# Patient Record
Sex: FEMALE | Race: WHITE | Employment: OTHER | URBAN - METROPOLITAN AREA
[De-identification: names, ages, dates, MRNs, and addresses within clinical notes are randomized per-mention and may not be internally consistent; named-entity substitution may affect disease eponyms.]

---

## 2017-01-10 PROBLEM — G89.29 CHRONIC BILATERAL LOW BACK PAIN WITH BILATERAL SCIATICA: Status: ACTIVE | Noted: 2017-01-10

## 2017-01-10 PROBLEM — M54.42 CHRONIC BILATERAL LOW BACK PAIN WITH BILATERAL SCIATICA: Status: ACTIVE | Noted: 2017-01-10

## 2017-01-10 PROBLEM — M54.41 CHRONIC BILATERAL LOW BACK PAIN WITH BILATERAL SCIATICA: Status: ACTIVE | Noted: 2017-01-10

## 2017-04-10 PROBLEM — M81.0 AGE-RELATED OSTEOPOROSIS WITHOUT CURRENT PATHOLOGICAL FRACTURE: Status: ACTIVE | Noted: 2017-04-10

## 2017-05-22 PROBLEM — M81.0 OSTEOPOROSIS WITHOUT CURRENT PATHOLOGICAL FRACTURE, UNSPECIFIED OSTEOPOROSIS TYPE: Status: ACTIVE | Noted: 2017-05-22

## 2017-05-22 PROBLEM — M81.0 AGE-RELATED OSTEOPOROSIS WITHOUT CURRENT PATHOLOGICAL FRACTURE: Status: RESOLVED | Noted: 2017-04-10 | Resolved: 2017-05-22

## 2017-05-24 PROBLEM — M43.17 SPONDYLOLISTHESIS AT L5-S1 LEVEL: Status: ACTIVE | Noted: 2017-05-24

## 2017-06-14 PROCEDURE — 81003 URINALYSIS AUTO W/O SCOPE: CPT | Performed by: INTERNAL MEDICINE

## 2017-06-15 PROCEDURE — 82607 VITAMIN B-12: CPT | Performed by: INTERNAL MEDICINE

## 2017-06-15 PROCEDURE — 87081 CULTURE SCREEN ONLY: CPT | Performed by: INTERNAL MEDICINE

## 2017-06-15 PROCEDURE — 82746 ASSAY OF FOLIC ACID SERUM: CPT | Performed by: INTERNAL MEDICINE

## 2017-06-23 ENCOUNTER — HOSPITAL ENCOUNTER (OUTPATIENT)
Facility: HOSPITAL | Age: 77
Setting detail: HOSPITAL OUTPATIENT SURGERY
Discharge: HOME | End: 2017-06-23
Attending: INTERNAL MEDICINE | Admitting: INTERNAL MEDICINE
Payer: MEDICARE

## 2017-06-23 ENCOUNTER — SURGERY (OUTPATIENT)
Age: 77
End: 2017-06-23

## 2017-06-23 VITALS
HEIGHT: 57 IN | SYSTOLIC BLOOD PRESSURE: 117 MMHG | DIASTOLIC BLOOD PRESSURE: 91 MMHG | RESPIRATION RATE: 17 BRPM | WEIGHT: 120 LBS | BODY MASS INDEX: 25.89 KG/M2 | HEART RATE: 82 BPM | OXYGEN SATURATION: 97 %

## 2017-06-23 PROCEDURE — 88305 TISSUE EXAM BY PATHOLOGIST: CPT | Performed by: INTERNAL MEDICINE

## 2017-06-23 PROCEDURE — 88312 SPECIAL STAINS GROUP 1: CPT | Performed by: INTERNAL MEDICINE

## 2017-06-23 PROCEDURE — 0DJD8ZZ INSPECTION OF LOWER INTESTINAL TRACT, VIA NATURAL OR ARTIFICIAL OPENING ENDOSCOPIC: ICD-10-PCS | Performed by: INTERNAL MEDICINE

## 2017-06-23 PROCEDURE — 0DB68ZX EXCISION OF STOMACH, VIA NATURAL OR ARTIFICIAL OPENING ENDOSCOPIC, DIAGNOSTIC: ICD-10-PCS | Performed by: INTERNAL MEDICINE

## 2017-06-23 PROCEDURE — 0DB98ZX EXCISION OF DUODENUM, VIA NATURAL OR ARTIFICIAL OPENING ENDOSCOPIC, DIAGNOSTIC: ICD-10-PCS | Performed by: INTERNAL MEDICINE

## 2017-06-23 RX ORDER — MIDAZOLAM HYDROCHLORIDE 1 MG/ML
INJECTION INTRAMUSCULAR; INTRAVENOUS
Status: DISCONTINUED | OUTPATIENT
Start: 2017-06-23 | End: 2017-06-23

## 2017-06-23 NOTE — OPERATIVE REPORT
SURGICAL CENTER [facility] OPERATIVE REPORT   PATIENT NAME: Branden Cota  MRN: [id number]  DATE OF OPERATION: 6/23/2017    PREOPERATIVE DIAGNOSIS FOR EGD  1. Iron deficiency anemia      PREOPERATIVE DIAGNOSIS FOR COLONOSCOPY:   1.     Iron defic the incisors. The esophagogastric junction was patent. There were no endoscopic features of eosinophilic esophagitis or Su's esophagus. 2. Mild patchy erythema was found in the antrum and body. No ulcers were noted.   Biopsies of the antrum and body evaluation.       Phil Padilla MD  Cc: Gail Bullock MD

## 2017-06-23 NOTE — H&P
GI PRE-OP H&P    CC: iron deficiency anemia    HPI:  Salvatore Current is a 68year old female who is here for EGD, colonoscopy for above indications    Past Medical History   Diagnosis Date   • Malignant neoplasm of breast (female), unspecified site 3/01 hejna    CHOLECYSTECTOMY      APPENDECTOMY         No current outpatient prescriptions on file.   Allergies As of Date: 06/20/2017  Allergen                          Noted       Reaction  CEPHALOSPORINS                    09/03/2008  Rash  PERFUMES

## 2017-08-04 PROBLEM — D50.9 IRON DEFICIENCY ANEMIA, UNSPECIFIED IRON DEFICIENCY ANEMIA TYPE: Status: ACTIVE | Noted: 2017-08-04

## 2017-08-22 PROBLEM — I49.3 PVC (PREMATURE VENTRICULAR CONTRACTION): Status: ACTIVE | Noted: 2017-08-22

## 2017-08-22 PROCEDURE — 81003 URINALYSIS AUTO W/O SCOPE: CPT | Performed by: INTERNAL MEDICINE

## 2017-08-22 PROCEDURE — 87081 CULTURE SCREEN ONLY: CPT | Performed by: INTERNAL MEDICINE

## 2017-09-06 ENCOUNTER — LAB ENCOUNTER (OUTPATIENT)
Dept: LAB | Facility: HOSPITAL | Age: 77
End: 2017-09-06
Attending: ORTHOPAEDIC SURGERY
Payer: MEDICARE

## 2017-09-06 DIAGNOSIS — Z01.818 PREOPERATIVE TESTING: ICD-10-CM

## 2017-09-06 LAB
ANTIBODY SCREEN: NEGATIVE
RH BLOOD TYPE: POSITIVE

## 2017-09-06 PROCEDURE — 86850 RBC ANTIBODY SCREEN: CPT

## 2017-09-06 PROCEDURE — 36415 COLL VENOUS BLD VENIPUNCTURE: CPT

## 2017-09-06 PROCEDURE — 86901 BLOOD TYPING SEROLOGIC RH(D): CPT

## 2017-09-06 PROCEDURE — 86900 BLOOD TYPING SEROLOGIC ABO: CPT

## 2017-09-12 ENCOUNTER — SURGERY (OUTPATIENT)
Age: 77
End: 2017-09-12

## 2017-09-12 ENCOUNTER — HOSPITAL ENCOUNTER (INPATIENT)
Facility: HOSPITAL | Age: 77
LOS: 1 days | Discharge: HOME | DRG: 460 | End: 2017-09-13
Attending: ORTHOPAEDIC SURGERY | Admitting: ORTHOPAEDIC SURGERY
Payer: MEDICARE

## 2017-09-12 ENCOUNTER — APPOINTMENT (OUTPATIENT)
Dept: GENERAL RADIOLOGY | Facility: HOSPITAL | Age: 77
DRG: 460 | End: 2017-09-12
Attending: ORTHOPAEDIC SURGERY
Payer: MEDICARE

## 2017-09-12 ENCOUNTER — ANESTHESIA EVENT (OUTPATIENT)
Dept: SURGERY | Facility: HOSPITAL | Age: 77
DRG: 460 | End: 2017-09-12
Payer: MEDICARE

## 2017-09-12 ENCOUNTER — ANESTHESIA (OUTPATIENT)
Dept: SURGERY | Facility: HOSPITAL | Age: 77
DRG: 460 | End: 2017-09-12
Payer: MEDICARE

## 2017-09-12 DIAGNOSIS — M43.17 SPONDYLOLISTHESIS AT L5-S1 LEVEL: ICD-10-CM

## 2017-09-12 DIAGNOSIS — M54.16 SPINAL STENOSIS OF LUMBAR REGION WITH RADICULOPATHY: Primary | ICD-10-CM

## 2017-09-12 DIAGNOSIS — Z01.818 PREOPERATIVE TESTING: ICD-10-CM

## 2017-09-12 DIAGNOSIS — M48.061 SPINAL STENOSIS OF LUMBAR REGION WITH RADICULOPATHY: Primary | ICD-10-CM

## 2017-09-12 PROBLEM — M43.16 SPONDYLOLISTHESIS, LUMBAR REGION: Status: ACTIVE | Noted: 2017-09-12

## 2017-09-12 LAB
ERYTHROCYTE [DISTWIDTH] IN BLOOD BY AUTOMATED COUNT: 18.7 % (ref 11–15)
HCT VFR BLD AUTO: 33.6 % (ref 35–48)
HGB BLD-MCNC: 10.9 G/DL (ref 12–16)
MCH RBC QN AUTO: 28.2 PG (ref 27–32)
MCHC RBC AUTO-ENTMCNC: 32.5 G/DL (ref 32–37)
MCV RBC AUTO: 86.6 FL (ref 80–100)
PLATELET # BLD AUTO: 263 K/UL (ref 140–400)
PMV BLD AUTO: 7.5 FL (ref 7.4–10.3)
RBC # BLD AUTO: 3.88 M/UL (ref 3.7–5.4)
WBC # BLD AUTO: 9.6 K/UL (ref 4–11)

## 2017-09-12 PROCEDURE — 72100 X-RAY EXAM L-S SPINE 2/3 VWS: CPT | Performed by: ORTHOPAEDIC SURGERY

## 2017-09-12 PROCEDURE — 76001 XR C-ARM FLUORO >1 HOUR  (CPT=76001): CPT | Performed by: ORTHOPAEDIC SURGERY

## 2017-09-12 PROCEDURE — 95938 SOMATOSENSORY TESTING: CPT | Performed by: ORTHOPAEDIC SURGERY

## 2017-09-12 PROCEDURE — 85027 COMPLETE CBC AUTOMATED: CPT | Performed by: PHYSICIAN ASSISTANT

## 2017-09-12 PROCEDURE — 0SB20ZZ EXCISION OF LUMBAR VERTEBRAL DISC, OPEN APPROACH: ICD-10-PCS | Performed by: ORTHOPAEDIC SURGERY

## 2017-09-12 PROCEDURE — 0SG30A1 FUSION OF LUMBOSACRAL JOINT WITH INTERBODY FUSION DEVICE, POSTERIOR APPROACH, POSTERIOR COLUMN, OPEN APPROACH: ICD-10-PCS | Performed by: ORTHOPAEDIC SURGERY

## 2017-09-12 PROCEDURE — 95937 NEUROMUSCULAR JUNCTION TEST: CPT | Performed by: ORTHOPAEDIC SURGERY

## 2017-09-12 RX ORDER — ONDANSETRON 2 MG/ML
INJECTION INTRAMUSCULAR; INTRAVENOUS AS NEEDED
Status: DISCONTINUED | OUTPATIENT
Start: 2017-09-12 | End: 2017-09-12 | Stop reason: SURG

## 2017-09-12 RX ORDER — ROCURONIUM BROMIDE 10 MG/ML
INJECTION, SOLUTION INTRAVENOUS AS NEEDED
Status: DISCONTINUED | OUTPATIENT
Start: 2017-09-12 | End: 2017-09-12 | Stop reason: SURG

## 2017-09-12 RX ORDER — HYDROMORPHONE HYDROCHLORIDE 1 MG/ML
0.6 INJECTION, SOLUTION INTRAMUSCULAR; INTRAVENOUS; SUBCUTANEOUS EVERY 5 MIN PRN
Status: DISCONTINUED | OUTPATIENT
Start: 2017-09-12 | End: 2017-09-12 | Stop reason: HOSPADM

## 2017-09-12 RX ORDER — ONDANSETRON 2 MG/ML
4 INJECTION INTRAMUSCULAR; INTRAVENOUS EVERY 4 HOURS PRN
Status: DISCONTINUED | OUTPATIENT
Start: 2017-09-12 | End: 2017-09-13

## 2017-09-12 RX ORDER — HALOPERIDOL 5 MG/ML
0.25 INJECTION INTRAMUSCULAR ONCE AS NEEDED
Status: DISCONTINUED | OUTPATIENT
Start: 2017-09-12 | End: 2017-09-12 | Stop reason: HOSPADM

## 2017-09-12 RX ORDER — ACETAMINOPHEN 500 MG
1000 TABLET ORAL ONCE
Status: COMPLETED | OUTPATIENT
Start: 2017-09-12 | End: 2017-09-12

## 2017-09-12 RX ORDER — SODIUM CHLORIDE 9 MG/ML
INJECTION, SOLUTION INTRAVENOUS CONTINUOUS PRN
Status: DISCONTINUED | OUTPATIENT
Start: 2017-09-12 | End: 2017-09-12 | Stop reason: SURG

## 2017-09-12 RX ORDER — MELATONIN
325
Status: DISCONTINUED | OUTPATIENT
Start: 2017-09-13 | End: 2017-09-13

## 2017-09-12 RX ORDER — SODIUM CHLORIDE, SODIUM LACTATE, POTASSIUM CHLORIDE, CALCIUM CHLORIDE 600; 310; 30; 20 MG/100ML; MG/100ML; MG/100ML; MG/100ML
INJECTION, SOLUTION INTRAVENOUS CONTINUOUS
Status: DISCONTINUED | OUTPATIENT
Start: 2017-09-12 | End: 2017-09-13

## 2017-09-12 RX ORDER — ONDANSETRON 2 MG/ML
4 INJECTION INTRAMUSCULAR; INTRAVENOUS ONCE AS NEEDED
Status: DISCONTINUED | OUTPATIENT
Start: 2017-09-12 | End: 2017-09-12 | Stop reason: HOSPADM

## 2017-09-12 RX ORDER — HYDROCODONE BITARTRATE AND ACETAMINOPHEN 5; 325 MG/1; MG/1
2 TABLET ORAL AS NEEDED
Status: DISCONTINUED | OUTPATIENT
Start: 2017-09-12 | End: 2017-09-12 | Stop reason: HOSPADM

## 2017-09-12 RX ORDER — POLYETHYLENE GLYCOL 3350 17 G/17G
17 POWDER, FOR SOLUTION ORAL DAILY PRN
Status: DISCONTINUED | OUTPATIENT
Start: 2017-09-12 | End: 2017-09-13

## 2017-09-12 RX ORDER — PHENYLEPHRINE HCL 10 MG/ML
VIAL (ML) INJECTION AS NEEDED
Status: DISCONTINUED | OUTPATIENT
Start: 2017-09-12 | End: 2017-09-12 | Stop reason: SURG

## 2017-09-12 RX ORDER — SENNOSIDES 8.6 MG
17.2 TABLET ORAL NIGHTLY
Status: DISCONTINUED | OUTPATIENT
Start: 2017-09-12 | End: 2017-09-13

## 2017-09-12 RX ORDER — ACETAMINOPHEN 325 MG/1
650 TABLET ORAL EVERY 4 HOURS PRN
Status: DISCONTINUED | OUTPATIENT
Start: 2017-09-12 | End: 2017-09-13

## 2017-09-12 RX ORDER — MAGNESIUM HYDROXIDE 1200 MG/15ML
LIQUID ORAL CONTINUOUS PRN
Status: DISCONTINUED | OUTPATIENT
Start: 2017-09-12 | End: 2017-09-12

## 2017-09-12 RX ORDER — SODIUM PHOSPHATE, DIBASIC AND SODIUM PHOSPHATE, MONOBASIC 7; 19 G/133ML; G/133ML
1 ENEMA RECTAL ONCE AS NEEDED
Status: DISCONTINUED | OUTPATIENT
Start: 2017-09-12 | End: 2017-09-13

## 2017-09-12 RX ORDER — SCOLOPAMINE TRANSDERMAL SYSTEM 1 MG/1
1 PATCH, EXTENDED RELEASE TRANSDERMAL ONCE
Status: DISCONTINUED | OUTPATIENT
Start: 2017-09-12 | End: 2017-09-12

## 2017-09-12 RX ORDER — MORPHINE SULFATE 4 MG/ML
4 INJECTION, SOLUTION INTRAMUSCULAR; INTRAVENOUS EVERY 10 MIN PRN
Status: DISCONTINUED | OUTPATIENT
Start: 2017-09-12 | End: 2017-09-12 | Stop reason: HOSPADM

## 2017-09-12 RX ORDER — EPHEDRINE SULFATE 50 MG/ML
INJECTION, SOLUTION INTRAVENOUS AS NEEDED
Status: DISCONTINUED | OUTPATIENT
Start: 2017-09-12 | End: 2017-09-12 | Stop reason: SURG

## 2017-09-12 RX ORDER — TIZANIDINE 2 MG/1
2 TABLET ORAL ONCE
Status: COMPLETED | OUTPATIENT
Start: 2017-09-12 | End: 2017-09-12

## 2017-09-12 RX ORDER — DEXAMETHASONE SODIUM PHOSPHATE 4 MG/ML
VIAL (ML) INJECTION AS NEEDED
Status: DISCONTINUED | OUTPATIENT
Start: 2017-09-12 | End: 2017-09-12 | Stop reason: SURG

## 2017-09-12 RX ORDER — MORPHINE SULFATE 10 MG/ML
6 INJECTION, SOLUTION INTRAMUSCULAR; INTRAVENOUS EVERY 10 MIN PRN
Status: DISCONTINUED | OUTPATIENT
Start: 2017-09-12 | End: 2017-09-12 | Stop reason: HOSPADM

## 2017-09-12 RX ORDER — DIPHENHYDRAMINE HCL 25 MG
25 CAPSULE ORAL EVERY 4 HOURS PRN
Status: DISCONTINUED | OUTPATIENT
Start: 2017-09-12 | End: 2017-09-13

## 2017-09-12 RX ORDER — NALOXONE HYDROCHLORIDE 0.4 MG/ML
80 INJECTION, SOLUTION INTRAMUSCULAR; INTRAVENOUS; SUBCUTANEOUS AS NEEDED
Status: DISCONTINUED | OUTPATIENT
Start: 2017-09-12 | End: 2017-09-12 | Stop reason: HOSPADM

## 2017-09-12 RX ORDER — HYDROMORPHONE HYDROCHLORIDE 1 MG/ML
0.4 INJECTION, SOLUTION INTRAMUSCULAR; INTRAVENOUS; SUBCUTANEOUS EVERY 5 MIN PRN
Status: DISCONTINUED | OUTPATIENT
Start: 2017-09-12 | End: 2017-09-12 | Stop reason: HOSPADM

## 2017-09-12 RX ORDER — MIDAZOLAM HYDROCHLORIDE 1 MG/ML
INJECTION INTRAMUSCULAR; INTRAVENOUS AS NEEDED
Status: DISCONTINUED | OUTPATIENT
Start: 2017-09-12 | End: 2017-09-12 | Stop reason: SURG

## 2017-09-12 RX ORDER — DOCUSATE SODIUM 100 MG/1
100 CAPSULE, LIQUID FILLED ORAL 2 TIMES DAILY
Status: DISCONTINUED | OUTPATIENT
Start: 2017-09-12 | End: 2017-09-13

## 2017-09-12 RX ORDER — DIPHENHYDRAMINE HYDROCHLORIDE 50 MG/ML
25 INJECTION INTRAMUSCULAR; INTRAVENOUS EVERY 4 HOURS PRN
Status: DISCONTINUED | OUTPATIENT
Start: 2017-09-12 | End: 2017-09-13

## 2017-09-12 RX ORDER — FAMOTIDINE 20 MG/1
20 TABLET ORAL ONCE
Status: DISCONTINUED | OUTPATIENT
Start: 2017-09-12 | End: 2017-09-12 | Stop reason: HOSPADM

## 2017-09-12 RX ORDER — HALOBETASOL PROPIONATE 0.05 %
OINTMENT (GRAM) TOPICAL 2 TIMES DAILY
Status: DISCONTINUED | OUTPATIENT
Start: 2017-09-13 | End: 2017-09-13

## 2017-09-12 RX ORDER — SUCCINYLCHOLINE CHLORIDE 20 MG/ML
INJECTION INTRAMUSCULAR; INTRAVENOUS AS NEEDED
Status: DISCONTINUED | OUTPATIENT
Start: 2017-09-12 | End: 2017-09-12 | Stop reason: SURG

## 2017-09-12 RX ORDER — HYDROCODONE BITARTRATE AND ACETAMINOPHEN 10; 325 MG/1; MG/1
1 TABLET ORAL EVERY 4 HOURS PRN
Status: DISCONTINUED | OUTPATIENT
Start: 2017-09-12 | End: 2017-09-13

## 2017-09-12 RX ORDER — ZOLPIDEM TARTRATE 5 MG/1
5 TABLET ORAL NIGHTLY PRN
Status: DISCONTINUED | OUTPATIENT
Start: 2017-09-12 | End: 2017-09-13

## 2017-09-12 RX ORDER — DIAZEPAM 5 MG/1
5 TABLET ORAL EVERY 6 HOURS PRN
Status: DISCONTINUED | OUTPATIENT
Start: 2017-09-12 | End: 2017-09-13

## 2017-09-12 RX ORDER — CELECOXIB 100 MG/1
100 CAPSULE ORAL ONCE
Status: COMPLETED | OUTPATIENT
Start: 2017-09-12 | End: 2017-09-12

## 2017-09-12 RX ORDER — HYDROMORPHONE HYDROCHLORIDE 1 MG/ML
0.2 INJECTION, SOLUTION INTRAMUSCULAR; INTRAVENOUS; SUBCUTANEOUS EVERY 5 MIN PRN
Status: DISCONTINUED | OUTPATIENT
Start: 2017-09-12 | End: 2017-09-12 | Stop reason: HOSPADM

## 2017-09-12 RX ORDER — FAMOTIDINE 40 MG/1
40 TABLET, FILM COATED ORAL 2 TIMES DAILY
Status: DISCONTINUED | OUTPATIENT
Start: 2017-09-12 | End: 2017-09-13

## 2017-09-12 RX ORDER — METOCLOPRAMIDE HYDROCHLORIDE 5 MG/ML
10 INJECTION INTRAMUSCULAR; INTRAVENOUS EVERY 6 HOURS PRN
Status: DISCONTINUED | OUTPATIENT
Start: 2017-09-12 | End: 2017-09-13

## 2017-09-12 RX ORDER — BISACODYL 10 MG
10 SUPPOSITORY, RECTAL RECTAL
Status: DISCONTINUED | OUTPATIENT
Start: 2017-09-12 | End: 2017-09-13

## 2017-09-12 RX ORDER — HYDROMORPHONE HYDROCHLORIDE 1 MG/ML
0.2 INJECTION, SOLUTION INTRAMUSCULAR; INTRAVENOUS; SUBCUTANEOUS EVERY 2 HOUR PRN
Status: DISCONTINUED | OUTPATIENT
Start: 2017-09-12 | End: 2017-09-13

## 2017-09-12 RX ORDER — GLYCOPYRROLATE 0.2 MG/ML
INJECTION INTRAMUSCULAR; INTRAVENOUS AS NEEDED
Status: DISCONTINUED | OUTPATIENT
Start: 2017-09-12 | End: 2017-09-12 | Stop reason: SURG

## 2017-09-12 RX ORDER — GABAPENTIN 300 MG/1
600 CAPSULE ORAL ONCE
Status: COMPLETED | OUTPATIENT
Start: 2017-09-12 | End: 2017-09-12

## 2017-09-12 RX ORDER — HYDROCODONE BITARTRATE AND ACETAMINOPHEN 5; 325 MG/1; MG/1
1 TABLET ORAL AS NEEDED
Status: DISCONTINUED | OUTPATIENT
Start: 2017-09-12 | End: 2017-09-12 | Stop reason: HOSPADM

## 2017-09-12 RX ORDER — HYDROCODONE BITARTRATE AND ACETAMINOPHEN 10; 325 MG/1; MG/1
2 TABLET ORAL EVERY 6 HOURS PRN
Status: DISCONTINUED | OUTPATIENT
Start: 2017-09-12 | End: 2017-09-13

## 2017-09-12 RX ORDER — METOCLOPRAMIDE 10 MG/1
10 TABLET ORAL ONCE
Status: DISCONTINUED | OUTPATIENT
Start: 2017-09-12 | End: 2017-09-12 | Stop reason: HOSPADM

## 2017-09-12 RX ORDER — ACETAMINOPHEN 325 MG/1
650 TABLET ORAL ONCE
Status: DISCONTINUED | OUTPATIENT
Start: 2017-09-12 | End: 2017-09-12 | Stop reason: HOSPADM

## 2017-09-12 RX ORDER — MORPHINE SULFATE 2 MG/ML
2 INJECTION, SOLUTION INTRAMUSCULAR; INTRAVENOUS EVERY 10 MIN PRN
Status: DISCONTINUED | OUTPATIENT
Start: 2017-09-12 | End: 2017-09-12 | Stop reason: HOSPADM

## 2017-09-12 RX ORDER — BUPIVACAINE HYDROCHLORIDE 2.5 MG/ML
INJECTION, SOLUTION EPIDURAL; INFILTRATION; INTRACAUDAL AS NEEDED
Status: DISCONTINUED | OUTPATIENT
Start: 2017-09-12 | End: 2017-09-12 | Stop reason: HOSPADM

## 2017-09-12 RX ORDER — HYDROMORPHONE HYDROCHLORIDE 1 MG/ML
0.4 INJECTION, SOLUTION INTRAMUSCULAR; INTRAVENOUS; SUBCUTANEOUS EVERY 2 HOUR PRN
Status: DISCONTINUED | OUTPATIENT
Start: 2017-09-12 | End: 2017-09-13

## 2017-09-12 RX ORDER — LIDOCAINE HYDROCHLORIDE 10 MG/ML
INJECTION, SOLUTION EPIDURAL; INFILTRATION; INTRACAUDAL; PERINEURAL AS NEEDED
Status: DISCONTINUED | OUTPATIENT
Start: 2017-09-12 | End: 2017-09-12 | Stop reason: SURG

## 2017-09-12 RX ORDER — LISINOPRIL AND HYDROCHLOROTHIAZIDE 12.5; 1 MG/1; MG/1
1 TABLET ORAL
Status: DISCONTINUED | OUTPATIENT
Start: 2017-09-13 | End: 2017-09-12

## 2017-09-12 RX ORDER — OXYCODONE HYDROCHLORIDE 5 MG/1
10 TABLET ORAL ONCE
Status: COMPLETED | OUTPATIENT
Start: 2017-09-12 | End: 2017-09-12

## 2017-09-12 RX ORDER — HYDROMORPHONE HYDROCHLORIDE 1 MG/ML
0.8 INJECTION, SOLUTION INTRAMUSCULAR; INTRAVENOUS; SUBCUTANEOUS EVERY 2 HOUR PRN
Status: DISCONTINUED | OUTPATIENT
Start: 2017-09-12 | End: 2017-09-13

## 2017-09-12 RX ADMIN — SODIUM CHLORIDE, SODIUM LACTATE, POTASSIUM CHLORIDE, CALCIUM CHLORIDE: 600; 310; 30; 20 INJECTION, SOLUTION INTRAVENOUS at 14:28:00

## 2017-09-12 RX ADMIN — SODIUM CHLORIDE, SODIUM LACTATE, POTASSIUM CHLORIDE, CALCIUM CHLORIDE: 600; 310; 30; 20 INJECTION, SOLUTION INTRAVENOUS at 17:50:00

## 2017-09-12 RX ADMIN — SODIUM CHLORIDE: 9 INJECTION, SOLUTION INTRAVENOUS at 17:45:00

## 2017-09-12 RX ADMIN — SUCCINYLCHOLINE CHLORIDE 100 MG: 20 INJECTION INTRAMUSCULAR; INTRAVENOUS at 14:29:00

## 2017-09-12 RX ADMIN — EPHEDRINE SULFATE 10 MG: 50 INJECTION, SOLUTION INTRAVENOUS at 15:03:00

## 2017-09-12 RX ADMIN — SODIUM CHLORIDE: 9 INJECTION, SOLUTION INTRAVENOUS at 14:39:00

## 2017-09-12 RX ADMIN — ONDANSETRON 4 MG: 2 INJECTION INTRAMUSCULAR; INTRAVENOUS at 15:16:00

## 2017-09-12 RX ADMIN — ROCURONIUM BROMIDE 5 MG: 10 INJECTION, SOLUTION INTRAVENOUS at 14:29:00

## 2017-09-12 RX ADMIN — DEXAMETHASONE SODIUM PHOSPHATE 4 MG: 4 MG/ML VIAL (ML) INJECTION at 15:16:00

## 2017-09-12 RX ADMIN — LIDOCAINE HYDROCHLORIDE 50 MG: 10 INJECTION, SOLUTION EPIDURAL; INFILTRATION; INTRACAUDAL; PERINEURAL at 14:30:00

## 2017-09-12 RX ADMIN — PHENYLEPHRINE HCL 100 MCG: 10 MG/ML VIAL (ML) INJECTION at 15:45:00

## 2017-09-12 RX ADMIN — GLYCOPYRROLATE 0.2 MG: 0.2 INJECTION INTRAMUSCULAR; INTRAVENOUS at 14:29:00

## 2017-09-12 RX ADMIN — MIDAZOLAM HYDROCHLORIDE 1 MG: 1 INJECTION INTRAMUSCULAR; INTRAVENOUS at 14:29:00

## 2017-09-12 NOTE — INTERVAL H&P NOTE
Pre-op Diagnosis: Spondylolisthesis, spinal stenosis without neurogenic claudication, lumbar radiculopathy    The above referenced H&P was reviewed by Reina Anderson PA-C on 9/12/2017, the patient was examined and no significant changes have occurred in th

## 2017-09-12 NOTE — BRIEF OP NOTE
Pre-Operative Diagnosis: Spondylolisthesis, spinal stenosis without neurogenic claudication, lumbar radiculopathy     Post-Operative Diagnosis: Spondylolisthesis, spinal stenosis without neurogenic claudication, lumbar radiculopathy     Procedure Perfor

## 2017-09-12 NOTE — OPERATIVE REPORT
OPERATIVE REPORT   Torres Moreno  [id number]    9/12/2017    YOB: 1940 [id number]      PATIENT'S NAME:  Pao Pérez   ATTENDING PHYSICIAN:  Jesús Baker M.D.     OPERATING PHYSICIAN:  Jesús Baker M.D.      [facility] PROCEDURE: Patient's back was marked in the holding area. Patient was then brought back to the operating room where once under general anesthesia, Bernardo catheter and SCD boots were placed along with neuromonitoring leads.  Patient was then placed in the pro proceeded to use downgoing curettes and side going curettes to clear up the disc off the endplates and placed Actifuse bone graft into the disc space, and placed an 9 x 28 x 8mm cage, which was filled with allograft and local autograft into the disc space. My physician assistant was essential in performing the procedure for retraction and assistance throughout the procedure, from positioning through wound closure.      Enrique Hsu MD

## 2017-09-12 NOTE — H&P (VIEW-ONLY)
The patient is 68year old female  CC: Patient presents with:  Pre-Op Exam: Back surgery on 9/12/17 at [facility] by Dr. Susan Alexander. Check on the anemia and blood work.      HPI:    Patient is referred by Dr Susan Alexander for scheduled surgery named spinal fusion • Esophageal reflux    • Exposure to unspecified radiation     completed in 2005   • FHx: colon cancer     maternal grandfather   • High blood pressure    • High cholesterol    • History of psoriasis    • Insomnia 11/20/2014   • Iron deficiency anemia, u REPLACEMENT Left  6/16: TOTAL KNEE REPLACEMENT Right      Comment: martin [employer]    Current Outpatient Prescriptions on File Prior to Visit:  omeprazole 20 MG Oral Capsule Delayed Release Take 1 capsule (20 mg total) by mouth every morning.  Disp: 90 ca 11/15/2011  12/04/2012      HIGH DOSE FLU 65 YRS AND OLDER PRSV FREE SINGLE D (93598) FLU CLINIC                          11/25/2016      Influenza             09/01/2010 01/11/2014      Influenza Vaccine, High Dose, Preserv Free Position: Sitting, Cuff Size: adult)   Pulse 80   Wt 123 lb (55.8 kg)   Breastfeeding?  No   BMI 26.62 kg/m²    125/60    GENERAL: Well developed, well nourished white female, in no apparent distress   SKIN: no rashes, no suspicious lesions;normal temperatu PROTEIN      6.1 - 8.3 g/dL 7.2    Albumin      3.5 - 4.8 g/dL 3.5    Total Bilirubin      0.10 - 2.00 mg/dL 0.25    ALKALINE PHOSPHATASE      55 - 142 U/L 43 (L)    AST (SGOT)      15 - 41 U/L 16    ALT (SGPT)      14 - 54 U/L 14    GFR CKD-EPI      >=60. around the surgery. Stop mvi, vitamin supplements, otc herbals, nsaids, aspirin one week before surgery.   The patient has been instructed to hold the following medications one week before the surgery: vit d/nsaids  The patient has been instructed to h creams  Creams were prescribed  - Urea (X-VIATE) 40 % External Cream; APPLY DAILY  - Halobetasol Propionate (ULTRAVATE) 0.05 % External Ointment; APPLY EXTERNALLY DAILY AS DIRECTED    Osteoporosis  Check bmd  followup with endocrinology    Hypothyroidism, hours as needed for Pain. Celecoxib CeleBREX 200 MG 9/5 Take 1 capsule (200 mg total) by mouth every 12 (twelve) hours. Cholecalciferol VITAMIN D3 1000 UNITS  9/5 Take 1,000 Units by mouth daily.         DiphenhydrAMINE HCl BENADRYL 25 MG 9/11

## 2017-09-12 NOTE — ANESTHESIA PREPROCEDURE EVALUATION
Anesthesia PreOp Note    HPI:     Anali Harris is a 68year old female who presents for preoperative consultation requested by: Karlene Mccarty MD    Date of Surgery: 9/12/2017    Procedure(s):  POSTERIOR LUMBAR LAMINECT SPINAL FUS W/INSTR 1 RUSH Armendariz rhinitis, cause unspecified    • Back problem with left sided sciatica   • Depression 03/1992   • Esophageal reflux    • Exposure to unspecified radiation     completed in 2005   • High blood pressure    • High cholesterol    • History of blood transfusion REPLACEMENT Left  6/16: TOTAL KNEE REPLACEMENT Right      Comment: martin Fay      Prescriptions Prior to Admission:  docusate sodium 100 MG Oral Cap Take 1 capsule (100 mg total) by mouth 2 (two) times daily.  Disp: 60 capsule Rfl: 0 9/11/2017 at 21 months. Disp: 1 mL Rfl: prn Unknown at Unknown time   acetaminophen 500 MG Oral Tab Take 1-2 tablets (500-1,000 mg total) by mouth every 6 (six) hours as needed for Pain.  Disp: 50 tablet Rfl: 11 Unknown at Unknown time   Urea (X-VIATE) 40 % External Cream Available pre-op labs reviewed.     Lab Results  Component Value Date   WBC 4.37 08/18/2017   RBC 4.11 08/18/2017   HGB 10.9 (L) 08/18/2017   HCT 36.3 08/18/2017   MCV 88.3 08/18/2017   MCH 26.5 (L) 08/18/2017   MCHC 30.0 (L) 08/18/2017   RDW 18.4 (H) position included not limited blindness      I have informed Olegario Harman  of the nature of the anesthetic plan, benefits, risks, major complications, and any alternative forms of anesthetic management.    All of the patient's questions were answered to

## 2017-09-13 ENCOUNTER — APPOINTMENT (OUTPATIENT)
Dept: GENERAL RADIOLOGY | Facility: HOSPITAL | Age: 77
DRG: 460 | End: 2017-09-13
Attending: PHYSICIAN ASSISTANT
Payer: MEDICARE

## 2017-09-13 VITALS
BODY MASS INDEX: 25.67 KG/M2 | TEMPERATURE: 99 F | DIASTOLIC BLOOD PRESSURE: 51 MMHG | OXYGEN SATURATION: 97 % | WEIGHT: 119 LBS | HEART RATE: 88 BPM | HEIGHT: 57 IN | SYSTOLIC BLOOD PRESSURE: 106 MMHG | RESPIRATION RATE: 16 BRPM

## 2017-09-13 LAB
ANION GAP SERPL CALC-SCNC: 12 MMOL/L (ref 0–18)
BUN SERPL-MCNC: 15 MG/DL (ref 8–20)
BUN/CREAT SERPL: 28.8 (ref 10–20)
CALCIUM SERPL-MCNC: 8.4 MG/DL (ref 8.5–10.5)
CHLORIDE SERPL-SCNC: 106 MMOL/L (ref 95–110)
CO2 SERPL-SCNC: 20 MMOL/L (ref 22–32)
CREAT SERPL-MCNC: 0.52 MG/DL (ref 0.5–1.5)
ERYTHROCYTE [DISTWIDTH] IN BLOOD BY AUTOMATED COUNT: 18.6 % (ref 11–15)
GLUCOSE SERPL-MCNC: 89 MG/DL (ref 70–99)
HCT VFR BLD AUTO: 29.5 % (ref 35–48)
HGB BLD-MCNC: 9.5 G/DL (ref 12–16)
MCH RBC QN AUTO: 28 PG (ref 27–32)
MCHC RBC AUTO-ENTMCNC: 32.3 G/DL (ref 32–37)
MCV RBC AUTO: 86.8 FL (ref 80–100)
OSMOLALITY UR CALC.SUM OF ELEC: 286 MOSM/KG (ref 275–295)
PLATELET # BLD AUTO: 272 K/UL (ref 140–400)
PMV BLD AUTO: 7.4 FL (ref 7.4–10.3)
POTASSIUM SERPL-SCNC: 4.1 MMOL/L (ref 3.3–5.1)
RBC # BLD AUTO: 3.4 M/UL (ref 3.7–5.4)
SODIUM SERPL-SCNC: 138 MMOL/L (ref 136–144)
WBC # BLD AUTO: 6.8 K/UL (ref 4–11)

## 2017-09-13 PROCEDURE — 97161 PT EVAL LOW COMPLEX 20 MIN: CPT

## 2017-09-13 PROCEDURE — 97116 GAIT TRAINING THERAPY: CPT

## 2017-09-13 PROCEDURE — 85027 COMPLETE CBC AUTOMATED: CPT | Performed by: PHYSICIAN ASSISTANT

## 2017-09-13 PROCEDURE — 72100 X-RAY EXAM L-S SPINE 2/3 VWS: CPT | Performed by: PHYSICIAN ASSISTANT

## 2017-09-13 PROCEDURE — 80048 BASIC METABOLIC PNL TOTAL CA: CPT | Performed by: PHYSICIAN ASSISTANT

## 2017-09-13 PROCEDURE — 97535 SELF CARE MNGMENT TRAINING: CPT

## 2017-09-13 PROCEDURE — 97530 THERAPEUTIC ACTIVITIES: CPT

## 2017-09-13 PROCEDURE — 97165 OT EVAL LOW COMPLEX 30 MIN: CPT

## 2017-09-13 RX ORDER — LEVOTHYROXINE SODIUM 0.05 MG/1
50 TABLET ORAL
Status: DISCONTINUED | OUTPATIENT
Start: 2017-09-13 | End: 2017-09-13

## 2017-09-13 NOTE — OCCUPATIONAL THERAPY NOTE
OCCUPATIONAL THERAPY QUICK EVALUATION - INPATIENT    Room Number: 426/426-A  Evaluation Date: 9/13/2017     Type of Evaluation: Initial       Physician Order: IP Consult to Occupational Therapy  Reason for Therapy:  ADL/IADL Dysfunction and Discharge Plann EGD      Comment: gastritis, gastric polyps, hiatal hernia,                biopseis neg for HP or celiac  1964 both bone fx arm: FRACTURE SURGERY Right  1964 femur with metal: FRACTURE SURGERY Left  1964: SKIN TISSUE PROCEDURE UNLISTED      Comment: after meals?: None    AM-PAC Score:  Score: 24  Approx Degree of Impairment: 0%  Standardized Score (AM-PAC Scale): 57.54  CMS Modifier (G-Code): CH    FUNCTIONAL TRANSFER ASSESSMENT  Supine to Sit : Modified independent (w/ log roll)  Sit to Stand: Modified ind her . Pt has no add'l OT needs at this time, d/c OT.            OT Discharge Recommendations: Home  OT Device Recommendations: Shower chair;Sock aid;Long-handled shoehorn;Long-handled sponge    PLAN   Patient has been evaluated and presents with no

## 2017-09-13 NOTE — PHYSICAL THERAPY NOTE
PHYSICAL THERAPY TREATMENT NOTE - INPATIENT    Room Number: 426/426-A       Presenting Problem: Spondylolisthesis s/p L5-S1 laminectomy and TLIF    Problem List  Active Problems:    Spondylolisthesis, lumbar region      ASSESSMENT   WIL Cruz approved partic shortness of breath    AM-PAC '6-Clicks' INPATIENT SHORT FORM - BASIC MOBILITY  How much difficulty does the patient currently have. ..  -   Turning over in bed (including adjusting bedclothes, sheets and blankets)?: None   -   Sitting down on and standing home activity/exercise instructions provided to patient in preparation for discharge.    Goal #5   Current Status In progress   Goal #6    Goal #6  Current Status

## 2017-09-13 NOTE — H&P
[facility] Hospitalist H&P       CC: Back pain     PCP: Terence Cartagena MD    History of Present Illness: Patient is a 68year old female with PMH sig for HTN, Hypothyroidism, spinal stenosis, arthritis, who presented for lumbar fusion.  Patient is currently [facility] [employer]  06/2017: EGD      Comment: gastritis, gastric polyps, hiatal hernia,                biopseis neg for HP or celiac  1964 both bone fx arm: FRACTURE SURGERY Right  1964 femur with metal: FRACTURE SURGERY Left  1964: SKIN TISSUE PROCEDURE UNLISTED      C DiphenhydrAMINE HCl (BENADRYL) 25 MG Oral Cap Take 25 mg by mouth nightly as needed for Itching.  Disp:  Rfl:          Soc Hx     Smoking status: Former Smoker  1.00 Packs/day  For 15.00 Years     Types: Cigarettes    Quit date: 1/2/1970    Smokeless toba TPROT    No results for input(s): TROP in the last 72 hours. Radiology: Xr Lumbar Spine (min 2 Views) (cpt=72100)    Result Date: 9/13/2017  CONCLUSION:  1. Status post posterior L5-S1 spinal fusion.          Xr Lumbar Spine (min 2 Views) (cpt=72100

## 2017-09-13 NOTE — DISCHARGE PLANNING
CALIN met with the pt. At bedside. The pt. Lives with her  in a 2 story home with the bedrooms on the 2nd level. The pt. Reports being independent prior to admission with adls, ambulation and driving. The pt's  also drives. The pt.  Has 4 chi

## 2017-09-13 NOTE — DISCHARGE SUMMARY
General Medicine Discharge Summary     Patient ID:  Gilles Noel  68year old  10/26/1940    Admit date: 9/12/2017    Discharge date and time: 9/13/17    Attending Physician: Pamela Mooney MD     Consults: IP CONSULT TO HOSPITALIST  IP CONSULT TO SOCIAL fluoroscopy time only. Images were not obtained. Please see operative report and prior imaging studies for further details.              Disposition: home    Activity: activity as tolerated  Diet: regular diet  Wound Care: as directed  Code Status: Full Cod Sleep.        STOP taking these medications    celecoxib 200 MG Caps  Commonly known as:  CeleBREX            FU  Follow-up Information     Humberto Valerio PA-C In 10 days.     Specialty:  Physician Assistant  Why:  For wound re-check  Contact information: should be walking 1-2 miles per day by 4 weeks.   NOTE: If you need to lift or  an object (less than 10 lbs) from the floor, squat with your knees bent, do not bend at your waist.    Limitations  -No driving until cleared by MD, however you may be a plastic bag and then wrap pack or bag in a towel before you use it. You may also need to use pain medicine. If you do need pain medicine, take the medicine you were prescribed as directed.  Do not increase the pain medicine dosage without first contacting y protect your health. 3. Call your pharmacy early in the day. This gives your doctor time to review your records. To ensure you get the right medications, we do not rush refills without making sure the order and your record are reviewed.  Often, physicians outlined    Thank Taya Thompson M.D.  [facility] Hospitalist  [place]

## 2017-09-13 NOTE — PAYOR COMM NOTE
[facility] #[id number]   Admission Info: Inpatient (Adm: 09/12/17)   Hospital Account: [de-identified]   Description: 68year old F Primary Service: Surgical Unit Info: Uzair [id number] [facility]   Admission Orders     ADMIT TO INPATIENT [[id number]]     Electronically s 9/12/2017 1503 Given 10 mg Intravenous Lylia Edilia BENJAMIN CRNA      famotidine (PEPCID) tab     Date Action Dose Route User    9/13/2017 0757 Given 40 mg Oral Dennise Juárez, RN      fentaNYL citrate (SUBLIMAZE) 0.05 MG/ML injection 50 mcg     Date Acti HYDROmorphone HCl PF (DILAUDID) 1 MG/ML injection 0.4 mg     Date Action Dose Route User    9/13/2017 0044 Given 0.4 mg Intravenous Trevor Art RN      lactated ringers infusion     Date Action Dose Route User    9/12/2017 1426 New Bag (none) Kassandra Date Action Dose Route User    9/12/2017 1429 Given 5 mg Intravenous Jorge BENJAMIN CRNA      scopolamine (TRANSDERM-SCOP) 1.5 mg patch     Date Action Dose Route User    9/12/2017 1226 Patch applied 1 patch Transdermal (Behind Right Ear) [place]asha Diaz : Elle Jaramillo PA-C (Physician Assistant)   Pre-op Diagnosis: Spondylolisthesis, spinal stenosis without neurogenic claudication, lumbar radiculopathy     The above referenced H&P was reviewed by Da Dsouza PA-C on 9/12/2017, the patient was e which is the equivalent of climbing a flight of stairs, walking up a hill, do heavy housework, dance, or walk on level ground at 4 miles per hour  Does aerobics at [facility]  No cp  No sob     The patient has had surgery before and tolerated anesthesia.   With • Vitamin D deficiency 2015      Past Surgical History:  No date: APPENDECTOMY  No date:       Comment: x4  2017: CAPSULE      Comment: gastritis, small erosion in duodenum, likely                from previous biopsy, normal small bowel Lisinopril-Hydrochlorothiazide 10-12.5 MG Oral Tab Take 1 tablet by mouth once daily. Disp: 90 tablet Rfl: 3   denosumab 60 MG/ML Subcutaneous Solution Inject 60 mg sub-q every 6 months.  Disp: 1 mL Rfl: prn   LEVOTHYROXINE SODIUM 50 MCG Oral Tab TAKE 1 TAB Family history:                  Relation Status Comments               Fa  at age 68 CHF, MI's   Mo  at age 80 old age//CHF/heart block had      pacer          Orin Alive obese   Son Alive     Sis Alive breast cancer, asthma   Bro Alive kid ABDOMEN: normal active BS+, soft, nondistended; no HSM; no masses;nontender   PSYCHIATRIC: alert and oriented x 3; affect appropriate;intact judgement   Knees from   Pulses 2+ and equal PT     Pertinent Data:  Lab:   Component      Latest Ref Rng & Units 8 10.0 - 291.0 ng/mL   31.0   APTT      24.0 - 33.7 sec 22.4 (L)           Cxr: CHEST AP AND LATERAL VIEWS     COMPARISON: None     HISTORY: Encounter for other preprocedural examination     FINDINGS: Cardiac silhouette is within normal limits.  Pulmonary We discussed the role of anticoagulation to prevent DVT and the risk of bleeding or bruising-NOT AFTER SPINAL SURGERY-SCD'S AND WALKING  We discussed the use of incentive spirometry to prevent pneumonia.   We discussed the use of narcotics with patient-cont - Vitamin D3 (VITAMIN D3) 1000 UNITS Oral Tab;  Take 1,000 Units by mouth daily.     S/P subtotal parathyroidectomy  followup with endocrinology regarding calcium and bone density  Lytes stable         Insomnia, unspecified insomnia  - Zolpidem Tartrate (AM   Halobetasol Propionate ULTRAVATE 0.05 % 9/11 APPLY EXTERNALLY DAILY AS DIRECTED           Hydrocodone-Acetaminophen NORCO 5-325 MG 9/11 Take 1-2 tablets by mouth every 4 (four) hours as needed.           Ibuprofen (Discontinued) Ibuprofen 200 MG 9/5 Take posterior segmental spinal instrumentation L5 and S1 level,   morselized local autograft,   cancellous allograft,  intraoperative microscope use.      ASSISTANT: Jerry Baez PA-C     ANESTHESIA: General.   ESTIMATED BLOOD LOSS: 200 mL.    COMPLICATIONS: N PROCEDURE: Patient's back was marked in the holding area. Patient was then brought back to the operating room where once under general anesthesia, Bernardo catheter and SCD boots were placed along with neuromonitoring leads.  Patient was then placed in the pro proceeded to use downgoing curettes and side going curettes to clear up the disc off the endplates and placed Actifuse bone graft into the disc space, and placed an 9 x 28 x 8mm cage, which was filled with allograft and local autograft into the disc space. My physician assistant was essential in performing the procedure for retraction and assistance throughout the procedure, from positioning through wound closure.      Frandy Villareal MD

## 2017-09-13 NOTE — PROGRESS NOTES
S: Ms. Terrell Sparrow is doing well today. She notes low back discomfort, managed at this time with pain medication. She denies leg pain, but notes numbness/tingling in her right upper thigh, which she denies having previously.  She as not worked with PT/OT yet toda

## 2017-09-13 NOTE — PHYSICAL THERAPY NOTE
PHYSICAL THERAPY EVALUATION - INPATIENT     Room Number: 426/426-A  Evaluation Date: 9/13/2017  Type of Evaluation: Initial  Physician Order: PT Eval and Treat    Presenting Problem: Spondylolisthesis s/p L5-S1 laminectomy and TLIF  Reason for Therapy: Medical History  Past Medical History:   Diagnosis Date   • Acute cystitis with hematuria 8/22/2016   • Allergic rhinitis, cause unspecified    • Back problem with left sided sciatica   • Depression 03/1992   • Esophageal reflux    • Exposure to unspecifie lumpectomy  age 21: SPECIAL SERVICE OR REPORT      Comment: intussecption  5/2015 subtotal parathyroidectomy: THYROIDECTOMY  2011: TOTAL KNEE REPLACEMENT Left  6/16: TOTAL KNEE REPLACEMENT Right      Comment: martin Castro of  of the bed?: A Little   How much help from another person does the patient currently need. ..   -   Moving to and from a bed to a chair (including a wheelchair)?: A Little   -   Need to walk in hospital room?: A Little   -   Climbing 3-5 steps with a railin

## 2017-09-20 NOTE — PAYOR COMM NOTE
PLEASE FAX APPROVED DAYS BACK--------------  DISCHARGE REVIEW    Payor: [facility] [address] #:  [id number]  Authorization Number: [id number]    Admit date: 9/12/17  Admit time:  1914  Discharge Date: 9/13/2017  3:11 PM     Admitting Physi 9.9 oral iron continued, fu with PCP  - fu with surgery     HTN   - controlled  - continue home meds     Hypothyroidism  - continue home meds       Operative Procedures: Procedure(s) (LRB):  POSTERIOR LUMBAR LAMINECT SPINAL FUS W/INSTR 1 LEV (N/A)     Imag MG Tabs     Halobetasol Propionate 0.05 % Oint  Commonly known as:  ULTRAVATE  APPLY EXTERNALLY DAILY AS DIRECTED     Levothyroxine Sodium 50 MCG Tabs  Commonly known as:  SYNTHROID  TAKE 1 TABLET BY MOUTH DAILY     Lisinopril-Hydrochlorothiazide 10-12.5 M after surgery and hot tubs and swimming pools for at least 6 weeks. 4. Sleep either on your back, stomach or side. You may use pillows for support placed behind your back or between your legs.   5. It is important to begin a walking program as soon as you incision.  -Clear Drainage from your incision.  -Increase in drainage from the incision. Pain Management  It is not unusual after surgery, during the healing process to experience occasional pain, numbness, tingling or weakness in you back or legs.  If y delay in getting your prescription refilled  1. Contact your pharmacy at least 5 days before your prescription requires refilling. To protect your health, pharmacies will accept refill orders only from your doctor.  So, when you call [facility] with your request a of days. If you run out early, it may mean you are experiencing some difficulty with your dosage or medication. Call [facility] immediately and explain your difficulty. If your prescription is lost, misplaced or stolen:     To protect your health, lost or stolen p

## 2017-09-26 PROBLEM — Z98.890 S/P LUMBAR SPINE OPERATION: Status: ACTIVE | Noted: 2017-09-26

## 2018-01-31 PROBLEM — M43.16 SPONDYLOLISTHESIS, LUMBAR REGION: Status: RESOLVED | Noted: 2017-09-12 | Resolved: 2018-01-31

## 2018-01-31 PROBLEM — G89.29 CHRONIC BILATERAL LOW BACK PAIN WITH BILATERAL SCIATICA: Status: RESOLVED | Noted: 2017-01-10 | Resolved: 2018-01-31

## 2018-01-31 PROBLEM — M54.41 CHRONIC BILATERAL LOW BACK PAIN WITH BILATERAL SCIATICA: Status: RESOLVED | Noted: 2017-01-10 | Resolved: 2018-01-31

## 2018-01-31 PROBLEM — M54.42 CHRONIC BILATERAL LOW BACK PAIN WITH BILATERAL SCIATICA: Status: RESOLVED | Noted: 2017-01-10 | Resolved: 2018-01-31

## 2018-05-16 PROBLEM — M81.0 OSTEOPOROSIS WITHOUT CURRENT PATHOLOGICAL FRACTURE: Status: ACTIVE | Noted: 2017-05-22

## 2018-07-31 PROBLEM — M81.8 OTHER OSTEOPOROSIS WITHOUT CURRENT PATHOLOGICAL FRACTURE: Status: ACTIVE | Noted: 2017-05-22

## 2019-07-20 ENCOUNTER — HOSPITAL ENCOUNTER (EMERGENCY)
Facility: HOSPITAL | Age: 79
Discharge: HOME | End: 2019-07-20
Attending: EMERGENCY MEDICINE
Payer: MEDICARE

## 2019-07-20 ENCOUNTER — APPOINTMENT (OUTPATIENT)
Dept: GENERAL RADIOLOGY | Facility: HOSPITAL | Age: 79
End: 2019-07-20
Attending: EMERGENCY MEDICINE
Payer: MEDICARE

## 2019-07-20 ENCOUNTER — APPOINTMENT (OUTPATIENT)
Dept: CT IMAGING | Facility: HOSPITAL | Age: 79
End: 2019-07-20
Attending: EMERGENCY MEDICINE
Payer: MEDICARE

## 2019-07-20 VITALS
RESPIRATION RATE: 19 BRPM | WEIGHT: 115 LBS | BODY MASS INDEX: 24.81 KG/M2 | HEART RATE: 89 BPM | SYSTOLIC BLOOD PRESSURE: 125 MMHG | TEMPERATURE: 98 F | DIASTOLIC BLOOD PRESSURE: 71 MMHG | OXYGEN SATURATION: 97 % | HEIGHT: 57 IN

## 2019-07-20 DIAGNOSIS — R07.9 CHEST PAIN OF UNCERTAIN ETIOLOGY: Primary | ICD-10-CM

## 2019-07-20 LAB
ANION GAP SERPL CALC-SCNC: 8 MMOL/L (ref 0–18)
BASOPHILS # BLD AUTO: 0.02 X10(3) UL (ref 0–0.2)
BASOPHILS NFR BLD AUTO: 0.2 %
BUN BLD-MCNC: 15 MG/DL (ref 7–18)
BUN/CREAT SERPL: 18.1 (ref 10–20)
CALCIUM BLD-MCNC: 9.1 MG/DL (ref 8.5–10.1)
CHLORIDE SERPL-SCNC: 106 MMOL/L (ref 98–112)
CO2 SERPL-SCNC: 29 MMOL/L (ref 21–32)
CREAT BLD-MCNC: 0.83 MG/DL (ref 0.55–1.02)
D DIMER PPP FEU-MCNC: 0.8 UG/ML FEU (ref ?–0.78)
DEPRECATED RDW RBC AUTO: 49 FL (ref 35.1–46.3)
EOSINOPHIL # BLD AUTO: 0.08 X10(3) UL (ref 0–0.7)
EOSINOPHIL NFR BLD AUTO: 0.9 %
ERYTHROCYTE [DISTWIDTH] IN BLOOD BY AUTOMATED COUNT: 14.6 % (ref 11–15)
GLUCOSE BLD-MCNC: 217 MG/DL (ref 70–99)
HCT VFR BLD AUTO: 33.6 % (ref 35–48)
HGB BLD-MCNC: 10.7 G/DL (ref 12–16)
IMM GRANULOCYTES # BLD AUTO: 0.03 X10(3) UL (ref 0–1)
IMM GRANULOCYTES NFR BLD: 0.3 %
LYMPHOCYTES # BLD AUTO: 0.63 X10(3) UL (ref 1–4)
LYMPHOCYTES NFR BLD AUTO: 6.9 %
MCH RBC QN AUTO: 29.2 PG (ref 26–34)
MCHC RBC AUTO-ENTMCNC: 31.8 G/DL (ref 31–37)
MCV RBC AUTO: 91.6 FL (ref 80–100)
MONOCYTES # BLD AUTO: 0.71 X10(3) UL (ref 0.1–1)
MONOCYTES NFR BLD AUTO: 7.8 %
NEUTROPHILS # BLD AUTO: 7.61 X10 (3) UL (ref 1.5–7.7)
NEUTROPHILS # BLD AUTO: 7.61 X10(3) UL (ref 1.5–7.7)
NEUTROPHILS NFR BLD AUTO: 83.9 %
OSMOLALITY SERPL CALC.SUM OF ELEC: 303 MOSM/KG (ref 275–295)
PLATELET # BLD AUTO: 276 10(3)UL (ref 150–450)
POTASSIUM SERPL-SCNC: 3.9 MMOL/L (ref 3.5–5.1)
RBC # BLD AUTO: 3.67 X10(6)UL (ref 3.8–5.3)
SODIUM SERPL-SCNC: 143 MMOL/L (ref 136–145)
TROPONIN I SERPL-MCNC: <0.045 NG/ML (ref ?–0.04)
WBC # BLD AUTO: 9.1 X10(3) UL (ref 4–11)

## 2019-07-20 PROCEDURE — 93010 ELECTROCARDIOGRAM REPORT: CPT | Performed by: EMERGENCY MEDICINE

## 2019-07-20 PROCEDURE — 80048 BASIC METABOLIC PNL TOTAL CA: CPT | Performed by: EMERGENCY MEDICINE

## 2019-07-20 PROCEDURE — 99284 EMERGENCY DEPT VISIT MOD MDM: CPT

## 2019-07-20 PROCEDURE — 84484 ASSAY OF TROPONIN QUANT: CPT | Performed by: EMERGENCY MEDICINE

## 2019-07-20 PROCEDURE — 71045 X-RAY EXAM CHEST 1 VIEW: CPT | Performed by: EMERGENCY MEDICINE

## 2019-07-20 PROCEDURE — 93005 ELECTROCARDIOGRAM TRACING: CPT

## 2019-07-20 PROCEDURE — 71260 CT THORAX DX C+: CPT | Performed by: EMERGENCY MEDICINE

## 2019-07-20 PROCEDURE — 36415 COLL VENOUS BLD VENIPUNCTURE: CPT

## 2019-07-20 PROCEDURE — 85025 COMPLETE CBC W/AUTO DIFF WBC: CPT | Performed by: EMERGENCY MEDICINE

## 2019-07-20 PROCEDURE — 85379 FIBRIN DEGRADATION QUANT: CPT | Performed by: EMERGENCY MEDICINE

## 2019-07-20 NOTE — ED NOTES
DISCHARGE INSTRUCTIONS GIVEN TO PATIENT. VERBALIZED UNDERSTANDING. NO DISTRESS.   PATIENT LEFT ED AMBULATORY STEADY GAIT WITH SPOUSE

## 2019-07-20 NOTE — ED PROVIDER NOTES
Patient Seen in: [facility] AND [facility] Emergency Department    History   Patient presents with:  Chest Pain Angina (cardiovascular)    Stated Complaint:     HPI    75-year-old female with history of hyperlipidemia, hypertension, previous breast cancer, here polyps, hiatal hernia, biopseis neg for HP or celiac   • EGD  06/23/2017   • ESOPHAGOGASTRODUODENOSCOPY (EGD) N/A 6/23/2017    Performed by Matt Scott MD at [address] [place] [address] Right 1964 both bone fx arm   • FRACTURE SURGERY Left 1 dysuria, flank pain and frequency. Musculoskeletal: Negative for back pain. Skin: Negative for rash. Neurological: Negative for weakness, light-headedness and headaches. All other systems reviewed and are negative.       Positive for stated complain Report. Rate: 113  Rhythm: Sinus Rhythm  Reading: abnormal for rate, normal for rhythm, no acute ST changes      Cardiac Monitor:    Patient placed on the cardiac monitor and a rhythm strip obtained with the indication of chest pian.   Monitor shows regula Absolute 0.02 0.00 - 0.20 x10(3) uL    Immature Granulocyte Absolute 0.03 0.00 - 1.00 x10(3) uL    Neutrophil % 83.9 %    Lymphocyte % 6.9 %    Monocyte % 7.8 %    Eosinophil % 0.9 %    Basophil % 0.2 %    Immature Granulocyte % 0.3 %       Imaging Results 7 hours and troponin is negative - she does not wish to stay for 2hr troponin - she understands risks/benefits and would like to go home - return precautions discussed    The patient was informed of their elevated blood pressure reading in the Emergency [place] List

## 2019-07-20 NOTE — ED INITIAL ASSESSMENT (HPI)
C/o midsternal chest pressure xtoday, +recent flight from [place].  States pain radiates to left arm and jaw

## 2020-06-17 PROBLEM — E11.9 NEW ONSET TYPE 2 DIABETES MELLITUS (HCC): Status: ACTIVE | Noted: 2020-06-17

## 2020-06-17 PROBLEM — E53.8 B12 DEFICIENCY: Status: ACTIVE | Noted: 2020-06-17

## 2020-09-18 PROBLEM — R73.03 PREDIABETES: Status: ACTIVE | Noted: 2020-09-18

## 2021-05-02 PROBLEM — D64.9 ANEMIA, UNSPECIFIED TYPE: Status: ACTIVE | Noted: 2021-05-02

## 2021-05-06 PROBLEM — E11.9 NEW ONSET TYPE 2 DIABETES MELLITUS (HCC): Status: RESOLVED | Noted: 2020-06-17 | Resolved: 2021-05-06

## 2021-05-06 PROBLEM — E11.65 CONTROLLED TYPE 2 DIABETES MELLITUS WITH HYPERGLYCEMIA, WITHOUT LONG-TERM CURRENT USE OF INSULIN (HCC): Status: ACTIVE | Noted: 2021-05-06

## 2021-05-08 PROBLEM — R73.03 PREDIABETES: Status: RESOLVED | Noted: 2020-09-18 | Resolved: 2021-05-08

## 2021-07-07 PROBLEM — M54.32 BACK PAIN WITH LEFT-SIDED SCIATICA: Status: ACTIVE | Noted: 2021-07-07

## 2021-08-18 PROBLEM — M54.16 LUMBAR RADICULOPATHY: Status: ACTIVE | Noted: 2021-08-18

## 2021-08-18 PROBLEM — Z98.1 HISTORY OF LUMBAR FUSION: Status: ACTIVE | Noted: 2017-09-26

## 2021-09-06 PROBLEM — E11.22 CKD STAGE 3 SECONDARY TO DIABETES (HCC): Status: ACTIVE | Noted: 2021-09-06

## 2021-09-06 PROBLEM — N18.30 CKD STAGE 3 SECONDARY TO DIABETES (HCC): Status: ACTIVE | Noted: 2021-09-06

## 2021-09-15 PROBLEM — M43.16 SPONDYLOLISTHESIS OF LUMBAR REGION: Status: ACTIVE | Noted: 2017-09-12

## 2021-10-15 PROBLEM — M51.26 HNP (HERNIATED NUCLEUS PULPOSUS), LUMBAR: Status: ACTIVE | Noted: 2021-10-15

## 2021-11-05 ENCOUNTER — HOSPITAL ENCOUNTER (EMERGENCY)
Facility: HOSPITAL | Age: 81
Discharge: HOME | End: 2021-11-05
Attending: EMERGENCY MEDICINE
Payer: MEDICARE

## 2021-11-05 ENCOUNTER — APPOINTMENT (OUTPATIENT)
Dept: GENERAL RADIOLOGY | Facility: HOSPITAL | Age: 81
End: 2021-11-05
Attending: EMERGENCY MEDICINE
Payer: MEDICARE

## 2021-11-05 ENCOUNTER — APPOINTMENT (OUTPATIENT)
Dept: CT IMAGING | Facility: HOSPITAL | Age: 81
End: 2021-11-05
Attending: EMERGENCY MEDICINE
Payer: MEDICARE

## 2021-11-05 VITALS
HEIGHT: 57 IN | RESPIRATION RATE: 18 BRPM | HEART RATE: 93 BPM | SYSTOLIC BLOOD PRESSURE: 166 MMHG | OXYGEN SATURATION: 96 % | WEIGHT: 106 LBS | BODY MASS INDEX: 22.87 KG/M2 | TEMPERATURE: 97 F | DIASTOLIC BLOOD PRESSURE: 76 MMHG

## 2021-11-05 DIAGNOSIS — M54.89 BACK PAIN WITHOUT SCIATICA: Primary | ICD-10-CM

## 2021-11-05 PROCEDURE — 72100 X-RAY EXAM L-S SPINE 2/3 VWS: CPT | Performed by: EMERGENCY MEDICINE

## 2021-11-05 PROCEDURE — 99284 EMERGENCY DEPT VISIT MOD MDM: CPT

## 2021-11-05 PROCEDURE — 72131 CT LUMBAR SPINE W/O DYE: CPT | Performed by: EMERGENCY MEDICINE

## 2021-11-05 RX ORDER — CYCLOBENZAPRINE HCL 10 MG
10 TABLET ORAL 3 TIMES DAILY PRN
Qty: 20 TABLET | Refills: 0 | Status: SHIPPED | OUTPATIENT
Start: 2021-11-05 | End: 2021-11-12

## 2021-11-05 RX ORDER — METHYLPREDNISOLONE 4 MG/1
TABLET ORAL
Qty: 1 EACH | Refills: 0 | Status: SHIPPED | OUTPATIENT
Start: 2021-11-05 | End: 2021-11-05

## 2021-11-05 RX ORDER — CYCLOBENZAPRINE HCL 10 MG
10 TABLET ORAL 3 TIMES DAILY PRN
Qty: 20 TABLET | Refills: 0 | Status: SHIPPED | OUTPATIENT
Start: 2021-11-05 | End: 2021-11-05

## 2021-11-05 RX ORDER — METHYLPREDNISOLONE 4 MG/1
TABLET ORAL
Qty: 1 EACH | Refills: 0 | Status: SHIPPED | OUTPATIENT
Start: 2021-11-05 | End: 2021-11-15 | Stop reason: ALTCHOICE

## 2021-11-05 RX ORDER — HYDROCODONE BITARTRATE AND ACETAMINOPHEN 5; 325 MG/1; MG/1
1 TABLET ORAL EVERY 6 HOURS PRN
Qty: 10 TABLET | Refills: 0 | Status: SHIPPED | OUTPATIENT
Start: 2021-11-05 | End: 2021-11-05

## 2021-11-05 RX ORDER — HYDROCODONE BITARTRATE AND ACETAMINOPHEN 5; 325 MG/1; MG/1
1 TABLET ORAL EVERY 6 HOURS PRN
Qty: 10 TABLET | Refills: 0 | Status: SHIPPED | OUTPATIENT
Start: 2021-11-05 | End: 2021-11-15

## 2021-11-05 RX ORDER — CYCLOBENZAPRINE HCL 10 MG
10 TABLET ORAL ONCE
Status: COMPLETED | OUTPATIENT
Start: 2021-11-05 | End: 2021-11-05

## 2021-11-05 NOTE — ED PROVIDER NOTES
Patient Seen in: [facility] AND [facility] Emergency Department      History   Patient presents with:  Back Pain    Stated Complaint: Severe back pain    Subjective:   HPI    26-year-old female with previous back surgery, here with complaints of worsening back p polyp, no repeat needed   • COLONOSCOPY N/A 6/23/2017    diverticulosis, int hemorrhoids, no further screening needed   • COLONOSCOPY  06/23/2017    done for anemia   • COLONOSCOPY,BIOPSY N/A 7/30/2015    Procedure: COLONOSCOPY, POSSIBLE BIOPSY, POSSIBLE P systems reviewed and are negative. Positive for stated complaint: Severe back pain  Other systems are as noted in HPI. Constitutional and vital signs reviewed. All other systems reviewed and negative except as noted above.     Physical Exam     E pedicle screw, and left L4 inferior corner extending into the defect. Abnormal lucency around the left L4 pedicle screw compatible with motion. 2. Multilevel advanced lumbar degenerative disc disease with levoscoliosis. 3.  L4-5 posterior and interbody fus obtaining the patient's history, performing the physical exam and reviewing the diagnostics, multiple initial diagnoses were considered based on the presenting problem including fracture, contusion, hardware failure.                              Disposition

## 2021-11-18 PROBLEM — J43.8 OTHER EMPHYSEMA (HCC): Status: ACTIVE | Noted: 2021-11-18

## 2021-12-12 NOTE — PLAN OF CARE
DISCHARGE PLANNING    • Discharge to home or other facility with appropriate resources Adequate for Discharge        GENITOURINARY - ADULT    • Absence of urinary retention Adequate for Discharge        MUSCULOSKELETAL - ADULT    • Return mobility to safes
(1) Outpatient Area

## 2022-01-19 PROBLEM — R60.9 PERIPHERAL EDEMA: Status: ACTIVE | Noted: 2022-01-19

## 2022-01-19 PROBLEM — R60.0 PERIPHERAL EDEMA: Status: ACTIVE | Noted: 2022-01-19

## 2022-02-23 NOTE — ANESTHESIA POSTPROCEDURE EVALUATION
Patient:  [address]    Procedure Summary     Date:  09/12/17 Room / Location:  [address] [place] [address] MAIN OR 10 / 300 [facility] MAIN OR    Anesthesia Start:  6127 Anesthesia Stop:  2538    Procedure:  POSTERIOR LUMBAR LAMINECT SPINAL FUS W/INSTR 1 LEV (N/A ) Diagnosis:  (Spondyl - Abilify 10 mg PO bedtime - Abilify 10 mg PO bedtime

## 2022-03-02 PROBLEM — E11.65 CONTROLLED TYPE 2 DIABETES MELLITUS WITH HYPERGLYCEMIA, WITHOUT LONG-TERM CURRENT USE OF INSULIN (HCC): Status: RESOLVED | Noted: 2021-05-06 | Resolved: 2022-03-02

## 2022-08-18 ENCOUNTER — HOSPITAL ENCOUNTER (INPATIENT)
Facility: HOSPITAL | Age: 82
LOS: 1 days | Discharge: HOME | End: 2022-08-20
Attending: STUDENT IN AN ORGANIZED HEALTH CARE EDUCATION/TRAINING PROGRAM | Admitting: STUDENT IN AN ORGANIZED HEALTH CARE EDUCATION/TRAINING PROGRAM
Payer: MEDICARE

## 2022-08-18 ENCOUNTER — APPOINTMENT (OUTPATIENT)
Dept: GENERAL RADIOLOGY | Facility: HOSPITAL | Age: 82
End: 2022-08-18
Attending: STUDENT IN AN ORGANIZED HEALTH CARE EDUCATION/TRAINING PROGRAM
Payer: MEDICARE

## 2022-08-18 DIAGNOSIS — J18.9 COMMUNITY ACQUIRED PNEUMONIA OF LEFT LUNG, UNSPECIFIED PART OF LUNG: Primary | ICD-10-CM

## 2022-08-18 LAB
ALBUMIN SERPL-MCNC: 3 G/DL (ref 3.4–5)
ALP LIVER SERPL-CCNC: 32 U/L
ALT SERPL-CCNC: 18 U/L
ANION GAP SERPL CALC-SCNC: 10 MMOL/L (ref 0–18)
APTT PPP: 25 SECONDS (ref 23.3–35.6)
AST SERPL-CCNC: 11 U/L (ref 15–37)
BASOPHILS # BLD AUTO: 0.02 X10(3) UL (ref 0–0.2)
BASOPHILS NFR BLD AUTO: 0.2 %
BILIRUB DIRECT SERPL-MCNC: <0.1 MG/DL (ref 0–0.2)
BILIRUB SERPL-MCNC: 0.2 MG/DL (ref 0.1–2)
BUN BLD-MCNC: 33 MG/DL (ref 7–18)
BUN/CREAT SERPL: 39.3 (ref 10–20)
CALCIUM BLD-MCNC: 9.7 MG/DL (ref 8.5–10.1)
CHLORIDE SERPL-SCNC: 107 MMOL/L (ref 98–112)
CO2 SERPL-SCNC: 23 MMOL/L (ref 21–32)
CREAT BLD-MCNC: 0.84 MG/DL
D DIMER PPP FEU-MCNC: 0.51 UG/ML FEU (ref ?–0.81)
DEPRECATED RDW RBC AUTO: 49.9 FL (ref 35.1–46.3)
EOSINOPHIL # BLD AUTO: 0.15 X10(3) UL (ref 0–0.7)
EOSINOPHIL NFR BLD AUTO: 1.7 %
ERYTHROCYTE [DISTWIDTH] IN BLOOD BY AUTOMATED COUNT: 14 % (ref 11–15)
GFR SERPLBLD BASED ON 1.73 SQ M-ARVRAT: 70 ML/MIN/1.73M2 (ref 60–?)
GLUCOSE BLD-MCNC: 131 MG/DL (ref 70–99)
HCT VFR BLD AUTO: 32.4 %
HGB BLD-MCNC: 10.2 G/DL
IMM GRANULOCYTES # BLD AUTO: 0.08 X10(3) UL (ref 0–1)
IMM GRANULOCYTES NFR BLD: 0.9 %
INR BLD: 1.02 (ref 0.85–1.16)
LYMPHOCYTES # BLD AUTO: 0.94 X10(3) UL (ref 1–4)
LYMPHOCYTES NFR BLD AUTO: 10.4 %
MCH RBC QN AUTO: 30.8 PG (ref 26–34)
MCHC RBC AUTO-ENTMCNC: 31.5 G/DL (ref 31–37)
MCV RBC AUTO: 97.9 FL
MONOCYTES # BLD AUTO: 0.41 X10(3) UL (ref 0.1–1)
MONOCYTES NFR BLD AUTO: 4.6 %
NEUTROPHILS # BLD AUTO: 7.4 X10 (3) UL (ref 1.5–7.7)
NEUTROPHILS # BLD AUTO: 7.4 X10(3) UL (ref 1.5–7.7)
NEUTROPHILS NFR BLD AUTO: 82.2 %
NT-PROBNP SERPL-MCNC: 134 PG/ML (ref ?–450)
OSMOLALITY SERPL CALC.SUM OF ELEC: 299 MOSM/KG (ref 275–295)
PLATELET # BLD AUTO: 273 10(3)UL (ref 150–450)
POTASSIUM SERPL-SCNC: 4 MMOL/L (ref 3.5–5.1)
PROCALCITONIN SERPL-MCNC: 3.76 NG/ML (ref ?–0.16)
PROT SERPL-MCNC: 6.4 G/DL (ref 6.4–8.2)
PROTHROMBIN TIME: 13.3 SECONDS (ref 11.6–14.8)
RBC # BLD AUTO: 3.31 X10(6)UL
SARS-COV-2 RNA RESP QL NAA+PROBE: NOT DETECTED
SODIUM SERPL-SCNC: 140 MMOL/L (ref 136–145)
TROPONIN I HIGH SENSITIVITY: 10 NG/L
WBC # BLD AUTO: 9 X10(3) UL (ref 4–11)

## 2022-08-18 PROCEDURE — 93010 ELECTROCARDIOGRAM REPORT: CPT | Performed by: STUDENT IN AN ORGANIZED HEALTH CARE EDUCATION/TRAINING PROGRAM

## 2022-08-18 PROCEDURE — 85610 PROTHROMBIN TIME: CPT | Performed by: STUDENT IN AN ORGANIZED HEALTH CARE EDUCATION/TRAINING PROGRAM

## 2022-08-18 PROCEDURE — 85025 COMPLETE CBC W/AUTO DIFF WBC: CPT | Performed by: STUDENT IN AN ORGANIZED HEALTH CARE EDUCATION/TRAINING PROGRAM

## 2022-08-18 PROCEDURE — 84145 PROCALCITONIN (PCT): CPT | Performed by: STUDENT IN AN ORGANIZED HEALTH CARE EDUCATION/TRAINING PROGRAM

## 2022-08-18 PROCEDURE — 83880 ASSAY OF NATRIURETIC PEPTIDE: CPT | Performed by: STUDENT IN AN ORGANIZED HEALTH CARE EDUCATION/TRAINING PROGRAM

## 2022-08-18 PROCEDURE — 93005 ELECTROCARDIOGRAM TRACING: CPT

## 2022-08-18 PROCEDURE — 99285 EMERGENCY DEPT VISIT HI MDM: CPT

## 2022-08-18 PROCEDURE — 85379 FIBRIN DEGRADATION QUANT: CPT | Performed by: STUDENT IN AN ORGANIZED HEALTH CARE EDUCATION/TRAINING PROGRAM

## 2022-08-18 PROCEDURE — 71045 X-RAY EXAM CHEST 1 VIEW: CPT | Performed by: STUDENT IN AN ORGANIZED HEALTH CARE EDUCATION/TRAINING PROGRAM

## 2022-08-18 PROCEDURE — 85730 THROMBOPLASTIN TIME PARTIAL: CPT | Performed by: STUDENT IN AN ORGANIZED HEALTH CARE EDUCATION/TRAINING PROGRAM

## 2022-08-18 PROCEDURE — 80076 HEPATIC FUNCTION PANEL: CPT | Performed by: STUDENT IN AN ORGANIZED HEALTH CARE EDUCATION/TRAINING PROGRAM

## 2022-08-18 PROCEDURE — 80048 BASIC METABOLIC PNL TOTAL CA: CPT | Performed by: STUDENT IN AN ORGANIZED HEALTH CARE EDUCATION/TRAINING PROGRAM

## 2022-08-18 PROCEDURE — 96365 THER/PROPH/DIAG IV INF INIT: CPT

## 2022-08-18 PROCEDURE — 36415 COLL VENOUS BLD VENIPUNCTURE: CPT

## 2022-08-18 PROCEDURE — 84484 ASSAY OF TROPONIN QUANT: CPT | Performed by: STUDENT IN AN ORGANIZED HEALTH CARE EDUCATION/TRAINING PROGRAM

## 2022-08-19 PROBLEM — J18.9 COMMUNITY ACQUIRED PNEUMONIA: Status: ACTIVE | Noted: 2022-08-19

## 2022-08-19 PROBLEM — J18.9 COMMUNITY ACQUIRED PNEUMONIA OF LEFT LUNG, UNSPECIFIED PART OF LUNG: Status: ACTIVE | Noted: 2022-08-19

## 2022-08-19 LAB
L PNEUMO AG UR QL: NEGATIVE
LACTATE SERPL-SCNC: 0.8 MMOL/L (ref 0.4–2)
STREP PNEUMO ANTIGEN, URINE: NEGATIVE

## 2022-08-19 PROCEDURE — 87040 BLOOD CULTURE FOR BACTERIA: CPT | Performed by: STUDENT IN AN ORGANIZED HEALTH CARE EDUCATION/TRAINING PROGRAM

## 2022-08-19 PROCEDURE — 83605 ASSAY OF LACTIC ACID: CPT | Performed by: STUDENT IN AN ORGANIZED HEALTH CARE EDUCATION/TRAINING PROGRAM

## 2022-08-19 PROCEDURE — 87449 NOS EACH ORGANISM AG IA: CPT | Performed by: STUDENT IN AN ORGANIZED HEALTH CARE EDUCATION/TRAINING PROGRAM

## 2022-08-19 PROCEDURE — 87081 CULTURE SCREEN ONLY: CPT | Performed by: STUDENT IN AN ORGANIZED HEALTH CARE EDUCATION/TRAINING PROGRAM

## 2022-08-19 RX ORDER — LISINOPRIL AND HYDROCHLOROTHIAZIDE 12.5; 1 MG/1; MG/1
1 TABLET ORAL DAILY
Status: DISCONTINUED | OUTPATIENT
Start: 2022-08-19 | End: 2022-08-19

## 2022-08-19 RX ORDER — LEVOFLOXACIN 5 MG/ML
750 INJECTION, SOLUTION INTRAVENOUS ONCE
Status: COMPLETED | OUTPATIENT
Start: 2022-08-19 | End: 2022-08-19

## 2022-08-19 RX ORDER — LEVOFLOXACIN 5 MG/ML
500 INJECTION, SOLUTION INTRAVENOUS
Status: DISCONTINUED | OUTPATIENT
Start: 2022-08-21 | End: 2022-08-19

## 2022-08-19 RX ORDER — GUAIFENESIN 600 MG
600 TABLET, EXTENDED RELEASE 12 HR ORAL 2 TIMES DAILY
Status: DISCONTINUED | OUTPATIENT
Start: 2022-08-19 | End: 2022-08-20

## 2022-08-19 RX ORDER — HYDROCHLOROTHIAZIDE 12.5 MG/1
12.5 TABLET ORAL DAILY
Status: DISCONTINUED | OUTPATIENT
Start: 2022-08-19 | End: 2022-08-20

## 2022-08-19 RX ORDER — ATORVASTATIN CALCIUM 10 MG/1
10 TABLET, FILM COATED ORAL NIGHTLY
Status: DISCONTINUED | OUTPATIENT
Start: 2022-08-19 | End: 2022-08-20

## 2022-08-19 RX ORDER — LEVOTHYROXINE SODIUM 0.05 MG/1
50 TABLET ORAL
Status: DISCONTINUED | OUTPATIENT
Start: 2022-08-19 | End: 2022-08-20

## 2022-08-19 RX ORDER — GUAIFENESIN 600 MG
600 TABLET, EXTENDED RELEASE 12 HR ORAL ONCE
Status: COMPLETED | OUTPATIENT
Start: 2022-08-19 | End: 2022-08-19

## 2022-08-19 RX ORDER — ACETAMINOPHEN 500 MG
500 TABLET ORAL EVERY 4 HOURS PRN
Status: DISCONTINUED | OUTPATIENT
Start: 2022-08-19 | End: 2022-08-20

## 2022-08-19 RX ORDER — CLONAZEPAM 0.5 MG/1
0.5 TABLET ORAL 2 TIMES DAILY PRN
Status: DISCONTINUED | OUTPATIENT
Start: 2022-08-19 | End: 2022-08-20

## 2022-08-19 RX ORDER — LEVOFLOXACIN 5 MG/ML
750 INJECTION, SOLUTION INTRAVENOUS
Status: DISCONTINUED | OUTPATIENT
Start: 2022-08-20 | End: 2022-08-20

## 2022-08-19 RX ORDER — BENZONATATE 100 MG/1
200 CAPSULE ORAL 3 TIMES DAILY PRN
Status: DISCONTINUED | OUTPATIENT
Start: 2022-08-19 | End: 2022-08-20

## 2022-08-19 RX ORDER — GABAPENTIN 300 MG/1
300 CAPSULE ORAL 2 TIMES DAILY
Status: DISCONTINUED | OUTPATIENT
Start: 2022-08-19 | End: 2022-08-20

## 2022-08-19 RX ORDER — PANTOPRAZOLE SODIUM 40 MG/1
40 TABLET, DELAYED RELEASE ORAL
Status: DISCONTINUED | OUTPATIENT
Start: 2022-08-19 | End: 2022-08-20

## 2022-08-19 RX ORDER — ENOXAPARIN SODIUM 100 MG/ML
40 INJECTION SUBCUTANEOUS DAILY
Status: DISCONTINUED | OUTPATIENT
Start: 2022-08-19 | End: 2022-08-20

## 2022-08-19 RX ORDER — LISINOPRIL 10 MG/1
10 TABLET ORAL DAILY
Status: DISCONTINUED | OUTPATIENT
Start: 2022-08-19 | End: 2022-08-20

## 2022-08-19 RX ORDER — LEVOFLOXACIN 5 MG/ML
500 INJECTION, SOLUTION INTRAVENOUS EVERY 24 HOURS
Status: DISCONTINUED | OUTPATIENT
Start: 2022-08-20 | End: 2022-08-19

## 2022-08-19 RX ORDER — SODIUM CHLORIDE 9 MG/ML
INJECTION, SOLUTION INTRAVENOUS CONTINUOUS
Status: DISCONTINUED | OUTPATIENT
Start: 2022-08-19 | End: 2022-08-19

## 2022-08-19 NOTE — PROGRESS NOTES
[facility] Pharmacy Note:  Renal Adjustment for levofloxacin (Anton Loza)    Gio Anderson is a 80year old patient who has been prescribed levofloxacin (LEVAQUIN) 750 mg every 24 hrs. The estimated creatinine clearance is 42.1 mL/min (based on SCr of 0.84 mg/dL). The dose has been adjusted to levofloxacin (LEVAQUIN) 750 mg every 48 hrs per hospital renal dose adjustment protocol for treatment of pneumonia. Pharmacy will follow and adjust dose as warranted for additional renal function changes.     Thank you,    Randy Charles, PharmD  8/19/2022  3:27 AM

## 2022-08-19 NOTE — ED QUICK NOTES
Pt updated on pox verbalizes understanding and is agreeable. Waiting for admission, call light within reach.

## 2022-08-19 NOTE — ED QUICK NOTES
Pt reports she feels congested asking for something to help with her nasal congestion denies sob/cp. Ed md aware.

## 2022-08-19 NOTE — ED QUICK NOTES
Orders for admission, patient is aware of plan and ready to go upstairs. Any questions, please call ED RN Madeline at extension [phone].      Patient Covid vaccination status: Fully vaccinated     COVID Test Ordered in ED: Rapid SARS-CoV-2 by PCR    COVID Suspicion at Admission: Low clinical suspicion for COVID    Running Infusions:  None    Mental Status/LOC at time of transport: A&Ox3    Other pertinent information:   CIWA score: N/A   NIH score:  N/A

## 2022-08-19 NOTE — PROGRESS NOTES
[facility] Pharmacy Note:  Renal Adjustment for levofloxacin (Priya ShermanGeorge Lieberman is a 80year old patient who has been prescribed levofloxacin (LEVAQUIN) 500 mg every 48 hrs. The estimated creatinine clearance is 42.1 mL/min (based on SCr of 0.84 mg/dL). The dose has been adjusted to levofloxacin (LEVAQUIN) 750 mg every 48 hrs per hospital renal dose adjustment protocol for treatment of pneumonia. Pharmacy will follow and adjust dose as warranted for additional renal function changes.     Thank you,    Linda Phelan, [facility]  8/19/2022  8:53 AM

## 2022-08-19 NOTE — ED QUICK NOTES
Pt up to and from restroom, son at bedside, pt has no complaints at this time, waiting for admission, call light within reach.

## 2022-08-19 NOTE — ED QUICK NOTES
Pt up ambulatory to and from restroom tolerated well, pt aware of poc verbalizes understanding and is agreeable. Call light within reach.

## 2022-08-19 NOTE — ED QUICK NOTES
Pt ambulatory to room steady gait with cane with reports of feeling short of breath \"labored\" that lasted about an hour and resolved pt spoke with pcp and was send to er to get checked out, pt at this time has no complaints denies cp/sob/n/v/dizziness/cough. Pt changed into gown, placed on monitor, oriented to room, call light within reach.

## 2022-08-19 NOTE — ED INITIAL ASSESSMENT (HPI)
Patient arrives ambulatory through triage with complaints of SOB, high HR since about 6pm.   Denies any CP. At home COVID negative today. Advised to come to ER by PCP.

## 2022-08-20 VITALS
WEIGHT: 112 LBS | DIASTOLIC BLOOD PRESSURE: 53 MMHG | TEMPERATURE: 98 F | OXYGEN SATURATION: 94 % | SYSTOLIC BLOOD PRESSURE: 122 MMHG | RESPIRATION RATE: 16 BRPM | BODY MASS INDEX: 24.16 KG/M2 | HEIGHT: 57 IN | HEART RATE: 93 BPM

## 2022-08-20 LAB
ANION GAP SERPL CALC-SCNC: 3 MMOL/L (ref 0–18)
BASOPHILS # BLD AUTO: 0.03 X10(3) UL (ref 0–0.2)
BASOPHILS NFR BLD AUTO: 0.6 %
BUN BLD-MCNC: 23 MG/DL (ref 7–18)
BUN/CREAT SERPL: 33.8 (ref 10–20)
CALCIUM BLD-MCNC: 9.7 MG/DL (ref 8.5–10.1)
CHLORIDE SERPL-SCNC: 109 MMOL/L (ref 98–112)
CO2 SERPL-SCNC: 28 MMOL/L (ref 21–32)
CREAT BLD-MCNC: 0.68 MG/DL
DEPRECATED RDW RBC AUTO: 51.2 FL (ref 35.1–46.3)
EOSINOPHIL # BLD AUTO: 0.28 X10(3) UL (ref 0–0.7)
EOSINOPHIL NFR BLD AUTO: 5.1 %
ERYTHROCYTE [DISTWIDTH] IN BLOOD BY AUTOMATED COUNT: 13.9 % (ref 11–15)
GFR SERPLBLD BASED ON 1.73 SQ M-ARVRAT: 87 ML/MIN/1.73M2 (ref 60–?)
GLUCOSE BLD-MCNC: 111 MG/DL (ref 70–99)
HCT VFR BLD AUTO: 32.1 %
HGB BLD-MCNC: 10.1 G/DL
IMM GRANULOCYTES # BLD AUTO: 0.05 X10(3) UL (ref 0–1)
IMM GRANULOCYTES NFR BLD: 0.9 %
LYMPHOCYTES # BLD AUTO: 1.38 X10(3) UL (ref 1–4)
LYMPHOCYTES NFR BLD AUTO: 25.3 %
MAGNESIUM SERPL-MCNC: 1.9 MG/DL (ref 1.6–2.6)
MCH RBC QN AUTO: 31.1 PG (ref 26–34)
MCHC RBC AUTO-ENTMCNC: 31.5 G/DL (ref 31–37)
MCV RBC AUTO: 98.8 FL
MONOCYTES # BLD AUTO: 0.26 X10(3) UL (ref 0.1–1)
MONOCYTES NFR BLD AUTO: 4.8 %
NEUTROPHILS # BLD AUTO: 3.45 X10 (3) UL (ref 1.5–7.7)
NEUTROPHILS # BLD AUTO: 3.45 X10(3) UL (ref 1.5–7.7)
NEUTROPHILS NFR BLD AUTO: 63.3 %
OSMOLALITY SERPL CALC.SUM OF ELEC: 294 MOSM/KG (ref 275–295)
PLATELET # BLD AUTO: 273 10(3)UL (ref 150–450)
POTASSIUM SERPL-SCNC: 4.5 MMOL/L (ref 3.5–5.1)
RBC # BLD AUTO: 3.25 X10(6)UL
SODIUM SERPL-SCNC: 140 MMOL/L (ref 136–145)
WBC # BLD AUTO: 5.5 X10(3) UL (ref 4–11)

## 2022-08-20 PROCEDURE — 83735 ASSAY OF MAGNESIUM: CPT | Performed by: STUDENT IN AN ORGANIZED HEALTH CARE EDUCATION/TRAINING PROGRAM

## 2022-08-20 PROCEDURE — 85025 COMPLETE CBC W/AUTO DIFF WBC: CPT | Performed by: STUDENT IN AN ORGANIZED HEALTH CARE EDUCATION/TRAINING PROGRAM

## 2022-08-20 PROCEDURE — 80048 BASIC METABOLIC PNL TOTAL CA: CPT | Performed by: STUDENT IN AN ORGANIZED HEALTH CARE EDUCATION/TRAINING PROGRAM

## 2022-08-20 RX ORDER — ACETAMINOPHEN 500 MG
500 TABLET ORAL EVERY 4 HOURS PRN
Status: DISCONTINUED | OUTPATIENT
Start: 2022-08-20 | End: 2022-08-20

## 2022-08-20 RX ORDER — LEVOFLOXACIN 750 MG/1
750 TABLET ORAL
Qty: 2 TABLET | Refills: 0 | Status: SHIPPED | OUTPATIENT
Start: 2022-08-20 | End: 2022-08-23

## 2023-08-31 ENCOUNTER — APPOINTMENT (OUTPATIENT)
Dept: URGENT CARE | Age: 83
End: 2023-08-31

## 2023-10-06 RX ORDER — CHOLECALCIFEROL (VITAMIN D3) 50 MCG
TABLET ORAL
COMMUNITY

## 2023-10-06 RX ORDER — LISINOPRIL 10 MG/1
10 TABLET ORAL DAILY
COMMUNITY

## 2023-10-06 RX ORDER — DULOXETIN HYDROCHLORIDE 60 MG/1
60 CAPSULE, DELAYED RELEASE ORAL NIGHTLY
COMMUNITY

## 2023-10-06 RX ORDER — MELATONIN
325
COMMUNITY

## 2023-10-06 RX ORDER — AMLODIPINE BESYLATE 2.5 MG/1
2.5 TABLET ORAL NIGHTLY
COMMUNITY

## 2023-10-07 ENCOUNTER — EKG ENCOUNTER (OUTPATIENT)
Dept: LAB | Facility: HOSPITAL | Age: 83
End: 2023-10-07
Attending: UROLOGY
Payer: MEDICARE

## 2023-10-07 DIAGNOSIS — Z01.818 PRE-OP TESTING: ICD-10-CM

## 2023-10-07 PROCEDURE — 93005 ELECTROCARDIOGRAM TRACING: CPT

## 2023-10-07 PROCEDURE — 93010 ELECTROCARDIOGRAM REPORT: CPT | Performed by: UROLOGY

## 2023-10-08 LAB
ATRIAL RATE: 83 BPM
P AXIS: 80 DEGREES
P-R INTERVAL: 172 MS
Q-T INTERVAL: 336 MS
QRS DURATION: 92 MS
QTC CALCULATION (BEZET): 394 MS
R AXIS: -11 DEGREES
T AXIS: 40 DEGREES
VENTRICULAR RATE: 83 BPM

## 2023-10-10 ENCOUNTER — HOSPITAL ENCOUNTER (OUTPATIENT)
Facility: HOSPITAL | Age: 83
Setting detail: HOSPITAL OUTPATIENT SURGERY
Discharge: HOME | End: 2023-10-10
Attending: UROLOGY | Admitting: UROLOGY
Payer: MEDICARE

## 2023-10-10 ENCOUNTER — ANESTHESIA EVENT (OUTPATIENT)
Dept: SURGERY | Facility: HOSPITAL | Age: 83
End: 2023-10-10
Payer: MEDICARE

## 2023-10-10 ENCOUNTER — ANESTHESIA (OUTPATIENT)
Dept: SURGERY | Facility: HOSPITAL | Age: 83
End: 2023-10-10
Payer: MEDICARE

## 2023-10-10 VITALS
HEIGHT: 57 IN | RESPIRATION RATE: 18 BRPM | TEMPERATURE: 98 F | BODY MASS INDEX: 22.87 KG/M2 | WEIGHT: 106 LBS | OXYGEN SATURATION: 95 % | SYSTOLIC BLOOD PRESSURE: 112 MMHG | HEART RATE: 88 BPM | DIASTOLIC BLOOD PRESSURE: 52 MMHG

## 2023-10-10 DIAGNOSIS — Z01.818 PRE-OP TESTING: Primary | ICD-10-CM

## 2023-10-10 PROCEDURE — 88305 TISSUE EXAM BY PATHOLOGIST: CPT | Performed by: UROLOGY

## 2023-10-10 PROCEDURE — 0TBB8ZZ EXCISION OF BLADDER, VIA NATURAL OR ARTIFICIAL OPENING ENDOSCOPIC: ICD-10-PCS | Performed by: UROLOGY

## 2023-10-10 PROCEDURE — 88307 TISSUE EXAM BY PATHOLOGIST: CPT | Performed by: UROLOGY

## 2023-10-10 RX ORDER — NEOSTIGMINE METHYLSULFATE 1 MG/ML
INJECTION, SOLUTION INTRAVENOUS AS NEEDED
Status: DISCONTINUED | OUTPATIENT
Start: 2023-10-10 | End: 2023-10-10 | Stop reason: SURG

## 2023-10-10 RX ORDER — HYDROCODONE BITARTRATE AND ACETAMINOPHEN 5; 325 MG/1; MG/1
2 TABLET ORAL ONCE AS NEEDED
Status: DISCONTINUED | OUTPATIENT
Start: 2023-10-10 | End: 2023-10-10

## 2023-10-10 RX ORDER — EPHEDRINE SULFATE 50 MG/ML
INJECTION INTRAVENOUS AS NEEDED
Status: DISCONTINUED | OUTPATIENT
Start: 2023-10-10 | End: 2023-10-10 | Stop reason: SURG

## 2023-10-10 RX ORDER — HYDROMORPHONE HYDROCHLORIDE 1 MG/ML
0.6 INJECTION, SOLUTION INTRAMUSCULAR; INTRAVENOUS; SUBCUTANEOUS EVERY 5 MIN PRN
Status: DISCONTINUED | OUTPATIENT
Start: 2023-10-10 | End: 2023-10-10

## 2023-10-10 RX ORDER — ACETAMINOPHEN 500 MG
1000 TABLET ORAL ONCE
Status: DISCONTINUED | OUTPATIENT
Start: 2023-10-10 | End: 2023-10-10 | Stop reason: HOSPADM

## 2023-10-10 RX ORDER — HYDROMORPHONE HYDROCHLORIDE 1 MG/ML
0.2 INJECTION, SOLUTION INTRAMUSCULAR; INTRAVENOUS; SUBCUTANEOUS EVERY 5 MIN PRN
Status: DISCONTINUED | OUTPATIENT
Start: 2023-10-10 | End: 2023-10-10

## 2023-10-10 RX ORDER — ONDANSETRON 2 MG/ML
INJECTION INTRAMUSCULAR; INTRAVENOUS AS NEEDED
Status: DISCONTINUED | OUTPATIENT
Start: 2023-10-10 | End: 2023-10-10 | Stop reason: SURG

## 2023-10-10 RX ORDER — SODIUM CHLORIDE, SODIUM LACTATE, POTASSIUM CHLORIDE, CALCIUM CHLORIDE 600; 310; 30; 20 MG/100ML; MG/100ML; MG/100ML; MG/100ML
INJECTION, SOLUTION INTRAVENOUS CONTINUOUS
Status: DISCONTINUED | OUTPATIENT
Start: 2023-10-10 | End: 2023-10-10

## 2023-10-10 RX ORDER — ROCURONIUM BROMIDE 10 MG/ML
INJECTION, SOLUTION INTRAVENOUS AS NEEDED
Status: DISCONTINUED | OUTPATIENT
Start: 2023-10-10 | End: 2023-10-10 | Stop reason: SURG

## 2023-10-10 RX ORDER — PHENYLEPHRINE HCL 10 MG/ML
VIAL (ML) INJECTION AS NEEDED
Status: DISCONTINUED | OUTPATIENT
Start: 2023-10-10 | End: 2023-10-10 | Stop reason: SURG

## 2023-10-10 RX ORDER — GLYCOPYRROLATE 0.2 MG/ML
INJECTION, SOLUTION INTRAMUSCULAR; INTRAVENOUS AS NEEDED
Status: DISCONTINUED | OUTPATIENT
Start: 2023-10-10 | End: 2023-10-10 | Stop reason: SURG

## 2023-10-10 RX ORDER — ACETAMINOPHEN 500 MG
1000 TABLET ORAL ONCE AS NEEDED
Status: DISCONTINUED | OUTPATIENT
Start: 2023-10-10 | End: 2023-10-10

## 2023-10-10 RX ORDER — LIDOCAINE HYDROCHLORIDE 10 MG/ML
INJECTION, SOLUTION EPIDURAL; INFILTRATION; INTRACAUDAL; PERINEURAL AS NEEDED
Status: DISCONTINUED | OUTPATIENT
Start: 2023-10-10 | End: 2023-10-10 | Stop reason: SURG

## 2023-10-10 RX ORDER — NALOXONE HYDROCHLORIDE 0.4 MG/ML
0.08 INJECTION, SOLUTION INTRAMUSCULAR; INTRAVENOUS; SUBCUTANEOUS AS NEEDED
Status: DISCONTINUED | OUTPATIENT
Start: 2023-10-10 | End: 2023-10-10

## 2023-10-10 RX ORDER — HYDROMORPHONE HYDROCHLORIDE 1 MG/ML
0.4 INJECTION, SOLUTION INTRAMUSCULAR; INTRAVENOUS; SUBCUTANEOUS EVERY 5 MIN PRN
Status: DISCONTINUED | OUTPATIENT
Start: 2023-10-10 | End: 2023-10-10

## 2023-10-10 RX ORDER — ONDANSETRON 2 MG/ML
4 INJECTION INTRAMUSCULAR; INTRAVENOUS EVERY 6 HOURS PRN
Status: DISCONTINUED | OUTPATIENT
Start: 2023-10-10 | End: 2023-10-10

## 2023-10-10 RX ORDER — HYDROCODONE BITARTRATE AND ACETAMINOPHEN 5; 325 MG/1; MG/1
1 TABLET ORAL ONCE AS NEEDED
Status: DISCONTINUED | OUTPATIENT
Start: 2023-10-10 | End: 2023-10-10

## 2023-10-10 RX ORDER — PHENAZOPYRIDINE HYDROCHLORIDE 200 MG/1
200 TABLET, FILM COATED ORAL 3 TIMES DAILY PRN
Qty: 21 TABLET | Refills: 0 | Status: SHIPPED | OUTPATIENT
Start: 2023-10-10

## 2023-10-10 RX ADMIN — SODIUM CHLORIDE, SODIUM LACTATE, POTASSIUM CHLORIDE, CALCIUM CHLORIDE: 600; 310; 30; 20 INJECTION, SOLUTION INTRAVENOUS at 14:54:00

## 2023-10-10 RX ADMIN — EPHEDRINE SULFATE 10 MG: 50 INJECTION INTRAVENOUS at 14:15:00

## 2023-10-10 RX ADMIN — ROCURONIUM BROMIDE 30 MG: 10 INJECTION, SOLUTION INTRAVENOUS at 13:52:00

## 2023-10-10 RX ADMIN — PHENYLEPHRINE HCL 100 MCG: 10 MG/ML VIAL (ML) INJECTION at 14:02:00

## 2023-10-10 RX ADMIN — GLYCOPYRROLATE 0.6 MG: 0.2 INJECTION, SOLUTION INTRAMUSCULAR; INTRAVENOUS at 14:45:00

## 2023-10-10 RX ADMIN — NEOSTIGMINE METHYLSULFATE 3 MG: 1 INJECTION, SOLUTION INTRAVENOUS at 14:45:00

## 2023-10-10 RX ADMIN — LIDOCAINE HYDROCHLORIDE 50 MG: 10 INJECTION, SOLUTION EPIDURAL; INFILTRATION; INTRACAUDAL; PERINEURAL at 13:52:00

## 2023-10-10 RX ADMIN — ONDANSETRON 4 MG: 2 INJECTION INTRAMUSCULAR; INTRAVENOUS at 14:36:00

## 2023-10-10 RX ADMIN — EPHEDRINE SULFATE 10 MG: 50 INJECTION INTRAVENOUS at 14:19:00

## 2023-10-10 RX ADMIN — PHENYLEPHRINE HCL 100 MCG: 10 MG/ML VIAL (ML) INJECTION at 14:25:00

## 2023-10-10 RX ADMIN — PHENYLEPHRINE HCL 100 MCG: 10 MG/ML VIAL (ML) INJECTION at 14:05:00

## 2023-10-10 RX ADMIN — SODIUM CHLORIDE, SODIUM LACTATE, POTASSIUM CHLORIDE, CALCIUM CHLORIDE: 600; 310; 30; 20 INJECTION, SOLUTION INTRAVENOUS at 13:51:00

## 2023-10-10 NOTE — ANESTHESIA PROCEDURE NOTES
Airway  Date/Time: 10/10/2023 1:54 PM  Urgency: elective    Airway not difficult    General Information and Staff    Patient location during procedure: OR  Anesthesiologist: Israel Geronimo MD  Resident/CRNA: Cathryn Gandara CRNA  Performed: CRNA   Performed by: Cathryn Gandara CRNA  Authorized by: Israel Geronimo MD      Indications and Patient Condition  Indications for airway management: anesthesia  Sedation level: deep  Preoxygenated: yes  Patient position: sniffing  Mask difficulty assessment: 2 - vent by mask + OA or adjuvant +/- NMBA    Final Airway Details  Final airway type: endotracheal airway      Successful airway: ETT  Cuffed: yes   Successful intubation technique: direct laryngoscopy  Endotracheal tube insertion site: oral  Blade: Marita  Blade size: #3  ETT size (mm): 7.0    Cormack-Lehane Classification: grade IIA - partial view of glottis  Placement verified by: capnometry   Measured from: lips  ETT to lips (cm): 20  Number of attempts at approach: 1

## 2023-10-10 NOTE — H&P
H&P dated 10/9/23 by Dr. Troy Jimenez was reviewed. No changes to be made. Patient seen in preoperative area. We discussed the surgical plan for today and again discussed risks, benefits, alternatives, and post operative expectations. Patient verbalized understanding and all questions answered. She would like to proceed with surgery as planned.     Yumiko Lutz, [address] [facility] Urology

## 2023-10-10 NOTE — DISCHARGE INSTRUCTIONS
Expect a variable degree of blood in urine. Stay well hydrated and avoid constipation. Rest today and resume normal activity tomorrow. Call if fever, passing large blood clots, severe pain, questions or concerns.

## 2023-10-10 NOTE — OPERATIVE REPORT
[address] [facility]  10/26/1940  [id number]      Operative Report    Preoperative diagnosis: Bladder tumor  Postoperative diagnosis: Same  Procedure: Cystoscopy with trans urethral resection of 3 cm bladder tumor  Date of procedure: 10/10/2023        Surgeon: Dr. Conchita Soni  Assistant: None  Anesthesia: General  EBL: 5mL  Findings: 3 cm tumor on the left trigone which came down to a 5 mm stalk 1 cm anteromedial to the left ureteral orifice. The left ureteral orifice was not affected. Specimen: 1. Bladder tumor 2. Deep resection of bladder tumor site  Drains: None    Procedure Summary: After informed consent was obtained and in the chart, preoperative antibiotics were given, and the patient was taken to the operating room suite and placed on the operating room table in supine position. After bilateral sequential devices had been applied and satisfactory general anesthesia had been achieved, the patients' legs were raised to a dorsal lithotomy position. The patients' groin was prepped and draped in the usual sterile fashion. A 21Fr cystoscope was then introduced through a normal female urethra with a large tumor on the left trigone as above. The bladder was drained and refilled with sterile irrigant. Systematic examination of the bladder showed no signs of foreign body, diverticulum, extrinsic pressure defect, and minimal trabeculation. A well lubricated 26Fr resectoscope sheath was placed into the bladder using a bibiana obturator. The owens working element was then placed with a 24Fr bipolar loop. The loop was then used to resect the tumor which was irrigated from the bladder and sent as specimen #1. Additional resection was the taken of the bladder tumor site which was sent as specimen #2. Hemostasis was achieved in the resected area using electrocautery. The bladder was drained and the area was re-inspected under a low flow, low pressure state and was seen to be completely hemostatic.  The left ureteral orifice was near the area of resection but not involved and there was clear efflux of urine seen. The bladder was refilled with sterile irrigant and drained several times to ensure no retained tumor fragments. The resectoscope was then removed. The procedure was then terminated. Disposition: The patient tolerated the procedure well, was extubated in the OR and transferred to the PACU in stable condition with no immediately apparent complications.      Juan Daniel Wilhelm, [address] [place] [address] Urology

## 2024-04-18 RX ORDER — ACETAMINOPHEN 500 MG
500 TABLET ORAL EVERY 6 HOURS PRN
COMMUNITY

## 2024-04-18 RX ORDER — MULTIVITAMIN WITH IRON
250 TABLET ORAL 2 TIMES DAILY
COMMUNITY

## 2024-04-19 ENCOUNTER — LAB ENCOUNTER (OUTPATIENT)
Dept: LAB | Facility: HOSPITAL | Age: 84
End: 2024-04-19
Attending: ANESTHESIOLOGY
Payer: MEDICARE

## 2024-04-19 DIAGNOSIS — D64.9 ANEMIA: Primary | ICD-10-CM

## 2024-04-19 DIAGNOSIS — Z01.818 PRE-OP TESTING: ICD-10-CM

## 2024-04-19 LAB
ANTIBODY SCREEN: NEGATIVE
RH BLOOD TYPE: POSITIVE

## 2024-04-19 PROCEDURE — 85060 BLOOD SMEAR INTERPRETATION: CPT

## 2024-04-19 PROCEDURE — 86920 COMPATIBILITY TEST SPIN: CPT

## 2024-04-19 PROCEDURE — 86901 BLOOD TYPING SEROLOGIC RH(D): CPT

## 2024-04-19 PROCEDURE — 86900 BLOOD TYPING SEROLOGIC ABO: CPT

## 2024-04-19 PROCEDURE — 86850 RBC ANTIBODY SCREEN: CPT

## 2024-04-19 PROCEDURE — 36415 COLL VENOUS BLD VENIPUNCTURE: CPT

## 2024-04-19 PROCEDURE — 85027 COMPLETE CBC AUTOMATED: CPT

## 2024-04-20 LAB
DEPRECATED RDW RBC AUTO: 67.1 FL (ref 35.1–46.3)
ERYTHROCYTE [DISTWIDTH] IN BLOOD BY AUTOMATED COUNT: 15.5 % (ref 11–15)
HCT VFR BLD AUTO: 28.5 %
HGB BLD-MCNC: 8.8 G/DL
MCH RBC QN AUTO: 36.4 PG (ref 26–34)
MCHC RBC AUTO-ENTMCNC: 30.9 G/DL (ref 31–37)
MCV RBC AUTO: 117.8 FL
PLATELET # BLD AUTO: 164 10(3)UL (ref 150–450)
RBC # BLD AUTO: 2.42 X10(6)UL
WBC # BLD AUTO: 8 X10(3) UL (ref 4–11)

## 2024-04-22 ENCOUNTER — ANESTHESIA EVENT (OUTPATIENT)
Dept: SURGERY | Facility: HOSPITAL | Age: 84
End: 2024-04-22
Payer: MEDICARE

## 2024-04-23 ENCOUNTER — HOSPITAL ENCOUNTER (INPATIENT)
Facility: HOSPITAL | Age: 84
LOS: 10 days | Discharge: NURSING, REHAB OR LONG-TERM CARE | End: 2024-05-03
Attending: OBSTETRICS & GYNECOLOGY | Admitting: OBSTETRICS & GYNECOLOGY
Payer: MEDICARE

## 2024-04-23 ENCOUNTER — ANESTHESIA (OUTPATIENT)
Dept: SURGERY | Facility: HOSPITAL | Age: 84
End: 2024-04-23
Payer: MEDICARE

## 2024-04-23 DIAGNOSIS — Z01.818 PRE-OP TESTING: Primary | ICD-10-CM

## 2024-04-23 DIAGNOSIS — C56.9 MALIGNANT NEOPLASM OF OVARY, UNSPECIFIED LATERALITY (HCC): ICD-10-CM

## 2024-04-23 LAB — GLUCOSE BLDC GLUCOMTR-MCNC: 143 MG/DL (ref 70–99)

## 2024-04-23 PROCEDURE — 0DNW4ZZ RELEASE PERITONEUM, PERCUTANEOUS ENDOSCOPIC APPROACH: ICD-10-PCS | Performed by: OBSTETRICS & GYNECOLOGY

## 2024-04-23 PROCEDURE — 0UT24ZZ RESECTION OF BILATERAL OVARIES, PERCUTANEOUS ENDOSCOPIC APPROACH: ICD-10-PCS | Performed by: OBSTETRICS & GYNECOLOGY

## 2024-04-23 PROCEDURE — 88309 TISSUE EXAM BY PATHOLOGIST: CPT | Performed by: OBSTETRICS & GYNECOLOGY

## 2024-04-23 PROCEDURE — 82962 GLUCOSE BLOOD TEST: CPT

## 2024-04-23 PROCEDURE — 88342 IMHCHEM/IMCYTCHM 1ST ANTB: CPT | Performed by: OBSTETRICS & GYNECOLOGY

## 2024-04-23 PROCEDURE — 88305 TISSUE EXAM BY PATHOLOGIST: CPT | Performed by: OBSTETRICS & GYNECOLOGY

## 2024-04-23 PROCEDURE — 07BC4ZZ EXCISION OF PELVIS LYMPHATIC, PERCUTANEOUS ENDOSCOPIC APPROACH: ICD-10-PCS | Performed by: OBSTETRICS & GYNECOLOGY

## 2024-04-23 PROCEDURE — 88112 CYTOPATH CELL ENHANCE TECH: CPT | Performed by: OBSTETRICS & GYNECOLOGY

## 2024-04-23 PROCEDURE — 0UT74ZZ RESECTION OF BILATERAL FALLOPIAN TUBES, PERCUTANEOUS ENDOSCOPIC APPROACH: ICD-10-PCS | Performed by: OBSTETRICS & GYNECOLOGY

## 2024-04-23 PROCEDURE — 0UT94ZZ RESECTION OF UTERUS, PERCUTANEOUS ENDOSCOPIC APPROACH: ICD-10-PCS | Performed by: OBSTETRICS & GYNECOLOGY

## 2024-04-23 PROCEDURE — 0DBU4ZZ EXCISION OF OMENTUM, PERCUTANEOUS ENDOSCOPIC APPROACH: ICD-10-PCS | Performed by: OBSTETRICS & GYNECOLOGY

## 2024-04-23 PROCEDURE — 8E0W4CZ ROBOTIC ASSISTED PROCEDURE OF TRUNK REGION, PERCUTANEOUS ENDOSCOPIC APPROACH: ICD-10-PCS | Performed by: OBSTETRICS & GYNECOLOGY

## 2024-04-23 PROCEDURE — 88307 TISSUE EXAM BY PATHOLOGIST: CPT | Performed by: OBSTETRICS & GYNECOLOGY

## 2024-04-23 PROCEDURE — 88341 IMHCHEM/IMCYTCHM EA ADD ANTB: CPT | Performed by: OBSTETRICS & GYNECOLOGY

## 2024-04-23 RX ORDER — MORPHINE SULFATE 10 MG/ML
6 INJECTION, SOLUTION INTRAMUSCULAR; INTRAVENOUS EVERY 10 MIN PRN
Status: DISCONTINUED | OUTPATIENT
Start: 2024-04-23 | End: 2024-04-23 | Stop reason: HOSPADM

## 2024-04-23 RX ORDER — HYDROCODONE BITARTRATE AND ACETAMINOPHEN 5; 325 MG/1; MG/1
1 TABLET ORAL EVERY 6 HOURS PRN
Status: DISCONTINUED | OUTPATIENT
Start: 2024-04-23 | End: 2024-05-03

## 2024-04-23 RX ORDER — ACETAMINOPHEN 325 MG/1
650 TABLET ORAL EVERY 4 HOURS PRN
Status: DISCONTINUED | OUTPATIENT
Start: 2024-04-23 | End: 2024-05-03

## 2024-04-23 RX ORDER — NEOSTIGMINE METHYLSULFATE 1 MG/ML
INJECTION, SOLUTION INTRAVENOUS AS NEEDED
Status: DISCONTINUED | OUTPATIENT
Start: 2024-04-23 | End: 2024-04-23 | Stop reason: SURG

## 2024-04-23 RX ORDER — DOCUSATE SODIUM 100 MG/1
100 CAPSULE, LIQUID FILLED ORAL 2 TIMES DAILY
Status: DISCONTINUED | OUTPATIENT
Start: 2024-04-24 | End: 2024-05-03

## 2024-04-23 RX ORDER — DEXAMETHASONE SODIUM PHOSPHATE 4 MG/ML
VIAL (ML) INJECTION AS NEEDED
Status: DISCONTINUED | OUTPATIENT
Start: 2024-04-23 | End: 2024-04-23 | Stop reason: SURG

## 2024-04-23 RX ORDER — ATORVASTATIN CALCIUM 10 MG/1
10 TABLET, FILM COATED ORAL NIGHTLY
Status: DISCONTINUED | OUTPATIENT
Start: 2024-04-23 | End: 2024-05-03

## 2024-04-23 RX ORDER — NICOTINE POLACRILEX 4 MG
15 LOZENGE BUCCAL
Status: DISCONTINUED | OUTPATIENT
Start: 2024-04-23 | End: 2024-04-23 | Stop reason: HOSPADM

## 2024-04-23 RX ORDER — CLONAZEPAM 0.5 MG/1
0.5 TABLET ORAL NIGHTLY
Status: DISCONTINUED | OUTPATIENT
Start: 2024-04-23 | End: 2024-05-03

## 2024-04-23 RX ORDER — MORPHINE SULFATE 4 MG/ML
4 INJECTION, SOLUTION INTRAMUSCULAR; INTRAVENOUS EVERY 10 MIN PRN
Status: DISCONTINUED | OUTPATIENT
Start: 2024-04-23 | End: 2024-04-23 | Stop reason: HOSPADM

## 2024-04-23 RX ORDER — ONDANSETRON 2 MG/ML
4 INJECTION INTRAMUSCULAR; INTRAVENOUS EVERY 8 HOURS PRN
Status: DISCONTINUED | OUTPATIENT
Start: 2024-04-23 | End: 2024-05-03

## 2024-04-23 RX ORDER — ENOXAPARIN SODIUM 100 MG/ML
40 INJECTION SUBCUTANEOUS DAILY
Status: DISCONTINUED | OUTPATIENT
Start: 2024-04-24 | End: 2024-05-03

## 2024-04-23 RX ORDER — HYDROMORPHONE HYDROCHLORIDE 1 MG/ML
0.6 INJECTION, SOLUTION INTRAMUSCULAR; INTRAVENOUS; SUBCUTANEOUS EVERY 5 MIN PRN
Status: DISCONTINUED | OUTPATIENT
Start: 2024-04-23 | End: 2024-04-23 | Stop reason: HOSPADM

## 2024-04-23 RX ORDER — NICOTINE POLACRILEX 4 MG
30 LOZENGE BUCCAL
Status: DISCONTINUED | OUTPATIENT
Start: 2024-04-23 | End: 2024-04-23 | Stop reason: HOSPADM

## 2024-04-23 RX ORDER — BUPIVACAINE HYDROCHLORIDE 2.5 MG/ML
INJECTION, SOLUTION EPIDURAL; INFILTRATION; INTRACAUDAL AS NEEDED
Status: DISCONTINUED | OUTPATIENT
Start: 2024-04-23 | End: 2024-04-23 | Stop reason: HOSPADM

## 2024-04-23 RX ORDER — LISINOPRIL 10 MG/1
10 TABLET ORAL EVERY MORNING
Status: DISCONTINUED | OUTPATIENT
Start: 2024-04-23 | End: 2024-05-03

## 2024-04-23 RX ORDER — GLYCOPYRROLATE 0.2 MG/ML
INJECTION, SOLUTION INTRAMUSCULAR; INTRAVENOUS AS NEEDED
Status: DISCONTINUED | OUTPATIENT
Start: 2024-04-23 | End: 2024-04-23 | Stop reason: SURG

## 2024-04-23 RX ORDER — SODIUM CHLORIDE, SODIUM LACTATE, POTASSIUM CHLORIDE, CALCIUM CHLORIDE 600; 310; 30; 20 MG/100ML; MG/100ML; MG/100ML; MG/100ML
INJECTION, SOLUTION INTRAVENOUS CONTINUOUS
Status: DISCONTINUED | OUTPATIENT
Start: 2024-04-23 | End: 2024-04-24

## 2024-04-23 RX ORDER — SODIUM CHLORIDE, SODIUM LACTATE, POTASSIUM CHLORIDE, CALCIUM CHLORIDE 600; 310; 30; 20 MG/100ML; MG/100ML; MG/100ML; MG/100ML
INJECTION, SOLUTION INTRAVENOUS CONTINUOUS
Status: DISCONTINUED | OUTPATIENT
Start: 2024-04-23 | End: 2024-04-27

## 2024-04-23 RX ORDER — LIDOCAINE HYDROCHLORIDE 10 MG/ML
INJECTION, SOLUTION EPIDURAL; INFILTRATION; INTRACAUDAL; PERINEURAL AS NEEDED
Status: DISCONTINUED | OUTPATIENT
Start: 2024-04-23 | End: 2024-04-23 | Stop reason: SURG

## 2024-04-23 RX ORDER — NALOXONE HYDROCHLORIDE 0.4 MG/ML
0.08 INJECTION, SOLUTION INTRAMUSCULAR; INTRAVENOUS; SUBCUTANEOUS AS NEEDED
Status: DISCONTINUED | OUTPATIENT
Start: 2024-04-23 | End: 2024-04-23 | Stop reason: HOSPADM

## 2024-04-23 RX ORDER — HYDROMORPHONE HYDROCHLORIDE 1 MG/ML
0.8 INJECTION, SOLUTION INTRAMUSCULAR; INTRAVENOUS; SUBCUTANEOUS EVERY 2 HOUR PRN
Status: DISCONTINUED | OUTPATIENT
Start: 2024-04-23 | End: 2024-04-28

## 2024-04-23 RX ORDER — SODIUM CHLORIDE, SODIUM LACTATE, POTASSIUM CHLORIDE, CALCIUM CHLORIDE 600; 310; 30; 20 MG/100ML; MG/100ML; MG/100ML; MG/100ML
INJECTION, SOLUTION INTRAVENOUS CONTINUOUS
Status: DISCONTINUED | OUTPATIENT
Start: 2024-04-23 | End: 2024-04-23 | Stop reason: HOSPADM

## 2024-04-23 RX ORDER — DIPHENHYDRAMINE HYDROCHLORIDE 50 MG/ML
12.5 INJECTION INTRAMUSCULAR; INTRAVENOUS EVERY 4 HOURS PRN
Status: DISCONTINUED | OUTPATIENT
Start: 2024-04-23 | End: 2024-04-28

## 2024-04-23 RX ORDER — ONDANSETRON 2 MG/ML
INJECTION INTRAMUSCULAR; INTRAVENOUS AS NEEDED
Status: DISCONTINUED | OUTPATIENT
Start: 2024-04-23 | End: 2024-04-23 | Stop reason: SURG

## 2024-04-23 RX ORDER — HYDROMORPHONE HYDROCHLORIDE 1 MG/ML
0.2 INJECTION, SOLUTION INTRAMUSCULAR; INTRAVENOUS; SUBCUTANEOUS EVERY 2 HOUR PRN
Status: DISCONTINUED | OUTPATIENT
Start: 2024-04-23 | End: 2024-04-28

## 2024-04-23 RX ORDER — SODIUM CHLORIDE 9 MG/ML
INJECTION, SOLUTION INTRAVENOUS CONTINUOUS PRN
Status: DISCONTINUED | OUTPATIENT
Start: 2024-04-23 | End: 2024-04-23 | Stop reason: SURG

## 2024-04-23 RX ORDER — CLINDAMYCIN PHOSPHATE 900 MG/50ML
900 INJECTION, SOLUTION INTRAVENOUS EVERY 8 HOURS
Status: COMPLETED | OUTPATIENT
Start: 2024-04-23 | End: 2024-04-23

## 2024-04-23 RX ORDER — ACETAMINOPHEN 10 MG/ML
INJECTION, SOLUTION INTRAVENOUS AS NEEDED
Status: DISCONTINUED | OUTPATIENT
Start: 2024-04-23 | End: 2024-04-23 | Stop reason: SURG

## 2024-04-23 RX ORDER — PANTOPRAZOLE SODIUM 20 MG/1
20 TABLET, DELAYED RELEASE ORAL
Status: DISCONTINUED | OUTPATIENT
Start: 2024-04-24 | End: 2024-05-03

## 2024-04-23 RX ORDER — HYDROMORPHONE HYDROCHLORIDE 1 MG/ML
0.4 INJECTION, SOLUTION INTRAMUSCULAR; INTRAVENOUS; SUBCUTANEOUS EVERY 5 MIN PRN
Status: DISCONTINUED | OUTPATIENT
Start: 2024-04-23 | End: 2024-04-23 | Stop reason: HOSPADM

## 2024-04-23 RX ORDER — ONDANSETRON 4 MG/1
4 TABLET, FILM COATED ORAL EVERY 8 HOURS PRN
Status: DISCONTINUED | OUTPATIENT
Start: 2024-04-23 | End: 2024-05-03

## 2024-04-23 RX ORDER — LEVOTHYROXINE SODIUM 0.05 MG/1
50 TABLET ORAL
Status: DISCONTINUED | OUTPATIENT
Start: 2024-04-24 | End: 2024-05-03

## 2024-04-23 RX ORDER — DEXTROSE MONOHYDRATE 25 G/50ML
50 INJECTION, SOLUTION INTRAVENOUS
Status: DISCONTINUED | OUTPATIENT
Start: 2024-04-23 | End: 2024-04-23 | Stop reason: HOSPADM

## 2024-04-23 RX ORDER — MORPHINE SULFATE 4 MG/ML
2 INJECTION, SOLUTION INTRAMUSCULAR; INTRAVENOUS EVERY 10 MIN PRN
Status: DISCONTINUED | OUTPATIENT
Start: 2024-04-23 | End: 2024-04-23 | Stop reason: HOSPADM

## 2024-04-23 RX ORDER — SIMETHICONE 80 MG
80 TABLET,CHEWABLE ORAL 3 TIMES DAILY PRN
Status: DISCONTINUED | OUTPATIENT
Start: 2024-04-23 | End: 2024-05-03

## 2024-04-23 RX ORDER — ACETAMINOPHEN 500 MG
1000 TABLET ORAL ONCE
Status: DISCONTINUED | OUTPATIENT
Start: 2024-04-23 | End: 2024-04-23 | Stop reason: HOSPADM

## 2024-04-23 RX ORDER — HYDROMORPHONE HYDROCHLORIDE 1 MG/ML
0.4 INJECTION, SOLUTION INTRAMUSCULAR; INTRAVENOUS; SUBCUTANEOUS EVERY 2 HOUR PRN
Status: DISCONTINUED | OUTPATIENT
Start: 2024-04-23 | End: 2024-04-28

## 2024-04-23 RX ORDER — PHENYLEPHRINE HCL 10 MG/ML
VIAL (ML) INJECTION AS NEEDED
Status: DISCONTINUED | OUTPATIENT
Start: 2024-04-23 | End: 2024-04-23 | Stop reason: SURG

## 2024-04-23 RX ORDER — HYDROMORPHONE HYDROCHLORIDE 1 MG/ML
0.2 INJECTION, SOLUTION INTRAMUSCULAR; INTRAVENOUS; SUBCUTANEOUS EVERY 5 MIN PRN
Status: DISCONTINUED | OUTPATIENT
Start: 2024-04-23 | End: 2024-04-23 | Stop reason: HOSPADM

## 2024-04-23 RX ORDER — ROCURONIUM BROMIDE 10 MG/ML
INJECTION, SOLUTION INTRAVENOUS AS NEEDED
Status: DISCONTINUED | OUTPATIENT
Start: 2024-04-23 | End: 2024-04-23 | Stop reason: SURG

## 2024-04-23 RX ORDER — IBUPROFEN 600 MG/1
600 TABLET ORAL EVERY 6 HOURS PRN
Status: DISCONTINUED | OUTPATIENT
Start: 2024-04-23 | End: 2024-04-24

## 2024-04-23 RX ORDER — HYDROMORPHONE HYDROCHLORIDE 1 MG/ML
INJECTION, SOLUTION INTRAMUSCULAR; INTRAVENOUS; SUBCUTANEOUS AS NEEDED
Status: DISCONTINUED | OUTPATIENT
Start: 2024-04-23 | End: 2024-04-23 | Stop reason: SURG

## 2024-04-23 RX ORDER — GABAPENTIN 300 MG/1
300 CAPSULE ORAL 2 TIMES DAILY
Status: DISCONTINUED | OUTPATIENT
Start: 2024-04-23 | End: 2024-05-03

## 2024-04-23 RX ORDER — AMLODIPINE BESYLATE 2.5 MG/1
2.5 TABLET ORAL NIGHTLY
Status: DISCONTINUED | OUTPATIENT
Start: 2024-04-23 | End: 2024-05-03

## 2024-04-23 RX ORDER — DULOXETIN HYDROCHLORIDE 30 MG/1
60 CAPSULE, DELAYED RELEASE ORAL NIGHTLY
Status: DISCONTINUED | OUTPATIENT
Start: 2024-04-23 | End: 2024-05-03

## 2024-04-23 RX ADMIN — PHENYLEPHRINE HCL 100 MCG: 10 MG/ML VIAL (ML) INJECTION at 10:37:00

## 2024-04-23 RX ADMIN — PHENYLEPHRINE HCL 200 MCG: 10 MG/ML VIAL (ML) INJECTION at 08:21:00

## 2024-04-23 RX ADMIN — PHENYLEPHRINE HCL 200 MCG: 10 MG/ML VIAL (ML) INJECTION at 07:42:00

## 2024-04-23 RX ADMIN — ROCURONIUM BROMIDE 10 MG: 10 INJECTION, SOLUTION INTRAVENOUS at 09:34:00

## 2024-04-23 RX ADMIN — LIDOCAINE HYDROCHLORIDE 50 MG: 10 INJECTION, SOLUTION EPIDURAL; INFILTRATION; INTRACAUDAL; PERINEURAL at 07:37:00

## 2024-04-23 RX ADMIN — PHENYLEPHRINE HCL 200 MCG: 10 MG/ML VIAL (ML) INJECTION at 07:41:00

## 2024-04-23 RX ADMIN — PHENYLEPHRINE HCL 100 MCG: 10 MG/ML VIAL (ML) INJECTION at 10:35:00

## 2024-04-23 RX ADMIN — SODIUM CHLORIDE, SODIUM LACTATE, POTASSIUM CHLORIDE, CALCIUM CHLORIDE: 600; 310; 30; 20 INJECTION, SOLUTION INTRAVENOUS at 09:42:00

## 2024-04-23 RX ADMIN — PHENYLEPHRINE HCL 100 MCG: 10 MG/ML VIAL (ML) INJECTION at 10:32:00

## 2024-04-23 RX ADMIN — ROCURONIUM BROMIDE 30 MG: 10 INJECTION, SOLUTION INTRAVENOUS at 07:37:00

## 2024-04-23 RX ADMIN — PHENYLEPHRINE HCL 200 MCG: 10 MG/ML VIAL (ML) INJECTION at 07:45:00

## 2024-04-23 RX ADMIN — DEXAMETHASONE SODIUM PHOSPHATE 8 MG: 4 MG/ML VIAL (ML) INJECTION at 07:37:00

## 2024-04-23 RX ADMIN — PHENYLEPHRINE HCL 100 MCG: 10 MG/ML VIAL (ML) INJECTION at 09:20:00

## 2024-04-23 RX ADMIN — SODIUM CHLORIDE: 9 INJECTION, SOLUTION INTRAVENOUS at 07:56:00

## 2024-04-23 RX ADMIN — ONDANSETRON 4 MG: 2 INJECTION INTRAMUSCULAR; INTRAVENOUS at 07:37:00

## 2024-04-23 RX ADMIN — PHENYLEPHRINE HCL 200 MCG: 10 MG/ML VIAL (ML) INJECTION at 08:06:00

## 2024-04-23 RX ADMIN — GLYCOPYRROLATE 0.8 MG: 0.2 INJECTION, SOLUTION INTRAMUSCULAR; INTRAVENOUS at 11:03:00

## 2024-04-23 RX ADMIN — ACETAMINOPHEN 650 MG: 10 INJECTION, SOLUTION INTRAVENOUS at 10:56:00

## 2024-04-23 RX ADMIN — PHENYLEPHRINE HCL 200 MCG: 10 MG/ML VIAL (ML) INJECTION at 08:22:00

## 2024-04-23 RX ADMIN — NEOSTIGMINE METHYLSULFATE 4 MG: 1 INJECTION, SOLUTION INTRAVENOUS at 11:03:00

## 2024-04-23 RX ADMIN — HYDROMORPHONE HYDROCHLORIDE 0.5 MG: 1 INJECTION, SOLUTION INTRAMUSCULAR; INTRAVENOUS; SUBCUTANEOUS at 09:30:00

## 2024-04-23 RX ADMIN — SODIUM CHLORIDE, SODIUM LACTATE, POTASSIUM CHLORIDE, CALCIUM CHLORIDE: 600; 310; 30; 20 INJECTION, SOLUTION INTRAVENOUS at 07:37:00

## 2024-04-23 NOTE — CM/SW NOTE
Department  notified of request for tierra [facility] referrals started. Assigned CM/SW to follow up with pt/family on further discharge planning.     Pattie Espinoza, DSC

## 2024-04-23 NOTE — ANESTHESIA POSTPROCEDURE EVALUATION
Patient: Lizeth Mcgill    Procedure Summary       Date: 04/23/24 Room / Location: [facility] MAIN OR  / [facility] MAIN OR    Anesthesia Start: 0730 Anesthesia Stop: 1125    Procedures:       Robotic assisted total laparoscopic hysterectomy, bilateral salpingo-oophorectomy, staging, debulking; Omentectomy; lysis of adhesions      XI ROBOT-ASSISTED LAPAROSCOPIC OVARIAN CYSTECTOMY/ SALPINGO-OOPHORECTOMY (Bilateral) Diagnosis: (Carcinoma, malignant neoplasm of right ovary)    Surgeons: Rigoberto Couch MD Anesthesiologist: Aiden Garrett MD    Anesthesia Type: general ASA Status: 4            Anesthesia Type: general    Vitals Value Taken Time   /61 04/23/24 1206   Temp 97.3 °F (36.3 °C) 04/23/24 1126   Pulse 66 04/23/24 1213   Resp 13 04/23/24 1213   SpO2 93 % 04/23/24 1213   Vitals shown include unfiled device data.    [facility] AN Post Evaluation:   Patient Evaluated in PACU  Patient Participation: complete - patient participated  Level of Consciousness: awake and alert  Pain Score: 0  Pain Management: adequate  Airway Patency:patent  Yes    Nausea/Vomiting: none  Cardiovascular Status: acceptable  Respiratory Status: acceptable  Postoperative Hydration acceptable      Aiden Garrett MD  4/23/2024 12:13 PM

## 2024-04-23 NOTE — CM/SW NOTE
APN order- pt will need to recover at [facility]    [facility] to send ref, [facility]    4/[facility] 0830  CM lvm for PP liaison re ref, await acceptance.    0910  Reserved at [facility], await PT/OT notes to start auth.    1145  CM met with pt and son at bedside to discuss rehab for recovery. Per pt she had this planned pta and facility is aware.    CM informed pt that bed is res'd however rehab notes will be needed to send to ins for prior auth. Pt reports she was seen by therapy already today.    1400  PT/OT notes pending    1455  PT OT notes sent to ref, CM req ins auth be started asap      Plan  [facility] pending  auth     / to remain available for support and/or discharge planning.     Mayuri Almendarez, RN    Ext [phone]

## 2024-04-23 NOTE — ANESTHESIA PROCEDURE NOTES
Airway  Date/Time: 4/23/2024 7:39 AM  Urgency: elective    Airway not difficult    General Information and Staff    Patient location during procedure: OR  Anesthesiologist: Aiden Garrett MD  Performed: anesthesiologist   Performed by: Aiden Garrett MD  Authorized by: Aiden Garrett MD      Indications and Patient Condition  Indications for airway management: anesthesia  Spontaneous Ventilation: absent  Sedation level: deep  Preoxygenated: yes  Patient position: sniffing  MILS not maintained throughout  Mask difficulty assessment: 0 - not attempted    Final Airway Details  Final airway type: endotracheal airway      Successful airway: ETT  Cuffed: yes   Successful intubation technique: direct laryngoscopy  Facilitating devices/methods: rapid sequence intubation  Endotracheal tube insertion site: oral  Blade: Marita  Blade size: #4  ETT size (mm): 7.0    Cormack-Lehane Classification: grade I - full view of glottis  Placement verified by: capnometry   Cuff volume (mL): 5  Measured from: lips  ETT to lips (cm): 22  Number of attempts at approach: 1  Number of other approaches attempted: 0
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
## 2024-04-23 NOTE — PLAN OF CARE
Pt aox4, drowsy, easily arousable. Tolerating clears. Pain managed with dilaudid. Voiding via arellano, low output, MD notified. Call light within reach, calls appropriately. Safety plan in place. Family at bedside.     Problem: Patient Centered Care  Goal: Patient preferences are identified and integrated in the patient's plan of care  Description: Interventions:  - What would you like us to know as we care for you? My son is my POA.   - Provide timely, complete, and accurate information to patient/family  - Incorporate patient and family knowledge, values, beliefs, and cultural backgrounds into the planning and delivery of care  - Encourage patient/family to participate in care and decision-making at the level they choose  - Honor patient and family perspectives and choices  Outcome: Progressing     Problem: Patient/Family Goals  Goal: Patient/Family Short Term Goal  Description: Patient's Short Term Goal:     Interventions:   -   - See additional Care Plan goals for specific interventions  Outcome: Progressing     Problem: PAIN - ADULT  Goal: Verbalizes/displays adequate comfort level or patient's stated pain goal  Description: INTERVENTIONS:  - Encourage pt to monitor pain and request assistance  - Assess pain using appropriate pain scale  - Administer analgesics based on type and severity of pain and evaluate response  - Implement non-pharmacological measures as appropriate and evaluate response  - Consider cultural and social influences on pain and pain management  - Manage/alleviate anxiety  - Utilize distraction and/or relaxation techniques  - Monitor for opioid side effects  - Notify MD/LIP if interventions unsuccessful or patient reports new pain  - Anticipate increased pain with activity and pre-medicate as appropriate  Outcome: Progressing     Problem: SAFETY ADULT - FALL  Goal: Free from fall injury  Description: INTERVENTIONS:  - Assess pt frequently for physical needs  - Identify cognitive and physical  deficits and behaviors that affect risk of falls.  - Atkinson fall precautions as indicated by assessment.  - Educate pt/family on patient safety including physical limitations  - Instruct pt to call for assistance with activity based on assessment  - Modify environment to reduce risk of injury  - Provide assistive devices as appropriate  - Consider OT/PT consult to assist with strengthening/mobility  - Encourage toileting schedule  Outcome: Progressing

## 2024-04-23 NOTE — BRIEF OP NOTE
Pre-Operative Diagnosis: Carcinoma, malignant neoplasm of right ovary     Post-Operative Diagnosis: Carcinoma, malignant neoplasm of right ovary      Procedure Performed:   Robotic assisted total laparoscopic hysterectomy, bilateral salpingo-oophorectomy, staging, debulking; Omentectomy; lysis of adhesions    Surgeons and Role:  Panel 1:     * Rigoberto Couch MD - Primary  Panel 2:     * Rigoberto Couch MD - Primary    Assistant(s):  Surgical Assistant.: Nando Mosqueda CSA  PA: Amanda Garza PA     Surgical Findings: right ovarian mass, extensive adhesions, see full op report     Specimen: sent to pathology     Estimated Blood Loss: No data recorded    Dictation Number:  TBD    BARRINGTON Ireland  4/23/2024  11:27 AM

## 2024-04-24 LAB
ANION GAP SERPL CALC-SCNC: 6 MMOL/L (ref 0–18)
BASOPHILS # BLD AUTO: 0.01 X10(3) UL (ref 0–0.2)
BASOPHILS NFR BLD AUTO: 0.1 %
BUN BLD-MCNC: 14 MG/DL (ref 9–23)
BUN/CREAT SERPL: 21.9 (ref 10–20)
CALCIUM BLD-MCNC: 9.4 MG/DL (ref 8.7–10.4)
CHLORIDE SERPL-SCNC: 109 MMOL/L (ref 98–112)
CO2 SERPL-SCNC: 26 MMOL/L (ref 21–32)
CREAT BLD-MCNC: 0.64 MG/DL
DEPRECATED RDW RBC AUTO: 64.8 FL (ref 35.1–46.3)
EGFRCR SERPLBLD CKD-EPI 2021: 88 ML/MIN/1.73M2 (ref 60–?)
EOSINOPHIL # BLD AUTO: 0 X10(3) UL (ref 0–0.7)
EOSINOPHIL NFR BLD AUTO: 0 %
ERYTHROCYTE [DISTWIDTH] IN BLOOD BY AUTOMATED COUNT: 15 % (ref 11–15)
GLUCOSE BLD-MCNC: 154 MG/DL (ref 70–99)
HCT VFR BLD AUTO: 25 %
HGB BLD-MCNC: 7.9 G/DL
IMM GRANULOCYTES # BLD AUTO: 0.04 X10(3) UL (ref 0–1)
IMM GRANULOCYTES NFR BLD: 0.3 %
LYMPHOCYTES # BLD AUTO: 0.56 X10(3) UL (ref 1–4)
LYMPHOCYTES NFR BLD AUTO: 4.6 %
MCH RBC QN AUTO: 36.6 PG (ref 26–34)
MCHC RBC AUTO-ENTMCNC: 31.6 G/DL (ref 31–37)
MCV RBC AUTO: 115.7 FL
MONOCYTES # BLD AUTO: 0.56 X10(3) UL (ref 0.1–1)
MONOCYTES NFR BLD AUTO: 4.6 %
NEUTROPHILS # BLD AUTO: 11.07 X10 (3) UL (ref 1.5–7.7)
NEUTROPHILS # BLD AUTO: 11.07 X10(3) UL (ref 1.5–7.7)
NEUTROPHILS NFR BLD AUTO: 90.4 %
OSMOLALITY SERPL CALC.SUM OF ELEC: 296 MOSM/KG (ref 275–295)
PLATELET # BLD AUTO: 234 10(3)UL (ref 150–450)
POTASSIUM SERPL-SCNC: 3.9 MMOL/L (ref 3.5–5.1)
RBC # BLD AUTO: 2.16 X10(6)UL
SODIUM SERPL-SCNC: 141 MMOL/L (ref 136–145)
WBC # BLD AUTO: 12.2 X10(3) UL (ref 4–11)

## 2024-04-24 PROCEDURE — 97166 OT EVAL MOD COMPLEX 45 MIN: CPT

## 2024-04-24 PROCEDURE — 97162 PT EVAL MOD COMPLEX 30 MIN: CPT

## 2024-04-24 PROCEDURE — 97530 THERAPEUTIC ACTIVITIES: CPT

## 2024-04-24 PROCEDURE — 85025 COMPLETE CBC W/AUTO DIFF WBC: CPT | Performed by: PHYSICIAN ASSISTANT

## 2024-04-24 PROCEDURE — 97535 SELF CARE MNGMENT TRAINING: CPT

## 2024-04-24 PROCEDURE — 80048 BASIC METABOLIC PNL TOTAL CA: CPT | Performed by: PHYSICIAN ASSISTANT

## 2024-04-24 RX ORDER — HYDROCODONE BITARTRATE AND ACETAMINOPHEN 5; 325 MG/1; MG/1
1 TABLET ORAL EVERY 6 HOURS PRN
Qty: 20 TABLET | Refills: 0 | Status: SHIPPED | OUTPATIENT
Start: 2024-04-24 | End: 2024-04-26

## 2024-04-24 RX ORDER — SIMETHICONE 80 MG
80 TABLET,CHEWABLE ORAL 3 TIMES DAILY PRN
Qty: 60 TABLET | Refills: 0 | Status: SHIPPED | OUTPATIENT
Start: 2024-04-24 | End: 2024-04-26

## 2024-04-24 RX ORDER — SODIUM CHLORIDE, SODIUM LACTATE, POTASSIUM CHLORIDE, CALCIUM CHLORIDE 600; 310; 30; 20 MG/100ML; MG/100ML; MG/100ML; MG/100ML
INJECTION, SOLUTION INTRAVENOUS CONTINUOUS
Status: DISCONTINUED | OUTPATIENT
Start: 2024-04-24 | End: 2024-04-26

## 2024-04-24 RX ORDER — PSEUDOEPHEDRINE HCL 30 MG
100 TABLET ORAL 2 TIMES DAILY
Qty: 60 CAPSULE | Refills: 0 | Status: SHIPPED | OUTPATIENT
Start: 2024-04-24 | End: 2024-04-26

## 2024-04-24 RX ORDER — ENOXAPARIN SODIUM 100 MG/ML
40 INJECTION SUBCUTANEOUS DAILY
Qty: 8 ML | Refills: 0 | Status: SHIPPED | OUTPATIENT
Start: 2024-04-25 | End: 2024-04-26

## 2024-04-24 NOTE — PROGRESS NOTES
[facility]   INTERNAL MEDICINE PROGRESS NOTE    CHIEF COMPLAINT   Post-op TLH-BSO, resp failure    SUBJECTIVE  24 HR EVENTS   Hypoxia overnight  Needed some IV dilaudid  Pain better this AM   Some nausea - zofran helped    INPATIENT MEDICATIONS  Scheduled Medications:  enoxaparin, 40 mg, Daily  docusate sodium, 100 mg, BID  clonazePAM, 0.5 mg, Nightly  DULoxetine, 60 mg, Nightly  gabapentin, 300 mg, BID  levothyroxine, 50 mcg, Daily @ 0700  pantoprazole, 20 mg, QAM AC  atorvastatin, 10 mg, Nightly  [Held by provider] lisinopril, 10 mg, QAM  [Held by provider] amLODIPine, 2.5 mg, Nightly     Drips:   lactated ringers, Last Rate: 150 mL/hr at 04/23/24 0942  lactated ringers, Last Rate: 100 mL/hr at 04/24/24 0850       PRN Medications:   ondansetron, 4 mg, Q8H PRN   Or  ondansetron, 4 mg, Q8H PRN  acetaminophen, 650 mg, Q4H PRN  ibuprofen, 600 mg, Q6H PRN  HYDROcodone-acetaminophen, 1 tablet, Q6H PRN  HYDROmorphone, 0.2 mg, Q2H PRN   Or  HYDROmorphone, 0.4 mg, Q2H PRN   Or  HYDROmorphone, 0.8 mg, Q2H PRN  simethicone, 80 mg, TID PRN  diphenhydrAMINE, 12.5 mg, Q4H PRN       PHYSICAL EXAMINATION  Vitals: /47 (BP Location: Right arm)   Pulse 113   Temp 97.6 °F (36.4 °C) (Oral)   Resp 20   Ht 4' 9\" (1.448 m)   Wt 102 lb (46.3 kg)   SpO2 93%   BMI 22.07 kg/m²   Gen: NAD, well nourished  Eyes: PERRLA, normal conjunctivae  ENMT: Moist mucous membranes, trachea midline  CV: RRR, no peripheral edema  Resp: CTAB, non-labored respirations, symmetric expansion  GI: Soft, NT, ND, no rebound, no guarding  MSK:  No C/C/E, normal active/passive ROM in C-spine  Skin: No rashes  Neuro: CN 2-12 grossly intact  Psych: A&Ox3, conversant w/ linear thought process     LABORATORY VALUES   No results for input(s): \"NA\", \"K\", \"CL\", \"CO2\", \"BUN\", \"CREATSERUM\", \"ANIONGAP\", \"GLU\", \"CA\", \"MAGNESIUM\", \"PHOS\", \"TP\", \"ALB\", \"AST\", \"ALT\", \"ALKPHO\", \"BILT\", \"EGFRCR\" in the last 168 hours.    Invalid input(s): \"AGR\"  Recent Labs    Lab 04/19/24  1354 04/24/24  1034   WBC 8.0 12.2*   HGB 8.8* 7.9*   HCT 28.5* 25.0*   .0 234.0   .8* 115.7*   MOPERCENT  --  4.6   EOPERCENT  --  0.0   BAPERCENT  --  0.1     ASSESSMENT & PLAN  Lizeth Mcgill - 83 year old female with ovarian CA, HTN, breaset CA, DM2, hypothyroidism who presented for hysterectomy-BSO.     Ovarian CA  S/p robotic assisted total lap hysterectomy-BSO, debulking, omentectomy  - PRN pain meds, Transition to PO when able  - monitor for acute blood loss anemia   - Bowel reg, antiemetics  - DVT Prophy- lovenox  - Bernardo removed  - Still needing some IV dilaudid    Acute resp failure w hypoxia  - 70% w ambulating per pt. On 3L NC 4/24 AM  - Suspect atelectasis. Minimal basilar air movement on exam  - Can hold off on imaging for now  - OOB, encourage IS, ambulate TID  - Supplemental O2 for goal SpO2 90-93%. On 3L NC 4/24 AM     Acute on chronic anemia   - Presume 2/2 expected post-op ABLA w dilutional component from IVF  - No active bleeding  - Trend CBC    HTN  - BP stable  - hold home lisinopril for now    Anxiety  - clonazepam nightly    Hypothyroidism  - home sythroid    GERD  - PPI    ACP: Full Code  Ppx: DVT: Enox  Dispo  EDoD:  ~ 4/25  F/u:   - PCP:  Chong Cornejo MD    Available via bubble chat or perfect serve 7A - 7P.    Hai Landrum MD   Internal Medicine - Hospitalist  [facility]

## 2024-04-24 NOTE — OPERATIVE REPORT
[facility]    PATIENT'S NAME: GÉNESIS RIVERA   ATTENDING PHYSICIAN: Rigoberto Couch MD   OPERATING PHYSICIAN: Rigoberto Couch MD   PATIENT ACCOUNT#:   [id number]    LOCATION:  [address] [place] [address]  MEDICAL RECORD #:   [id number]       YOB: 1940  ADMISSION DATE:       04/23/2024      OPERATION DATE:  04/23/2024    OPERATIVE REPORT    #####EDITING MAY BE REQUIRED#####    PREOPERATIVE DIAGNOSIS:    1.   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  2.   Status post 6 cycles of neoadjuvant chemotherapy.  3.   History of extensive abdominal surgeries.   4.   History of diverticulosis.   POSTOPERATIVE DIAGNOSIS:    1.   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  2.   Status post 6 cycles of neoadjuvant chemotherapy.  3.   History of extensive abdominal surgeries.   4.   History of diverticulosis.   PROCEDURE:  Laparoscopic lysis of adhesions (encompassing over 20 minutes), robotic-assisted total laparoscopic hysterectomy and bilateral salpingo-oophorectomy with tumor debulking, bilateral pelvic and paraaortic lymph node dissection, infracolic and portion of supracolic omentectomy.      ASSISTANTS:  Amanda Garza PA-C and Nando Mosqueda CSA.    INTRAVENOUS FLUIDS:  2 L.    URINE OUTPUT:  200 mL.    ESTIMATED BLOOD LOSS:  50 mL.     DRAINS:  Bernardo catheter.    COMPLICATIONS:  None.    CONDITION:  Stable, to recovery room.     INDICATIONS:  This is an 83-year-old female who had an incidental finding of bladder mass, pelvic mass, and peritoneal nodules.  She was found to have a low-grade papillary urothelial bladder cancer, underwent transurethral resection.  PET scan showed peritoneal implants and the lesion in the right ovary.  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  Immunohistochemistry was not typical for gynecologic malignancies, although this was thought to be the leading differential diagnosis.  Her tumor markers were all negative.  The  patient was started on neoadjuvant chemotherapy, has undergone 6 cycles.  Tumor markers are unchanged; however, recent PET scan shows resolution of nodularity along the ascending colon, the right adnexal lesion now has no activity and there was no obvious omental disease that was seen on PET scan either.  I discussed with the patient that overall our suspicion is that this is a gynecologic primary given ovarian mass and pattern of peritoneal spread.  The patient is 83 with a decent performance status; however, has a significant surgical history and diverticular disease.  I counseled her on the role of surgical intervention and given the smaller burden of disease, her age and history of previous surgeries, an MIS approach would be best to minimize complications and enhance recovery.  She was counseled on risks of complications given her extensive surgical history and diverticular disease, and informed consent was obtained.  She underwent preoperative medical clearance and was cleared for surgery.      FINDINGS:  Laparoscopic evaluation showed extensive adhesions of the omentum in all 4 quadrants, especially in the upper aspects of the abdomen.  The omentum was adhered in numerous areas with some thin and filmy adhesions and then also more dense adhesions, especially near the periumbilical area.  There were tissue changes in the area above where it was attached to the umbilicus and some other areas suggestive of previous and possibly residual malignancy.  This was part of her debulking procedure.  There was scarring in the right upper quadrant in the area of her previous cholecystectomy.  The diaphragms looked normal.  The stomach had no disease.  The small-bowel loops had no obvious disease.  I inspected the ascending colon and the cecal area.  The patient is status post appendectomy with no obvious disease that was seen in this area.  In the pelvis, the uterus had some scarring in the anterior cul-de-sac from her  previous 4 C-sections.  The right ovary had a cystic mass on it and some papillary changes near the fallopian tubes suggestive of residual cancer.  The left tube and ovary was adhered to the sigmoid colon with some thin filmy adhesions outside the uterus.  The patient had extensive colonic diverticulum with no evidence of acute infection or significant adhesions.  In the lymph node areas, there were some mildly prominent lymph nodes, especially on the right side and some in the paraaortic region.  These were excised as part of a debulking procedure.  At the end of the procedure there was no visible disease that was seen, leaving her with an R0 resection.    OPERATIVE TECHNIQUE:  The patient was taken to the operating room where she was given general endotracheal tube intubation anesthesia.  She was prepped and draped in the usual sterile fashion.  A Bernardo catheter was inserted.  Pneumoperitoneum was created in the left upper quadrant at Levin point with a Veress needle.  Initial pressure was 3 mmHg.  The abdomen was then insufflated and AirSeal port was placed.  Significant areas of adhesions were seen; therefore, at this time I was able to strategically place a left-sided port approximately 15 cm from the midline and approximately 3 mL above the umbilicus.  Using these ports, the adhesions of the omentum to the anterior abdominal wall and the left upper quadrant were taken down.  We were then able to systematically work on the adhesions in the right upper abdomen and midabdomen of the omentum.  Cold scissors and monopolar tori with energy were used.  Ultimately, all the adhesions were taken down safely, including dense adhesions in the omentum to the anterior abdominal wall near the umbilicus, which likely represented residual disease in the omentum.  At this time, the AirSeal port was converted to a robotic port given the patient's very thin configuration and narrow body.  I placed a midline port just to the  right of the midline.  At the same level, approximately 2 cm off to the right side, an additional right-sided port was placed for the robot.  AirSeal was placed in the left upper quadrant above our initial port and a 5 mm assistant port was placed in the right lateral quadrant.  The robot was set for upper abdominal surgery and was brought in and docked.  I used the vessel sealer device along with monopolar tori and bipolar cautery and went on to further take adhesions down of the infracolic omentum to the left upper quadrant.  We started with dividing the posterior membrane along the transverse colon,  the omentum off both edges, and systematically we were able to perform an infracolic and partial supracolic omentectomy by coming across the lesser sac.  A significant amount of time was required for this, but a complete infracolic omentectomy and a representative supracolic omentectomy was performed.  There were areas concerning for residual disease that were all removed as part of this resection.  This omentum was then sutured to the anterior abdominal wall.  We undocked the robot and placed the patient in Trendelenburg position.  Prior to this a medium VCare was placed under direct visualization with good uterine manipulation.  Pelvic washings were obtained.  The robot was docked.  Standard instrumentation applied.  Adhesions of the left ovary and uterus to the sigmoid colon with thin filmy adhesions were taken down.  The round ligaments on both sides were cauterized and cut.  The peritoneum between the round ligament and infundibulopelvic ligament was divided.  Retroperitoneal space was opened, ureters were identified bilaterally.  Infundibulopelvic ligaments were then skeletonized, cauterized, and cut anteriorly for the round ligaments then ________  due to her significant adhesions from .  The bladder was backfilled with approximately 100 mL of saline and this helped direct the planes of the  bladder and we were able to dissect the bladder off the pubocervical fascia and it came down quite nicely.  The posterior peritoneum was then skeletonized.  The uterine vessels were skeletonized, cauterized, and cut.  Descending branches were similarly cauterized and cut.  The parametrium was taken down up to the level of the VCare cup.  Colpotomy was made anteriorly, carried out circumferentially.  Specimen was delivered through the vagina.  The omentum was then removed from ________ of the anterior abdominal wall and delivered through the vagina.  A 35 mm Manuel Kaylen tube was brought in transvaginally.  The pelvic sidewall was opened.  On the right side, there were noted to be some enlarged lymph nodes and these were excised along the external and internal iliac vessels.  On the left side, similar dissection was carried out.  We then opened the peritoneum over the right common iliac up to the level of duodenum.  The patient had a fairly low duodenum.  Aortocaval lymph nodes in this area were sampled and delivered through the Kaylen tube.  Surgicel was placed in the areas of lymph node dissection.  The vagina was then closed with 0 180 V-Loc sutures starting from the right lateral vaginal angle to the left side and back to the right side.  Transvaginal examination showed excellent closure of the vagina.  The pelvis was irrigated.  Small bleeders were cauterized and no ongoing bleeding was seen.  Surgicel powder was placed in the pelvis.  I then took the patient out of Trendelenburg position and reinspected the area of dissection in the upper abdomen for the omentectomy and no ongoing bleeding was seen.  There was no evidence of any other injury to other organs in the area.  Surgicel powder was placed in this area as well.  Robotic instruments were removed.  Robot was undocked.  Cannulas were removed under direct visualization.  AirSeal was used to deflate the abdomen.  The incisions were closed with  subcuticular 4-0 Vicryl suture along with Dermabond.  Approximately 30 mL of Marcaine was injected over the 6 incision sites.  Transvaginal examination showed good closure of the vagina.  There was noted to be a small vaginal introitus laceration in the posterior aspect.  This was corrected with a figure-of-eight 2-0 Vicryl suture.  No ongoing bleeding was seen.  Bernardo catheter showed clear urine.     The procedure at this point was ended.  Sponge, instrument, and needle counts were reported as correct x2.  I was present and scrubbed for the entire procedure.  The procedure took additional time due to laparoscopic lysis of adhesions and adhesions over the bladder, but otherwise was uncomplicated, went fairly smoothly, and the patient tolerated the procedure quite well.  Amanda Garza served as assistant and was invaluable in providing traction, countertraction, exposure, and specimen retrieval in this advanced oncologic case.  Sponge, instrument, and needle counts were reported as correct x2.      Dictated By Rigoberto Couch MD  d: 04/23/2024 11:27:05  t: 04/23/2024 13:06:49  Job [id number]  /    cc: Rigoberto Couch MD

## 2024-04-24 NOTE — PHYSICAL THERAPY NOTE
PHYSICAL THERAPY EVALUATION - INPATIENT     Room Number: 456/456-A  Evaluation Date: 4/24/2024  Type of Evaluation: Initial   Physician Order: PT Eval and Treat    Presenting Problem: s/p Robotic assisted total laparoscopic hysterectomy, bilateral salpingo-oophorectomy, staging, debulking; Omentectomy; lysis of adhesions on 4/23  Co-Morbidities : ovarian cancer  Reason for Therapy: Mobility Dysfunction and Discharge Planning    PHYSICAL THERAPY ASSESSMENT   Patient is a 83 year old female admitted 4/23/2024 for Robotic assisted total laparoscopic hysterectomy, bilateral salpingo-oophorectomy, staging, debulking; Omentectomy; lysis of adhesions.  Prior to admission, patient's baseline is independent.  Patient is currently functioning below baseline with bed mobility, transfers, gait, standing prolonged periods, and performing household tasks.  Patient is requiring contact guard assist as a result of the following impairments: decreased functional strength, decreased endurance/aerobic capacity, pain, impaired dynamic standing balance, medical status, and increased O2 needs from baseline.  Physical Therapy will continue to follow for duration of hospitalization.    Patient will benefit from continued skilled PT Services to promote return to prior level of function and safety with continuous assistance and gradual rehabilitative therapy .    PLAN  PT Treatment Plan: Body mechanics;Bed mobility;Endurance;Energy conservation;Family education;Patient education;Gait training;Strengthening;Balance training;Transfer training  Rehab Potential : Good  Frequency (Obs): 3-5x/week    PHYSICAL THERAPY MEDICAL/SOCIAL HISTORY     Problem List  Active Problems:    Ovarian cancer (HCC)    HOME SITUATION  Home Situation  Type of Home: Independent living facility  Home Layout: One level  Lives With: Alone (staff assist PRN, family assist PRN)  Drives: No  Patient Owned Equipment: Rollator  Patient Regularly Uses: None     Prior Level of  [place]: independent with ADLs and functional mobility with a rollator    SUBJECTIVE  Agreeable to activity.     PHYSICAL THERAPY EXAMINATION   OBJECTIVE  Precautions: Abdominal protective strategies;Bed/chair alarm;Limb alert - left  Fall Risk: Standard fall risk    WEIGHT BEARING RESTRICTION  Weight Bearing Restriction: None    PAIN ASSESSMENT  Rating: Unable to rate (\"a little\")  Location: abdomen  Management Techniques: Activity promotion;Body mechanics;Repositioning    COGNITION  Overall Cognitive Status:  WFL - within functional limits    RANGE OF MOTION AND STRENGTH ASSESSMENT  Upper extremity ROM and strength are within functional limits.  Lower extremity ROM is within functional limits.  Lower extremity strength is within functional limits.    BALANCE  Static Sitting: Good  Dynamic Sitting: Fair +  Static Standing: Fair  Dynamic Standing: Fair -    O2 WALK  Oxygen Therapy  SPO2% on Oxygen at Rest: 94  At rest oxygen flow (liters per minute): 2  SPO2% Ambulation on Oxygen: 78 (increased O2 to 6 L gradually for recovery; remained on 4 L for rest of session with SPO2 stable at 93% at end of session)  Ambulation oxygen flow (liters per minute): 2    AM-PAC '6-Clicks' INPATIENT SHORT FORM - BASIC MOBILITY  How much difficulty does the patient currently have...  Patient Difficulty: Turning over in bed (including adjusting bedclothes, sheets and blankets)?: A Little   Patient Difficulty: Sitting down on and standing up from a chair with arms (e.g., wheelchair, bedside commode, etc.): A Little   Patient Difficulty: Moving from lying on back to sitting on the side of the bed?: A Little   How much help from another person does the patient currently need...   Help from Another: Moving to and from a bed to a chair (including a wheelchair)?: A Little   Help from Another: Need to walk in hospital room?: A Little   Help from Another: Climbing 3-5 steps with a railing?: A Lot     AM-PAC Score:  Raw Score: 17   Approx  Degree of Impairment: 50.57%   Standardized Score (AM-PAC Scale): 42.13   CMS Modifier (G-Code): CK    FUNCTIONAL ABILITY STATUS  Functional Mobility/Gait Assessment  Gait Assistance: Contact guard assist  Distance (ft): 40 x 2  Assistive Device: Rolling walker  Pattern: Shuffle (flexed posture, slow pace; 1 seated rest break)  Sit to Stand: contact guard assist    Exercise/Education Provided:  Body mechanics  Energy conservation  Functional activity tolerated  Gait training  Posture  Transfer training  Abdominal protective strategies    Additional Information: RN approved PT eval. Pt received sitting in chair with son at bedside. Introduced self and role. Educated on PT plan of care and goals for today. Pt verbalized understanding and agreeable to activity. Demos STS transfer from chair with CGA. Ambulated in hallway with RW and CGA. Maintained on 3 L O2 while walking initially, increased to 4 L on return to room (see flowsheet). Required 1 seated rest break for O2 recovery. Pt overall slightly unsteady, slow, shuffled gait but no LOB noted. Educated regarding energy conservation, pursed lip breathing, taking rest breaks as needed and pacing techniques. Pt was left sitting in chair with chair alarm on, needs within reach, handoff to RN complete.     The patient's Approx Degree of Impairment: 50.57% has been calculated based on documentation in the [facility] '6 clicks' Inpatient Basic Mobility Short Form.  Research supports that patients with this level of impairment may benefit from a rehab facility.  Final disposition will be made by interdisciplinary medical team.    Patient End of Session: Up in chair;Needs met;Call light within reach;RN aware of session/findings;All patient questions and concerns addressed;Alarm set;Family present;Discussed recommendations with /    CURRENT GOALS  Goals to be met by: 5/1/24  Patient Goal Patient's self-stated goal is: go to rehab   Goal #1 Patient is able  to demonstrate supine - sit EOB @ level: supervision     Goal #1   Current Status    Goal #2 Patient is able to demonstrate transfers Sit to/from Stand at assistance level: supervision with walker - rolling     Goal #2  Current Status    Goal #3 Patient is able to ambulate 150 feet with assist device: walker - rolling at assistance level: supervision   Goal #3   Current Status    Goal #4    Goal #4   Current Status    Goal #5 Patient to demonstrate independence with home activity/exercise instructions provided to patient in preparation for discharge.   Goal #5   Current Status    Goal #6    Goal #6  Current Status      Patient Evaluation Complexity Level:  History Moderate - 1 or 2 personal factors and/or co-morbidities   Examination of body systems Moderate - addressing a total of 3 or more elements   Clinical Presentation  Moderate - Evolving   Clinical Decision Making  Moderate Complexity     Therapeutic Activity:  24 minutes

## 2024-04-24 NOTE — PROGRESS NOTES
[facility] GYNECOLOGIC ONCOLOGY POST-OPERATIVE VISIT     MEDICAL RECORD #: [id number] DATE OF SERVICE: 4/24/2024             Reason for visit:  Post-operative day one    HISTORY OF PRESENT ILLNESS:  83 year old female with a history of incidental finding of bladder mass, pelvic mass, and peritoneal nodules. She was found to have a low-grade papillary urothelial bladder cancer, underwent transurethral resection. PET scan showed peritoneal implants and the lesion in the right ovary. 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 Immunohistochemistry was not typical for gynecologic malignancies, although this was thought to be the leading differential diagnosis. Her tumor markers were all negative. The patient was started on neoadjuvant chemotherapy, has undergone 6 cycles.  She underwent Laparoscopic lysis of adhesions (encompassing over 20 minutes), robotic-assisted total laparoscopic hysterectomy and bilateral salpingo-oophorectomy with tumor debulking, bilateral pelvic and paraaortic lymph node dissection, infracolic and portion of supracolic omentectomy on 4/23/24.    GI//GYNE Complaint:   Other complaints: Tolerating diet, no N/V. Urinating freely, +belching, no flatus. Pain controlled. Desaturated working with PT this AM, on NC.     Interim History:  Underwent surgery    Patient's medications, allergies, past medical, surgical, social and family histories were reviewed and updated as appropriate.    Pathology/Lab Results:  Recent Results (from the past 720 hour(s))   CBC, Platelet; No Differential    Collection Time: 04/19/24  1:54 PM   Result Value Ref Range    WBC 8.0 4.0 - 11.0 x10(3) uL    RBC 2.42 (L) 3.80 - 5.30 x10(6)uL    HGB 8.8 (L) 12.0 - 16.0 g/dL    HCT 28.5 (L) 35.0 - 48.0 %    .8 (H) 80.0 - 100.0 fL    MCH 36.4 (H) 26.0 - 34.0 pg    MCHC 30.9 (L) 31.0 - 37.0 g/dL    RDW 15.5 (H) 11.0 - 15.0 %    RDW-SD 67.1 (H) 35.1 - 46.3 fL    .0 150.0 - 450.0 10(3)uL   ABORH  (Blood Type)    Collection Time: 04/19/24  1:54 PM   Result Value Ref Range    ABO BLOOD TYPE A     RH BLOOD TYPE Positive    Antibody Screen    Collection Time: 04/19/24  1:54 PM   Result Value Ref Range    Antibody Screen Negative    MD BLOOD SMEAR CONSULT    Collection Time: 04/19/24  1:54 PM   Result Value Ref Range    MD Blood Smear Consult       Evaluation of the peripheral blood smear demonstrates moderate macrocytic anemia characterized by moderate anisopoikilocytosis with macrocytes, ovalocytes and polychromasia. Neutrophils display distinct cytoplasmic granulation. No circulating blasts seen.    Differential considerations for macrocytic anemia may include vitamin B12 or folate deficiency, autoimmune hemolytic anemia, cold agglutinin disease, liver disease, some myelodysplasias, thyroid disease, excessive alcohol consumption and drug effects.     Clinical correlation is recommended.     Reviewed by IRAIDA Vázquez M.D.   Prepare RBC STAT    Collection Time: 04/23/24  6:38 AM   Result Value Ref Range    Blood Product [id number]     Unit Number [id number]     UNIT ABO A     UNIT RH POS     Crossmatch Compatible     Product Status Cross Matched     Expiration Date [id number]     Blood Type Barcode 6200     Unit Volume 350 ml   Cytology fluids    Collection Time: 04/23/24  8:27 AM   Result Value Ref Range    Case Report       Non-Gynecologic Cytology                          Case: [id number]                                   Authorizing Provider:  Rigoberto Couch MD         Collected:           04/23/2024 08:27 AM          Ordering Location:     [facility]          Received:            04/23/2024 10:44 AM                                 Operating Room                                                               Pathologist:           Cooper Dahl MD                                                         Specimen:    Pelvic washings, 1. PELVIC WASHINGS-FOR CYTOLOGY                                            General Categorization         Benign, inflammatory, reactive or reparative changes    Final Diagnosis:         Pelvic washings:   Adequacy: Satisfactory for evaluation  General Category: Benign, inflammatory, reactive, or reparative changes  Diagnosis:  Benign mesothelial cells present  Histiocytes present  Peripheral blood elements present  No malignant cells identified        Embedded Images      Final Diagnosis Comment         Recommend correlation with histologic findings ([id number]).            Procedure       Monolayers:  1  Cytospins:     2      Clinical Information       Carcinoma, Malignant Neoplasm Of Right Ovary.        Non Gyne Interpretation  Benign      Gross Description       Volume (ml): 25    Color: Pink     POCT Glucose    Collection Time: 04/23/24 11:27 AM   Result Value Ref Range    POC Glucose  143 (H) 70 - 99 mg/dL   Basic Metabolic Panel (8)    Collection Time: 04/24/24 10:16 AM   Result Value Ref Range    Glucose 154 (H) 70 - 99 mg/dL    Sodium 141 136 - 145 mmol/L    Potassium 3.9 3.5 - 5.1 mmol/L    Chloride 109 98 - 112 mmol/L    CO2 26.0 21.0 - 32.0 mmol/L    Anion Gap 6 0 - 18 mmol/L    BUN 14 9 - 23 mg/dL    Creatinine 0.64 0.55 - 1.02 mg/dL    BUN/CREA Ratio 21.9 (H) 10.0 - 20.0    Calcium, Total 9.4 8.7 - 10.4 mg/dL    Calculated Osmolality 296 (H) 275 - 295 mOsm/kg    eGFR-Cr 88 >=60 mL/min/1.73m2   CBC W/ DIFFERENTIAL    Collection Time: 04/24/24 10:34 AM   Result Value Ref Range    WBC 12.2 (H) 4.0 - 11.0 x10(3) uL    RBC 2.16 (L) 3.80 - 5.30 x10(6)uL    HGB 7.9 (L) 12.0 - 16.0 g/dL    HCT 25.0 (L) 35.0 - 48.0 %    .7 (H) 80.0 - 100.0 fL    MCH 36.6 (H) 26.0 - 34.0 pg    MCHC 31.6 31.0 - 37.0 g/dL    RDW-SD 64.8 (H) 35.1 - 46.3 fL    RDW 15.0 11.0 - 15.0 %    .0 150.0 - 450.0 10(3)uL    Neutrophil Absolute Prelim 11.07 (H) 1.50 - 7.70 x10 (3) uL    Neutrophil Absolute 11.07 (H) 1.50 - 7.70 x10(3) uL    Lymphocyte Absolute 0.56 (L) 1.00 - 4.00 x10(3) uL     Monocyte Absolute 0.56 0.10 - 1.00 x10(3) uL    Eosinophil Absolute 0.00 0.00 - 0.70 x10(3) uL    Basophil Absolute 0.01 0.00 - 0.20 x10(3) uL    Immature Granulocyte Absolute 0.04 0.00 - 1.00 x10(3) uL    Neutrophil % 90.4 %    Lymphocyte % 4.6 %    Monocyte % 4.6 %    Eosinophil % 0.0 %    Basophil % 0.1 %    Immature Granulocyte % 0.3 %       Labs reviewed    Physical examination:    /48 (BP Location: Right arm)   Pulse 101   Temp 98.4 °F (36.9 °C) (Oral)   Resp 19   Ht 4' 9\" (1.448 m)   Wt 102 lb (46.3 kg)   SpO2 95%   BMI 22.07 kg/m²     GENERAL: alert and oriented, cooperative, in no acute distress  ABDOMEN: soft, non-tender. Bowel sounds normal. No masses,  no organomegaly  INCISION/SURGICAL WOUNDS: clean, dry and intact trocar incisions  PELVIC: Pelvic exam: examination not indicated.    ASSESSMENT:  Dx: suspected ovarian vs primary peritoneal cancer    Hgb with anticipated drop,was 8.8 pre-op, today 7.9    Cr and WBC WNL    Vitals stable on NC    Good UOP with IVF bolus    PLAN:    Monitor hgb, transfuse <7.0    LMWH - will go home on lmwh 40mg daily x 2 weeks    Soft diet, IVF, encouraged PO fluids    Encouraged IS    PT/OT/SW    Planning to discharge to [facility] likely tomorrow    Recheck labs in AM    The patient verbalized good understanding of this.  I will keep you informed of her progress.  Thank you for allowing me to participate in the care of this patient.    BARRINGTON Ireland  04/24/24

## 2024-04-24 NOTE — CDS QUERY
How to answer this Query:     1.) DON'T CLICK COSIGN BUTTON FIRST  2.) Click \"3 dots...\" to the right of cosign button and click EDIT on the toolbar.  2.) Type an \"X\" in the bracket for the diagnosis that applies. (You may also add additional clinical details as you feel necessary to substantiate your response).   3.) Finally click \"Sign\" to complete response.  Thank You     CLINICAL DOCUMENTATION CLARIFICATION FORM  Dear Doctor: Diallo    Please clarify if the Atelectasis is a postop complication   SELECTION BY PROVIDER ONLY    ( )  Yes, the atelectasis is a postoperative complication     ( x )  No, the atelectasis is not a postoperative complication. Expected post-op condition     ( )  Other (please specify):       S/P 4/23/24--Laparoscopic lysis of adhesions (encompassing over 20 minutes),robotic-assisted total laparoscopic hysterectomy and bilateral salpingo-oophorectomy with tumor debulking, bilateral pelvic and paraaortic lymph node dissection, infracolic and portion of supracolic omentectomy.  O2 SAT DROPPED TO 70% with ambulation- placed on 15L NRB. Eventually switched over to 4L oxygen via nasal cannula  4/24 PN states - Suspect atelectasis.Minimal basilar air movement on exam- Can hold off on imaging for now. OOB, encourage IS, ambulate TID  If you have any questions, please contact Clinical :  Salma ANGEL RN at [phone]     Thank You!    THIS FORM IS A PERMANENT PART OF THE MEDICAL RECORD

## 2024-04-24 NOTE — OCCUPATIONAL THERAPY NOTE
OCCUPATIONAL THERAPY EVALUATION - INPATIENT     Room Number: 456/456-A  Evaluation Date: 4/24/2024  Type of Evaluation: Initial  Presenting Problem: S/p robotic assisted total lap hysterectomy-BSO, debulking, omentectomy    Physician Order: IP Consult to Occupational Therapy  Reason for Therapy: ADL/IADL Dysfunction and Discharge Planning    OCCUPATIONAL THERAPY ASSESSMENT   Patient is a 83 year old female admitted 4/23/2024 for S/p robotic assisted total lap hysterectomy-BSO, debulking, omentectomy.  Prior to admission, patient's baseline is independent with ADL and functional mobility and receives assist with IADL.  Patient is currently functioning below baseline with toileting, bathing, lower body dressing, bed mobility, transfers, dynamic standing balance, functional standing tolerance, and energy conservation strategies.  Patient is requiring contact guard assist and moderate assist as a result of the following impairments: decreased functional strength, decreased endurance, pain, impaired dynamic standing balance, impaired motor planning, and decreased muscular endurance. Occupational Therapy will continue to follow for duration of hospitalization.    Patient will benefit from continued skilled OT Services to promote return to prior level of function and safety with continuous assistance and gradual rehabilitative therapy     PLAN  OT Treatment Plan: Balance activities;Energy conservation/work simplification techniques;ADL training;IADL training;Functional transfer training;UE strengthening/ROM;Endurance training;Patient/Family education;Patient/Family training;Equipment eval/education;Compensatory technique education  OT Device Recommendations: TBD    OCCUPATIONAL THERAPY MEDICAL/SOCIAL HISTORY   Problem List  Active Problems:    Ovarian cancer (HCC)    HOME SITUATION  Type of Home: Independent living facility  Home Layout: One level  Lives With: Alone (staff assist PRN, family assist PRN)  Toilet and  Equipment: Comfort height toilet; Grab bar  Shower/Tub and Equipment: Walk-in shower; Grab bar; Shower chair  Other Equipment: None  Occupation/Status: retired  Hand Dominance: Right  Drives: No  Patient Regularly Uses: None    Stairs in Home: none  Use of Assistive Device(s): rollator    Prior Level of [place]: Prior to admission, patient was independent with all ADLs and receives assist for IADLs. Patient lives in a ILF with 24 hour staff. Patient was not driving and currently does not work. Patient used rollator for mobility. At home patient currently owns all recommended equipment.     SUBJECTIVE  \"I went for an MRI for my spine and they found this cancer.\"    OCCUPATIONAL THERAPY EXAMINATION    OBJECTIVE  Precautions: Abdominal protective strategies; Bed/chair alarm; Limb alert - left  Fall Risk: Standard fall risk    PAIN ASSESSMENT  Rating: 3  Location: surgical site  Management Techniques: Activity promotion; Body mechanics; Relaxation; Repositioning; Breathing techniques    COGNITION  Overall Cognitive Status:  WFL - within functional limits    VISION  Current Vision: no visual deficits    PERCEPTION  Overall Perception Status:   WFL - within functional limits    SENSATION  Light touch:  intact    Communication: Able to communicate wants and needs     Behavioral/Emotional/Social: Calm and cooperative     RANGE OF MOTION   Upper extremity ROM is within functional limits     STRENGTH ASSESSMENT  Upper extremity strength is within functional limits     COORDINATION  Gross Motor: WFL   Fine Motor: WFL     ACTIVITIES OF DAILY LIVING ASSESSMENT  AM-PAC ‘6-Clicks’ Inpatient Daily Activity Short Form  How much help from another person does the patient currently need…  -   Putting on and taking off regular lower body clothing?: A Lot  -   Bathing (including washing, rinsing, drying)?: A Lot  -   Toileting, which includes using toilet, bedpan or urinal? : A Lot  -   Putting on and taking off regular upper body  clothing?: None  -   Taking care of personal grooming such as brushing teeth?: A Little  -   Eating meals?: None    AM-PAC Score:  Score: 17  Approx Degree of Impairment: 50.11%  Standardized Score (AM-PAC Scale): 37.26  CMS Modifier (G-Code): CK    FUNCTIONAL TRANSFER ASSESSMENT  Sit to Stand: Chair  Chair: Contact Guard Assist    FUNCTIONAL ADL ASSESSMENT  Eating: Independent  Grooming Standing: Contact Guard Assist  Bathing Seated: Moderate Assist  UB Dressing Seated: Independent  LB Dressing Seated: Moderate Assist  Toileting Seated: Moderate Assist    THERAPEUTIC EXERCISE     Skilled Therapy Provided: Patient is able to complete all functional mobility this session with CGA. Patient requires mod A with ADLs due to dynamic standing balance and endurance. Education provided on importance of OOB activity and energy conservation upon returning home with good return demonstration from patient. Patient educated on diaphragmatic breathing techniques as patient drops to 78% on 2L and required increasing support to 6L to recover. On 4L with activity, patient is able to maintain saturations >90%. Patient reports that 7-8/10 on level of exertion scale.    EDUCATION PROVIDED  Patient: Role of Occupational Therapy; Plan of Care; Adaptive Equipment Recommendations; DME Recommendations; Functional Transfer Techniques; Fall Prevention; Surgical Precautions; Posture/Positioning; Energy Conservation; Compensatory ADL Techniques; Proper Body Mechanics; Abdominal Protective Strategies  Patient's Response to Education: Verbalized Understanding; Returned Demonstration    The patient's Approx Degree of Impairment: 50.11% has been calculated based on documentation in the [facility] '6 clicks' Inpatient Daily Activity Short Form.  Research supports that patients with this level of impairment may benefit from subacute rehab.  Final disposition will be made by interdisciplinary medical team.     Patient End of Session: Up in chair;Needs  met;RN aware of session/findings;Call light within reach;All patient questions and concerns addressed;Family present    OT Goals  Patients self stated goal is: unstated     Patient will complete functional transfer with mod I  Comment:     Patient will complete toileting with mod I  Comment:     Patient will tolerate standing for 3 minutes in prep for adls with mod I   Comment:    Patient will complete item retrieval with mod I  Comment:          Goals  on: 24  Frequency: 3-5x/week    Patient Evaluation Complexity Level:   Occupational Profile/Medical History LOW - Brief history including review of medical or therapy records    Specific performance deficits impacting engagement in ADL/IADL LOW  1 - 3 performance deficits    Client Assessment/Performance Deficits MODERATE - Comorbidities and min to mod modifications of tasks    Clinical Decision Making LOW - Analysis of occupational profile, problem-focused assessments, limited treatment options    Overall Complexity LOW     Self-Care Home Management: 15 minutes  Therapeutic Activity: 10 minutes    Cheryl Cisse OTR/L  [facility]  [id number]

## 2024-04-24 NOTE — DIETARY NOTE
BRIEF DIETITIAN NOTE     Pt re-screened for MS of 2. Found to be at no nutrition risk at this time. Pt with PMHx of pelvic mass. Low po intakes for now, POD#1 lysis of adhesions, progressing from CLD to FLD. Weight relatively stable, per EHR wt hx review, pt wt as of 10/10/23 was 106#, #, pt with 4 lbs eight loss, this represents a 3.7% weight loss in 6 months, not significant. Will monitor diet progression. Will follow per protocol. Please consult RD if earlier nutrition intervention is needed.     Percent Meals Eaten (last 6 days)       Date/Time Percent Meals Eaten (%)    04/24/24 0936 50 %             Patient Weight(s) for the past 336 hrs:   Weight   04/23/24 0624 46.3 kg (102 lb)   04/18/24 1032 46.7 kg (103 lb)        Wt Readings from Last 6 Encounters:   04/23/24 46.3 kg (102 lb)   10/10/23 48.1 kg (106 lb)   08/19/22 50.8 kg (112 lb)   02/03/22 49.9 kg (110 lb)   01/18/22 49.9 kg (110 lb)   11/15/21 49.9 kg (110 lb)        F/u per protocol or as appropriate.       Trudy Agrawal RD, LDN  Clinical Dietitian  Office: [phone]

## 2024-04-24 NOTE — PLAN OF CARE
Patient alert and oriented. Pain managed with norco. Denies nausea, tolerating LFS. 2L oxygen, weaning as able, encouraging use of IS and ambulation. Voiding s/p arellano removal, IVF, vaginal discharge. Up with assist and walker. Call light within reach, using appropriately.     Problem: PAIN - ADULT  Goal: Verbalizes/displays adequate comfort level or patient's stated pain goal  Description: INTERVENTIONS:  - Encourage pt to monitor pain and request assistance  - Assess pain using appropriate pain scale  - Administer analgesics based on type and severity of pain and evaluate response  - Implement non-pharmacological measures as appropriate and evaluate response  - Consider cultural and social influences on pain and pain management  - Manage/alleviate anxiety  - Utilize distraction and/or relaxation techniques  - Monitor for opioid side effects  - Notify MD/LIP if interventions unsuccessful or patient reports new pain  - Anticipate increased pain with activity and pre-medicate as appropriate  Outcome: Progressing     Problem: SAFETY ADULT - FALL  Goal: Free from fall injury  Description: INTERVENTIONS:  - Assess pt frequently for physical needs  - Identify cognitive and physical deficits and behaviors that affect risk of falls.  - [place] fall precautions as indicated by assessment.  - Educate pt/family on patient safety including physical limitations  - Instruct pt to call for assistance with activity based on assessment  - Modify environment to reduce risk of injury  - Provide assistive devices as appropriate  - Consider OT/PT consult to assist with strengthening/mobility  - Encourage toileting schedule  Outcome: Progressing     Problem: RISK FOR INFECTION - ADULT  Goal: Absence of fever/infection during anticipated neutropenic period  Description: INTERVENTIONS  - Monitor WBC  - Administer growth factors as ordered  - Implement neutropenic guidelines  Outcome: Progressing     Problem: DISCHARGE PLANNING  Goal:  Discharge to home or other facility with appropriate resources  Description: INTERVENTIONS:  - Identify barriers to discharge w/pt and caregiver  - Include patient/family/discharge partner in discharge planning  - Arrange for needed discharge resources and transportation as appropriate  - Identify discharge learning needs (meds, wound care, etc)  - Arrange for interpreters to assist at discharge as needed  - Consider post-discharge preferences of patient/family/discharge partner  - Complete POLST form as appropriate  - Assess patient's ability to be responsible for managing their own health  - Refer to Case Management Department for coordinating discharge planning if the patient needs post-hospital services based on physician/LIP order or complex needs related to functional status, cognitive ability or social support system  Outcome: Progressing     Problem: RESPIRATORY - ADULT  Goal: Achieves optimal ventilation and oxygenation  Description: INTERVENTIONS:  - Assess for changes in respiratory status  - Assess for changes in mentation and behavior  - Position to facilitate oxygenation and minimize respiratory effort  - Oxygen supplementation based on oxygen saturation or ABGs  - Provide Smoking Cessation handout, if applicable  - Encourage broncho-pulmonary hygiene including cough, deep breathe, Incentive Spirometry  - Assess the need for suctioning and perform as needed  - Assess and instruct to report SOB or any respiratory difficulty  - Respiratory Therapy support as indicated  - Manage/alleviate anxiety  - Monitor for signs/symptoms of CO2 retention  Outcome: Progressing     Problem: GASTROINTESTINAL - ADULT  Goal: Minimal or absence of nausea and vomiting  Description: INTERVENTIONS:  - Maintain adequate hydration with IV or PO as ordered and tolerated  - Nasogastric tube to low intermittent suction as ordered  - Evaluate effectiveness of ordered antiemetic medications  - Provide nonpharmacologic comfort  measures as appropriate  - Advance diet as tolerated, if ordered  - Obtain nutritional consult as needed  - Evaluate fluid balance  Outcome: Progressing  Goal: Maintains or returns to baseline bowel function  Description: INTERVENTIONS:  - Assess bowel function  - Maintain adequate hydration with IV or PO as ordered and tolerated  - Evaluate effectiveness of GI medications  - Encourage mobilization and activity  - Obtain nutritional consult as needed  - Establish a toileting routine/schedule  - Consider collaborating with pharmacy to review patient's medication profile  Outcome: Progressing     Problem: GENITOURINARY - ADULT  Goal: Absence of urinary retention  Description: INTERVENTIONS:  - Assess patient’s ability to void and empty bladder  - Monitor intake/output and perform bladder scan as needed  - Follow urinary retention protocol/standard of care  - Consider collaborating with pharmacy to review patient's medication profile  - Implement strategies to promote bladder emptying  Outcome: Progressing     Problem: METABOLIC/FLUID AND ELECTROLYTES - ADULT  Goal: Electrolytes maintained within normal limits  Description: INTERVENTIONS:  - Monitor labs and rhythm and assess patient for signs and symptoms of electrolyte imbalances  - Administer electrolyte replacement as ordered  - Monitor response to electrolyte replacements, including rhythm and repeat lab results as appropriate  - Fluid restriction as ordered  - Instruct patient on fluid and nutrition restrictions as appropriate  Outcome: Progressing     Problem: SKIN/TISSUE INTEGRITY - ADULT  Goal: Skin integrity remains intact  Description: INTERVENTIONS  - Assess and document risk factors for pressure ulcer development  - Assess and document skin integrity  - Monitor for areas of redness and/or skin breakdown  - Initiate interventions, skin care algorithm/standards of care as needed  Outcome: Progressing     Problem: SKIN/TISSUE INTEGRITY - ADULT  Goal:  Incision(s), wounds(s) or drain site(s) healing without S/S of infection  Description: INTERVENTIONS:  - Assess and document risk factors for pressure ulcer development  - Assess and document skin integrity  - Assess and document dressing/incision, wound bed, drain sites and surrounding tissue  - Implement wound care per orders  - Initiate isolation precautions as appropriate  - Initiate Pressure Ulcer prevention bundle as indicated  Outcome: Progressing

## 2024-04-24 NOTE — PLAN OF CARE
Lizeth is alert and oriented x4, doing Q4 VS per order, on 2-3L O2 via NC d/t desatting a bit, wean as evangelista, will start on Lovenox. SCDs on bilaterally. Using IS at bedside, educated on use. CLD, will start on LFS diet in the morning. Bernardo in place, monitoring output, if output is > 300 ml Q8 hours then will DC in the morning and removed packing vaginal per order. Repos as evangelista in bed. Incisions in place, CDI, open to air. Pain managed with dilaudid prn and norco prn. Zofran given once for nausea, improved afterwards. Left arm precaution. LR at 100 ml/hr. Plan pending course, safety measures in place, call light within reach.     Problem: Patient Centered Care  Goal: Patient preferences are identified and integrated in the patient's plan of care  Description: Interventions:  - What would you like us to know as we care for you? From home   - Provide timely, complete, and accurate information to patient/family  - Incorporate patient and family knowledge, values, beliefs, and cultural backgrounds into the planning and delivery of care  - Encourage patient/family to participate in care and decision-making at the level they choose  - Honor patient and family perspectives and choices  Outcome: Progressing     Problem: Patient/Family Goals  Goal: Patient/Family Long Term Goal  Description: Patient's Long Term Goal:     Interventions:  -   - See additional Care Plan goals for specific interventions  Outcome: Progressing  Goal: Patient/Family Short Term Goal  Description: Patient's Short Term Goal:     Interventions:   -   - See additional Care Plan goals for specific interventions  Outcome: Progressing     Problem: PAIN - ADULT  Goal: Verbalizes/displays adequate comfort level or patient's stated pain goal  Description: INTERVENTIONS:  - Encourage pt to monitor pain and request assistance  - Assess pain using appropriate pain scale  - Administer analgesics based on type and severity of pain and evaluate response  - Implement  non-pharmacological measures as appropriate and evaluate response  - Consider cultural and social influences on pain and pain management  - Manage/alleviate anxiety  - Utilize distraction and/or relaxation techniques  - Monitor for opioid side effects  - Notify MD/LIP if interventions unsuccessful or patient reports new pain  - Anticipate increased pain with activity and pre-medicate as appropriate  Outcome: Progressing     Problem: SAFETY ADULT - FALL  Goal: Free from fall injury  Description: INTERVENTIONS:  - Assess pt frequently for physical needs  - Identify cognitive and physical deficits and behaviors that affect risk of falls.  - [place] fall precautions as indicated by assessment.  - Educate pt/family on patient safety including physical limitations  - Instruct pt to call for assistance with activity based on assessment  - Modify environment to reduce risk of injury  - Provide assistive devices as appropriate  - Consider OT/PT consult to assist with strengthening/mobility  - Encourage toileting schedule  Outcome: Progressing     Problem: RISK FOR INFECTION - ADULT  Goal: Absence of fever/infection during anticipated neutropenic period  Description: INTERVENTIONS  - Monitor WBC  - Administer growth factors as ordered  - Implement neutropenic guidelines  Outcome: Progressing     Problem: DISCHARGE PLANNING  Goal: Discharge to home or other facility with appropriate resources  Description: INTERVENTIONS:  - Identify barriers to discharge w/pt and caregiver  - Include patient/family/discharge partner in discharge planning  - Arrange for needed discharge resources and transportation as appropriate  - Identify discharge learning needs (meds, wound care, etc)  - Arrange for interpreters to assist at discharge as needed  - Consider post-discharge preferences of patient/family/discharge partner  - Complete POLST form as appropriate  - Assess patient's ability to be responsible for managing their own health  -  Refer to Case Management Department for coordinating discharge planning if the patient needs post-hospital services based on physician/LIP order or complex needs related to functional status, cognitive ability or social support system  Outcome: Progressing     Problem: RESPIRATORY - ADULT  Goal: Achieves optimal ventilation and oxygenation  Description: INTERVENTIONS:  - Assess for changes in respiratory status  - Assess for changes in mentation and behavior  - Position to facilitate oxygenation and minimize respiratory effort  - Oxygen supplementation based on oxygen saturation or ABGs  - Provide Smoking Cessation handout, if applicable  - Encourage broncho-pulmonary hygiene including cough, deep breathe, Incentive Spirometry  - Assess the need for suctioning and perform as needed  - Assess and instruct to report SOB or any respiratory difficulty  - Respiratory Therapy support as indicated  - Manage/alleviate anxiety  - Monitor for signs/symptoms of CO2 retention  Outcome: Progressing     Problem: GASTROINTESTINAL - ADULT  Goal: Minimal or absence of nausea and vomiting  Description: INTERVENTIONS:  - Maintain adequate hydration with IV or PO as ordered and tolerated  - Nasogastric tube to low intermittent suction as ordered  - Evaluate effectiveness of ordered antiemetic medications  - Provide nonpharmacologic comfort measures as appropriate  - Advance diet as tolerated, if ordered  - Obtain nutritional consult as needed  - Evaluate fluid balance  Outcome: Progressing  Goal: Maintains or returns to baseline bowel function  Description: INTERVENTIONS:  - Assess bowel function  - Maintain adequate hydration with IV or PO as ordered and tolerated  - Evaluate effectiveness of GI medications  - Encourage mobilization and activity  - Obtain nutritional consult as needed  - Establish a toileting routine/schedule  - Consider collaborating with pharmacy to review patient's medication profile  Outcome: Progressing      Problem: GENITOURINARY - ADULT  Goal: Absence of urinary retention  Description: INTERVENTIONS:  - Assess patient’s ability to void and empty bladder  - Monitor intake/output and perform bladder scan as needed  - Follow urinary retention protocol/standard of care  - Consider collaborating with pharmacy to review patient's medication profile  - Implement strategies to promote bladder emptying  Outcome: Progressing     Problem: METABOLIC/FLUID AND ELECTROLYTES - ADULT  Goal: Electrolytes maintained within normal limits  Description: INTERVENTIONS:  - Monitor labs and rhythm and assess patient for signs and symptoms of electrolyte imbalances  - Administer electrolyte replacement as ordered  - Monitor response to electrolyte replacements, including rhythm and repeat lab results as appropriate  - Fluid restriction as ordered  - Instruct patient on fluid and nutrition restrictions as appropriate  Outcome: Progressing     Problem: SKIN/TISSUE INTEGRITY - ADULT  Goal: Skin integrity remains intact  Description: INTERVENTIONS  - Assess and document risk factors for pressure ulcer development  - Assess and document skin integrity  - Monitor for areas of redness and/or skin breakdown  - Initiate interventions, skin care algorithm/standards of care as needed  Outcome: Progressing  Goal: Incision(s), wounds(s) or drain site(s) healing without S/S of infection  Description: INTERVENTIONS:  - Assess and document risk factors for pressure ulcer development  - Assess and document skin integrity  - Assess and document dressing/incision, wound bed, drain sites and surrounding tissue  - Implement wound care per orders  - Initiate isolation precautions as appropriate  - Initiate Pressure Ulcer prevention bundle as indicated  Outcome: Progressing     Problem: HEMATOLOGIC - ADULT  Goal: Maintains hematologic stability  Description: INTERVENTIONS  - Assess for signs and symptoms of bleeding or hemorrhage  - Monitor labs and vital signs  for trends  - Administer supportive blood products/factors, fluids and medications as ordered and appropriate  - Administer supportive blood products/factors as ordered and appropriate  Outcome: Progressing  Goal: Free from bleeding injury  Description: (Example usage: patient with low platelets)  INTERVENTIONS:  - Avoid intramuscular injections, enemas and rectal medication administration  - Ensure safe mobilization of patient  - Hold pressure on venipuncture sites to achieve adequate hemostasis  - Assess for signs and symptoms of internal bleeding  - Monitor lab trends  - Patient is to report abnormal signs of bleeding to staff  - Avoid use of toothpicks and dental floss  - Use electric shaver for shaving  - Use soft bristle tooth brush  - Limit straining and forceful nose blowing  Outcome: Progressing     Problem: MUSCULOSKELETAL - ADULT  Goal: Return mobility to safest level of function  Description: INTERVENTIONS:  - Assess patient stability and activity tolerance for standing, transferring and ambulating w/ or w/o assistive devices  - Assist with transfers and ambulation using safe patient handling equipment as needed  - Ensure adequate protection for wounds/incisions during mobilization  - Obtain PT/OT consults as needed  - Advance activity as appropriate  - Communicate ordered activity level and limitations with patient/family  Outcome: Progressing  Goal: Maintain proper alignment of affected body part  Description: INTERVENTIONS:  - Support and protect limb and body alignment per provider's orders  - Instruct and reinforce with patient and family use of appropriate assistive device and precautions (e.g. spinal or hip dislocation precautions)  Outcome: Progressing

## 2024-04-25 LAB
ANION GAP SERPL CALC-SCNC: 4 MMOL/L (ref 0–18)
ANTIBODY SCREEN: NEGATIVE
BASOPHILS # BLD AUTO: 0.02 X10(3) UL (ref 0–0.2)
BASOPHILS NFR BLD AUTO: 0.3 %
BLOOD TYPE BARCODE: 6200
BUN BLD-MCNC: 11 MG/DL (ref 9–23)
BUN/CREAT SERPL: 20 (ref 10–20)
CALCIUM BLD-MCNC: 9.5 MG/DL (ref 8.7–10.4)
CHLORIDE SERPL-SCNC: 109 MMOL/L (ref 98–112)
CO2 SERPL-SCNC: 29 MMOL/L (ref 21–32)
CREAT BLD-MCNC: 0.55 MG/DL
DEPRECATED RDW RBC AUTO: 63.8 FL (ref 35.1–46.3)
EGFRCR SERPLBLD CKD-EPI 2021: 91 ML/MIN/1.73M2 (ref 60–?)
EOSINOPHIL # BLD AUTO: 0.05 X10(3) UL (ref 0–0.7)
EOSINOPHIL NFR BLD AUTO: 0.8 %
ERYTHROCYTE [DISTWIDTH] IN BLOOD BY AUTOMATED COUNT: 15 % (ref 11–15)
GLUCOSE BLD-MCNC: 113 MG/DL (ref 70–99)
HCT VFR BLD AUTO: 21.3 %
HCT VFR BLD AUTO: 23.9 %
HGB BLD-MCNC: 6.6 G/DL
HGB BLD-MCNC: 8.1 G/DL
IMM GRANULOCYTES # BLD AUTO: 0.03 X10(3) UL (ref 0–1)
IMM GRANULOCYTES NFR BLD: 0.5 %
LYMPHOCYTES # BLD AUTO: 0.49 X10(3) UL (ref 1–4)
LYMPHOCYTES NFR BLD AUTO: 7.7 %
MCH RBC QN AUTO: 36.3 PG (ref 26–34)
MCHC RBC AUTO-ENTMCNC: 31 G/DL (ref 31–37)
MCV RBC AUTO: 117 FL
MONOCYTES # BLD AUTO: 0.34 X10(3) UL (ref 0.1–1)
MONOCYTES NFR BLD AUTO: 5.3 %
NEUTROPHILS # BLD AUTO: 5.47 X10 (3) UL (ref 1.5–7.7)
NEUTROPHILS # BLD AUTO: 5.47 X10(3) UL (ref 1.5–7.7)
NEUTROPHILS NFR BLD AUTO: 85.4 %
OSMOLALITY SERPL CALC.SUM OF ELEC: 294 MOSM/KG (ref 275–295)
PLATELET # BLD AUTO: 201 10(3)UL (ref 150–450)
POTASSIUM SERPL-SCNC: 3.7 MMOL/L (ref 3.5–5.1)
RBC # BLD AUTO: 1.82 X10(6)UL
RH BLOOD TYPE: POSITIVE
SODIUM SERPL-SCNC: 142 MMOL/L (ref 136–145)
UNIT VOLUME: 350 ML
WBC # BLD AUTO: 6.4 X10(3) UL (ref 4–11)

## 2024-04-25 PROCEDURE — 36430 TRANSFUSION BLD/BLD COMPNT: CPT

## 2024-04-25 PROCEDURE — 85060 BLOOD SMEAR INTERPRETATION: CPT | Performed by: PHYSICIAN ASSISTANT

## 2024-04-25 PROCEDURE — 85025 COMPLETE CBC W/AUTO DIFF WBC: CPT | Performed by: PHYSICIAN ASSISTANT

## 2024-04-25 PROCEDURE — 85014 HEMATOCRIT: CPT | Performed by: INTERNAL MEDICINE

## 2024-04-25 PROCEDURE — 85018 HEMOGLOBIN: CPT | Performed by: INTERNAL MEDICINE

## 2024-04-25 PROCEDURE — 86900 BLOOD TYPING SEROLOGIC ABO: CPT | Performed by: INTERNAL MEDICINE

## 2024-04-25 PROCEDURE — 86850 RBC ANTIBODY SCREEN: CPT | Performed by: INTERNAL MEDICINE

## 2024-04-25 PROCEDURE — 86920 COMPATIBILITY TEST SPIN: CPT

## 2024-04-25 PROCEDURE — 30233N1 TRANSFUSION OF NONAUTOLOGOUS RED BLOOD CELLS INTO PERIPHERAL VEIN, PERCUTANEOUS APPROACH: ICD-10-PCS | Performed by: INTERNAL MEDICINE

## 2024-04-25 PROCEDURE — 80048 BASIC METABOLIC PNL TOTAL CA: CPT | Performed by: PHYSICIAN ASSISTANT

## 2024-04-25 PROCEDURE — 86901 BLOOD TYPING SEROLOGIC RH(D): CPT | Performed by: INTERNAL MEDICINE

## 2024-04-25 RX ORDER — SODIUM CHLORIDE 9 MG/ML
INJECTION, SOLUTION INTRAVENOUS ONCE
Status: COMPLETED | OUTPATIENT
Start: 2024-04-25 | End: 2024-04-25

## 2024-04-25 NOTE — PROGRESS NOTES
[facility]   INTERNAL MEDICINE PROGRESS NOTE    CHIEF COMPLAINT   Post-op TLH-BSO, resp failure    SUBJECTIVE  24 HR EVENTS   Anemia - hgb 6.6. 1u RBC this AM    Hypoxia overnight - still 2L. On O2 via NC  Needed some norco  Pain better this AM   Some nausea - zofran helped    INPATIENT MEDICATIONS  Scheduled Medications:  sodium chloride, , Once  enoxaparin, 40 mg, Daily  docusate sodium, 100 mg, BID  clonazePAM, 0.5 mg, Nightly  DULoxetine, 60 mg, Nightly  gabapentin, 300 mg, BID  levothyroxine, 50 mcg, Daily @ 0700  pantoprazole, 20 mg, QAM AC  atorvastatin, 10 mg, Nightly  [Held by provider] lisinopril, 10 mg, QAM  [Held by provider] amLODIPine, 2.5 mg, Nightly     Drips:   lactated ringers, Last Rate: 60 mL/hr at 04/24/24 1925  lactated ringers, Last Rate: 150 mL/hr at 04/23/24 0942       PRN Medications:   ondansetron, 4 mg, Q8H PRN   Or  ondansetron, 4 mg, Q8H PRN  acetaminophen, 650 mg, Q4H PRN  HYDROcodone-acetaminophen, 1 tablet, Q6H PRN  HYDROmorphone, 0.2 mg, Q2H PRN   Or  HYDROmorphone, 0.4 mg, Q2H PRN   Or  HYDROmorphone, 0.8 mg, Q2H PRN  simethicone, 80 mg, TID PRN  diphenhydrAMINE, 12.5 mg, Q4H PRN       PHYSICAL EXAMINATION  Vitals: /52 (BP Location: Left arm)   Pulse 95   Temp 98.2 °F (36.8 °C) (Oral)   Resp 19   Ht 4' 9\" (1.448 m)   Wt 102 lb (46.3 kg)   SpO2 93%   BMI 22.07 kg/m²   Gen: NAD, well nourished  Eyes: PERRLA, normal conjunctivae  ENMT: Moist mucous membranes, trachea midline  CV: RRR, no peripheral edema  Resp: CTAB, non-labored respirations, symmetric expansion  GI: Soft, NT, ND, no rebound, no guarding  MSK:  No C/C/E, normal active/passive ROM in C-spine  Skin: No rashes  Neuro: CN 2-12 grossly intact  Psych: A&Ox3, conversant w/ linear thought process     LABORATORY VALUES   Recent Labs   Lab 04/24/24  1016 04/25/24  0547    142   K 3.9 3.7    109   CO2 26.0 29.0   BUN 14 11   CREATSERUM 0.64 0.55   ANIONGAP 6 4   * 113*   CA 9.4 9.5    EGFRCR 88 91     Recent Labs   Lab 04/19/24  1354 04/24/24  1034 04/25/24  0620   WBC 8.0 12.2* 6.4   HGB 8.8* 7.9* 6.6*   HCT 28.5* 25.0* 21.3*   .0 234.0 201.0   .8* 115.7* 117.0*   MOPERCENT  --  4.6  --    EOPERCENT  --  0.0  --    BAPERCENT  --  0.1  --      ASSESSMENT & PLAN  Lizeth Mcgill - 83 year old female with ovarian CA, HTN, breaset CA, DM2, hypothyroidism who presented for hysterectomy-BSO.     Ovarian CA  S/p robotic assisted total lap hysterectomy-BSO, debulking, omentectomy  - PRN pain meds, Transition to PO when able  - monitor for acute blood loss anemia   - Bowel reg, antiemetics  - DVT Prophy- lovenox  - Bernardo removed  - Still needing some IV dilaudid    Acute resp failure w hypoxia  - 70% w ambulating per pt. On 3L NC 4/24 AM  - Suspect atelectasis. Minimal basilar air movement on exam  - Can hold off on imaging for now  - OOB, encourage IS, ambulate TID  - Supplemental O2 for goal SpO2 90-93%. On 2L NC 4/24 AM   - Lungs are clear - can hold off on imaging. Encourage IS    Acute on chronic anemia   - Presume 2/2 expected post-op ABLA w dilutional component from IVF  - No active bleeding  - Trend CBC  - 4/25 hgb < 7 - 1u RBC    HTN  - BP stable  - hold home lisinopril for now    Anxiety  - clonazepam nightly    Hypothyroidism  - home sythroid    GERD  - PPI    ACP: Full Code  Ppx: DVT: Enox  Dispo  EDoD:  ~ 4/26 if hgb stable & off O2  F/u:   - PCP:  Chong Cornejo MD    Available via epic secure chat or perfect serve 7A - 7P.    Hai Landrum MD   Internal Medicine - Hospitalist  [facility]

## 2024-04-25 NOTE — PLAN OF CARE
Patient alert and oriented x4, vitals stable. Transfused 1 unit of pRBCs, tolerated well. Pain managed with norco. Denies nausea, tolerating LFS diet. 2L oxygen, weaning as able, encouraging use of IS and ambulation. Voiding freely, loose BM this am, continues IVF, scant vaginal discharge. Up with assist and walker. Call light within reach, has been calling appropriately for assistance.  Problem: Patient Centered Care  Goal: Patient preferences are identified and integrated in the patient's plan of care  Description: Interventions:  - What would you like us to know as we care for you? From home   - Provide timely, complete, and accurate information to patient/family  - Incorporate patient and family knowledge, values, beliefs, and cultural backgrounds into the planning and delivery of care  - Encourage patient/family to participate in care and decision-making at the level they choose  - Honor patient and family perspectives and choices  Outcome: Progressing     Problem: PAIN - ADULT  Goal: Verbalizes/displays adequate comfort level or patient's stated pain goal  Description: INTERVENTIONS:  - Encourage pt to monitor pain and request assistance  - Assess pain using appropriate pain scale  - Administer analgesics based on type and severity of pain and evaluate response  - Implement non-pharmacological measures as appropriate and evaluate response  - Consider cultural and social influences on pain and pain management  - Manage/alleviate anxiety  - Utilize distraction and/or relaxation techniques  - Monitor for opioid side effects  - Notify MD/LIP if interventions unsuccessful or patient reports new pain  - Anticipate increased pain with activity and pre-medicate as appropriate  Outcome: Progressing     Problem: SAFETY ADULT - FALL  Goal: Free from fall injury  Description: INTERVENTIONS:  - Assess pt frequently for physical needs  - Identify cognitive and physical deficits and behaviors that affect risk of falls.  -  [place] fall precautions as indicated by assessment.  - Educate pt/family on patient safety including physical limitations  - Instruct pt to call for assistance with activity based on assessment  - Modify environment to reduce risk of injury  - Provide assistive devices as appropriate  - Consider OT/PT consult to assist with strengthening/mobility  - Encourage toileting schedule  Outcome: Progressing     Problem: RISK FOR INFECTION - ADULT  Goal: Absence of fever/infection during anticipated neutropenic period  Description: INTERVENTIONS  - Monitor WBC  - Administer growth factors as ordered  - Implement neutropenic guidelines  Outcome: Progressing     Problem: DISCHARGE PLANNING  Goal: Discharge to home or other facility with appropriate resources  Description: INTERVENTIONS:  - Identify barriers to discharge w/pt and caregiver  - Include patient/family/discharge partner in discharge planning  - Arrange for needed discharge resources and transportation as appropriate  - Identify discharge learning needs (meds, wound care, etc)  - Arrange for interpreters to assist at discharge as needed  - Consider post-discharge preferences of patient/family/discharge partner  - Complete POLST form as appropriate  - Assess patient's ability to be responsible for managing their own health  - Refer to Case Management Department for coordinating discharge planning if the patient needs post-hospital services based on physician/LIP order or complex needs related to functional status, cognitive ability or social support system  Outcome: Progressing     Problem: RESPIRATORY - ADULT  Goal: Achieves optimal ventilation and oxygenation  Description: INTERVENTIONS:  - Assess for changes in respiratory status  - Assess for changes in mentation and behavior  - Position to facilitate oxygenation and minimize respiratory effort  - Oxygen supplementation based on oxygen saturation or ABGs  - Provide Smoking Cessation handout, if applicable  -  Encourage broncho-pulmonary hygiene including cough, deep breathe, Incentive Spirometry  - Assess the need for suctioning and perform as needed  - Assess and instruct to report SOB or any respiratory difficulty  - Respiratory Therapy support as indicated  - Manage/alleviate anxiety  - Monitor for signs/symptoms of CO2 retention  Outcome: Progressing     Problem: GASTROINTESTINAL - ADULT  Goal: Minimal or absence of nausea and vomiting  Description: INTERVENTIONS:  - Maintain adequate hydration with IV or PO as ordered and tolerated  - Nasogastric tube to low intermittent suction as ordered  - Evaluate effectiveness of ordered antiemetic medications  - Provide nonpharmacologic comfort measures as appropriate  - Advance diet as tolerated, if ordered  - Obtain nutritional consult as needed  - Evaluate fluid balance  Outcome: Progressing  Goal: Maintains or returns to baseline bowel function  Description: INTERVENTIONS:  - Assess bowel function  - Maintain adequate hydration with IV or PO as ordered and tolerated  - Evaluate effectiveness of GI medications  - Encourage mobilization and activity  - Obtain nutritional consult as needed  - Establish a toileting routine/schedule  - Consider collaborating with pharmacy to review patient's medication profile  Outcome: Progressing     Problem: GENITOURINARY - ADULT  Goal: Absence of urinary retention  Description: INTERVENTIONS:  - Assess patient’s ability to void and empty bladder  - Monitor intake/output and perform bladder scan as needed  - Follow urinary retention protocol/standard of care  - Consider collaborating with pharmacy to review patient's medication profile  - Implement strategies to promote bladder emptying  Outcome: Progressing     Problem: METABOLIC/FLUID AND ELECTROLYTES - ADULT  Goal: Electrolytes maintained within normal limits  Description: INTERVENTIONS:  - Monitor labs and rhythm and assess patient for signs and symptoms of electrolyte imbalances  -  Administer electrolyte replacement as ordered  - Monitor response to electrolyte replacements, including rhythm and repeat lab results as appropriate  - Fluid restriction as ordered  - Instruct patient on fluid and nutrition restrictions as appropriate  Outcome: Progressing     Problem: SKIN/TISSUE INTEGRITY - ADULT  Goal: Skin integrity remains intact  Description: INTERVENTIONS  - Assess and document risk factors for pressure ulcer development  - Assess and document skin integrity  - Monitor for areas of redness and/or skin breakdown  - Initiate interventions, skin care algorithm/standards of care as needed  Outcome: Progressing  Goal: Incision(s), wounds(s) or drain site(s) healing without S/S of infection  Description: INTERVENTIONS:  - Assess and document risk factors for pressure ulcer development  - Assess and document skin integrity  - Assess and document dressing/incision, wound bed, drain sites and surrounding tissue  - Implement wound care per orders  - Initiate isolation precautions as appropriate  - Initiate Pressure Ulcer prevention bundle as indicated  Outcome: Progressing     Problem: HEMATOLOGIC - ADULT  Goal: Maintains hematologic stability  Description: INTERVENTIONS  - Assess for signs and symptoms of bleeding or hemorrhage  - Monitor labs and vital signs for trends  - Administer supportive blood products/factors, fluids and medications as ordered and appropriate  - Administer supportive blood products/factors as ordered and appropriate  Outcome: Progressing  Goal: Free from bleeding injury  Description: (Example usage: patient with low platelets)  INTERVENTIONS:  - Avoid intramuscular injections, enemas and rectal medication administration  - Ensure safe mobilization of patient  - Hold pressure on venipuncture sites to achieve adequate hemostasis  - Assess for signs and symptoms of internal bleeding  - Monitor lab trends  - Patient is to report abnormal signs of bleeding to staff  - Avoid use of  toothpicks and dental floss  - Use electric shaver for shaving  - Use soft bristle tooth brush  - Limit straining and forceful nose blowing  Outcome: Progressing     Problem: MUSCULOSKELETAL - ADULT  Goal: Return mobility to safest level of function  Description: INTERVENTIONS:  - Assess patient stability and activity tolerance for standing, transferring and ambulating w/ or w/o assistive devices  - Assist with transfers and ambulation using safe patient handling equipment as needed  - Ensure adequate protection for wounds/incisions during mobilization  - Obtain PT/OT consults as needed  - Advance activity as appropriate  - Communicate ordered activity level and limitations with patient/family  Outcome: Progressing  Goal: Maintain proper alignment of affected body part  Description: INTERVENTIONS:  - Support and protect limb and body alignment per provider's orders  - Instruct and reinforce with patient and family use of appropriate assistive device and precautions (e.g. spinal or hip dislocation precautions)  Outcome: Progressing

## 2024-04-25 NOTE — PLAN OF CARE
Lizeth is alert and oriented x4, on 2L O2 via NC d/t desatting a bit, wean as evangelista, on Lovenox. Using IS at bedside, educated on use. Voiding up to bathroom, 1x-assist/stand-by with walker, hat in bathroom to monitor output. Repos as evangelista in bed. Incisions in place, CDI, open to air. Pain managed, norco prn if need, pt will call when she needs it. Zofran given once for nausea, improved afterwards. Left arm precaution. LR at 60 ml/hr. Had BM during the shift. Plan pending course, plan for [facility] pending auth, safety measures in place, call light within reach.     Problem: Patient Centered Care  Goal: Patient preferences are identified and integrated in the patient's plan of care  Description: Interventions:  - What would you like us to know as we care for you? From home   - Provide timely, complete, and accurate information to patient/family  - Incorporate patient and family knowledge, values, beliefs, and cultural backgrounds into the planning and delivery of care  - Encourage patient/family to participate in care and decision-making at the level they choose  - Honor patient and family perspectives and choices  Outcome: Progressing     Problem: Patient/Family Goals  Goal: Patient/Family Long Term Goal  Description: Patient's Long Term Goal:     Interventions:  -   - See additional Care Plan goals for specific interventions  Outcome: Progressing  Goal: Patient/Family Short Term Goal  Description: Patient's Short Term Goal:     Interventions:   -   - See additional Care Plan goals for specific interventions  Outcome: Progressing     Problem: PAIN - ADULT  Goal: Verbalizes/displays adequate comfort level or patient's stated pain goal  Description: INTERVENTIONS:  - Encourage pt to monitor pain and request assistance  - Assess pain using appropriate pain scale  - Administer analgesics based on type and severity of pain and evaluate response  - Implement non-pharmacological measures as appropriate and evaluate  response  - Consider cultural and social influences on pain and pain management  - Manage/alleviate anxiety  - Utilize distraction and/or relaxation techniques  - Monitor for opioid side effects  - Notify MD/LIP if interventions unsuccessful or patient reports new pain  - Anticipate increased pain with activity and pre-medicate as appropriate  Outcome: Progressing     Problem: SAFETY ADULT - FALL  Goal: Free from fall injury  Description: INTERVENTIONS:  - Assess pt frequently for physical needs  - Identify cognitive and physical deficits and behaviors that affect risk of falls.  - [place] fall precautions as indicated by assessment.  - Educate pt/family on patient safety including physical limitations  - Instruct pt to call for assistance with activity based on assessment  - Modify environment to reduce risk of injury  - Provide assistive devices as appropriate  - Consider OT/PT consult to assist with strengthening/mobility  - Encourage toileting schedule  Outcome: Progressing     Problem: RISK FOR INFECTION - ADULT  Goal: Absence of fever/infection during anticipated neutropenic period  Description: INTERVENTIONS  - Monitor WBC  - Administer growth factors as ordered  - Implement neutropenic guidelines  Outcome: Progressing     Problem: DISCHARGE PLANNING  Goal: Discharge to home or other facility with appropriate resources  Description: INTERVENTIONS:  - Identify barriers to discharge w/pt and caregiver  - Include patient/family/discharge partner in discharge planning  - Arrange for needed discharge resources and transportation as appropriate  - Identify discharge learning needs (meds, wound care, etc)  - Arrange for interpreters to assist at discharge as needed  - Consider post-discharge preferences of patient/family/discharge partner  - Complete POLST form as appropriate  - Assess patient's ability to be responsible for managing their own health  - Refer to Case Management Department for coordinating  discharge planning if the patient needs post-hospital services based on physician/LIP order or complex needs related to functional status, cognitive ability or social support system  Outcome: Progressing     Problem: RESPIRATORY - ADULT  Goal: Achieves optimal ventilation and oxygenation  Description: INTERVENTIONS:  - Assess for changes in respiratory status  - Assess for changes in mentation and behavior  - Position to facilitate oxygenation and minimize respiratory effort  - Oxygen supplementation based on oxygen saturation or ABGs  - Provide Smoking Cessation handout, if applicable  - Encourage broncho-pulmonary hygiene including cough, deep breathe, Incentive Spirometry  - Assess the need for suctioning and perform as needed  - Assess and instruct to report SOB or any respiratory difficulty  - Respiratory Therapy support as indicated  - Manage/alleviate anxiety  - Monitor for signs/symptoms of CO2 retention  Outcome: Progressing     Problem: GASTROINTESTINAL - ADULT  Goal: Minimal or absence of nausea and vomiting  Description: INTERVENTIONS:  - Maintain adequate hydration with IV or PO as ordered and tolerated  - Nasogastric tube to low intermittent suction as ordered  - Evaluate effectiveness of ordered antiemetic medications  - Provide nonpharmacologic comfort measures as appropriate  - Advance diet as tolerated, if ordered  - Obtain nutritional consult as needed  - Evaluate fluid balance  Outcome: Progressing  Goal: Maintains or returns to baseline bowel function  Description: INTERVENTIONS:  - Assess bowel function  - Maintain adequate hydration with IV or PO as ordered and tolerated  - Evaluate effectiveness of GI medications  - Encourage mobilization and activity  - Obtain nutritional consult as needed  - Establish a toileting routine/schedule  - Consider collaborating with pharmacy to review patient's medication profile  Outcome: Progressing     Problem: GENITOURINARY - ADULT  Goal: Absence of urinary  retention  Description: INTERVENTIONS:  - Assess patient’s ability to void and empty bladder  - Monitor intake/output and perform bladder scan as needed  - Follow urinary retention protocol/standard of care  - Consider collaborating with pharmacy to review patient's medication profile  - Implement strategies to promote bladder emptying  Outcome: Progressing     Problem: METABOLIC/FLUID AND ELECTROLYTES - ADULT  Goal: Electrolytes maintained within normal limits  Description: INTERVENTIONS:  - Monitor labs and rhythm and assess patient for signs and symptoms of electrolyte imbalances  - Administer electrolyte replacement as ordered  - Monitor response to electrolyte replacements, including rhythm and repeat lab results as appropriate  - Fluid restriction as ordered  - Instruct patient on fluid and nutrition restrictions as appropriate  Outcome: Progressing     Problem: SKIN/TISSUE INTEGRITY - ADULT  Goal: Skin integrity remains intact  Description: INTERVENTIONS  - Assess and document risk factors for pressure ulcer development  - Assess and document skin integrity  - Monitor for areas of redness and/or skin breakdown  - Initiate interventions, skin care algorithm/standards of care as needed  Outcome: Progressing  Goal: Incision(s), wounds(s) or drain site(s) healing without S/S of infection  Description: INTERVENTIONS:  - Assess and document risk factors for pressure ulcer development  - Assess and document skin integrity  - Assess and document dressing/incision, wound bed, drain sites and surrounding tissue  - Implement wound care per orders  - Initiate isolation precautions as appropriate  - Initiate Pressure Ulcer prevention bundle as indicated  Outcome: Progressing     Problem: HEMATOLOGIC - ADULT  Goal: Maintains hematologic stability  Description: INTERVENTIONS  - Assess for signs and symptoms of bleeding or hemorrhage  - Monitor labs and vital signs for trends  - Administer supportive blood products/factors,  fluids and medications as ordered and appropriate  - Administer supportive blood products/factors as ordered and appropriate  Outcome: Progressing  Goal: Free from bleeding injury  Description: (Example usage: patient with low platelets)  INTERVENTIONS:  - Avoid intramuscular injections, enemas and rectal medication administration  - Ensure safe mobilization of patient  - Hold pressure on venipuncture sites to achieve adequate hemostasis  - Assess for signs and symptoms of internal bleeding  - Monitor lab trends  - Patient is to report abnormal signs of bleeding to staff  - Avoid use of toothpicks and dental floss  - Use electric shaver for shaving  - Use soft bristle tooth brush  - Limit straining and forceful nose blowing  Outcome: Progressing     Problem: MUSCULOSKELETAL - ADULT  Goal: Return mobility to safest level of function  Description: INTERVENTIONS:  - Assess patient stability and activity tolerance for standing, transferring and ambulating w/ or w/o assistive devices  - Assist with transfers and ambulation using safe patient handling equipment as needed  - Ensure adequate protection for wounds/incisions during mobilization  - Obtain PT/OT consults as needed  - Advance activity as appropriate  - Communicate ordered activity level and limitations with patient/family  Outcome: Progressing  Goal: Maintain proper alignment of affected body part  Description: INTERVENTIONS:  - Support and protect limb and body alignment per provider's orders  - Instruct and reinforce with patient and family use of appropriate assistive device and precautions (e.g. spinal or hip dislocation precautions)  Outcome: Progressing

## 2024-04-25 NOTE — PROGRESS NOTES
[facility] GYNECOLOGIC ONCOLOGY POST-OPERATIVE VISIT     MEDICAL RECORD #: [id number] DATE OF SERVICE: 4/25/2024             Reason for visit:  Post-operative day two    HISTORY OF PRESENT ILLNESS:  83 year old female with a history of incidental finding of bladder mass, pelvic mass, and peritoneal nodules. She was found to have a low-grade papillary urothelial bladder cancer, underwent transurethral resection. PET scan showed peritoneal implants and the lesion in the right ovary. 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 Immunohistochemistry was not typical for gynecologic malignancies, although this was thought to be the leading differential diagnosis. Her tumor markers were all negative. The patient was started on neoadjuvant chemotherapy, has undergone 6 cycles.  She underwent Laparoscopic lysis of adhesions (encompassing over 20 minutes), robotic-assisted total laparoscopic hysterectomy and bilateral salpingo-oophorectomy with tumor debulking, bilateral pelvic and paraaortic lymph node dissection, infracolic and portion of supracolic omentectomy on 4/23/24.    GI//GYNE Complaint:   Other complaints: Tolerating diet, no N/V. Urinating freely, +belching, +BM. Pain controlled. Desaturated again this AM, on NC. Hgb 6.6, pending transfusion.    Interim History:  Underwent surgery    Patient's medications, allergies, past medical, surgical, social and family histories were reviewed and updated as appropriate.    Pathology/Lab Results:  Recent Results (from the past 720 hour(s))   CBC, Platelet; No Differential    Collection Time: 04/19/24  1:54 PM   Result Value Ref Range    WBC 8.0 4.0 - 11.0 x10(3) uL    RBC 2.42 (L) 3.80 - 5.30 x10(6)uL    HGB 8.8 (L) 12.0 - 16.0 g/dL    HCT 28.5 (L) 35.0 - 48.0 %    .8 (H) 80.0 - 100.0 fL    MCH 36.4 (H) 26.0 - 34.0 pg    MCHC 30.9 (L) 31.0 - 37.0 g/dL    RDW 15.5 (H) 11.0 - 15.0 %    RDW-SD 67.1 (H) 35.1 - 46.3 fL    .0 150.0 - 450.0  10(3)uL   ABORH (Blood Type)    Collection Time: 04/19/24  1:54 PM   Result Value Ref Range    ABO BLOOD TYPE A     RH BLOOD TYPE Positive    Antibody Screen    Collection Time: 04/19/24  1:54 PM   Result Value Ref Range    Antibody Screen Negative    MD BLOOD SMEAR CONSULT    Collection Time: 04/19/24  1:54 PM   Result Value Ref Range    MD Blood Smear Consult       Evaluation of the peripheral blood smear demonstrates moderate macrocytic anemia characterized by moderate anisopoikilocytosis with macrocytes, ovalocytes and polychromasia. Neutrophils display distinct cytoplasmic granulation. No circulating blasts seen.    Differential considerations for macrocytic anemia may include vitamin B12 or folate deficiency, autoimmune hemolytic anemia, cold agglutinin disease, liver disease, some myelodysplasias, thyroid disease, excessive alcohol consumption and drug effects.     Clinical correlation is recommended.     Reviewed by IRAIDA Vázquez M.D.   Prepare RBC STAT    Collection Time: 04/23/24  6:38 AM   Result Value Ref Range    Blood Product [id number]     Unit Number [id number]     UNIT ABO A     UNIT RH POS     Crossmatch Compatible     Product Status Cross Matched     Expiration Date 734711407650     Blood Type Barcode 6200     Unit Volume 350 ml   Cytology fluids    Collection Time: 04/23/24  8:27 AM   Result Value Ref Range    Case Report       Non-Gynecologic Cytology                          Case: [id number]                                   Authorizing Provider:  Rigoberto Couch MD         Collected:           04/23/2024 08:27 AM          Ordering Location:     [facility]          Received:            04/23/2024 10:44 AM                                 Operating Room                                                               Pathologist:           Cooper Dahl MD                                                         Specimen:    Pelvic washings, 1. PELVIC WASHINGS-FOR CYTOLOGY                                            General Categorization         Benign, inflammatory, reactive or reparative changes    Final Diagnosis:         Pelvic washings:   Adequacy: Satisfactory for evaluation  General Category: Benign, inflammatory, reactive, or reparative changes  Diagnosis:  Benign mesothelial cells present  Histiocytes present  Peripheral blood elements present  No malignant cells identified        Embedded Images      Final Diagnosis Comment         Recommend correlation with histologic findings ([id number]).            Procedure       Monolayers:  1  Cytospins:     2      Clinical Information       Carcinoma, Malignant Neoplasm Of Right Ovary.        Non Gyne Interpretation  Benign      Gross Description       Volume (ml): 25    Color: Pink     POCT Glucose    Collection Time: 04/23/24 11:27 AM   Result Value Ref Range    POC Glucose  143 (H) 70 - 99 mg/dL   Basic Metabolic Panel (8)    Collection Time: 04/24/24 10:16 AM   Result Value Ref Range    Glucose 154 (H) 70 - 99 mg/dL    Sodium 141 136 - 145 mmol/L    Potassium 3.9 3.5 - 5.1 mmol/L    Chloride 109 98 - 112 mmol/L    CO2 26.0 21.0 - 32.0 mmol/L    Anion Gap 6 0 - 18 mmol/L    BUN 14 9 - 23 mg/dL    Creatinine 0.64 0.55 - 1.02 mg/dL    BUN/CREA Ratio 21.9 (H) 10.0 - 20.0    Calcium, Total 9.4 8.7 - 10.4 mg/dL    Calculated Osmolality 296 (H) 275 - 295 mOsm/kg    eGFR-Cr 88 >=60 mL/min/1.73m2   CBC W/ DIFFERENTIAL    Collection Time: 04/24/24 10:34 AM   Result Value Ref Range    WBC 12.2 (H) 4.0 - 11.0 x10(3) uL    RBC 2.16 (L) 3.80 - 5.30 x10(6)uL    HGB 7.9 (L) 12.0 - 16.0 g/dL    HCT 25.0 (L) 35.0 - 48.0 %    .7 (H) 80.0 - 100.0 fL    MCH 36.6 (H) 26.0 - 34.0 pg    MCHC 31.6 31.0 - 37.0 g/dL    RDW-SD 64.8 (H) 35.1 - 46.3 fL    RDW 15.0 11.0 - 15.0 %    .0 150.0 - 450.0 10(3)uL    Neutrophil Absolute Prelim 11.07 (H) 1.50 - 7.70 x10 (3) uL    Neutrophil Absolute 11.07 (H) 1.50 - 7.70 x10(3) uL    Lymphocyte Absolute 0.56 (L) 1.00 - 4.00  x10(3) uL    Monocyte Absolute 0.56 0.10 - 1.00 x10(3) uL    Eosinophil Absolute 0.00 0.00 - 0.70 x10(3) uL    Basophil Absolute 0.01 0.00 - 0.20 x10(3) uL    Immature Granulocyte Absolute 0.04 0.00 - 1.00 x10(3) uL    Neutrophil % 90.4 %    Lymphocyte % 4.6 %    Monocyte % 4.6 %    Eosinophil % 0.0 %    Basophil % 0.1 %    Immature Granulocyte % 0.3 %   Basic Metabolic Panel (8)    Collection Time: 04/25/24  5:47 AM   Result Value Ref Range    Glucose 113 (H) 70 - 99 mg/dL    Sodium 142 136 - 145 mmol/L    Potassium 3.7 3.5 - 5.1 mmol/L    Chloride 109 98 - 112 mmol/L    CO2 29.0 21.0 - 32.0 mmol/L    Anion Gap 4 0 - 18 mmol/L    BUN 11 9 - 23 mg/dL    Creatinine 0.55 0.55 - 1.02 mg/dL    BUN/CREA Ratio 20.0 10.0 - 20.0    Calcium, Total 9.5 8.7 - 10.4 mg/dL    Calculated Osmolality 294 275 - 295 mOsm/kg    eGFR-Cr 91 >=60 mL/min/1.73m2   CBC W/ DIFFERENTIAL    Collection Time: 04/25/24  6:20 AM   Result Value Ref Range    WBC 6.4 4.0 - 11.0 x10(3) uL    RBC 1.82 (L) 3.80 - 5.30 x10(6)uL    HGB 6.6 (LL) 12.0 - 16.0 g/dL    HCT 21.3 (L) 35.0 - 48.0 %    .0 (H) 80.0 - 100.0 fL    MCH 36.3 (H) 26.0 - 34.0 pg    MCHC 31.0 31.0 - 37.0 g/dL    RDW-SD 63.8 (H) 35.1 - 46.3 fL    RDW 15.0 11.0 - 15.0 %    .0 150.0 - 450.0 10(3)uL    Neutrophil Absolute Prelim 5.47 1.50 - 7.70 x10 (3) uL   ABORH (Blood Type)    Collection Time: 04/25/24  7:57 AM   Result Value Ref Range    ABO BLOOD TYPE A     RH BLOOD TYPE Positive    Antibody Screen    Collection Time: 04/25/24  7:57 AM   Result Value Ref Range    Antibody Screen Negative    Prepare RBC Once    Collection Time: 04/25/24  9:06 AM   Result Value Ref Range    Blood Product [id number]     Unit Number [id number]     UNIT ABO A     UNIT RH NEG     Crossmatch Compatible     Product Status Cross Matched     Expiration Date 756114595202     Blood Type Barcode 0600     Unit Volume 350 ml       Labs reviewed    Physical examination:    /60 (BP Location: Right  arm)   Pulse 109   Temp 98.8 °F (37.1 °C) (Oral)   Resp 18   Ht 4' 9\" (1.448 m)   Wt 102 lb (46.3 kg)   SpO2 94%   BMI 22.07 kg/m²     GENERAL: alert and oriented, cooperative, in no acute distress  ABDOMEN: soft, non-tender. Bowel sounds normal. No masses,  no organomegaly  INCISION/SURGICAL WOUNDS: clean, dry and intact trocar incisions  PELVIC: Pelvic exam: examination not indicated.    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  I will keep you informed of her progress.  Thank you for allowing me to participate in the care of this patient.    BARRINGTON Ireland  04/25/24

## 2024-04-25 NOTE — CONGREGATE LIVING REVIEW
[facility] Authorization    The [facility] Review Committee has reviewed this case and the patient IS APPROVED for discharge to a facility for Short Term Skilled once the following procedure is followed:     - The physician discharge instructions (contained within the [facility] note for SNF) must inlcude the below appropriate and approved COVID instructions to the facility    For questions regarding CLRC approval process, please contact the CM assigned to the case.  For questions regarding RN discharge workflow, please contact the unit Clinical Leader.

## 2024-04-25 NOTE — CM/SW NOTE
Per Gabi at [facility] ins auth has been received.   Bed available when pt is stable for discharge.    Reina Govea RN, BSN  Nurse   [phone]

## 2024-04-26 ENCOUNTER — APPOINTMENT (OUTPATIENT)
Dept: GENERAL RADIOLOGY | Facility: HOSPITAL | Age: 84
End: 2024-04-26
Attending: INTERNAL MEDICINE
Payer: MEDICARE

## 2024-04-26 LAB
ANION GAP SERPL CALC-SCNC: 2 MMOL/L (ref 0–18)
BASOPHILS # BLD AUTO: 0.01 X10(3) UL (ref 0–0.2)
BASOPHILS NFR BLD AUTO: 0.1 %
BLOOD TYPE BARCODE: 600
BUN BLD-MCNC: 10 MG/DL (ref 9–23)
BUN/CREAT SERPL: 15.2 (ref 10–20)
CALCIUM BLD-MCNC: 9.7 MG/DL (ref 8.7–10.4)
CHLORIDE SERPL-SCNC: 108 MMOL/L (ref 98–112)
CO2 SERPL-SCNC: 32 MMOL/L (ref 21–32)
CREAT BLD-MCNC: 0.66 MG/DL
DEPRECATED RDW RBC AUTO: 86.3 FL (ref 35.1–46.3)
EGFRCR SERPLBLD CKD-EPI 2021: 87 ML/MIN/1.73M2 (ref 60–?)
EOSINOPHIL # BLD AUTO: 0.09 X10(3) UL (ref 0–0.7)
EOSINOPHIL NFR BLD AUTO: 1.1 %
ERYTHROCYTE [DISTWIDTH] IN BLOOD BY AUTOMATED COUNT: 23.9 % (ref 11–15)
GLUCOSE BLD-MCNC: 116 MG/DL (ref 70–99)
HCT VFR BLD AUTO: 22 %
HCT VFR BLD AUTO: 22.2 %
HGB BLD-MCNC: 7.3 G/DL
HGB BLD-MCNC: 7.6 G/DL
IMM GRANULOCYTES # BLD AUTO: 0.03 X10(3) UL (ref 0–1)
IMM GRANULOCYTES NFR BLD: 0.4 %
LYMPHOCYTES # BLD AUTO: 0.49 X10(3) UL (ref 1–4)
LYMPHOCYTES NFR BLD AUTO: 6.1 %
MCH RBC QN AUTO: 34.4 PG (ref 26–34)
MCHC RBC AUTO-ENTMCNC: 33.2 G/DL (ref 31–37)
MCV RBC AUTO: 103.8 FL
MONOCYTES # BLD AUTO: 0.32 X10(3) UL (ref 0.1–1)
MONOCYTES NFR BLD AUTO: 4 %
NEUTROPHILS # BLD AUTO: 7.03 X10 (3) UL (ref 1.5–7.7)
NEUTROPHILS # BLD AUTO: 7.03 X10(3) UL (ref 1.5–7.7)
NEUTROPHILS NFR BLD AUTO: 88.3 %
OSMOLALITY SERPL CALC.SUM OF ELEC: 294 MOSM/KG (ref 275–295)
PLATELET # BLD AUTO: 171 10(3)UL (ref 150–450)
PLATELET MORPHOLOGY: NORMAL
POTASSIUM SERPL-SCNC: 3.5 MMOL/L (ref 3.5–5.1)
RBC # BLD AUTO: 2.12 X10(6)UL
SODIUM SERPL-SCNC: 142 MMOL/L (ref 136–145)
UNIT VOLUME: 350 ML
WBC # BLD AUTO: 8 X10(3) UL (ref 4–11)

## 2024-04-26 PROCEDURE — 80048 BASIC METABOLIC PNL TOTAL CA: CPT | Performed by: PHYSICIAN ASSISTANT

## 2024-04-26 PROCEDURE — 97530 THERAPEUTIC ACTIVITIES: CPT

## 2024-04-26 PROCEDURE — 85025 COMPLETE CBC W/AUTO DIFF WBC: CPT | Performed by: PHYSICIAN ASSISTANT

## 2024-04-26 PROCEDURE — 71045 X-RAY EXAM CHEST 1 VIEW: CPT | Performed by: INTERNAL MEDICINE

## 2024-04-26 PROCEDURE — 85018 HEMOGLOBIN: CPT | Performed by: PHYSICIAN ASSISTANT

## 2024-04-26 PROCEDURE — 85014 HEMATOCRIT: CPT | Performed by: PHYSICIAN ASSISTANT

## 2024-04-26 RX ORDER — SIMETHICONE 80 MG
80 TABLET,CHEWABLE ORAL 3 TIMES DAILY PRN
Qty: 60 TABLET | Refills: 0 | Status: SHIPPED | OUTPATIENT
Start: 2024-04-26

## 2024-04-26 RX ORDER — ENOXAPARIN SODIUM 100 MG/ML
40 INJECTION SUBCUTANEOUS DAILY
Qty: 8 ML | Refills: 0 | Status: SHIPPED | OUTPATIENT
Start: 2024-04-26 | End: 2024-05-10

## 2024-04-26 RX ORDER — PSEUDOEPHEDRINE HCL 30 MG
100 TABLET ORAL 2 TIMES DAILY
Qty: 60 CAPSULE | Refills: 0 | Status: SHIPPED | OUTPATIENT
Start: 2024-04-26

## 2024-04-26 RX ORDER — HYDROCODONE BITARTRATE AND ACETAMINOPHEN 5; 325 MG/1; MG/1
1 TABLET ORAL EVERY 6 HOURS PRN
Qty: 20 TABLET | Refills: 0 | Status: SHIPPED | OUTPATIENT
Start: 2024-04-26

## 2024-04-26 NOTE — PLAN OF CARE
Patient is alert and orientated by 4.  Patient has abdominal TEO drains in place, patient has abdominal abscess.  Patient on Lovenox Patient continuing IV antibiotics and Tylenol for pain, Norco if needed.  Patient ambulates with walker to restroom.  Patient had BM today.  Patient to have PICC line placed. Patient has personal belongings and call light within reach.  Safety measures in place.  Problem: Patient Centered Care  Goal: Patient preferences are identified and integrated in the patient's plan of care  Description: Interventions:  - What would you like us to know as we care for you? From home   - Provide timely, complete, and accurate information to patient/family  - Incorporate patient and family knowledge, values, beliefs, and cultural backgrounds into the planning and delivery of care  - Encourage patient/family to participate in care and decision-making at the level they choose  - Honor patient and family perspectives and choices  4/26/2024 0423 by Milagro Black RN  Outcome: Progressing  4/25/2024 2259 by Milagro Black RN  Outcome: Progressing     Problem: Patient/Family Goals  Goal: Patient/Family Long Term Goal  Description: Patient's Long Term Goal: To go home    Interventions:  - Continue plan of care  - See additional Care Plan goals for specific interventions  4/26/2024 0423 by Milagro Black RN  Outcome: Progressing  4/25/2024 2259 by Milagro Black RN  Outcome: Progressing  Goal: Patient/Family Short Term Goal  Description: Patient's Short Term Goal: To wake up feeling good.    Interventions:   - Continue to follow plan of care  - See additional Care Plan goals for specific interventions  4/26/2024 0423 by Milagro Black RN  Outcome: Progressing  4/25/2024 2259 by Milagro Black RN  Outcome: Progressing     Problem: PAIN - ADULT  Goal: Verbalizes/displays adequate comfort level or patient's stated pain goal  Description: INTERVENTIONS:  - Encourage pt to monitor pain and  request assistance  - Assess pain using appropriate pain scale  - Administer analgesics based on type and severity of pain and evaluate response  - Implement non-pharmacological measures as appropriate and evaluate response  - Consider cultural and social influences on pain and pain management  - Manage/alleviate anxiety  - Utilize distraction and/or relaxation techniques  - Monitor for opioid side effects  - Notify MD/LIP if interventions unsuccessful or patient reports new pain  - Anticipate increased pain with activity and pre-medicate as appropriate  4/26/2024 0423 by Milagro Black RN  Outcome: Progressing  4/25/2024 2259 by Milagro Black RN  Outcome: Progressing     Problem: SAFETY ADULT - FALL  Goal: Free from fall injury  Description: INTERVENTIONS:  - Assess pt frequently for physical needs  - Identify cognitive and physical deficits and behaviors that affect risk of falls.  - [place] fall precautions as indicated by assessment.  - Educate pt/family on patient safety including physical limitations  - Instruct pt to call for assistance with activity based on assessment  - Modify environment to reduce risk of injury  - Provide assistive devices as appropriate  - Consider OT/PT consult to assist with strengthening/mobility  - Encourage toileting schedule  4/26/2024 0423 by Milagro Black RN  Outcome: Progressing  4/25/2024 2259 by Milagro Black RN  Outcome: Progressing     Problem: RISK FOR INFECTION - ADULT  Goal: Absence of fever/infection during anticipated neutropenic period  Description: INTERVENTIONS  - Monitor WBC  - Administer growth factors as ordered  - Implement neutropenic guidelines  4/26/2024 0423 by Milagro Black RN  Outcome: Progressing  4/25/2024 2259 by Milagro Black RN  Outcome: Progressing     Problem: DISCHARGE PLANNING  Goal: Discharge to home or other facility with appropriate resources  Description: INTERVENTIONS:  - Identify barriers to discharge w/pt and  caregiver  - Include patient/family/discharge partner in discharge planning  - Arrange for needed discharge resources and transportation as appropriate  - Identify discharge learning needs (meds, wound care, etc)  - Arrange for interpreters to assist at discharge as needed  - Consider post-discharge preferences of patient/family/discharge partner  - Complete POLST form as appropriate  - Assess patient's ability to be responsible for managing their own health  - Refer to Case Management Department for coordinating discharge planning if the patient needs post-hospital services based on physician/LIP order or complex needs related to functional status, cognitive ability or social support system  4/26/2024 0423 by Milagro Black RN  Outcome: Progressing  4/25/2024 2259 by Milagro Black RN  Outcome: Progressing     Problem: RESPIRATORY - ADULT  Goal: Achieves optimal ventilation and oxygenation  Description: INTERVENTIONS:  - Assess for changes in respiratory status  - Assess for changes in mentation and behavior  - Position to facilitate oxygenation and minimize respiratory effort  - Oxygen supplementation based on oxygen saturation or ABGs  - Provide Smoking Cessation handout, if applicable  - Encourage broncho-pulmonary hygiene including cough, deep breathe, Incentive Spirometry  - Assess the need for suctioning and perform as needed  - Assess and instruct to report SOB or any respiratory difficulty  - Respiratory Therapy support as indicated  - Manage/alleviate anxiety  - Monitor for signs/symptoms of CO2 retention  4/26/2024 0423 by Milagro Black RN  Outcome: Progressing  4/25/2024 2259 by Milagro Black RN  Outcome: Progressing     Problem: GASTROINTESTINAL - ADULT  Goal: Minimal or absence of nausea and vomiting  Description: INTERVENTIONS:  - Maintain adequate hydration with IV or PO as ordered and tolerated  - Nasogastric tube to low intermittent suction as ordered  - Evaluate effectiveness of  ordered antiemetic medications  - Provide nonpharmacologic comfort measures as appropriate  - Advance diet as tolerated, if ordered  - Obtain nutritional consult as needed  - Evaluate fluid balance  4/26/2024 0423 by Milagro Black RN  Outcome: Progressing  4/25/2024 2259 by Milagro Black RN  Outcome: Progressing  Goal: Maintains or returns to baseline bowel function  Description: INTERVENTIONS:  - Assess bowel function  - Maintain adequate hydration with IV or PO as ordered and tolerated  - Evaluate effectiveness of GI medications  - Encourage mobilization and activity  - Obtain nutritional consult as needed  - Establish a toileting routine/schedule  - Consider collaborating with pharmacy to review patient's medication profile  4/26/2024 0423 by Milagro Black RN  Outcome: Progressing  4/25/2024 2259 by Milagro Black RN  Outcome: Progressing     Problem: GENITOURINARY - ADULT  Goal: Absence of urinary retention  Description: INTERVENTIONS:  - Assess patient’s ability to void and empty bladder  - Monitor intake/output and perform bladder scan as needed  - Follow urinary retention protocol/standard of care  - Consider collaborating with pharmacy to review patient's medication profile  - Implement strategies to promote bladder emptying  4/26/2024 0423 by Milagro Black RN  Outcome: Progressing  4/25/2024 2259 by Milagro Black RN  Outcome: Progressing     Problem: METABOLIC/FLUID AND ELECTROLYTES - ADULT  Goal: Electrolytes maintained within normal limits  Description: INTERVENTIONS:  - Monitor labs and rhythm and assess patient for signs and symptoms of electrolyte imbalances  - Administer electrolyte replacement as ordered  - Monitor response to electrolyte replacements, including rhythm and repeat lab results as appropriate  - Fluid restriction as ordered  - Instruct patient on fluid and nutrition restrictions as appropriate  4/26/2024 0423 by Milagro Black RN  Outcome:  Progressing  4/25/2024 2259 by Milagro Black RN  Outcome: Progressing     Problem: SKIN/TISSUE INTEGRITY - ADULT  Goal: Skin integrity remains intact  Description: INTERVENTIONS  - Assess and document risk factors for pressure ulcer development  - Assess and document skin integrity  - Monitor for areas of redness and/or skin breakdown  - Initiate interventions, skin care algorithm/standards of care as needed  4/26/2024 0423 by Milagro Black RN  Outcome: Progressing  4/25/2024 2259 by Milagro Black RN  Outcome: Progressing  Goal: Incision(s), wounds(s) or drain site(s) healing without S/S of infection  Description: INTERVENTIONS:  - Assess and document risk factors for pressure ulcer development  - Assess and document skin integrity  - Assess and document dressing/incision, wound bed, drain sites and surrounding tissue  - Implement wound care per orders  - Initiate isolation precautions as appropriate  - Initiate Pressure Ulcer prevention bundle as indicated  4/26/2024 0423 by Milagro Black RN  Outcome: Progressing  4/25/2024 2259 by Milagro Black RN  Outcome: Progressing     Problem: HEMATOLOGIC - ADULT  Goal: Maintains hematologic stability  Description: INTERVENTIONS  - Assess for signs and symptoms of bleeding or hemorrhage  - Monitor labs and vital signs for trends  - Administer supportive blood products/factors, fluids and medications as ordered and appropriate  - Administer supportive blood products/factors as ordered and appropriate  4/26/2024 0423 by Milagro Black RN  Outcome: Progressing  4/25/2024 2259 by Milagro Black RN  Outcome: Progressing  Goal: Free from bleeding injury  Description: (Example usage: patient with low platelets)  INTERVENTIONS:  - Avoid intramuscular injections, enemas and rectal medication administration  - Ensure safe mobilization of patient  - Hold pressure on venipuncture sites to achieve adequate hemostasis  - Assess for signs and symptoms of internal  bleeding  - Monitor lab trends  - Patient is to report abnormal signs of bleeding to staff  - Avoid use of toothpicks and dental floss  - Use electric shaver for shaving  - Use soft bristle tooth brush  - Limit straining and forceful nose blowing  4/26/2024 0423 by Milagro Black RN  Outcome: Progressing  4/25/2024 2259 by Milagro Black RN  Outcome: Progressing     Problem: MUSCULOSKELETAL - ADULT  Goal: Return mobility to safest level of function  Description: INTERVENTIONS:  - Assess patient stability and activity tolerance for standing, transferring and ambulating w/ or w/o assistive devices  - Assist with transfers and ambulation using safe patient handling equipment as needed  - Ensure adequate protection for wounds/incisions during mobilization  - Obtain PT/OT consults as needed  - Advance activity as appropriate  - Communicate ordered activity level and limitations with patient/family  4/26/2024 0423 by Milagro Black RN  Outcome: Progressing  4/25/2024 2259 by Milagro Black RN  Outcome: Progressing  Goal: Maintain proper alignment of affected body part  Description: INTERVENTIONS:  - Support and protect limb and body alignment per provider's orders  - Instruct and reinforce with patient and family use of appropriate assistive device and precautions (e.g. spinal or hip dislocation precautions)  4/26/2024 0423 by Milagro Black RN  Outcome: Progressing  4/25/2024 2259 by Milagro Black RN  Outcome: Progressing

## 2024-04-26 NOTE — PLAN OF CARE
Patient alert and oriented x4, vitals stable. Saline locked. Denies nausea, tolerating LFS diet. 2L oxygen, weaning as able, encouraging use of IS and ambulation, sat up in chair today. Worked with PT. Voiding freely, loose BM this am refused colace. Up with assist and walker. Call light within reach, has been calling appropriately for assistance. Plans for transfer to [facility] tomorrow pending medically clearance, prescriptions on chart.  Problem: Patient Centered Care  Goal: Patient preferences are identified and integrated in the patient's plan of care  Description: Interventions:  - What would you like us to know as we care for you? From home   - Provide timely, complete, and accurate information to patient/family  - Incorporate patient and family knowledge, values, beliefs, and cultural backgrounds into the planning and delivery of care  - Encourage patient/family to participate in care and decision-making at the level they choose  - Honor patient and family perspectives and choices  Outcome: Progressing  Problem: PAIN - ADULT  Goal: Verbalizes/displays adequate comfort level or patient's stated pain goal  Description: INTERVENTIONS:  - Encourage pt to monitor pain and request assistance  - Assess pain using appropriate pain scale  - Administer analgesics based on type and severity of pain and evaluate response  - Implement non-pharmacological measures as appropriate and evaluate response  - Consider cultural and social influences on pain and pain management  - Manage/alleviate anxiety  - Utilize distraction and/or relaxation techniques  - Monitor for opioid side effects  - Notify MD/LIP if interventions unsuccessful or patient reports new pain  - Anticipate increased pain with activity and pre-medicate as appropriate  Outcome: Progressing     Problem: SAFETY ADULT - FALL  Goal: Free from fall injury  Description: INTERVENTIONS:  - Assess pt frequently for physical needs  - Identify cognitive and physical  deficits and behaviors that affect risk of falls.  - [place] fall precautions as indicated by assessment.  - Educate pt/family on patient safety including physical limitations  - Instruct pt to call for assistance with activity based on assessment  - Modify environment to reduce risk of injury  - Provide assistive devices as appropriate  - Consider OT/PT consult to assist with strengthening/mobility  - Encourage toileting schedule  Outcome: Progressing     Problem: RISK FOR INFECTION - ADULT  Goal: Absence of fever/infection during anticipated neutropenic period  Description: INTERVENTIONS  - Monitor WBC  - Administer growth factors as ordered  - Implement neutropenic guidelines  Outcome: Progressing     Problem: DISCHARGE PLANNING  Goal: Discharge to home or other facility with appropriate resources  Description: INTERVENTIONS:  - Identify barriers to discharge w/pt and caregiver  - Include patient/family/discharge partner in discharge planning  - Arrange for needed discharge resources and transportation as appropriate  - Identify discharge learning needs (meds, wound care, etc)  - Arrange for interpreters to assist at discharge as needed  - Consider post-discharge preferences of patient/family/discharge partner  - Complete POLST form as appropriate  - Assess patient's ability to be responsible for managing their own health  - Refer to Case Management Department for coordinating discharge planning if the patient needs post-hospital services based on physician/LIP order or complex needs related to functional status, cognitive ability or social support system  Outcome: Progressing     Problem: RESPIRATORY - ADULT  Goal: Achieves optimal ventilation and oxygenation  Description: INTERVENTIONS:  - Assess for changes in respiratory status  - Assess for changes in mentation and behavior  - Position to facilitate oxygenation and minimize respiratory effort  - Oxygen supplementation based on oxygen saturation or ABGs  -  Provide Smoking Cessation handout, if applicable  - Encourage broncho-pulmonary hygiene including cough, deep breathe, Incentive Spirometry  - Assess the need for suctioning and perform as needed  - Assess and instruct to report SOB or any respiratory difficulty  - Respiratory Therapy support as indicated  - Manage/alleviate anxiety  - Monitor for signs/symptoms of CO2 retention  Outcome: Progressing     Problem: GASTROINTESTINAL - ADULT  Goal: Minimal or absence of nausea and vomiting  Description: INTERVENTIONS:  - Maintain adequate hydration with IV or PO as ordered and tolerated  - Nasogastric tube to low intermittent suction as ordered  - Evaluate effectiveness of ordered antiemetic medications  - Provide nonpharmacologic comfort measures as appropriate  - Advance diet as tolerated, if ordered  - Obtain nutritional consult as needed  - Evaluate fluid balance  Outcome: Progressing  Goal: Maintains or returns to baseline bowel function  Description: INTERVENTIONS:  - Assess bowel function  - Maintain adequate hydration with IV or PO as ordered and tolerated  - Evaluate effectiveness of GI medications  - Encourage mobilization and activity  - Obtain nutritional consult as needed  - Establish a toileting routine/schedule  - Consider collaborating with pharmacy to review patient's medication profile  Outcome: Progressing     Problem: GENITOURINARY - ADULT  Goal: Absence of urinary retention  Description: INTERVENTIONS:  - Assess patient’s ability to void and empty bladder  - Monitor intake/output and perform bladder scan as needed  - Follow urinary retention protocol/standard of care  - Consider collaborating with pharmacy to review patient's medication profile  - Implement strategies to promote bladder emptying  Outcome: Progressing     Problem: METABOLIC/FLUID AND ELECTROLYTES - ADULT  Goal: Electrolytes maintained within normal limits  Description: INTERVENTIONS:  - Monitor labs and rhythm and assess patient for  signs and symptoms of electrolyte imbalances  - Administer electrolyte replacement as ordered  - Monitor response to electrolyte replacements, including rhythm and repeat lab results as appropriate  - Fluid restriction as ordered  - Instruct patient on fluid and nutrition restrictions as appropriate  Outcome: Progressing     Problem: SKIN/TISSUE INTEGRITY - ADULT  Goal: Skin integrity remains intact  Description: INTERVENTIONS  - Assess and document risk factors for pressure ulcer development  - Assess and document skin integrity  - Monitor for areas of redness and/or skin breakdown  - Initiate interventions, skin care algorithm/standards of care as needed  Outcome: Progressing  Goal: Incision(s), wounds(s) or drain site(s) healing without S/S of infection  Description: INTERVENTIONS:  - Assess and document risk factors for pressure ulcer development  - Assess and document skin integrity  - Assess and document dressing/incision, wound bed, drain sites and surrounding tissue  - Implement wound care per orders  - Initiate isolation precautions as appropriate  - Initiate Pressure Ulcer prevention bundle as indicated  Outcome: Progressing     Problem: HEMATOLOGIC - ADULT  Goal: Maintains hematologic stability  Description: INTERVENTIONS  - Assess for signs and symptoms of bleeding or hemorrhage  - Monitor labs and vital signs for trends  - Administer supportive blood products/factors, fluids and medications as ordered and appropriate  - Administer supportive blood products/factors as ordered and appropriate  Outcome: Progressing  Goal: Free from bleeding injury  Description: (Example usage: patient with low platelets)  INTERVENTIONS:  - Avoid intramuscular injections, enemas and rectal medication administration  - Ensure safe mobilization of patient  - Hold pressure on venipuncture sites to achieve adequate hemostasis  - Assess for signs and symptoms of internal bleeding  - Monitor lab trends  - Patient is to report  abnormal signs of bleeding to staff  - Avoid use of toothpicks and dental floss  - Use electric shaver for shaving  - Use soft bristle tooth brush  - Limit straining and forceful nose blowing  Outcome: Progressing     Problem: MUSCULOSKELETAL - ADULT  Goal: Return mobility to safest level of function  Description: INTERVENTIONS:  - Assess patient stability and activity tolerance for standing, transferring and ambulating w/ or w/o assistive devices  - Assist with transfers and ambulation using safe patient handling equipment as needed  - Ensure adequate protection for wounds/incisions during mobilization  - Obtain PT/OT consults as needed  - Advance activity as appropriate  - Communicate ordered activity level and limitations with patient/family  Outcome: Progressing  Goal: Maintain proper alignment of affected body part  Description: INTERVENTIONS:  - Support and protect limb and body alignment per provider's orders  - Instruct and reinforce with patient and family use of appropriate assistive device and precautions (e.g. spinal or hip dislocation precautions)  Outcome: Progressing

## 2024-04-26 NOTE — PHYSICAL THERAPY NOTE
PHYSICAL THERAPY TREATMENT NOTE - INPATIENT     Room Number: 456/456-A       Presenting Problem: s/p Robotic assisted total laparoscopic hysterectomy, bilateral salpingo-oophorectomy, staging, debulking; Omentectomy; lysis of adhesions on 4/23  Co-Morbidities : ovarian cancer    Problem List  Active Problems:    Ovarian cancer (HCC)      PHYSICAL THERAPY ASSESSMENT   Patient demonstrates good  progress this session, goals  remain in progress.    Patient continues to function below baseline with bed mobility, transfers, gait, stair negotiation, and standing prolonged periods.  Contributing factors to remaining limitations include decreased functional strength, decreased endurance/aerobic capacity, and decreased muscular endurance.  Next session anticipate patient to progress bed mobility, transfers, gait, and standing prolonged periods.  Physical Therapy will continue to follow patient for duration of hospitalization.    Patient continues to benefit from continued skilled PT services: to promote return to prior level of function and safety with continuous assistance and gradual rehabilitative therapy .    PLAN  PT Treatment Plan: Bed mobility;Body mechanics;Coordination;Endurance;Energy conservation;Patient education;Gait training;Strengthening;Transfer training;Balance training  Frequency (Obs): 3-5x/week    SUBJECTIVE  Pt was agreeable to therapy session.     OBJECTIVE  Precautions: Abdominal protective strategies;Bed/chair alarm;Limb alert - left    WEIGHT BEARING RESTRICTION                PAIN ASSESSMENT   Rating: Unable to rate (\"a little\")  Location: abdomen  Management Techniques: Activity promotion;Body mechanics;Repositioning    BALANCE  Static Sitting: Good  Dynamic Sitting: Fair +  Static Standing: Fair -  Dynamic Standing: Fair -    ACTIVITY TOLERANCE                          O2 WALK       AM-PAC '6-Clicks' INPATIENT SHORT FORM - BASIC MOBILITY  How much difficulty does the patient currently  have...  Patient Difficulty: Turning over in bed (including adjusting bedclothes, sheets and blankets)?: A Little   Patient Difficulty: Sitting down on and standing up from a chair with arms (e.g., wheelchair, bedside commode, etc.): A Little   Patient Difficulty: Moving from lying on back to sitting on the side of the bed?: A Lot   How much help from another person does the patient currently need...   Help from Another: Moving to and from a bed to a chair (including a wheelchair)?: A Little   Help from Another: Need to walk in hospital room?: A Little   Help from Another: Climbing 3-5 steps with a railing?: A Lot     AM-PAC Score:  Raw Score: 16   Approx Degree of Impairment: 54.16%   Standardized Score (AM-PAC Scale): 40.78   CMS Modifier (G-Code): CK    FUNCTIONAL ABILITY STATUS  Functional Mobility/Gait Assessment  Gait Assistance: Contact guard assist  Distance (ft): 50'x2  Assistive Device: Rolling walker  Pattern:  (narrow KATRINA)  Rolling: minimal assist  Supine to Sit: minimal assist  Sit to Supine: moderate assist  Sit to Stand: minimal assist    Additional information: Pt is received in the bed and was cleared for therapy session. Pt is min A with bed mobility and to transfer to the EOB. Pt sat EOB for a few minutes and denied any dizziness and light headedness. Pt is in min A with sit<>stand transfers with the RW. Pt was able to AMb about 50'x2 with the RW min/CGA for balance and safety. Pt with decreased steve and step length with narrow KATRINA. Pt reported that she was feeling a little stronger today. Returned pt back to the room and back to the bed per pt request. Pt is mod A back to supine in  the bed. Pt is repositioned and left in the bed with all needs within reach and alarm system activated. Reported to the RN on the status of the pt.     The patient's Approx Degree of Impairment: 54.16% has been calculated based on documentation in the [facility] '6 clicks' Inpatient Daily Activity Short Form.  Research  supports that patients with this level of impairment may benefit from Rehab.  Final disposition will be made by interdisciplinary medical team.        Patient End of Session: In bed;Needs met;Call light within reach;RN aware of session/findings;All patient questions and concerns addressed;Alarm set    CURRENT GOALS   Goals to be met by: 5/1/24  Patient Goal Patient's self-stated goal is: go to rehab   Goal #1 Patient is able to demonstrate supine - sit EOB @ level: supervision     Goal #1   Current Status Min/mod A    Goal #2 Patient is able to demonstrate transfers Sit to/from Stand at assistance level: supervision with walker - rolling     Goal #2  Current Status Min A with the RW   Goal #3 Patient is able to ambulate 150 feet with assist device: walker - rolling at assistance level: supervision   Goal #3   Current Status 50'x2 with the RW CGA   Goal #4    Goal #4   Current Status    Goal #5 Patient to demonstrate independence with home activity/exercise instructions provided to patient in preparation for discharge.   Goal #5   Current Status IN PROGRESS   Goal #6    Goal #6  Current Status      Therapeutic Activity: 25 minutes

## 2024-04-26 NOTE — DIETARY NOTE
BRIEF DIETITIAN NOTE (4/24)    Pt re-screened for MS of 2. Found to be at no nutrition risk at this time. Pt with PMHx of pelvic mass. Low po intakes for now, POD#1 lysis of adhesions, progressing from CLD to FLD. Weight relatively stable, per EHR wt hx review, pt wt as of 10/10/23 was 106#, #, pt with 4 lbs eight loss, this represents a 3.7% weight loss in 6 months, not significant. Will monitor diet progression. Will follow per protocol. Please consult RD if earlier nutrition intervention is needed.     Percent Meals Eaten (last 6 days)       Date/Time Percent Meals Eaten (%)    04/24/24 0936 50 %             Patient Weight(s) for the past 336 hrs:   Weight   04/23/24 0624 46.3 kg (102 lb)   04/18/24 1032 46.7 kg (103 lb)        Wt Readings from Last 6 Encounters:   04/23/24 46.3 kg (102 lb)   10/10/23 48.1 kg (106 lb)   08/19/22 50.8 kg (112 lb)   02/03/22 49.9 kg (110 lb)   01/18/22 49.9 kg (110 lb)   11/15/21 49.9 kg (110 lb)        F/u per protocol or as appropriate.       Trudy Agrawal RD, LDN  Clinical Dietitian  Office: [phone]      Brief Nutrition Note: (4/26/24)    F/u po intake. Chart review completed. Pt is POD #3. Visited pt, but sleeping soundly. Lunch tray on tray table and pt ate 1/2 of a whole milk, all of a pudding, all of chicken noodle soup and 1/4 of a tuna sandwich--decent intake for an 83 y o tiny woman.  Looked well nourished per visual exam. Will add an ONS daily just to maximize po intake for post op healing, but full nutritional assessment not warranted.      Ольга Bernal RD, LDN   Clinical Dietitian [id number]

## 2024-04-26 NOTE — PROGRESS NOTES
[facility]   INTERNAL MEDICINE PROGRESS NOTE    CHIEF COMPLAINT   Post-op TLH-BSO, resp failure    SUBJECTIVE  24 HR EVENTS   Anemia - hgb 6.6. 1u RBC 4/25. 8 then 7 today. No bleeding    Hypoxia overnight - still 3L. On O2 via NC  Needed some norco  Pain better this AM   Some nausea - zofran helped    INPATIENT MEDICATIONS  Scheduled Medications:  enoxaparin, 40 mg, Daily  docusate sodium, 100 mg, BID  clonazePAM, 0.5 mg, Nightly  DULoxetine, 60 mg, Nightly  gabapentin, 300 mg, BID  levothyroxine, 50 mcg, Daily @ 0700  pantoprazole, 20 mg, QAM AC  atorvastatin, 10 mg, Nightly  [Held by provider] lisinopril, 10 mg, QAM  [Held by provider] amLODIPine, 2.5 mg, Nightly     Drips:   lactated ringers, Last Rate: 60 mL/hr at 04/25/24 1534  lactated ringers, Last Rate: 150 mL/hr at 04/23/24 0942       PRN Medications:   ondansetron, 4 mg, Q8H PRN   Or  ondansetron, 4 mg, Q8H PRN  acetaminophen, 650 mg, Q4H PRN  HYDROcodone-acetaminophen, 1 tablet, Q6H PRN  HYDROmorphone, 0.2 mg, Q2H PRN   Or  HYDROmorphone, 0.4 mg, Q2H PRN   Or  HYDROmorphone, 0.8 mg, Q2H PRN  simethicone, 80 mg, TID PRN  diphenhydrAMINE, 12.5 mg, Q4H PRN       PHYSICAL EXAMINATION  Vitals: /59 (BP Location: Right arm)   Pulse 101   Temp 98.6 °F (37 °C) (Oral)   Resp 18   Ht 4' 9\" (1.448 m)   Wt 102 lb (46.3 kg)   SpO2 96%   BMI 22.07 kg/m²   Gen: NAD, well nourished  Eyes: PERRLA, normal conjunctivae  ENMT: Moist mucous membranes, trachea midline  CV: RRR, no peripheral edema  Resp: CTAB, non-labored respirations, symmetric expansion  GI: Soft, NT, ND, no rebound, no guarding  MSK:  No C/C/E, normal active/passive ROM in C-spine  Skin: No rashes  Neuro: CN 2-12 grossly intact  Psych: A&Ox3, conversant w/ linear thought process     LABORATORY VALUES   Recent Labs   Lab 04/24/24  1016 04/25/24  0547 04/26/24  0526    142 142   K 3.9 3.7 3.5    109 108   CO2 26.0 29.0 32.0   BUN 14 11 10   CREATSERUM 0.64 0.55 0.66    ANIONGAP 6 4 2   * 113* 116*   CA 9.4 9.5 9.7   EGFRCR 88 91 87     Recent Labs   Lab 04/19/24  1354 04/24/24  1034 04/25/24  0620 04/25/24  1407 04/26/24  0526   WBC 8.0 12.2* 6.4  --  8.0   HGB 8.8* 7.9* 6.6* 8.1* 7.3*   HCT 28.5* 25.0* 21.3* 23.9* 22.0*   .0 234.0 201.0  --  171.0   .8* 115.7* 117.0*  --  103.8*   MOPERCENT  --  4.6 5.3  --  4.0   EOPERCENT  --  0.0 0.8  --  1.1   BAPERCENT  --  0.1 0.3  --  0.1     ASSESSMENT & PLAN  Lizeth Mcgill - 83 year old female with ovarian CA, HTN, breaset CA, DM2, hypothyroidism who presented for hysterectomy-BSO.     Ovarian CA  S/p robotic assisted total lap hysterectomy-BSO, debulking, omentectomy  - PRN pain meds, Transition to PO when able  - monitor for acute blood loss anemia   - Bowel reg, antiemetics  - DVT Prophy- lovenox  - Bernardo removed    Acute resp failure w hypoxia  - 70% w ambulating per pt. On 3L NC 4/24 AM  - Suspect atelectasis. Minimal basilar air movement on exam  - Can hold off on imaging for now  - OOB, encourage IS, ambulate TID  - Supplemental O2 for goal SpO2 90-93%. On 2L NC 4/24 AM   - Encourage IS  [  ] Still on 3L, check CXR    Acute on chronic anemia   - Presume 2/2 expected post-op ABLA w dilutional component from IVF  - No active bleeding  - Trend CBC  - 4/25 hgb < 7 - 1u RBC    HTN  - BP stable  - hold home lisinopril for now    Anxiety  - clonazepam nightly    Hypothyroidism  - home sythroid    GERD  - PPI    ACP: Full Code  Ppx: DVT: Enox  Dispo  EDoD:  ~ 4/27 if hgb stable & off O2  F/u:   - PCP:  Chong Cornejo MD    Available via epic secure chat or perfect serve 7A - 7P.    Hai Landrum, MD   Internal Medicine - Hospitalist  [facility]    ADDENDUM  CXR reviewed - LLL opacity. No s/s of pneumonia. Do suspect atelectasis. Pt not using IS. Encourage IS use!!!    - Prior to any increases in supplemental O2, please ask patient to use IS!   - Supplemental O2 to maintain SpO2 > 90-93%. Please wean O2 to  goal SpO2 90 - 93%, (%).   - High-quality evidence shows that liberal oxygen therapy increases mortality without improving other patient-important outcomes. Supplemental oxygen might become unfavourable above an SpO2 range of 94-96%  - Mortality and morbidity in acutely ill adults treated with liberal versus conservative oxygen therapy (IOTA). PMID: 81285263.      Hai Landrum MD  04/26/24 1:19 PM

## 2024-04-26 NOTE — PLAN OF CARE
Patient is alert and orientated by 4.  Patient ambulating to restroom with one assist.  Patient voiding freely and had a BM.  Patient taking scheduled med's.  Patient has personal belongings and call light within reach.  Safety measures in place.  Problem: Patient Centered Care  Goal: Patient preferences are identified and integrated in the patient's plan of care  Description: Interventions:  - What would you like us to know as we care for you? From home   - Provide timely, complete, and accurate information to patient/family  - Incorporate patient and family knowledge, values, beliefs, and cultural backgrounds into the planning and delivery of care  - Encourage patient/family to participate in care and decision-making at the level they choose  - Honor patient and family perspectives and choices  Outcome: Progressing     Problem: Patient/Family Goals  Goal: Patient/Family Long Term Goal  Description: Patient's Long Term Goal: To go home    Interventions:  - Continue to follow plan of care  - See additional Care Plan goals for specific interventions  Outcome: Progressing  Goal: Patient/Family Short Term Goal  Description: Patient's Short Term Goal: To wake up feeling good    Interventions:   - Follow plan of care  - See additional Care Plan goals for specific interventions  Outcome: Progressing     Problem: PAIN - ADULT  Goal: Verbalizes/displays adequate comfort level or patient's stated pain goal  Description: INTERVENTIONS:  - Encourage pt to monitor pain and request assistance  - Assess pain using appropriate pain scale  - Administer analgesics based on type and severity of pain and evaluate response  - Implement non-pharmacological measures as appropriate and evaluate response  - Consider cultural and social influences on pain and pain management  - Manage/alleviate anxiety  - Utilize distraction and/or relaxation techniques  - Monitor for opioid side effects  - Notify MD/LIP if interventions unsuccessful or  patient reports new pain  - Anticipate increased pain with activity and pre-medicate as appropriate  Outcome: Progressing     Problem: SAFETY ADULT - FALL  Goal: Free from fall injury  Description: INTERVENTIONS:  - Assess pt frequently for physical needs  - Identify cognitive and physical deficits and behaviors that affect risk of falls.  - Wagner fall precautions as indicated by assessment.  - Educate pt/family on patient safety including physical limitations  - Instruct pt to call for assistance with activity based on assessment  - Modify environment to reduce risk of injury  - Provide assistive devices as appropriate  - Consider OT/PT consult to assist with strengthening/mobility  - Encourage toileting schedule  Outcome: Progressing     Problem: RISK FOR INFECTION - ADULT  Goal: Absence of fever/infection during anticipated neutropenic period  Description: INTERVENTIONS  - Monitor WBC  - Administer growth factors as ordered  - Implement neutropenic guidelines  Outcome: Progressing     Problem: DISCHARGE PLANNING  Goal: Discharge to home or other facility with appropriate resources  Description: INTERVENTIONS:  - Identify barriers to discharge w/pt and caregiver  - Include patient/family/discharge partner in discharge planning  - Arrange for needed discharge resources and transportation as appropriate  - Identify discharge learning needs (meds, wound care, etc)  - Arrange for interpreters to assist at discharge as needed  - Consider post-discharge preferences of patient/family/discharge partner  - Complete POLST form as appropriate  - Assess patient's ability to be responsible for managing their own health  - Refer to Case Management Department for coordinating discharge planning if the patient needs post-hospital services based on physician/LIP order or complex needs related to functional status, cognitive ability or social support system  Outcome: Progressing     Problem: RESPIRATORY - ADULT  Goal: Achieves  optimal ventilation and oxygenation  Description: INTERVENTIONS:  - Assess for changes in respiratory status  - Assess for changes in mentation and behavior  - Position to facilitate oxygenation and minimize respiratory effort  - Oxygen supplementation based on oxygen saturation or ABGs  - Provide Smoking Cessation handout, if applicable  - Encourage broncho-pulmonary hygiene including cough, deep breathe, Incentive Spirometry  - Assess the need for suctioning and perform as needed  - Assess and instruct to report SOB or any respiratory difficulty  - Respiratory Therapy support as indicated  - Manage/alleviate anxiety  - Monitor for signs/symptoms of CO2 retention  Outcome: Progressing     Problem: GASTROINTESTINAL - ADULT  Goal: Minimal or absence of nausea and vomiting  Description: INTERVENTIONS:  - Maintain adequate hydration with IV or PO as ordered and tolerated  - Nasogastric tube to low intermittent suction as ordered  - Evaluate effectiveness of ordered antiemetic medications  - Provide nonpharmacologic comfort measures as appropriate  - Advance diet as tolerated, if ordered  - Obtain nutritional consult as needed  - Evaluate fluid balance  Outcome: Progressing  Goal: Maintains or returns to baseline bowel function  Description: INTERVENTIONS:  - Assess bowel function  - Maintain adequate hydration with IV or PO as ordered and tolerated  - Evaluate effectiveness of GI medications  - Encourage mobilization and activity  - Obtain nutritional consult as needed  - Establish a toileting routine/schedule  - Consider collaborating with pharmacy to review patient's medication profile  Outcome: Progressing     Problem: GENITOURINARY - ADULT  Goal: Absence of urinary retention  Description: INTERVENTIONS:  - Assess patient’s ability to void and empty bladder  - Monitor intake/output and perform bladder scan as needed  - Follow urinary retention protocol/standard of care  - Consider collaborating with pharmacy to  review patient's medication profile  - Implement strategies to promote bladder emptying  Outcome: Progressing     Problem: METABOLIC/FLUID AND ELECTROLYTES - ADULT  Goal: Electrolytes maintained within normal limits  Description: INTERVENTIONS:  - Monitor labs and rhythm and assess patient for signs and symptoms of electrolyte imbalances  - Administer electrolyte replacement as ordered  - Monitor response to electrolyte replacements, including rhythm and repeat lab results as appropriate  - Fluid restriction as ordered  - Instruct patient on fluid and nutrition restrictions as appropriate  Outcome: Progressing     Problem: SKIN/TISSUE INTEGRITY - ADULT  Goal: Skin integrity remains intact  Description: INTERVENTIONS  - Assess and document risk factors for pressure ulcer development  - Assess and document skin integrity  - Monitor for areas of redness and/or skin breakdown  - Initiate interventions, skin care algorithm/standards of care as needed  Outcome: Progressing  Goal: Incision(s), wounds(s) or drain site(s) healing without S/S of infection  Description: INTERVENTIONS:  - Assess and document risk factors for pressure ulcer development  - Assess and document skin integrity  - Assess and document dressing/incision, wound bed, drain sites and surrounding tissue  - Implement wound care per orders  - Initiate isolation precautions as appropriate  - Initiate Pressure Ulcer prevention bundle as indicated  Outcome: Progressing     Problem: HEMATOLOGIC - ADULT  Goal: Maintains hematologic stability  Description: INTERVENTIONS  - Assess for signs and symptoms of bleeding or hemorrhage  - Monitor labs and vital signs for trends  - Administer supportive blood products/factors, fluids and medications as ordered and appropriate  - Administer supportive blood products/factors as ordered and appropriate  Outcome: Progressing  Goal: Free from bleeding injury  Description: (Example usage: patient with low  platelets)  INTERVENTIONS:  - Avoid intramuscular injections, enemas and rectal medication administration  - Ensure safe mobilization of patient  - Hold pressure on venipuncture sites to achieve adequate hemostasis  - Assess for signs and symptoms of internal bleeding  - Monitor lab trends  - Patient is to report abnormal signs of bleeding to staff  - Avoid use of toothpicks and dental floss  - Use electric shaver for shaving  - Use soft bristle tooth brush  - Limit straining and forceful nose blowing  Outcome: Progressing     Problem: MUSCULOSKELETAL - ADULT  Goal: Return mobility to safest level of function  Description: INTERVENTIONS:  - Assess patient stability and activity tolerance for standing, transferring and ambulating w/ or w/o assistive devices  - Assist with transfers and ambulation using safe patient handling equipment as needed  - Ensure adequate protection for wounds/incisions during mobilization  - Obtain PT/OT consults as needed  - Advance activity as appropriate  - Communicate ordered activity level and limitations with patient/family  Outcome: Progressing  Goal: Maintain proper alignment of affected body part  Description: INTERVENTIONS:  - Support and protect limb and body alignment per provider's orders  - Instruct and reinforce with patient and family use of appropriate assistive device and precautions (e.g. spinal or hip dislocation precautions)  Outcome: Progressing

## 2024-04-26 NOTE — PROGRESS NOTES
[facility] GYNECOLOGIC ONCOLOGY POST-OPERATIVE VISIT     MEDICAL RECORD #: [id number] DATE OF SERVICE: 4/26/2024             Reason for visit:  Post-operative day three    HISTORY OF PRESENT ILLNESS:  83 year old female with a history of incidental finding of bladder mass, pelvic mass, and peritoneal nodules. She was found to have a low-grade papillary urothelial bladder cancer, underwent transurethral resection. PET scan showed peritoneal implants and the lesion in the right ovary. 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 Immunohistochemistry was not typical for gynecologic malignancies, although this was thought to be the leading differential diagnosis. Her tumor markers were all negative. The patient was started on neoadjuvant chemotherapy, has undergone 6 cycles.  She underwent Laparoscopic lysis of adhesions (encompassing over 20 minutes), robotic-assisted total laparoscopic hysterectomy and bilateral salpingo-oophorectomy with tumor debulking, bilateral pelvic and paraaortic lymph node dissection, infracolic and portion of supracolic omentectomy on 4/23/24.    GI//GYNE Complaint:   Other complaints: Tolerating diet, no N/V. Urinating freely, +belching, +BM. Pain controlled. Denies vaginal bleeding. Desaturating with activity, weaning NC.     Interim History:  Underwent surgery    Patient's medications, allergies, past medical, surgical, social and family histories were reviewed and updated as appropriate.    Pathology/Lab Results:  Recent Results (from the past 720 hour(s))   CBC, Platelet; No Differential    Collection Time: 04/19/24  1:54 PM   Result Value Ref Range    WBC 8.0 4.0 - 11.0 x10(3) uL    RBC 2.42 (L) 3.80 - 5.30 x10(6)uL    HGB 8.8 (L) 12.0 - 16.0 g/dL    HCT 28.5 (L) 35.0 - 48.0 %    .8 (H) 80.0 - 100.0 fL    MCH 36.4 (H) 26.0 - 34.0 pg    MCHC 30.9 (L) 31.0 - 37.0 g/dL    RDW 15.5 (H) 11.0 - 15.0 %    RDW-SD 67.1 (H) 35.1 - 46.3 fL    .0 150.0 -  450.0 10(3)uL   ABORH (Blood Type)    Collection Time: 04/19/24  1:54 PM   Result Value Ref Range    ABO BLOOD TYPE A     RH BLOOD TYPE Positive    Antibody Screen    Collection Time: 04/19/24  1:54 PM   Result Value Ref Range    Antibody Screen Negative    MD BLOOD SMEAR CONSULT    Collection Time: 04/19/24  1:54 PM   Result Value Ref Range    MD Blood Smear Consult       Evaluation of the peripheral blood smear demonstrates moderate macrocytic anemia characterized by moderate anisopoikilocytosis with macrocytes, ovalocytes and polychromasia. Neutrophils display distinct cytoplasmic granulation. No circulating blasts seen.    Differential considerations for macrocytic anemia may include vitamin B12 or folate deficiency, autoimmune hemolytic anemia, cold agglutinin disease, liver disease, some myelodysplasias, thyroid disease, excessive alcohol consumption and drug effects.     Clinical correlation is recommended.     Reviewed by IRAIDA Vázquez M.D.   Cytology fluids    Collection Time: 04/23/24  8:27 AM   Result Value Ref Range    Case Report       Non-Gynecologic Cytology                          Case: [id number]                                   Authorizing Provider:  Rigoberto Couch MD         Collected:           04/23/2024 08:27 AM          Ordering Location:     [facility]          Received:            04/23/2024 10:44 AM                                 Operating Room                                                               Pathologist:           Cooper Dahl MD                                                         Specimen:    Pelvic washings, 1. PELVIC WASHINGS-FOR CYTOLOGY                                           General Categorization         Benign, inflammatory, reactive or reparative changes    Final Diagnosis:         Pelvic washings:   Adequacy: Satisfactory for evaluation  General Category: Benign, inflammatory, reactive, or reparative changes  Diagnosis:  Benign mesothelial cells  present  Histiocytes present  Peripheral blood elements present  No malignant cells identified        Embedded Images      Final Diagnosis Comment         Recommend correlation with histologic findings ([id number]).            Procedure       Monolayers:  1  Cytospins:     2      Clinical Information       Carcinoma, Malignant Neoplasm Of Right Ovary.        Non Gyne Interpretation  Benign      Gross Description       Volume (ml): 25    Color: Pink     POCT Glucose    Collection Time: 04/23/24 11:27 AM   Result Value Ref Range    POC Glucose  143 (H) 70 - 99 mg/dL   Basic Metabolic Panel (8)    Collection Time: 04/24/24 10:16 AM   Result Value Ref Range    Glucose 154 (H) 70 - 99 mg/dL    Sodium 141 136 - 145 mmol/L    Potassium 3.9 3.5 - 5.1 mmol/L    Chloride 109 98 - 112 mmol/L    CO2 26.0 21.0 - 32.0 mmol/L    Anion Gap 6 0 - 18 mmol/L    BUN 14 9 - 23 mg/dL    Creatinine 0.64 0.55 - 1.02 mg/dL    BUN/CREA Ratio 21.9 (H) 10.0 - 20.0    Calcium, Total 9.4 8.7 - 10.4 mg/dL    Calculated Osmolality 296 (H) 275 - 295 mOsm/kg    eGFR-Cr 88 >=60 mL/min/1.73m2   CBC W/ DIFFERENTIAL    Collection Time: 04/24/24 10:34 AM   Result Value Ref Range    WBC 12.2 (H) 4.0 - 11.0 x10(3) uL    RBC 2.16 (L) 3.80 - 5.30 x10(6)uL    HGB 7.9 (L) 12.0 - 16.0 g/dL    HCT 25.0 (L) 35.0 - 48.0 %    .7 (H) 80.0 - 100.0 fL    MCH 36.6 (H) 26.0 - 34.0 pg    MCHC 31.6 31.0 - 37.0 g/dL    RDW-SD 64.8 (H) 35.1 - 46.3 fL    RDW 15.0 11.0 - 15.0 %    .0 150.0 - 450.0 10(3)uL    Neutrophil Absolute Prelim 11.07 (H) 1.50 - 7.70 x10 (3) uL    Neutrophil Absolute 11.07 (H) 1.50 - 7.70 x10(3) uL    Lymphocyte Absolute 0.56 (L) 1.00 - 4.00 x10(3) uL    Monocyte Absolute 0.56 0.10 - 1.00 x10(3) uL    Eosinophil Absolute 0.00 0.00 - 0.70 x10(3) uL    Basophil Absolute 0.01 0.00 - 0.20 x10(3) uL    Immature Granulocyte Absolute 0.04 0.00 - 1.00 x10(3) uL    Neutrophil % 90.4 %    Lymphocyte % 4.6 %    Monocyte % 4.6 %    Eosinophil % 0.0 %     Basophil % 0.1 %    Immature Granulocyte % 0.3 %   Basic Metabolic Panel (8)    Collection Time: 04/25/24  5:47 AM   Result Value Ref Range    Glucose 113 (H) 70 - 99 mg/dL    Sodium 142 136 - 145 mmol/L    Potassium 3.7 3.5 - 5.1 mmol/L    Chloride 109 98 - 112 mmol/L    CO2 29.0 21.0 - 32.0 mmol/L    Anion Gap 4 0 - 18 mmol/L    BUN 11 9 - 23 mg/dL    Creatinine 0.55 0.55 - 1.02 mg/dL    BUN/CREA Ratio 20.0 10.0 - 20.0    Calcium, Total 9.5 8.7 - 10.4 mg/dL    Calculated Osmolality 294 275 - 295 mOsm/kg    eGFR-Cr 91 >=60 mL/min/1.73m2   CBC W/ DIFFERENTIAL    Collection Time: 04/25/24  6:20 AM   Result Value Ref Range    WBC 6.4 4.0 - 11.0 x10(3) uL    RBC 1.82 (L) 3.80 - 5.30 x10(6)uL    HGB 6.6 (LL) 12.0 - 16.0 g/dL    HCT 21.3 (L) 35.0 - 48.0 %    .0 (H) 80.0 - 100.0 fL    MCH 36.3 (H) 26.0 - 34.0 pg    MCHC 31.0 31.0 - 37.0 g/dL    RDW-SD 63.8 (H) 35.1 - 46.3 fL    RDW 15.0 11.0 - 15.0 %    .0 150.0 - 450.0 10(3)uL    Neutrophil Absolute Prelim 5.47 1.50 - 7.70 x10 (3) uL    Neutrophil Absolute 5.47 1.50 - 7.70 x10(3) uL    Lymphocyte Absolute 0.49 (L) 1.00 - 4.00 x10(3) uL    Monocyte Absolute 0.34 0.10 - 1.00 x10(3) uL    Eosinophil Absolute 0.05 0.00 - 0.70 x10(3) uL    Basophil Absolute 0.02 0.00 - 0.20 x10(3) uL    Immature Granulocyte Absolute 0.03 0.00 - 1.00 x10(3) uL    Neutrophil % 85.4 %    Lymphocyte % 7.7 %    Monocyte % 5.3 %    Eosinophil % 0.8 %    Basophil % 0.3 %    Immature Granulocyte % 0.5 %   ABORH (Blood Type)    Collection Time: 04/25/24  7:57 AM   Result Value Ref Range    ABO BLOOD TYPE A     RH BLOOD TYPE Positive    Antibody Screen    Collection Time: 04/25/24  7:57 AM   Result Value Ref Range    Antibody Screen Negative    Prepare RBC STAT    Collection Time: 04/25/24 10:44 AM   Result Value Ref Range    Blood Product Q3228R21     Unit Number [id number]     UNIT ABO A     UNIT RH POS     Product Status Released from [employer]     Expiration Date [id number]      Blood Type Barcode 6200     Unit Volume 350 ml   Hemoglobin & Hematocrit    Collection Time: 04/25/24  2:07 PM   Result Value Ref Range    HGB 8.1 (L) 12.0 - 16.0 g/dL    HCT 23.9 (L) 35.0 - 48.0 %   Prepare RBC Once    Collection Time: 04/26/24 12:40 AM   Result Value Ref Range    Blood Product [id number]     Unit Number [id number]     UNIT ABO A     UNIT RH NEG     Product Status Presumed Transfused     Expiration Date [id number]     Blood Type Barcode 0600     Unit Volume 350 ml   Basic Metabolic Panel (8)    Collection Time: 04/26/24  5:26 AM   Result Value Ref Range    Glucose 116 (H) 70 - 99 mg/dL    Sodium 142 136 - 145 mmol/L    Potassium 3.5 3.5 - 5.1 mmol/L    Chloride 108 98 - 112 mmol/L    CO2 32.0 21.0 - 32.0 mmol/L    Anion Gap 2 0 - 18 mmol/L    BUN 10 9 - 23 mg/dL    Creatinine 0.66 0.55 - 1.02 mg/dL    BUN/CREA Ratio 15.2 10.0 - 20.0    Calcium, Total 9.7 8.7 - 10.4 mg/dL    Calculated Osmolality 294 275 - 295 mOsm/kg    eGFR-Cr 87 >=60 mL/min/1.73m2   CBC W/ DIFFERENTIAL    Collection Time: 04/26/24  5:26 AM   Result Value Ref Range    WBC 8.0 4.0 - 11.0 x10(3) uL    RBC 2.12 (L) 3.80 - 5.30 x10(6)uL    HGB 7.3 (L) 12.0 - 16.0 g/dL    HCT 22.0 (L) 35.0 - 48.0 %    .8 (H) 80.0 - 100.0 fL    MCH 34.4 (H) 26.0 - 34.0 pg    MCHC 33.2 31.0 - 37.0 g/dL    RDW-SD 86.3 (H) 35.1 - 46.3 fL    RDW 23.9 (H) 11.0 - 15.0 %    .0 150.0 - 450.0 10(3)uL    Neutrophil Absolute Prelim 7.03 1.50 - 7.70 x10 (3) uL    Neutrophil Absolute 7.03 1.50 - 7.70 x10(3) uL    Lymphocyte Absolute 0.49 (L) 1.00 - 4.00 x10(3) uL    Monocyte Absolute 0.32 0.10 - 1.00 x10(3) uL    Eosinophil Absolute 0.09 0.00 - 0.70 x10(3) uL    Basophil Absolute 0.01 0.00 - 0.20 x10(3) uL    Immature Granulocyte Absolute 0.03 0.00 - 1.00 x10(3) uL    Neutrophil % 88.3 %    Lymphocyte % 6.1 %    Monocyte % 4.0 %    Eosinophil % 1.1 %    Basophil % 0.1 %    Immature Granulocyte % 0.4 %   RBC Morphology Scan    Collection Time:  04/26/24  5:26 AM   Result Value Ref Range    RBC Morphology See morphology below (A) Normal, Slide reviewed, see previous RBC morphology.    Platelet Morphology Normal Normal    Macrocytosis 1+      Microcytosis 1+     Hemoglobin & Hematocrit    Collection Time: 04/26/24  2:18 PM   Result Value Ref Range    HGB 7.6 (L) 12.0 - 16.0 g/dL    HCT 22.2 (L) 35.0 - 48.0 %       Labs reviewed    Physical examination:    /68 (BP Location: Right arm)   Pulse 105   Temp 98.4 °F (36.9 °C) (Oral)   Resp 18   Ht 4' 9\" (1.448 m)   Wt 102 lb (46.3 kg)   SpO2 96%   BMI 22.07 kg/m²     GENERAL: alert and oriented, cooperative, in no acute distress  ABDOMEN: soft, non-tender. Bowel sounds normal. No masses,  no organomegaly  INCISION/SURGICAL WOUNDS: clean, dry and intact trocar incisions  PELVIC: Pelvic exam: examination not indicated.    ASSESSMENT:  Dx: suspected ovarian vs primary peritoneal cancer    Hgb likely equilibrating. 8.8 pre-op, Hgb 6.6 4/25 requiring 1 unit pRBCs - > 8.1, this AM 7.3, repeat this afternoon stable at 7.6    Cr and WBC WNL    Vitals stable, weaning NC    Good UOP off IVF     PLAN:    LMWH - will go home on lmwh 40mg daily x 2 weeks    Soft diet, IVF, encouraged PO fluids    Encouraged IS    PT/OT/SW    Planning to discharge to [facility]     Recheck labs in AM    Ok to discharge if hgb stable and cleared by hospitalist for hypoxia    The patient verbalized good understanding of this.  I will keep you informed of her progress.  Thank you for allowing me to participate in the care of this patient.    BARRINGTON Ireland  04/26/24

## 2024-04-27 LAB
ANION GAP SERPL CALC-SCNC: 3 MMOL/L (ref 0–18)
BASOPHILS # BLD AUTO: 0 X10(3) UL (ref 0–0.2)
BASOPHILS NFR BLD AUTO: 0 %
BUN BLD-MCNC: 11 MG/DL (ref 9–23)
BUN/CREAT SERPL: 20.4 (ref 10–20)
CALCIUM BLD-MCNC: 9.8 MG/DL (ref 8.7–10.4)
CHLORIDE SERPL-SCNC: 109 MMOL/L (ref 98–112)
CO2 SERPL-SCNC: 32 MMOL/L (ref 21–32)
CREAT BLD-MCNC: 0.54 MG/DL
DEPRECATED RDW RBC AUTO: 82 FL (ref 35.1–46.3)
EGFRCR SERPLBLD CKD-EPI 2021: 91 ML/MIN/1.73M2 (ref 60–?)
EOSINOPHIL # BLD AUTO: 0.1 X10(3) UL (ref 0–0.7)
EOSINOPHIL NFR BLD AUTO: 1.8 %
ERYTHROCYTE [DISTWIDTH] IN BLOOD BY AUTOMATED COUNT: 22.2 % (ref 11–15)
GLUCOSE BLD-MCNC: 111 MG/DL (ref 70–99)
HCT VFR BLD AUTO: 23.2 %
HGB BLD-MCNC: 7.4 G/DL
IMM GRANULOCYTES # BLD AUTO: 0.02 X10(3) UL (ref 0–1)
IMM GRANULOCYTES NFR BLD: 0.4 %
LYMPHOCYTES # BLD AUTO: 0.72 X10(3) UL (ref 1–4)
LYMPHOCYTES NFR BLD AUTO: 13.3 %
MCH RBC QN AUTO: 33.2 PG (ref 26–34)
MCHC RBC AUTO-ENTMCNC: 31.9 G/DL (ref 31–37)
MCV RBC AUTO: 104 FL
MONOCYTES # BLD AUTO: 0.27 X10(3) UL (ref 0.1–1)
MONOCYTES NFR BLD AUTO: 5 %
NEUTROPHILS # BLD AUTO: 4.31 X10 (3) UL (ref 1.5–7.7)
NEUTROPHILS # BLD AUTO: 4.31 X10(3) UL (ref 1.5–7.7)
NEUTROPHILS NFR BLD AUTO: 79.5 %
OSMOLALITY SERPL CALC.SUM OF ELEC: 298 MOSM/KG (ref 275–295)
PLATELET # BLD AUTO: 238 10(3)UL (ref 150–450)
POTASSIUM SERPL-SCNC: 3.4 MMOL/L (ref 3.5–5.1)
RBC # BLD AUTO: 2.23 X10(6)UL
SODIUM SERPL-SCNC: 144 MMOL/L (ref 136–145)
WBC # BLD AUTO: 5.4 X10(3) UL (ref 4–11)

## 2024-04-27 PROCEDURE — 80048 BASIC METABOLIC PNL TOTAL CA: CPT | Performed by: PHYSICIAN ASSISTANT

## 2024-04-27 PROCEDURE — 85025 COMPLETE CBC W/AUTO DIFF WBC: CPT | Performed by: PHYSICIAN ASSISTANT

## 2024-04-27 RX ORDER — FUROSEMIDE 10 MG/ML
20 INJECTION INTRAMUSCULAR; INTRAVENOUS ONCE
Status: COMPLETED | OUTPATIENT
Start: 2024-04-27 | End: 2024-04-27

## 2024-04-27 RX ORDER — POTASSIUM CHLORIDE 20 MEQ/1
40 TABLET, EXTENDED RELEASE ORAL ONCE
Status: COMPLETED | OUTPATIENT
Start: 2024-04-27 | End: 2024-04-27

## 2024-04-27 NOTE — PROGRESS NOTES
[facility] Hospitalist Progress Note     CC: Hospital Follow up    PCP: Chong Cornejo MD       Assessment/Plan:     Active Problems:    Ovarian cancer (HCC)    Lizeth Mcgill is a 83 year old female with ovarian CA, HTN, breaset CA, DM2, hypothyroidism who presented for hysterectomy-BSO.      Ovarian CA  S/p robotic assisted total lap hysterectomy-BSO, debulking, omentectomy  - PRN pain meds, Transition to PO when able  - monitor for acute blood loss anemia   - Bowel reg, antiemetics  - DVT Prophy- lovenox  - Bernardo removed     Acute resp failure w hypoxia  - 70% w ambulating per pt. On 3L NC 4/24 AM  - Suspect atelectasis. Minimal basilar air movement on exam  - Can hold off on imaging for now  - OOB, encourage IS, ambulate TID  - Supplemental O2 for goal SpO2 90-93%. On 2L NC 4/24 AM   - Encourage IS  - CXR mild/moderate Left LL with effusion/atelectasis   - give dose of IV lasix today  - on RA this morning but sats 90%     Acute on chronic anemia   - Presume 2/2 expected post-op ABLA w dilutional component from IVF  - No active bleeding  - Trend CBC  - 4/25 hgb < 7 - 1u RBC  - hgb 7.4     HTN  - BP stable  - hold home lisinopril for now     Anxiety  - clonazepam nightly     Hypothyroidism  - home sythroid     GERD  - PPI     ACP: Full Code  Ppx: DVT: Enox  Dispo  EDoD:  ~ 4/28 if hgb stable & off O2  F/u:   - PCP:  Chong Cornejo MD    Questions/concerns were discussed with patient and/or family by bedside.    Thank You,  Haider Taylor MD    Hospitalist with [facility]     Subjective:     Feeling better, breathing improved, denies chest pain, no nausea, no fevers.     OBJECTIVE:    Blood pressure 126/55, pulse 99, temperature 98.6 °F (37 °C), temperature source Oral, resp. rate 19, height 4' 9\" (1.448 m), weight 102 lb (46.3 kg), SpO2 94%, not currently breastfeeding.    Temp:  [98.4 °F (36.9 °C)-98.6 °F (37 °C)] 98.6 °F (37 °C)  Pulse:  [] 99  Resp:  [18-19] 19  BP: (126-151)/(55-68)  126/55  SpO2:  [92 %-96 %] 94 %      Intake/Output:    Intake/Output Summary (Last 24 hours) at 4/27/2024 1230  Last data filed at 4/27/2024 1100  Gross per 24 hour   Intake 240 ml   Output 700 ml   Net -460 ml       Last 3 Weights   04/23/24 0624 102 lb (46.3 kg)   04/18/24 1032 103 lb (46.7 kg)   10/10/23 1117 106 lb (48.1 kg)   10/06/23 1816 105 lb (47.6 kg)   08/19/22 0242 112 lb (50.8 kg)   08/18/22 2020 109 lb (49.4 kg)       Exam   Gen: No acute distress, alert and oriented x3   Heent: NC AT, neck supple  Pulm: Lungs clear, normal respiratory effort  CV: Heart with regular rate and rhythm,    Abd: Abdomen soft, nontender, BS +   MSK: no clubbing, no cyanosis  Skin: no rashes or lesions  Neuro: AO*3, motor intact, no sensory deficits  Psyc: appropriate mood and affect      Data Review:       Labs:     Recent Labs   Lab 04/25/24  0620 04/25/24  1407 04/26/24  0526 04/26/24  1418 04/27/24  0606   RBC 1.82*  --  2.12*  --  2.23*   HGB 6.6*   < > 7.3* 7.6* 7.4*   HCT 21.3*   < > 22.0* 22.2* 23.2*   .0*  --  103.8*  --  104.0*   MCH 36.3*  --  34.4*  --  33.2   MCHC 31.0  --  33.2  --  31.9   RDW 15.0  --  23.9*  --  22.2*   NEPRELIM 5.47  --  7.03  --  4.31   WBC 6.4  --  8.0  --  5.4   .0  --  171.0  --  238.0    < > = values in this interval not displayed.         Recent Labs   Lab 04/25/24  0547 04/26/24  0526 04/27/24  0606   * 116* 111*   BUN 11 10 11   CREATSERUM 0.55 0.66 0.54*   EGFRCR 91 87 91   CA 9.5 9.7 9.8    142 144   K 3.7 3.5 3.4*    108 109   CO2 29.0 32.0 32.0       No results for input(s): \"ALT\", \"AST\", \"ALB\", \"AMYLASE\", \"LIPASE\", \"LDH\" in the last 168 hours.    Invalid input(s): \"ALPHOS\", \"TBIL\", \"DBIL\", \"TPROT\"      Imaging:  XR CHEST AP PORTABLE  (CPT=71045)    Result Date: 4/26/2024  CONCLUSION:   Mild-to-moderate opacification left lower lung which may reflect combination of pleural effusion and parenchymal opacity of atelectasis or pneumonia.  Recommend  short-term follow-up chest radiographs.  Small right pleural effusion.      Dictated by (CST): Elvis Williamson MD on 4/26/2024 at 12:32 PM     Finalized by (CST): Elvis Williamson MD on 4/26/2024 at 12:34 PM             Meds:      enoxaparin  40 mg Subcutaneous Daily    docusate sodium  100 mg Oral BID    clonazePAM  0.5 mg Oral Nightly    DULoxetine  60 mg Oral Nightly    gabapentin  300 mg Oral BID    levothyroxine  50 mcg Oral Daily @ 0700    pantoprazole  20 mg Oral QAM AC    atorvastatin  10 mg Oral Nightly    [Held by provider] lisinopril  10 mg Oral QAM    [Held by provider] amLODIPine  2.5 mg Oral Nightly         ondansetron **OR** ondansetron    acetaminophen    HYDROcodone-acetaminophen    HYDROmorphone **OR** HYDROmorphone **OR** HYDROmorphone    simethicone    diphenhydrAMINE

## 2024-04-27 NOTE — PLAN OF CARE
Patient alert and orientated.  Patient ambulates to bathroom one assist with walker.  Patient is voiding freely and had a small formed BM overnight.  Patient on low fiber soft diet, tolerated well.  Patient has personal belongings and call light within reach.  Safety measures in place.  If patient Hemoglobin is stable she will discharge to [facility] on Saturday.  Problem: Patient Centered Care  Goal: Patient preferences are identified and integrated in the patient's plan of care  Description: Interventions:  - What would you like us to know as we care for you? From home   - Provide timely, complete, and accurate information to patient/family  - Incorporate patient and family knowledge, values, beliefs, and cultural backgrounds into the planning and delivery of care  - Encourage patient/family to participate in care and decision-making at the level they choose  - Honor patient and family perspectives and choices  Outcome: Progressing     Problem: Patient/Family Goals  Goal: Patient/Family Long Term Goal  Description: Patient's Long Term Goal: To be able to get around better on m    Interventions:  -   - See additional Care Plan goals for specific interventions  Outcome: Progressing  Goal: Patient/Family Short Term Goal  Description: Patient's Short Term Goal:     Interventions:   -   - See additional Care Plan goals for specific interventions  Outcome: Progressing     Problem: PAIN - ADULT  Goal: Verbalizes/displays adequate comfort level or patient's stated pain goal  Description: INTERVENTIONS:  - Encourage pt to monitor pain and request assistance  - Assess pain using appropriate pain scale  - Administer analgesics based on type and severity of pain and evaluate response  - Implement non-pharmacological measures as appropriate and evaluate response  - Consider cultural and social influences on pain and pain management  - Manage/alleviate anxiety  - Utilize distraction and/or relaxation techniques  - Monitor  for opioid side effects  - Notify MD/LIP if interventions unsuccessful or patient reports new pain  - Anticipate increased pain with activity and pre-medicate as appropriate  Outcome: Progressing     Problem: SAFETY ADULT - FALL  Goal: Free from fall injury  Description: INTERVENTIONS:  - Assess pt frequently for physical needs  - Identify cognitive and physical deficits and behaviors that affect risk of falls.  - [facility] fall precautions as indicated by assessment.  - Educate pt/family on patient safety including physical limitations  - Instruct pt to call for assistance with activity based on assessment  - Modify environment to reduce risk of injury  - Provide assistive devices as appropriate  - Consider OT/PT consult to assist with strengthening/mobility  - Encourage toileting schedule  Outcome: Progressing     Problem: RISK FOR INFECTION - ADULT  Goal: Absence of fever/infection during anticipated neutropenic period  Description: INTERVENTIONS  - Monitor WBC  - Administer growth factors as ordered  - Implement neutropenic guidelines  Outcome: Progressing     Problem: DISCHARGE PLANNING  Goal: Discharge to home or other facility with appropriate resources  Description: INTERVENTIONS:  - Identify barriers to discharge w/pt and caregiver  - Include patient/family/discharge partner in discharge planning  - Arrange for needed discharge resources and transportation as appropriate  - Identify discharge learning needs (meds, wound care, etc)  - Arrange for interpreters to assist at discharge as needed  - Consider post-discharge preferences of patient/family/discharge partner  - Complete POLST form as appropriate  - Assess patient's ability to be responsible for managing their own health  - Refer to Case Management Department for coordinating discharge planning if the patient needs post-hospital services based on physician/LIP order or complex needs related to functional status, cognitive ability or social support  system  Outcome: Progressing     Problem: RESPIRATORY - ADULT  Goal: Achieves optimal ventilation and oxygenation  Description: INTERVENTIONS:  - Assess for changes in respiratory status  - Assess for changes in mentation and behavior  - Position to facilitate oxygenation and minimize respiratory effort  - Oxygen supplementation based on oxygen saturation or ABGs  - Provide Smoking Cessation handout, if applicable  - Encourage broncho-pulmonary hygiene including cough, deep breathe, Incentive Spirometry  - Assess the need for suctioning and perform as needed  - Assess and instruct to report SOB or any respiratory difficulty  - Respiratory Therapy support as indicated  - Manage/alleviate anxiety  - Monitor for signs/symptoms of CO2 retention  Outcome: Progressing     Problem: GASTROINTESTINAL - ADULT  Goal: Minimal or absence of nausea and vomiting  Description: INTERVENTIONS:  - Maintain adequate hydration with IV or PO as ordered and tolerated  - Nasogastric tube to low intermittent suction as ordered  - Evaluate effectiveness of ordered antiemetic medications  - Provide nonpharmacologic comfort measures as appropriate  - Advance diet as tolerated, if ordered  - Obtain nutritional consult as needed  - Evaluate fluid balance  Outcome: Progressing  Goal: Maintains or returns to baseline bowel function  Description: INTERVENTIONS:  - Assess bowel function  - Maintain adequate hydration with IV or PO as ordered and tolerated  - Evaluate effectiveness of GI medications  - Encourage mobilization and activity  - Obtain nutritional consult as needed  - Establish a toileting routine/schedule  - Consider collaborating with pharmacy to review patient's medication profile  Outcome: Progressing     Problem: GENITOURINARY - ADULT  Goal: Absence of urinary retention  Description: INTERVENTIONS:  - Assess patient’s ability to void and empty bladder  - Monitor intake/output and perform bladder scan as needed  - Follow urinary  retention protocol/standard of care  - Consider collaborating with pharmacy to review patient's medication profile  - Implement strategies to promote bladder emptying  Outcome: Progressing     Problem: METABOLIC/FLUID AND ELECTROLYTES - ADULT  Goal: Electrolytes maintained within normal limits  Description: INTERVENTIONS:  - Monitor labs and rhythm and assess patient for signs and symptoms of electrolyte imbalances  - Administer electrolyte replacement as ordered  - Monitor response to electrolyte replacements, including rhythm and repeat lab results as appropriate  - Fluid restriction as ordered  - Instruct patient on fluid and nutrition restrictions as appropriate  Outcome: Progressing     Problem: SKIN/TISSUE INTEGRITY - ADULT  Goal: Skin integrity remains intact  Description: INTERVENTIONS  - Assess and document risk factors for pressure ulcer development  - Assess and document skin integrity  - Monitor for areas of redness and/or skin breakdown  - Initiate interventions, skin care algorithm/standards of care as needed  Outcome: Progressing  Goal: Incision(s), wounds(s) or drain site(s) healing without S/S of infection  Description: INTERVENTIONS:  - Assess and document risk factors for pressure ulcer development  - Assess and document skin integrity  - Assess and document dressing/incision, wound bed, drain sites and surrounding tissue  - Implement wound care per orders  - Initiate isolation precautions as appropriate  - Initiate Pressure Ulcer prevention bundle as indicated  Outcome: Progressing     Problem: HEMATOLOGIC - ADULT  Goal: Maintains hematologic stability  Description: INTERVENTIONS  - Assess for signs and symptoms of bleeding or hemorrhage  - Monitor labs and vital signs for trends  - Administer supportive blood products/factors, fluids and medications as ordered and appropriate  - Administer supportive blood products/factors as ordered and appropriate  Outcome: Progressing  Goal: Free from  bleeding injury  Description: (Example usage: patient with low platelets)  INTERVENTIONS:  - Avoid intramuscular injections, enemas and rectal medication administration  - Ensure safe mobilization of patient  - Hold pressure on venipuncture sites to achieve adequate hemostasis  - Assess for signs and symptoms of internal bleeding  - Monitor lab trends  - Patient is to report abnormal signs of bleeding to staff  - Avoid use of toothpicks and dental floss  - Use electric shaver for shaving  - Use soft bristle tooth brush  - Limit straining and forceful nose blowing  Outcome: Progressing     Problem: MUSCULOSKELETAL - ADULT  Goal: Return mobility to safest level of function  Description: INTERVENTIONS:  - Assess patient stability and activity tolerance for standing, transferring and ambulating w/ or w/o assistive devices  - Assist with transfers and ambulation using safe patient handling equipment as needed  - Ensure adequate protection for wounds/incisions during mobilization  - Obtain PT/OT consults as needed  - Advance activity as appropriate  - Communicate ordered activity level and limitations with patient/family  Outcome: Progressing  Goal: Maintain proper alignment of affected body part  Description: INTERVENTIONS:  - Support and protect limb and body alignment per provider's orders  - Instruct and reinforce with patient and family use of appropriate assistive device and precautions (e.g. spinal or hip dislocation precautions)  Outcome: Progressing

## 2024-04-27 NOTE — PLAN OF CARE
Pt a/o x 4, pt denies pain @ this time. Pt ambulates in room with walker x 1 assist. Discussed POC, offered emotional support. Instructed pt to use call light for assistance, call light and belongings with in reach, assessment ongoing, bed alarm on.   Problem: Patient Centered Care  Goal: Patient preferences are identified and integrated in the patient's plan of care  Description: Interventions:  - What would you like us to know as we care for you? From home   - Provide timely, complete, and accurate information to patient/family  - Incorporate patient and family knowledge, values, beliefs, and cultural backgrounds into the planning and delivery of care  - Encourage patient/family to participate in care and decision-making at the level they choose  - Honor patient and family perspectives and choices  Outcome: Progressing     Problem: Patient/Family Goals  Goal: Patient/Family Long Term Goal  Description: Patient's Long Term Goal:     Interventions:  -   - See additional Care Plan goals for specific interventions  Outcome: Progressing  Goal: Patient/Family Short Term Goal  Description: Patient's Short Term Goal:     Interventions:   -   - See additional Care Plan goals for specific interventions  Outcome: Progressing     Problem: PAIN - ADULT  Goal: Verbalizes/displays adequate comfort level or patient's stated pain goal  Description: INTERVENTIONS:  - Encourage pt to monitor pain and request assistance  - Assess pain using appropriate pain scale  - Administer analgesics based on type and severity of pain and evaluate response  - Implement non-pharmacological measures as appropriate and evaluate response  - Consider cultural and social influences on pain and pain management  - Manage/alleviate anxiety  - Utilize distraction and/or relaxation techniques  - Monitor for opioid side effects  - Notify MD/LIP if interventions unsuccessful or patient reports new pain  - Anticipate increased pain with activity and  pre-medicate as appropriate  Outcome: Progressing     Problem: SAFETY ADULT - FALL  Goal: Free from fall injury  Description: INTERVENTIONS:  - Assess pt frequently for physical needs  - Identify cognitive and physical deficits and behaviors that affect risk of falls.  - [place] fall precautions as indicated by assessment.  - Educate pt/family on patient safety including physical limitations  - Instruct pt to call for assistance with activity based on assessment  - Modify environment to reduce risk of injury  - Provide assistive devices as appropriate  - Consider OT/PT consult to assist with strengthening/mobility  - Encourage toileting schedule  Outcome: Progressing     Problem: RISK FOR INFECTION - ADULT  Goal: Absence of fever/infection during anticipated neutropenic period  Description: INTERVENTIONS  - Monitor WBC  - Administer growth factors as ordered  - Implement neutropenic guidelines  Outcome: Progressing     Problem: DISCHARGE PLANNING  Goal: Discharge to home or other facility with appropriate resources  Description: INTERVENTIONS:  - Identify barriers to discharge w/pt and caregiver  - Include patient/family/discharge partner in discharge planning  - Arrange for needed discharge resources and transportation as appropriate  - Identify discharge learning needs (meds, wound care, etc)  - Arrange for interpreters to assist at discharge as needed  - Consider post-discharge preferences of patient/family/discharge partner  - Complete POLST form as appropriate  - Assess patient's ability to be responsible for managing their own health  - Refer to Case Management Department for coordinating discharge planning if the patient needs post-hospital services based on physician/LIP order or complex needs related to functional status, cognitive ability or social support system  Outcome: Progressing     Problem: RESPIRATORY - ADULT  Goal: Achieves optimal ventilation and oxygenation  Description: INTERVENTIONS:  -  Assess for changes in respiratory status  - Assess for changes in mentation and behavior  - Position to facilitate oxygenation and minimize respiratory effort  - Oxygen supplementation based on oxygen saturation or ABGs  - Provide Smoking Cessation handout, if applicable  - Encourage broncho-pulmonary hygiene including cough, deep breathe, Incentive Spirometry  - Assess the need for suctioning and perform as needed  - Assess and instruct to report SOB or any respiratory difficulty  - Respiratory Therapy support as indicated  - Manage/alleviate anxiety  - Monitor for signs/symptoms of CO2 retention  Outcome: Progressing     Problem: GASTROINTESTINAL - ADULT  Goal: Minimal or absence of nausea and vomiting  Description: INTERVENTIONS:  - Maintain adequate hydration with IV or PO as ordered and tolerated  - Nasogastric tube to low intermittent suction as ordered  - Evaluate effectiveness of ordered antiemetic medications  - Provide nonpharmacologic comfort measures as appropriate  - Advance diet as tolerated, if ordered  - Obtain nutritional consult as needed  - Evaluate fluid balance  Outcome: Progressing  Goal: Maintains or returns to baseline bowel function  Description: INTERVENTIONS:  - Assess bowel function  - Maintain adequate hydration with IV or PO as ordered and tolerated  - Evaluate effectiveness of GI medications  - Encourage mobilization and activity  - Obtain nutritional consult as needed  - Establish a toileting routine/schedule  - Consider collaborating with pharmacy to review patient's medication profile  Outcome: Progressing     Problem: GENITOURINARY - ADULT  Goal: Absence of urinary retention  Description: INTERVENTIONS:  - Assess patient’s ability to void and empty bladder  - Monitor intake/output and perform bladder scan as needed  - Follow urinary retention protocol/standard of care  - Consider collaborating with pharmacy to review patient's medication profile  - Implement strategies to promote  bladder emptying  Outcome: Progressing     Problem: METABOLIC/FLUID AND ELECTROLYTES - ADULT  Goal: Electrolytes maintained within normal limits  Description: INTERVENTIONS:  - Monitor labs and rhythm and assess patient for signs and symptoms of electrolyte imbalances  - Administer electrolyte replacement as ordered  - Monitor response to electrolyte replacements, including rhythm and repeat lab results as appropriate  - Fluid restriction as ordered  - Instruct patient on fluid and nutrition restrictions as appropriate  Outcome: Progressing     Problem: SKIN/TISSUE INTEGRITY - ADULT  Goal: Skin integrity remains intact  Description: INTERVENTIONS  - Assess and document risk factors for pressure ulcer development  - Assess and document skin integrity  - Monitor for areas of redness and/or skin breakdown  - Initiate interventions, skin care algorithm/standards of care as needed  Outcome: Progressing  Goal: Incision(s), wounds(s) or drain site(s) healing without S/S of infection  Description: INTERVENTIONS:  - Assess and document risk factors for pressure ulcer development  - Assess and document skin integrity  - Assess and document dressing/incision, wound bed, drain sites and surrounding tissue  - Implement wound care per orders  - Initiate isolation precautions as appropriate  - Initiate Pressure Ulcer prevention bundle as indicated  Outcome: Progressing     Problem: HEMATOLOGIC - ADULT  Goal: Maintains hematologic stability  Description: INTERVENTIONS  - Assess for signs and symptoms of bleeding or hemorrhage  - Monitor labs and vital signs for trends  - Administer supportive blood products/factors, fluids and medications as ordered and appropriate  - Administer supportive blood products/factors as ordered and appropriate  Outcome: Progressing  Goal: Free from bleeding injury  Description: (Example usage: patient with low platelets)  INTERVENTIONS:  - Avoid intramuscular injections, enemas and rectal medication  administration  - Ensure safe mobilization of patient  - Hold pressure on venipuncture sites to achieve adequate hemostasis  - Assess for signs and symptoms of internal bleeding  - Monitor lab trends  - Patient is to report abnormal signs of bleeding to staff  - Avoid use of toothpicks and dental floss  - Use electric shaver for shaving  - Use soft bristle tooth brush  - Limit straining and forceful nose blowing  Outcome: Progressing     Problem: MUSCULOSKELETAL - ADULT  Goal: Return mobility to safest level of function  Description: INTERVENTIONS:  - Assess patient stability and activity tolerance for standing, transferring and ambulating w/ or w/o assistive devices  - Assist with transfers and ambulation using safe patient handling equipment as needed  - Ensure adequate protection for wounds/incisions during mobilization  - Obtain PT/OT consults as needed  - Advance activity as appropriate  - Communicate ordered activity level and limitations with patient/family  Outcome: Progressing  Goal: Maintain proper alignment of affected body part  Description: INTERVENTIONS:  - Support and protect limb and body alignment per provider's orders  - Instruct and reinforce with patient and family use of appropriate assistive device and precautions (e.g. spinal or hip dislocation precautions)  Outcome: Progressing

## 2024-04-27 NOTE — PROGRESS NOTES
Talked to RN  Pt doing well   Rhea diet   +flatus/BM  AVSS  Still on 2L 02 sats 92%   Hgb stable   OK for discharge from surgical standpoint if cleared by hospitalist for hypoxia which seems to be improving   Lovenox x 2 weeks  Discharge planned to [facility]

## 2024-04-28 ENCOUNTER — APPOINTMENT (OUTPATIENT)
Dept: CT IMAGING | Facility: HOSPITAL | Age: 84
End: 2024-04-28
Attending: HOSPITALIST
Payer: MEDICARE

## 2024-04-28 LAB
ANION GAP SERPL CALC-SCNC: 3 MMOL/L (ref 0–18)
ATRIAL RATE: 104 BPM
BASOPHILS # BLD AUTO: 0.02 X10(3) UL (ref 0–0.2)
BASOPHILS NFR BLD AUTO: 0.2 %
BILIRUB UR QL: NEGATIVE
BUN BLD-MCNC: 15 MG/DL (ref 9–23)
BUN/CREAT SERPL: 25 (ref 10–20)
CALCIUM BLD-MCNC: 9.6 MG/DL (ref 8.7–10.4)
CHLORIDE SERPL-SCNC: 106 MMOL/L (ref 98–112)
CLARITY UR: CLEAR
CO2 SERPL-SCNC: 31 MMOL/L (ref 21–32)
CREAT BLD-MCNC: 0.6 MG/DL
DEPRECATED RDW RBC AUTO: 76.4 FL (ref 35.1–46.3)
EGFRCR SERPLBLD CKD-EPI 2021: 89 ML/MIN/1.73M2 (ref 60–?)
EOSINOPHIL # BLD AUTO: 0.02 X10(3) UL (ref 0–0.7)
EOSINOPHIL NFR BLD AUTO: 0.2 %
ERYTHROCYTE [DISTWIDTH] IN BLOOD BY AUTOMATED COUNT: 20.6 % (ref 11–15)
GLUCOSE BLD-MCNC: 182 MG/DL (ref 70–99)
GLUCOSE UR-MCNC: NORMAL MG/DL
HCT VFR BLD AUTO: 26.2 %
HGB BLD-MCNC: 8.5 G/DL
IMM GRANULOCYTES # BLD AUTO: 0.05 X10(3) UL (ref 0–1)
IMM GRANULOCYTES NFR BLD: 0.4 %
KETONES UR-MCNC: NEGATIVE MG/DL
LEUKOCYTE ESTERASE UR QL STRIP.AUTO: 75
LYMPHOCYTES # BLD AUTO: 0.49 X10(3) UL (ref 1–4)
LYMPHOCYTES NFR BLD AUTO: 3.9 %
MCH RBC QN AUTO: 33.3 PG (ref 26–34)
MCHC RBC AUTO-ENTMCNC: 32.4 G/DL (ref 31–37)
MCV RBC AUTO: 102.7 FL
MONOCYTES # BLD AUTO: 0.65 X10(3) UL (ref 0.1–1)
MONOCYTES NFR BLD AUTO: 5.2 %
NEUTROPHILS # BLD AUTO: 11.27 X10 (3) UL (ref 1.5–7.7)
NEUTROPHILS # BLD AUTO: 11.27 X10(3) UL (ref 1.5–7.7)
NEUTROPHILS NFR BLD AUTO: 90.1 %
NITRITE UR QL STRIP.AUTO: NEGATIVE
OSMOLALITY SERPL CALC.SUM OF ELEC: 295 MOSM/KG (ref 275–295)
P AXIS: 35 DEGREES
P-R INTERVAL: 158 MS
PH UR: 8.5 [PH] (ref 5–8)
PLATELET # BLD AUTO: 272 10(3)UL (ref 150–450)
POTASSIUM SERPL-SCNC: 3.7 MMOL/L (ref 3.5–5.1)
POTASSIUM SERPL-SCNC: 3.7 MMOL/L (ref 3.5–5.1)
PROT UR-MCNC: 20 MG/DL
Q-T INTERVAL: 326 MS
QRS DURATION: 82 MS
QTC CALCULATION (BEZET): 428 MS
R AXIS: 14 DEGREES
RBC # BLD AUTO: 2.55 X10(6)UL
SODIUM SERPL-SCNC: 140 MMOL/L (ref 136–145)
SP GR UR STRIP: >1.03 (ref 1–1.03)
T AXIS: 43 DEGREES
UROBILINOGEN UR STRIP-ACNC: NORMAL
VENTRICULAR RATE: 104 BPM
WBC # BLD AUTO: 12.5 X10(3) UL (ref 4–11)

## 2024-04-28 PROCEDURE — 93010 ELECTROCARDIOGRAM REPORT: CPT | Performed by: INTERNAL MEDICINE

## 2024-04-28 PROCEDURE — 81001 URINALYSIS AUTO W/SCOPE: CPT | Performed by: HOSPITALIST

## 2024-04-28 PROCEDURE — 93005 ELECTROCARDIOGRAM TRACING: CPT

## 2024-04-28 PROCEDURE — 85025 COMPLETE CBC W/AUTO DIFF WBC: CPT | Performed by: PHYSICIAN ASSISTANT

## 2024-04-28 PROCEDURE — 74177 CT ABD & PELVIS W/CONTRAST: CPT | Performed by: HOSPITALIST

## 2024-04-28 PROCEDURE — 71260 CT THORAX DX C+: CPT | Performed by: HOSPITALIST

## 2024-04-28 PROCEDURE — 87086 URINE CULTURE/COLONY COUNT: CPT | Performed by: HOSPITALIST

## 2024-04-28 PROCEDURE — 80048 BASIC METABOLIC PNL TOTAL CA: CPT | Performed by: PHYSICIAN ASSISTANT

## 2024-04-28 PROCEDURE — 84132 ASSAY OF SERUM POTASSIUM: CPT | Performed by: HOSPITALIST

## 2024-04-28 NOTE — PROGRESS NOTES
[facility] Hospitalist Progress Note     CC: Hospital Follow up    PCP: Chong Cornejo MD       Assessment/Plan:     Active Problems:    Ovarian cancer (HCC)    Lizeth Mcgill is a 83 year old female with ovarian CA, HTN, breaset CA, DM2, hypothyroidism who presented for hysterectomy-BSO.      Ovarian CA  S/p robotic assisted total lap hysterectomy-BSO, debulking, omentectomy  - PRN pain meds, Transition to PO when able  - monitor for acute blood loss anemia   - Bowel reg, antiemetics  - DVT Prophy- lovenox  - Bernardo removed  - CT abd pelvis, with fluid collection, unclear if post op or dev infection, no increase in pain, no change in bowel pattern, no high fevers, but does have increased WBC at 12  - zosyn started for poss UTI, discussed with Dr. Couch,      Dysuria  - burning with urination  - culture sent  - zosyn started as above     Acute resp failure w hypoxia  - 70% w ambulating per pt. On 3L NC 4/24 AM  - Suspect atelectasis. Minimal basilar air movement on exam  - Can hold off on imaging for now  - OOB, encourage IS, ambulate TID  - Supplemental O2 for goal SpO2 90-93%. On 2L NC 4/24 AM   - Encourage IS  - CXR mild/moderate Left LL with effusion/atelectasis   - give dose of IV lasix today  - on RA this morning but sats 90%  - ECG with PAc's not afib  - CT chest without PE     Acute on chronic anemia   - Presume 2/2 expected post-op ABLA w dilutional component from IVF  - No active bleeding  - Trend CBC  - 4/25 hgb < 7 - 1u RBC  - hgb 7.4-> 8.5     HTN  - BP stable  - hold home lisinopril for now     Anxiety  - clonazepam nightly     Hypothyroidism  - home sythroid     GERD  - PPI     ACP: Full Code  Ppx: DVT: Enox  Dispo  EDoD: pending course  F/u:   - PCP:  Chong Cornejo MD    Questions/concerns were discussed with patient and/or family by bedside.    Thank You,  Haider Taylor MD    Hospitalist with [facility]     Subjective:     Feeling better, breathing improved, denies chest pain, no  nausea, no fevers. Complains of dysuria     OBJECTIVE:    Blood pressure 144/70, pulse (!) 124, temperature 99.1 °F (37.3 °C), temperature source Oral, resp. rate 20, height 4' 9\" (1.448 m), weight 102 lb (46.3 kg), SpO2 91%, not currently breastfeeding.    Temp:  [99.1 °F (37.3 °C)-99.8 °F (37.7 °C)] 99.1 °F (37.3 °C)  Pulse:  [] 124  Resp:  [18-20] 20  BP: (140-144)/(55-70) 144/70  SpO2:  [91 %-93 %] 91 %      Intake/Output:    Intake/Output Summary (Last 24 hours) at 4/28/2024 1142  Last data filed at 4/28/2024 0956  Gross per 24 hour   Intake 400 ml   Output 550 ml   Net -150 ml       Last 3 Weights   04/23/24 0624 102 lb (46.3 kg)   04/18/24 1032 103 lb (46.7 kg)   10/10/23 1117 106 lb (48.1 kg)   10/06/23 1816 105 lb (47.6 kg)   08/19/22 0242 112 lb (50.8 kg)   08/18/22 2020 109 lb (49.4 kg)       Exam   Gen: No acute distress, alert and oriented x3   Heent: NC AT, neck supple  Pulm: Lungs clear, normal respiratory effort  CV: Heart with regular rate and rhythm,    Abd: Abdomen soft, nontender, BS +   MSK: no clubbing, no cyanosis  Skin: no rashes or lesions  Neuro: AO*3, motor intact, no sensory deficits  Psyc: appropriate mood and affect      Data Review:       Labs:     Recent Labs   Lab 04/26/24  0526 04/26/24  1418 04/27/24  0606 04/28/24  0557   RBC 2.12*  --  2.23* 2.55*   HGB 7.3* 7.6* 7.4* 8.5*   HCT 22.0* 22.2* 23.2* 26.2*   .8*  --  104.0* 102.7*   MCH 34.4*  --  33.2 33.3   MCHC 33.2  --  31.9 32.4   RDW 23.9*  --  22.2* 20.6*   NEPRELIM 7.03  --  4.31 11.27*   WBC 8.0  --  5.4 12.5*   .0  --  238.0 272.0         Recent Labs   Lab 04/26/24  0526 04/27/24  0606 04/28/24  0557   * 111* 182*   BUN 10 11 15   CREATSERUM 0.66 0.54* 0.60   EGFRCR 87 91 89   CA 9.7 9.8 9.6    144 140   K 3.5 3.4* 3.7  3.7    109 106   CO2 32.0 32.0 31.0       No results for input(s): \"ALT\", \"AST\", \"ALB\", \"AMYLASE\", \"LIPASE\", \"LDH\" in the last 168 hours.    Invalid input(s):  \"ALPHOS\", \"TBIL\", \"DBIL\", \"TPROT\"      Imaging:  CT ABDOMEN+PELVIS(CONTRAST ONLY)(CPT=74177)    Result Date: 4/28/2024  CONCLUSION:   11.8 x 4.6 centimeter bilobed rim enhancing fluid collection with mild gas in the pelvic operative bed.  This could be sterile or infected  There is evidence suggesting cystitis  Intra and extrahepatic biliary duct dilation is present down to the level of the papilla.  Prior cholecystectomy  Multiple small hypoenhancing liver lesions measuring up to 1.1 centimeter, nonspecific    Dictated by (CST): Osvaldo Draper MD on 4/28/2024 at 9:59 AM     Finalized by (CST): Osvaldo Draper MD on 4/28/2024 at 10:06 AM          CT CHEST PE AORTA (IV ONLY) (CPT=71260)    Result Date: 4/28/2024  CONCLUSION: No PE    Dictated by (CST): Osvaldo Draper MD on 4/28/2024 at 9:10 AM     Finalized by (CST): Osvaldo Draper MD on 4/28/2024 at 9:12 AM          XR CHEST AP PORTABLE  (CPT=71045)    Result Date: 4/26/2024  CONCLUSION:   Mild-to-moderate opacification left lower lung which may reflect combination of pleural effusion and parenchymal opacity of atelectasis or pneumonia.  Recommend short-term follow-up chest radiographs.  Small right pleural effusion.      Dictated by (CST): Elvis Williamson MD on 4/26/2024 at 12:32 PM     Finalized by (CST): Elvis Williamson MD on 4/26/2024 at 12:34 PM             Meds:      enoxaparin  40 mg Subcutaneous Daily    docusate sodium  100 mg Oral BID    clonazePAM  0.5 mg Oral Nightly    DULoxetine  60 mg Oral Nightly    gabapentin  300 mg Oral BID    levothyroxine  50 mcg Oral Daily @ 0700    pantoprazole  20 mg Oral QAM AC    atorvastatin  10 mg Oral Nightly    [Held by provider] lisinopril  10 mg Oral QAM    [Held by provider] amLODIPine  2.5 mg Oral Nightly         iopamidol    ondansetron **OR** ondansetron    acetaminophen    HYDROcodone-acetaminophen    simethicone

## 2024-04-28 NOTE — PROGRESS NOTES
Case d/w RN and Hospitalist Dr Taylor  Tachycardia overnight, Low grade temp 99  WBC up to 12K   Improved hypoxia   +flatus/BM, reassuring abd exam   CTA negative PE  CT A/P reviewed, postop changes noted, no discrete abscess or bowel issues   Bladder thickening noted, UA with some pyuria, pt c/o dysuria  Will start on empiric Abx to cover possible UTI  Repeat labs in am   Follow clinical course

## 2024-04-28 NOTE — PLAN OF CARE
Pt a/o x 4, vss, afebrile. Pt ambulates to bathroom with walker, standby assist. Discussed POC, pt completed CT. Offered emotional support, instructed pt to use call light for assistance. UA/C&S collected and sent, pt started on IV antibiotics. Call light and belongings with in reach, assessment ongoing.   Problem: Patient Centered Care  Goal: Patient preferences are identified and integrated in the patient's plan of care  Description: Interventions:  - What would you like us to know as we care for you? From home   - Provide timely, complete, and accurate information to patient/family  - Incorporate patient and family knowledge, values, beliefs, and cultural backgrounds into the planning and delivery of care  - Encourage patient/family to participate in care and decision-making at the level they choose  - Honor patient and family perspectives and choices  Outcome: Progressing     Problem: Patient/Family Goals  Goal: Patient/Family Long Term Goal  Description: Patient's Long Term Goal:     Interventions:  -   - See additional Care Plan goals for specific interventions  Outcome: Progressing  Goal: Patient/Family Short Term Goal  Description: Patient's Short Term Goal:     Interventions:   -   - See additional Care Plan goals for specific interventions  Outcome: Progressing     Problem: PAIN - ADULT  Goal: Verbalizes/displays adequate comfort level or patient's stated pain goal  Description: INTERVENTIONS:  - Encourage pt to monitor pain and request assistance  - Assess pain using appropriate pain scale  - Administer analgesics based on type and severity of pain and evaluate response  - Implement non-pharmacological measures as appropriate and evaluate response  - Consider cultural and social influences on pain and pain management  - Manage/alleviate anxiety  - Utilize distraction and/or relaxation techniques  - Monitor for opioid side effects  - Notify MD/LIP if interventions unsuccessful or patient reports new  pain  - Anticipate increased pain with activity and pre-medicate as appropriate  Outcome: Progressing     Problem: SAFETY ADULT - FALL  Goal: Free from fall injury  Description: INTERVENTIONS:  - Assess pt frequently for physical needs  - Identify cognitive and physical deficits and behaviors that affect risk of falls.  - [place] fall precautions as indicated by assessment.  - Educate pt/family on patient safety including physical limitations  - Instruct pt to call for assistance with activity based on assessment  - Modify environment to reduce risk of injury  - Provide assistive devices as appropriate  - Consider OT/PT consult to assist with strengthening/mobility  - Encourage toileting schedule  Outcome: Progressing     Problem: RISK FOR INFECTION - ADULT  Goal: Absence of fever/infection during anticipated neutropenic period  Description: INTERVENTIONS  - Monitor WBC  - Administer growth factors as ordered  - Implement neutropenic guidelines  Outcome: Progressing     Problem: DISCHARGE PLANNING  Goal: Discharge to home or other facility with appropriate resources  Description: INTERVENTIONS:  - Identify barriers to discharge w/pt and caregiver  - Include patient/family/discharge partner in discharge planning  - Arrange for needed discharge resources and transportation as appropriate  - Identify discharge learning needs (meds, wound care, etc)  - Arrange for interpreters to assist at discharge as needed  - Consider post-discharge preferences of patient/family/discharge partner  - Complete POLST form as appropriate  - Assess patient's ability to be responsible for managing their own health  - Refer to Case Management Department for coordinating discharge planning if the patient needs post-hospital services based on physician/LIP order or complex needs related to functional status, cognitive ability or social support system  Outcome: Progressing     Problem: RESPIRATORY - ADULT  Goal: Achieves optimal ventilation  and oxygenation  Description: INTERVENTIONS:  - Assess for changes in respiratory status  - Assess for changes in mentation and behavior  - Position to facilitate oxygenation and minimize respiratory effort  - Oxygen supplementation based on oxygen saturation or ABGs  - Provide Smoking Cessation handout, if applicable  - Encourage broncho-pulmonary hygiene including cough, deep breathe, Incentive Spirometry  - Assess the need for suctioning and perform as needed  - Assess and instruct to report SOB or any respiratory difficulty  - Respiratory Therapy support as indicated  - Manage/alleviate anxiety  - Monitor for signs/symptoms of CO2 retention  Outcome: Progressing     Problem: GASTROINTESTINAL - ADULT  Goal: Minimal or absence of nausea and vomiting  Description: INTERVENTIONS:  - Maintain adequate hydration with IV or PO as ordered and tolerated  - Nasogastric tube to low intermittent suction as ordered  - Evaluate effectiveness of ordered antiemetic medications  - Provide nonpharmacologic comfort measures as appropriate  - Advance diet as tolerated, if ordered  - Obtain nutritional consult as needed  - Evaluate fluid balance  Outcome: Progressing  Goal: Maintains or returns to baseline bowel function  Description: INTERVENTIONS:  - Assess bowel function  - Maintain adequate hydration with IV or PO as ordered and tolerated  - Evaluate effectiveness of GI medications  - Encourage mobilization and activity  - Obtain nutritional consult as needed  - Establish a toileting routine/schedule  - Consider collaborating with pharmacy to review patient's medication profile  Outcome: Progressing     Problem: GENITOURINARY - ADULT  Goal: Absence of urinary retention  Description: INTERVENTIONS:  - Assess patient’s ability to void and empty bladder  - Monitor intake/output and perform bladder scan as needed  - Follow urinary retention protocol/standard of care  - Consider collaborating with pharmacy to review patient's  medication profile  - Implement strategies to promote bladder emptying  Outcome: Progressing     Problem: METABOLIC/FLUID AND ELECTROLYTES - ADULT  Goal: Electrolytes maintained within normal limits  Description: INTERVENTIONS:  - Monitor labs and rhythm and assess patient for signs and symptoms of electrolyte imbalances  - Administer electrolyte replacement as ordered  - Monitor response to electrolyte replacements, including rhythm and repeat lab results as appropriate  - Fluid restriction as ordered  - Instruct patient on fluid and nutrition restrictions as appropriate  Outcome: Progressing     Problem: SKIN/TISSUE INTEGRITY - ADULT  Goal: Skin integrity remains intact  Description: INTERVENTIONS  - Assess and document risk factors for pressure ulcer development  - Assess and document skin integrity  - Monitor for areas of redness and/or skin breakdown  - Initiate interventions, skin care algorithm/standards of care as needed  Outcome: Progressing  Goal: Incision(s), wounds(s) or drain site(s) healing without S/S of infection  Description: INTERVENTIONS:  - Assess and document risk factors for pressure ulcer development  - Assess and document skin integrity  - Assess and document dressing/incision, wound bed, drain sites and surrounding tissue  - Implement wound care per orders  - Initiate isolation precautions as appropriate  - Initiate Pressure Ulcer prevention bundle as indicated  Outcome: Progressing     Problem: HEMATOLOGIC - ADULT  Goal: Maintains hematologic stability  Description: INTERVENTIONS  - Assess for signs and symptoms of bleeding or hemorrhage  - Monitor labs and vital signs for trends  - Administer supportive blood products/factors, fluids and medications as ordered and appropriate  - Administer supportive blood products/factors as ordered and appropriate  Outcome: Progressing  Goal: Free from bleeding injury  Description: (Example usage: patient with low platelets)  INTERVENTIONS:  - Avoid  intramuscular injections, enemas and rectal medication administration  - Ensure safe mobilization of patient  - Hold pressure on venipuncture sites to achieve adequate hemostasis  - Assess for signs and symptoms of internal bleeding  - Monitor lab trends  - Patient is to report abnormal signs of bleeding to staff  - Avoid use of toothpicks and dental floss  - Use electric shaver for shaving  - Use soft bristle tooth brush  - Limit straining and forceful nose blowing  Outcome: Progressing     Problem: MUSCULOSKELETAL - ADULT  Goal: Return mobility to safest level of function  Description: INTERVENTIONS:  - Assess patient stability and activity tolerance for standing, transferring and ambulating w/ or w/o assistive devices  - Assist with transfers and ambulation using safe patient handling equipment as needed  - Ensure adequate protection for wounds/incisions during mobilization  - Obtain PT/OT consults as needed  - Advance activity as appropriate  - Communicate ordered activity level and limitations with patient/family  Outcome: Progressing  Goal: Maintain proper alignment of affected body part  Description: INTERVENTIONS:  - Support and protect limb and body alignment per provider's orders  - Instruct and reinforce with patient and family use of appropriate assistive device and precautions (e.g. spinal or hip dislocation precautions)  Outcome: Progressing

## 2024-04-28 NOTE — PLAN OF CARE
Patient is a/o x4. On room air. Ambulates SBA w/walker. Tylenol given for pain prn. No acute changes noted throughout shift. Call light within reach. Bed alarm on. Calls appropriately.    Problem: Patient Centered Care  Goal: Patient preferences are identified and integrated in the patient's plan of care  Description: Interventions:  - What would you like us to know as we care for you? From home   - Provide timely, complete, and accurate information to patient/family  - Incorporate patient and family knowledge, values, beliefs, and cultural backgrounds into the planning and delivery of care  - Encourage patient/family to participate in care and decision-making at the level they choose  - Honor patient and family perspectives and choices  Outcome: Progressing     Problem: PAIN - ADULT  Goal: Verbalizes/displays adequate comfort level or patient's stated pain goal  Description: INTERVENTIONS:  - Encourage pt to monitor pain and request assistance  - Assess pain using appropriate pain scale  - Administer analgesics based on type and severity of pain and evaluate response  - Implement non-pharmacological measures as appropriate and evaluate response  - Consider cultural and social influences on pain and pain management  - Manage/alleviate anxiety  - Utilize distraction and/or relaxation techniques  - Monitor for opioid side effects  - Notify MD/LIP if interventions unsuccessful or patient reports new pain  - Anticipate increased pain with activity and pre-medicate as appropriate  Outcome: Progressing     Problem: SAFETY ADULT - FALL  Goal: Free from fall injury  Description: INTERVENTIONS:  - Assess pt frequently for physical needs  - Identify cognitive and physical deficits and behaviors that affect risk of falls.  - [place] fall precautions as indicated by assessment.  - Educate pt/family on patient safety including physical limitations  - Instruct pt to call for assistance with activity based on assessment  -  Modify environment to reduce risk of injury  - Provide assistive devices as appropriate  - Consider OT/PT consult to assist with strengthening/mobility  - Encourage toileting schedule  Outcome: Progressing     Problem: RISK FOR INFECTION - ADULT  Goal: Absence of fever/infection during anticipated neutropenic period  Description: INTERVENTIONS  - Monitor WBC  - Administer growth factors as ordered  - Implement neutropenic guidelines  Outcome: Progressing     Problem: DISCHARGE PLANNING  Goal: Discharge to home or other facility with appropriate resources  Description: INTERVENTIONS:  - Identify barriers to discharge w/pt and caregiver  - Include patient/family/discharge partner in discharge planning  - Arrange for needed discharge resources and transportation as appropriate  - Identify discharge learning needs (meds, wound care, etc)  - Arrange for interpreters to assist at discharge as needed  - Consider post-discharge preferences of patient/family/discharge partner  - Complete POLST form as appropriate  - Assess patient's ability to be responsible for managing their own health  - Refer to Case Management Department for coordinating discharge planning if the patient needs post-hospital services based on physician/LIP order or complex needs related to functional status, cognitive ability or social support system  Outcome: Progressing     Problem: RESPIRATORY - ADULT  Goal: Achieves optimal ventilation and oxygenation  Description: INTERVENTIONS:  - Assess for changes in respiratory status  - Assess for changes in mentation and behavior  - Position to facilitate oxygenation and minimize respiratory effort  - Oxygen supplementation based on oxygen saturation or ABGs  - Provide Smoking Cessation handout, if applicable  - Encourage broncho-pulmonary hygiene including cough, deep breathe, Incentive Spirometry  - Assess the need for suctioning and perform as needed  - Assess and instruct to report SOB or any respiratory  difficulty  - Respiratory Therapy support as indicated  - Manage/alleviate anxiety  - Monitor for signs/symptoms of CO2 retention  Outcome: Progressing     Problem: GASTROINTESTINAL - ADULT  Goal: Minimal or absence of nausea and vomiting  Description: INTERVENTIONS:  - Maintain adequate hydration with IV or PO as ordered and tolerated  - Nasogastric tube to low intermittent suction as ordered  - Evaluate effectiveness of ordered antiemetic medications  - Provide nonpharmacologic comfort measures as appropriate  - Advance diet as tolerated, if ordered  - Obtain nutritional consult as needed  - Evaluate fluid balance  Outcome: Progressing  Goal: Maintains or returns to baseline bowel function  Description: INTERVENTIONS:  - Assess bowel function  - Maintain adequate hydration with IV or PO as ordered and tolerated  - Evaluate effectiveness of GI medications  - Encourage mobilization and activity  - Obtain nutritional consult as needed  - Establish a toileting routine/schedule  - Consider collaborating with pharmacy to review patient's medication profile  Outcome: Progressing     Problem: GENITOURINARY - ADULT  Goal: Absence of urinary retention  Description: INTERVENTIONS:  - Assess patient’s ability to void and empty bladder  - Monitor intake/output and perform bladder scan as needed  - Follow urinary retention protocol/standard of care  - Consider collaborating with pharmacy to review patient's medication profile  - Implement strategies to promote bladder emptying  Outcome: Progressing     Problem: METABOLIC/FLUID AND ELECTROLYTES - ADULT  Goal: Electrolytes maintained within normal limits  Description: INTERVENTIONS:  - Monitor labs and rhythm and assess patient for signs and symptoms of electrolyte imbalances  - Administer electrolyte replacement as ordered  - Monitor response to electrolyte replacements, including rhythm and repeat lab results as appropriate  - Fluid restriction as ordered  - Instruct patient on  fluid and nutrition restrictions as appropriate  Outcome: Progressing     Problem: SKIN/TISSUE INTEGRITY - ADULT  Goal: Skin integrity remains intact  Description: INTERVENTIONS  - Assess and document risk factors for pressure ulcer development  - Assess and document skin integrity  - Monitor for areas of redness and/or skin breakdown  - Initiate interventions, skin care algorithm/standards of care as needed  Outcome: Progressing  Goal: Incision(s), wounds(s) or drain site(s) healing without S/S of infection  Description: INTERVENTIONS:  - Assess and document risk factors for pressure ulcer development  - Assess and document skin integrity  - Assess and document dressing/incision, wound bed, drain sites and surrounding tissue  - Implement wound care per orders  - Initiate isolation precautions as appropriate  - Initiate Pressure Ulcer prevention bundle as indicated  Outcome: Progressing     Problem: HEMATOLOGIC - ADULT  Goal: Maintains hematologic stability  Description: INTERVENTIONS  - Assess for signs and symptoms of bleeding or hemorrhage  - Monitor labs and vital signs for trends  - Administer supportive blood products/factors, fluids and medications as ordered and appropriate  - Administer supportive blood products/factors as ordered and appropriate  Outcome: Progressing  Goal: Free from bleeding injury  Description: (Example usage: patient with low platelets)  INTERVENTIONS:  - Avoid intramuscular injections, enemas and rectal medication administration  - Ensure safe mobilization of patient  - Hold pressure on venipuncture sites to achieve adequate hemostasis  - Assess for signs and symptoms of internal bleeding  - Monitor lab trends  - Patient is to report abnormal signs of bleeding to staff  - Avoid use of toothpicks and dental floss  - Use electric shaver for shaving  - Use soft bristle tooth brush  - Limit straining and forceful nose blowing  Outcome: Progressing     Problem: MUSCULOSKELETAL -  ADULT  Goal: Return mobility to safest level of function  Description: INTERVENTIONS:  - Assess patient stability and activity tolerance for standing, transferring and ambulating w/ or w/o assistive devices  - Assist with transfers and ambulation using safe patient handling equipment as needed  - Ensure adequate protection for wounds/incisions during mobilization  - Obtain PT/OT consults as needed  - Advance activity as appropriate  - Communicate ordered activity level and limitations with patient/family  Outcome: Progressing  Goal: Maintain proper alignment of affected body part  Description: INTERVENTIONS:  - Support and protect limb and body alignment per provider's orders  - Instruct and reinforce with patient and family use of appropriate assistive device and precautions (e.g. spinal or hip dislocation precautions)  Outcome: Progressing

## 2024-04-29 LAB
ANION GAP SERPL CALC-SCNC: 5 MMOL/L (ref 0–18)
BASOPHILS # BLD AUTO: 0.02 X10(3) UL (ref 0–0.2)
BASOPHILS NFR BLD AUTO: 0.2 %
BUN BLD-MCNC: 12 MG/DL (ref 9–23)
CALCIUM BLD-MCNC: 7.9 MG/DL (ref 8.7–10.4)
CHLORIDE SERPL-SCNC: 107 MMOL/L (ref 98–112)
CO2 SERPL-SCNC: 29 MMOL/L (ref 21–32)
CREAT BLD-MCNC: 0.61 MG/DL
DEPRECATED RDW RBC AUTO: 78.2 FL (ref 35.1–46.3)
EGFRCR SERPLBLD CKD-EPI 2021: 89 ML/MIN/1.73M2 (ref 60–?)
EOSINOPHIL # BLD AUTO: 0.1 X10(3) UL (ref 0–0.7)
EOSINOPHIL NFR BLD AUTO: 0.8 %
ERYTHROCYTE [DISTWIDTH] IN BLOOD BY AUTOMATED COUNT: 20.3 % (ref 11–15)
GLUCOSE BLD-MCNC: 119 MG/DL (ref 70–99)
HCT VFR BLD AUTO: 20.9 %
HCT VFR BLD AUTO: 22.9 %
HGB BLD-MCNC: 6.7 G/DL
HGB BLD-MCNC: 7.4 G/DL
IMM GRANULOCYTES # BLD AUTO: 0.04 X10(3) UL (ref 0–1)
IMM GRANULOCYTES NFR BLD: 0.3 %
LYMPHOCYTES # BLD AUTO: 0.69 X10(3) UL (ref 1–4)
LYMPHOCYTES NFR BLD AUTO: 5.8 %
MAGNESIUM SERPL-MCNC: 0.9 MG/DL (ref 1.6–2.6)
MAGNESIUM SERPL-MCNC: 1.8 MG/DL (ref 1.6–2.6)
MCH RBC QN AUTO: 33.8 PG (ref 26–34)
MCHC RBC AUTO-ENTMCNC: 32.1 G/DL (ref 31–37)
MCV RBC AUTO: 105.6 FL
MONOCYTES # BLD AUTO: 0.76 X10(3) UL (ref 0.1–1)
MONOCYTES NFR BLD AUTO: 6.4 %
NEUTROPHILS # BLD AUTO: 10.31 X10 (3) UL (ref 1.5–7.7)
NEUTROPHILS # BLD AUTO: 10.31 X10(3) UL (ref 1.5–7.7)
NEUTROPHILS NFR BLD AUTO: 86.5 %
PLATELET # BLD AUTO: 286 10(3)UL (ref 150–450)
POTASSIUM SERPL-SCNC: 3.5 MMOL/L (ref 3.5–5.1)
RBC # BLD AUTO: 1.98 X10(6)UL
SODIUM SERPL-SCNC: 141 MMOL/L (ref 136–145)
WBC # BLD AUTO: 11.9 X10(3) UL (ref 4–11)

## 2024-04-29 PROCEDURE — 85018 HEMOGLOBIN: CPT | Performed by: OBSTETRICS & GYNECOLOGY

## 2024-04-29 PROCEDURE — 80048 BASIC METABOLIC PNL TOTAL CA: CPT | Performed by: PHYSICIAN ASSISTANT

## 2024-04-29 PROCEDURE — 97530 THERAPEUTIC ACTIVITIES: CPT

## 2024-04-29 PROCEDURE — 85014 HEMATOCRIT: CPT | Performed by: OBSTETRICS & GYNECOLOGY

## 2024-04-29 PROCEDURE — 97535 SELF CARE MNGMENT TRAINING: CPT

## 2024-04-29 PROCEDURE — 83735 ASSAY OF MAGNESIUM: CPT | Performed by: HOSPITALIST

## 2024-04-29 PROCEDURE — 85025 COMPLETE CBC W/AUTO DIFF WBC: CPT | Performed by: PHYSICIAN ASSISTANT

## 2024-04-29 RX ORDER — MAGNESIUM OXIDE 400 MG/1
400 TABLET ORAL ONCE
Status: COMPLETED | OUTPATIENT
Start: 2024-04-29 | End: 2024-04-29

## 2024-04-29 RX ORDER — VANCOMYCIN HYDROCHLORIDE 125 MG/1
125 CAPSULE ORAL DAILY
Status: DISCONTINUED | OUTPATIENT
Start: 2024-04-29 | End: 2024-05-03

## 2024-04-29 NOTE — PROGRESS NOTES
[facility]    CDI Prediction Tool Protocol (Vancomycin Initiated)    OVP (oral vancomycin prophylaxis) 125 mg PO Daily is being started in this patient based on a score of 13.      Score Breakdown:  High risk antibiotic use (5 points)  Malignancy (3 points)     Long term care facility resident (1 point)  Age >/= 80 years (3 points)  PPI use in hospital (1 point)    This patient is currently at high risk for developing CDI due to his/her score being >/=13 points and is being started on prophylactic oral vancomycin to prevent Cdiff.     This patient does not have C. difficile infection. All measures taken within this protocol are to decrease the risk of CDI development.    Jeff Pabon, PharmD  4/29/2024  8:47 AM  [place]  [facility] Extension: [phone]

## 2024-04-29 NOTE — PROGRESS NOTES
[facility] Hospitalist Progress Note     CC: Hospital Follow up    PCP: Chong Cornejo MD       Assessment/Plan:     Active Problems:    Ovarian cancer (HCC)    Lizeth Mcgill is a 83 year old female with ovarian CA, HTN, breaset CA, DM2, hypothyroidism who presented for hysterectomy-BSO.      Ovarian CA  S/p robotic assisted total lap hysterectomy-BSO, debulking, omentectomy  - PRN pain meds, Transition to PO when able  - monitor for acute blood loss anemia   - Bowel reg, antiemetics  - DVT Prophy- lovenox  - Bernardo removed      Fever / abd pain  Dysuria  - CT abd pelvis, with fluid collection, urine culture negative  - given fever, elevated WBC, and neg Ucx, will need to assume fluid collection is poss infection  - zosyn started on 4/28  - discussed with Dr. Couch, rec IR consult for aspiration  - IR consulted, plan for poss aspiration tomorrow      Acute resp failure w hypoxia  - 70% w ambulating per pt. On 3L NC 4/24 AM  - Suspect atelectasis. Minimal basilar air movement on exam  - Can hold off on imaging for now  - OOB, encourage IS, ambulate TID  - Supplemental O2 for goal SpO2 90-93%. On 2L NC 4/24 AM   - Encourage IS  - CXR mild/moderate Left LL with effusion/atelectasis   - give dose of IV lasix today  - on RA this morning but sats 90%  - ECG with PAc's not afib  - CT chest without PE     Acute on chronic anemia   - Presume 2/2 expected post-op ABLA w dilutional component from IVF  - No active bleeding  - Trend CBC  - 4/25 hgb < 7 - 1u RBC  - hgb 7.4-> 8.5-. 7.4     HTN  - BP stable  - hold home lisinopril for now     Anxiety  - clonazepam nightly     Hypothyroidism  - home sythroid     GERD  - PPI     ACP: Full Code  Ppx: DVT: Enox  Dispo  EDoD: pending course  F/u:   - PCP:  Chong Cornejo MD    Questions/concerns were discussed with patient and/or family by bedside.    Thank You,  Haider Taylor MD    Hospitalist with [facility]     Subjective:     Feeling ok, breathing improved, denies  chest pain, no nausea, no fevers. Complains of dysuria and some lower abd pain    OBJECTIVE:    Blood pressure 126/39, pulse 95, temperature 98 °F (36.7 °C), temperature source Oral, resp. rate 16, height 4' 9\" (1.448 m), weight 102 lb (46.3 kg), SpO2 92%, not currently breastfeeding.    Temp:  [98 °F (36.7 °C)-100.5 °F (38.1 °C)] 98 °F (36.7 °C)  Pulse:  [] 95  Resp:  [16-20] 16  BP: (124-126)/(39-56) 126/39  SpO2:  [90 %-92 %] 92 %      Intake/Output:    Intake/Output Summary (Last 24 hours) at 4/29/2024 1318  Last data filed at 4/29/2024 0551  Gross per 24 hour   Intake 920 ml   Output 100 ml   Net 820 ml       Last 3 Weights   04/23/24 0624 102 lb (46.3 kg)   04/18/24 1032 103 lb (46.7 kg)   10/10/23 1117 106 lb (48.1 kg)   10/06/23 1816 105 lb (47.6 kg)   08/19/22 0242 112 lb (50.8 kg)   08/18/22 2020 109 lb (49.4 kg)       Exam   Gen: No acute distress, alert and oriented x3   Heent: NC AT, neck supple  Pulm: Lungs clear, normal respiratory effort  CV: Heart with regular rate and rhythm,    Abd: Abdomen soft, mild TTP in lower quadrant, BS +   MSK: no clubbing, no cyanosis  Skin: no rashes or lesions  Neuro: AO*3, motor intact, no sensory deficits  Psyc: appropriate mood and affect      Data Review:       Labs:     Recent Labs   Lab 04/27/24  0606 04/28/24  0557 04/29/24  0550 04/29/24  0715   RBC 2.23* 2.55* 1.98*  --    HGB 7.4* 8.5* 6.7* 7.4*   HCT 23.2* 26.2* 20.9* 22.9*   .0* 102.7* 105.6*  --    MCH 33.2 33.3 33.8  --    MCHC 31.9 32.4 32.1  --    RDW 22.2* 20.6* 20.3*  --    NEPRELIM 4.31 11.27* 10.31*  --    WBC 5.4 12.5* 11.9*  --    .0 272.0 286.0  --          Recent Labs   Lab 04/27/24  0606 04/28/24  0557 04/29/24  0550 04/29/24  0715   * 182* 119*  --    BUN 11 15  --  12   CREATSERUM 0.54* 0.60 0.61  --    EGFRCR 91 89 89  --    CA 9.8 9.6 7.9*  --     140 141  --    K 3.4* 3.7  3.7  --  3.5    106 107  --    CO2 32.0 31.0 29.0  --        No results for  input(s): \"ALT\", \"AST\", \"ALB\", \"AMYLASE\", \"LIPASE\", \"LDH\" in the last 168 hours.    Invalid input(s): \"ALPHOS\", \"TBIL\", \"DBIL\", \"TPROT\"      Imaging:  CT ABDOMEN+PELVIS(CONTRAST ONLY)(CPT=74177)    Result Date: 4/28/2024  CONCLUSION:   11.8 x 4.6 centimeter bilobed rim enhancing fluid collection with mild gas in the pelvic operative bed.  This could be sterile or infected  There is evidence suggesting cystitis  Intra and extrahepatic biliary duct dilation is present down to the level of the papilla.  Prior cholecystectomy  Multiple small hypoenhancing liver lesions measuring up to 1.1 centimeter, nonspecific    Dictated by (CST): Osvaldo Draper MD on 4/28/2024 at 9:59 AM     Finalized by (CST): Osvaldo Draper MD on 4/28/2024 at 10:06 AM          CT CHEST PE AORTA (IV ONLY) (CPT=71260)    Result Date: 4/28/2024  CONCLUSION: No PE    Dictated by (CST): Osvaldo Draper MD on 4/28/2024 at 9:10 AM     Finalized by (CST): Osvaldo Draper MD on 4/28/2024 at 9:12 AM             Meds:      vancomycin  125 mg Oral Daily    piperacillin-tazobactam  3.375 g Intravenous Q8H    [Held by provider] enoxaparin  40 mg Subcutaneous Daily    docusate sodium  100 mg Oral BID    clonazePAM  0.5 mg Oral Nightly    DULoxetine  60 mg Oral Nightly    gabapentin  300 mg Oral BID    levothyroxine  50 mcg Oral Daily @ 0700    pantoprazole  20 mg Oral QAM AC    atorvastatin  10 mg Oral Nightly    [Held by provider] lisinopril  10 mg Oral QAM    [Held by provider] amLODIPine  2.5 mg Oral Nightly         iopamidol    ondansetron **OR** ondansetron    acetaminophen    HYDROcodone-acetaminophen    simethicone
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
## 2024-04-29 NOTE — PLAN OF CARE
Patient alert and orientated by 4.  Patient ambulating to restroom voiding freely.  Patient tolerating low fiber soft diet.  Patient was febrile overnight 100.5, patient was given tylenol temp 99.4 will continue to monitor.  Patient receiving IV antibiotics.  Patient has no complaints of pain at this time.  Patient has personal belongings and call light within reach.  Safety measures in place.  Problem: Patient Centered Care  Goal: Patient preferences are identified and integrated in the patient's plan of care  Description: Interventions:  - What would you like us to know as we care for you? From home   - Provide timely, complete, and accurate information to patient/family  - Incorporate patient and family knowledge, values, beliefs, and cultural backgrounds into the planning and delivery of care  - Encourage patient/family to participate in care and decision-making at the level they choose  - Honor patient and family perspectives and choices  Outcome: Progressing     Problem: Patient/Family Goals  Goal: Patient/Family Long Term Goal  Description: Patient's Long Term Goal: To andrei back to [facility]    Interventions:  - Continue to follow plan of care  - See additional Care Plan goals for specific interventions  Outcome: Progressing  Goal: Patient/Family Short Term Goal  Description: Patient's Short Term Goal: To be discharged to home.  [facility] rehab    Interventions:   - Continue to follow plan of care.  - See additional Care Plan goals for specific interventions  Outcome: Progressing     Problem: PAIN - ADULT  Goal: Verbalizes/displays adequate comfort level or patient's stated pain goal  Description: INTERVENTIONS:  - Encourage pt to monitor pain and request assistance  - Assess pain using appropriate pain scale  - Administer analgesics based on type and severity of pain and evaluate response  - Implement non-pharmacological measures as appropriate and evaluate response  - Consider cultural and social  influences on pain and pain management  - Manage/alleviate anxiety  - Utilize distraction and/or relaxation techniques  - Monitor for opioid side effects  - Notify MD/LIP if interventions unsuccessful or patient reports new pain  - Anticipate increased pain with activity and pre-medicate as appropriate  Outcome: Progressing     Problem: SAFETY ADULT - FALL  Goal: Free from fall injury  Description: INTERVENTIONS:  - Assess pt frequently for physical needs  - Identify cognitive and physical deficits and behaviors that affect risk of falls.  - [place] fall precautions as indicated by assessment.  - Educate pt/family on patient safety including physical limitations  - Instruct pt to call for assistance with activity based on assessment  - Modify environment to reduce risk of injury  - Provide assistive devices as appropriate  - Consider OT/PT consult to assist with strengthening/mobility  - Encourage toileting schedule  Outcome: Progressing     Problem: RISK FOR INFECTION - ADULT  Goal: Absence of fever/infection during anticipated neutropenic period  Description: INTERVENTIONS  - Monitor WBC  - Administer growth factors as ordered  - Implement neutropenic guidelines  Outcome: Progressing     Problem: DISCHARGE PLANNING  Goal: Discharge to home or other facility with appropriate resources  Description: INTERVENTIONS:  - Identify barriers to discharge w/pt and caregiver  - Include patient/family/discharge partner in discharge planning  - Arrange for needed discharge resources and transportation as appropriate  - Identify discharge learning needs (meds, wound care, etc)  - Arrange for interpreters to assist at discharge as needed  - Consider post-discharge preferences of patient/family/discharge partner  - Complete POLST form as appropriate  - Assess patient's ability to be responsible for managing their own health  - Refer to Case Management Department for coordinating discharge planning if the patient needs  post-hospital services based on physician/LIP order or complex needs related to functional status, cognitive ability or social support system  Outcome: Progressing     Problem: RESPIRATORY - ADULT  Goal: Achieves optimal ventilation and oxygenation  Description: INTERVENTIONS:  - Assess for changes in respiratory status  - Assess for changes in mentation and behavior  - Position to facilitate oxygenation and minimize respiratory effort  - Oxygen supplementation based on oxygen saturation or ABGs  - Provide Smoking Cessation handout, if applicable  - Encourage broncho-pulmonary hygiene including cough, deep breathe, Incentive Spirometry  - Assess the need for suctioning and perform as needed  - Assess and instruct to report SOB or any respiratory difficulty  - Respiratory Therapy support as indicated  - Manage/alleviate anxiety  - Monitor for signs/symptoms of CO2 retention  Outcome: Progressing     Problem: GASTROINTESTINAL - ADULT  Goal: Minimal or absence of nausea and vomiting  Description: INTERVENTIONS:  - Maintain adequate hydration with IV or PO as ordered and tolerated  - Nasogastric tube to low intermittent suction as ordered  - Evaluate effectiveness of ordered antiemetic medications  - Provide nonpharmacologic comfort measures as appropriate  - Advance diet as tolerated, if ordered  - Obtain nutritional consult as needed  - Evaluate fluid balance  Outcome: Progressing  Goal: Maintains or returns to baseline bowel function  Description: INTERVENTIONS:  - Assess bowel function  - Maintain adequate hydration with IV or PO as ordered and tolerated  - Evaluate effectiveness of GI medications  - Encourage mobilization and activity  - Obtain nutritional consult as needed  - Establish a toileting routine/schedule  - Consider collaborating with pharmacy to review patient's medication profile  Outcome: Progressing     Problem: GENITOURINARY - ADULT  Goal: Absence of urinary retention  Description: INTERVENTIONS:  -  Assess patient’s ability to void and empty bladder  - Monitor intake/output and perform bladder scan as needed  - Follow urinary retention protocol/standard of care  - Consider collaborating with pharmacy to review patient's medication profile  - Implement strategies to promote bladder emptying  Outcome: Progressing     Problem: METABOLIC/FLUID AND ELECTROLYTES - ADULT  Goal: Electrolytes maintained within normal limits  Description: INTERVENTIONS:  - Monitor labs and rhythm and assess patient for signs and symptoms of electrolyte imbalances  - Administer electrolyte replacement as ordered  - Monitor response to electrolyte replacements, including rhythm and repeat lab results as appropriate  - Fluid restriction as ordered  - Instruct patient on fluid and nutrition restrictions as appropriate  Outcome: Progressing     Problem: SKIN/TISSUE INTEGRITY - ADULT  Goal: Skin integrity remains intact  Description: INTERVENTIONS  - Assess and document risk factors for pressure ulcer development  - Assess and document skin integrity  - Monitor for areas of redness and/or skin breakdown  - Initiate interventions, skin care algorithm/standards of care as needed  Outcome: Progressing  Goal: Incision(s), wounds(s) or drain site(s) healing without S/S of infection  Description: INTERVENTIONS:  - Assess and document risk factors for pressure ulcer development  - Assess and document skin integrity  - Assess and document dressing/incision, wound bed, drain sites and surrounding tissue  - Implement wound care per orders  - Initiate isolation precautions as appropriate  - Initiate Pressure Ulcer prevention bundle as indicated  Outcome: Progressing     Problem: HEMATOLOGIC - ADULT  Goal: Maintains hematologic stability  Description: INTERVENTIONS  - Assess for signs and symptoms of bleeding or hemorrhage  - Monitor labs and vital signs for trends  - Administer supportive blood products/factors, fluids and medications as ordered and  appropriate  - Administer supportive blood products/factors as ordered and appropriate  Outcome: Progressing  Goal: Free from bleeding injury  Description: (Example usage: patient with low platelets)  INTERVENTIONS:  - Avoid intramuscular injections, enemas and rectal medication administration  - Ensure safe mobilization of patient  - Hold pressure on venipuncture sites to achieve adequate hemostasis  - Assess for signs and symptoms of internal bleeding  - Monitor lab trends  - Patient is to report abnormal signs of bleeding to staff  - Avoid use of toothpicks and dental floss  - Use electric shaver for shaving  - Use soft bristle tooth brush  - Limit straining and forceful nose blowing  Outcome: Progressing     Problem: MUSCULOSKELETAL - ADULT  Goal: Return mobility to safest level of function  Description: INTERVENTIONS:  - Assess patient stability and activity tolerance for standing, transferring and ambulating w/ or w/o assistive devices  - Assist with transfers and ambulation using safe patient handling equipment as needed  - Ensure adequate protection for wounds/incisions during mobilization  - Obtain PT/OT consults as needed  - Advance activity as appropriate  - Communicate ordered activity level and limitations with patient/family  Outcome: Progressing  Goal: Maintain proper alignment of affected body part  Description: INTERVENTIONS:  - Support and protect limb and body alignment per provider's orders  - Instruct and reinforce with patient and family use of appropriate assistive device and precautions (e.g. spinal or hip dislocation precautions)  Outcome: Progressing

## 2024-04-29 NOTE — OCCUPATIONAL THERAPY NOTE
OCCUPATIONAL THERAPY TREATMENT NOTE - INPATIENT    Room Number: 456/456-A     Presenting Problem: S/p robotic assisted total lap hysterectomy-BSO, debulking, omentectomy     Problem List  Active Problems:    Ovarian cancer (HCC)      OCCUPATIONAL THERAPY ASSESSMENT   Patient demonstrates good  progress this session, goals remain in progress.    Patient continues to function below baseline with self care and functional transfers.   Contributing factors to remaining limitations include activity tolerance, balance, mobility, endurance, general weakness.  Next session anticipate patient to progress with OT POC.  Occupational Therapy will continue to follow patient for duration of hospitalization.    Patient continues to benefit from continued skilled OT services: to promote return to prior level of function and safety with continuous assistance and gradual rehabilitative therapy .     PLAN  OT Treatment Plan: Balance activities;Energy conservation/work simplification techniques;ADL training;IADL training;Functional transfer training;UE strengthening/ROM;Endurance training;Patient/Family education;Patient/Family training;Equipment eval/education;Compensatory technique education  OT Device Recommendations: TBD    SUBJECTIVE  I want to be able to do the social things at my [facility]- like watching movies. I think they are watching singing in the rain.     OBJECTIVE  Precautions: Abdominal protective strategies;Bed/chair alarm    WEIGHT BEARING RESTRICTION     PAIN ASSESSMENT  Rating: 3  Location: surgical site  Management Techniques: Activity promotion; Body mechanics; Relaxation; Repositioning; Breathing techniques         ACTIVITIES OF DAILY LIVING ASSESSMENT  AM-PAC ‘6-Clicks’ Inpatient Daily Activity Short Form  How much help from another person does the patient currently need…  -   Putting on and taking off regular lower body clothing?: A Lot  -   Bathing (including washing, rinsing, drying)?: A Lot  -   Toileting, which  includes using toilet, bedpan or urinal? : A Lot  -   Putting on and taking off regular upper body clothing?: None  -   Taking care of personal grooming such as brushing teeth?: A Little  -   Eating meals?: None    AM-PAC Score:  Score: 17  Approx Degree of Impairment: 50.11%  Standardized Score (AM-PAC Scale): 37.26  CMS Modifier (G-Code): CK    FUNCTIONAL ADL ASSESSMENT  Eating: Independent  Grooming Standing: Contact Guard Assist  Bathing Seated: Moderate Assist  UB Dressing Seated: Independent  LB Dressing Seated: Moderate Assist  Toileting Seated: Moderate Assist    THERAPEUTIC EXERCISE     Skilled Therapy Provided: Abdominal protection strategies; ADL compensations; pt requiring Min A for bed mobility with cues for log roll to protect abdomen; assist to adjust socks 2/2 pain with functional reach; tolerating functional mobiltiy <household distances with intermittent standing rest breaks; education on activity pacing and energy conservation; left up in chair, stable at exit.     EDUCATION PROVIDED  Patient: Role of Occupational Therapy; Plan of Care; Adaptive Equipment Recommendations; DME Recommendations; Functional Transfer Techniques; Fall Prevention; Surgical Precautions; Posture/Positioning; Energy Conservation; Compensatory ADL Techniques; Proper Body Mechanics; Abdominal Protective Strategies  Patient's Response to Education: Verbalized Understanding; Returned Demonstration    The patient's Approx Degree of Impairment: 50.11% has been calculated based on documentation in the [facility] '6 clicks' Inpatient Daily Activity Short Form.  Research supports that patients with this level of impairment may benefit from GR  Final disposition will be made by interdisciplinary medical team.    Patient End of Session: Up in chair    OT Goals:      OT Goals  Patients self stated goal is: unstated     Patient will complete functional transfer with mod I  Comment: ongoing    Patient will complete toileting with mod I   Comment: ongoing    Patient will tolerate standing for 3 minutes in prep for adls with mod I   Comment:ongoing     Patient will complete item retrieval with mod I  Comment:ongoing          Goals  on: 24  Frequency: 3-5x/week      OT Session Time: 15 minutes  Self-Care Home Management: 0 minutes  Therapeutic Activity: 15 minutes

## 2024-04-29 NOTE — CM/SW NOTE
Pt will be seen by IR today for possible drain placement 2/2 fluid collection to surgical site. Ins auth for ALLISON at [facility] expires at midnight. PT/OT re-evals requested. CM will send in AIDIN once available.    1325: PT/OT evals sent in AIDIN. CM requested that ins auth be resubmitted.    Plan: [facility] for ALLISON pending new ins auth and medical clearance.    KATE Kuhn    [phone]

## 2024-04-29 NOTE — PLAN OF CARE
No acute changes today. No pain. Up in chair. Working with PT/OT. Afebrile this shift. Tolerating diet and voiding freely. Plans for IR procedure tomorrow.     Problem: Patient Centered Care  Goal: Patient preferences are identified and integrated in the patient's plan of care  Description: Interventions:  - What would you like us to know as we care for you? From home   - Provide timely, complete, and accurate information to patient/family  - Incorporate patient and family knowledge, values, beliefs, and cultural backgrounds into the planning and delivery of care  - Encourage patient/family to participate in care and decision-making at the level they choose  - Honor patient and family perspectives and choices  Outcome: Progressing     Problem: PAIN - ADULT  Goal: Verbalizes/displays adequate comfort level or patient's stated pain goal  Description: INTERVENTIONS:  - Encourage pt to monitor pain and request assistance  - Assess pain using appropriate pain scale  - Administer analgesics based on type and severity of pain and evaluate response  - Implement non-pharmacological measures as appropriate and evaluate response  - Consider cultural and social influences on pain and pain management  - Manage/alleviate anxiety  - Utilize distraction and/or relaxation techniques  - Monitor for opioid side effects  - Notify MD/LIP if interventions unsuccessful or patient reports new pain  - Anticipate increased pain with activity and pre-medicate as appropriate  Outcome: Progressing     Problem: SAFETY ADULT - FALL  Goal: Free from fall injury  Description: INTERVENTIONS:  - Assess pt frequently for physical needs  - Identify cognitive and physical deficits and behaviors that affect risk of falls.  - [place] fall precautions as indicated by assessment.  - Educate pt/family on patient safety including physical limitations  - Instruct pt to call for assistance with activity based on assessment  - Modify environment to reduce risk  of injury  - Provide assistive devices as appropriate  - Consider OT/PT consult to assist with strengthening/mobility  - Encourage toileting schedule  Outcome: Progressing     Problem: RISK FOR INFECTION - ADULT  Goal: Absence of fever/infection during anticipated neutropenic period  Description: INTERVENTIONS  - Monitor WBC  - Administer growth factors as ordered  - Implement neutropenic guidelines  Outcome: Progressing     Problem: DISCHARGE PLANNING  Goal: Discharge to home or other facility with appropriate resources  Description: INTERVENTIONS:  - Identify barriers to discharge w/pt and caregiver  - Include patient/family/discharge partner in discharge planning  - Arrange for needed discharge resources and transportation as appropriate  - Identify discharge learning needs (meds, wound care, etc)  - Arrange for interpreters to assist at discharge as needed  - Consider post-discharge preferences of patient/family/discharge partner  - Complete POLST form as appropriate  - Assess patient's ability to be responsible for managing their own health  - Refer to Case Management Department for coordinating discharge planning if the patient needs post-hospital services based on physician/LIP order or complex needs related to functional status, cognitive ability or social support system  Outcome: Progressing     Problem: RESPIRATORY - ADULT  Goal: Achieves optimal ventilation and oxygenation  Description: INTERVENTIONS:  - Assess for changes in respiratory status  - Assess for changes in mentation and behavior  - Position to facilitate oxygenation and minimize respiratory effort  - Oxygen supplementation based on oxygen saturation or ABGs  - Provide Smoking Cessation handout, if applicable  - Encourage broncho-pulmonary hygiene including cough, deep breathe, Incentive Spirometry  - Assess the need for suctioning and perform as needed  - Assess and instruct to report SOB or any respiratory difficulty  - Respiratory Therapy  support as indicated  - Manage/alleviate anxiety  - Monitor for signs/symptoms of CO2 retention  Outcome: Progressing     Problem: GASTROINTESTINAL - ADULT  Goal: Minimal or absence of nausea and vomiting  Description: INTERVENTIONS:  - Maintain adequate hydration with IV or PO as ordered and tolerated  - Nasogastric tube to low intermittent suction as ordered  - Evaluate effectiveness of ordered antiemetic medications  - Provide nonpharmacologic comfort measures as appropriate  - Advance diet as tolerated, if ordered  - Obtain nutritional consult as needed  - Evaluate fluid balance  Outcome: Progressing  Goal: Maintains or returns to baseline bowel function  Description: INTERVENTIONS:  - Assess bowel function  - Maintain adequate hydration with IV or PO as ordered and tolerated  - Evaluate effectiveness of GI medications  - Encourage mobilization and activity  - Obtain nutritional consult as needed  - Establish a toileting routine/schedule  - Consider collaborating with pharmacy to review patient's medication profile  Outcome: Progressing     Problem: GENITOURINARY - ADULT  Goal: Absence of urinary retention  Description: INTERVENTIONS:  - Assess patient’s ability to void and empty bladder  - Monitor intake/output and perform bladder scan as needed  - Follow urinary retention protocol/standard of care  - Consider collaborating with pharmacy to review patient's medication profile  - Implement strategies to promote bladder emptying  Outcome: Progressing     Problem: METABOLIC/FLUID AND ELECTROLYTES - ADULT  Goal: Electrolytes maintained within normal limits  Description: INTERVENTIONS:  - Monitor labs and rhythm and assess patient for signs and symptoms of electrolyte imbalances  - Administer electrolyte replacement as ordered  - Monitor response to electrolyte replacements, including rhythm and repeat lab results as appropriate  - Fluid restriction as ordered  - Instruct patient on fluid and nutrition restrictions  as appropriate  Outcome: Progressing     Problem: SKIN/TISSUE INTEGRITY - ADULT  Goal: Skin integrity remains intact  Description: INTERVENTIONS  - Assess and document risk factors for pressure ulcer development  - Assess and document skin integrity  - Monitor for areas of redness and/or skin breakdown  - Initiate interventions, skin care algorithm/standards of care as needed  Outcome: Progressing  Goal: Incision(s), wounds(s) or drain site(s) healing without S/S of infection  Description: INTERVENTIONS:  - Assess and document risk factors for pressure ulcer development  - Assess and document skin integrity  - Assess and document dressing/incision, wound bed, drain sites and surrounding tissue  - Implement wound care per orders  - Initiate isolation precautions as appropriate  - Initiate Pressure Ulcer prevention bundle as indicated  Outcome: Progressing     Problem: HEMATOLOGIC - ADULT  Goal: Maintains hematologic stability  Description: INTERVENTIONS  - Assess for signs and symptoms of bleeding or hemorrhage  - Monitor labs and vital signs for trends  - Administer supportive blood products/factors, fluids and medications as ordered and appropriate  - Administer supportive blood products/factors as ordered and appropriate  Outcome: Progressing  Goal: Free from bleeding injury  Description: (Example usage: patient with low platelets)  INTERVENTIONS:  - Avoid intramuscular injections, enemas and rectal medication administration  - Ensure safe mobilization of patient  - Hold pressure on venipuncture sites to achieve adequate hemostasis  - Assess for signs and symptoms of internal bleeding  - Monitor lab trends  - Patient is to report abnormal signs of bleeding to staff  - Avoid use of toothpicks and dental floss  - Use electric shaver for shaving  - Use soft bristle tooth brush  - Limit straining and forceful nose blowing  Outcome: Progressing     Problem: MUSCULOSKELETAL - ADULT  Goal: Return mobility to safest level  of function  Description: INTERVENTIONS:  - Assess patient stability and activity tolerance for standing, transferring and ambulating w/ or w/o assistive devices  - Assist with transfers and ambulation using safe patient handling equipment as needed  - Ensure adequate protection for wounds/incisions during mobilization  - Obtain PT/OT consults as needed  - Advance activity as appropriate  - Communicate ordered activity level and limitations with patient/family  Outcome: Progressing  Goal: Maintain proper alignment of affected body part  Description: INTERVENTIONS:  - Support and protect limb and body alignment per provider's orders  - Instruct and reinforce with patient and family use of appropriate assistive device and precautions (e.g. spinal or hip dislocation precautions)  Outcome: Progressing

## 2024-04-29 NOTE — PHYSICAL THERAPY NOTE
PHYSICAL THERAPY TREATMENT NOTE - INPATIENT     Room Number: 456/456-A       Presenting Problem: s/p Robotic assisted total laparoscopic hysterectomy, bilateral salpingo-oophorectomy, staging, debulking; Omentectomy; lysis of adhesions on   Co-Morbidities : ovarian cancer    Problem List  Active Problems:    Ovarian cancer (HCC)      PHYSICAL THERAPY ASSESSMENT   Patient demonstrates good  progress this session, goals  remain in progress.    Patient continues to function below baseline with bed mobility, transfers, gait, standing prolonged periods, and performing household tasks.  Contributing factors to remaining limitations include decreased functional strength, decreased endurance/aerobic capacity, impaired dynamic standing balance, and medical status.  Next session anticipate patient to progress transfers, gait, standing prolonged periods, and performing household tasks.  Physical Therapy will continue to follow patient for duration of hospitalization.    Patient continues to benefit from continued skilled PT services: to promote return to prior level of function and safety with continuous assistance and gradual rehabilitative therapy .    PLAN  PT Treatment Plan: Bed mobility;Body mechanics;Coordination;Endurance;Energy conservation;Patient education;Gait training;Strengthening;Transfer training;Balance training  Frequency (Obs): 3-5x/week    SUBJECTIVE  Agreeable to activity.     OBJECTIVE  Precautions: Abdominal protective strategies;Bed/chair alarm    WEIGHT BEARING RESTRICTION  none    PAIN ASSESSMENT   Ratin  Location: abdomen  Management Techniques: Activity promotion;Body mechanics;Repositioning    BALANCE  Static Sitting: Good  Dynamic Sitting: Fair +  Static Standing: Fair -  Dynamic Standing: Poor +    ACTIVITY TOLERANCE  Pulse: 95  Heart Rate Source: Monitor     BP: 126/39  BP Location: Right arm  BP Method: Automatic  Patient Position: Sitting     O2 WALK  Oxygen Therapy  SPO2% on Room Air  at Rest: 96    AM-PAC '6-Clicks' INPATIENT SHORT FORM - BASIC MOBILITY  How much difficulty does the patient currently have...  Patient Difficulty: Turning over in bed (including adjusting bedclothes, sheets and blankets)?: A Little   Patient Difficulty: Sitting down on and standing up from a chair with arms (e.g., wheelchair, bedside commode, etc.): A Little   Patient Difficulty: Moving from lying on back to sitting on the side of the bed?: A Little   How much help from another person does the patient currently need...   Help from Another: Moving to and from a bed to a chair (including a wheelchair)?: A Little   Help from Another: Need to walk in hospital room?: A Little   Help from Another: Climbing 3-5 steps with a railing?: A Lot     AM-PAC Score:  Raw Score: 17   Approx Degree of Impairment: 50.57%   Standardized Score (AM-PAC Scale): 42.13   CMS Modifier (G-Code): CK    FUNCTIONAL ABILITY STATUS  Functional Mobility/Gait Assessment  Gait Assistance: Minimum assistance  Distance (ft): 80  Assistive Device: Rolling walker  Pattern: Shuffle (forward flexed posture, VC given to correct and to maintain proximity to RW, fair carryover noted. no LOB)  Supine to Sit: contact guard assist  Sit to Supine: minimal assist  Sit to Stand: contact guard assist    Additional information: RN approved PT tx. Pt received resting in bed. Introduced self and role. Educated on goals for today. Pt verbalized understanding and agreeable to activity. Transitioned to EOB with CGA. STS transfer to/from EOB with RW and CGA. Hallway ambulation with RW and min A. Needs VC to maintain safe proximity to RW and for upright posture. No LOB noted. Returned to room and requires min A to return to supine, max to boost upward in bed. Pt was left resting in bed with bed alarm on, needs within reach, handoff to RN complete.     The patient's Approx Degree of Impairment: 50.57% has been calculated based on documentation in the [facility] '6 clicks'  Inpatient Daily Activity Short Form.  Research supports that patients with this level of impairment may benefit from a rehab facility.  Final disposition will be made by interdisciplinary medical team.    Patient End of Session: In bed;Needs met;RN aware of session/findings;Call light within reach;All patient questions and concerns addressed;Alarm set    CURRENT GOALS   Goals to be met by: 5/1/24  Patient Goal Patient's self-stated goal is: go to rehab   Goal #1 Patient is able to demonstrate supine - sit EOB @ level: supervision      Goal #1   Current Status CGA   Goal #2 Patient is able to demonstrate transfers Sit to/from Stand at assistance level: supervision with walker - rolling      Goal #2  Current Status CGA with RW    Goal #3 Patient is able to ambulate 150 feet with assist device: walker - rolling at assistance level: supervision   Goal #3   Current Status 80 ft with RW and min A    Goal #4     Goal #4   Current Status     Goal #5 Patient to demonstrate independence with home activity/exercise instructions provided to patient in preparation for discharge.   Goal #5   Current Status IN PROGRESS   Goal #6     Goal #6  Current Status          Therapeutic Activity: 15 minutes

## 2024-04-29 NOTE — PROGRESS NOTES
[facility] [place] [facility] POST-OPERATIVE VISIT     MEDICAL RECORD #: [id number] DATE OF SERVICE: 4/29/2024             Reason for visit:  Post-operative day six    HISTORY OF PRESENT ILLNESS:  83 year old female with a history of incidental finding of bladder mass, pelvic mass, and peritoneal nodules. She was found to have a low-grade papillary urothelial bladder cancer, underwent transurethral resection. PET scan showed peritoneal implants and the lesion in the right ovary. 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 Immunohistochemistry was not typical for gynecologic malignancies, although this was thought to be the leading differential diagnosis. Her tumor markers were all negative. The patient was started on neoadjuvant chemotherapy, has undergone 6 cycles.  She underwent Laparoscopic lysis of adhesions (encompassing over 20 minutes), robotic-assisted total laparoscopic hysterectomy and bilateral salpingo-oophorectomy with tumor debulking, bilateral pelvic and paraaortic lymph node dissection, infracolic and portion of supracolic omentectomy on 4/23/24.    GI//GYNE Complaint:   Other complaints: Tolerating diet, no N/V. Urinating freely with new dysuria. +belching, +BM. Pain controlled, however notes new lower pelvic pain. Denies vaginal bleeding. Desaturating with activity, weaning NC.    Interim History:  Underwent surgery  Febrile - CT A/P with fluid collection    Patient's medications, allergies, past medical, surgical, social and family histories were reviewed and updated as appropriate.    Pathology/Lab Results:  Recent Results (from the past 720 hour(s))   CBC, Platelet; No Differential    Collection Time: 04/19/24  1:54 PM   Result Value Ref Range    WBC 8.0 4.0 - 11.0 x10(3) uL    RBC 2.42 (L) 3.80 - 5.30 x10(6)uL    HGB 8.8 (L) 12.0 - 16.0 g/dL    HCT 28.5 (L) 35.0 - 48.0 %    .8 (H) 80.0 - 100.0 fL    MCH 36.4 (H) 26.0 - 34.0 pg    MCHC 30.9 (L) 31.0 - 37.0  g/dL    RDW 15.5 (H) 11.0 - 15.0 %    RDW-SD 67.1 (H) 35.1 - 46.3 fL    .0 150.0 - 450.0 10(3)uL   ABORH (Blood Type)    Collection Time: 04/19/24  1:54 PM   Result Value Ref Range    ABO BLOOD TYPE A     RH BLOOD TYPE Positive    Antibody Screen    Collection Time: 04/19/24  1:54 PM   Result Value Ref Range    Antibody Screen Negative    MD BLOOD SMEAR CONSULT    Collection Time: 04/19/24  1:54 PM   Result Value Ref Range    MD Blood Smear Consult       Evaluation of the peripheral blood smear demonstrates moderate macrocytic anemia characterized by moderate anisopoikilocytosis with macrocytes, ovalocytes and polychromasia. Neutrophils display distinct cytoplasmic granulation. No circulating blasts seen.    Differential considerations for macrocytic anemia may include vitamin B12 or folate deficiency, autoimmune hemolytic anemia, cold agglutinin disease, liver disease, some myelodysplasias, thyroid disease, excessive alcohol consumption and drug effects.     Clinical correlation is recommended.     Reviewed by IRAIDA Vázquez M.D.   Cytology fluids    Collection Time: 04/23/24  8:27 AM   Result Value Ref Range    Case Report       Non-Gynecologic Cytology                          Case: [id number]                                   Authorizing Provider:  Rigoberto Couch MD         Collected:           04/23/2024 08:27 AM          Ordering Location:     [facility]          Received:            04/23/2024 10:44 AM                                 Operating Room                                                               Pathologist:           Cooper Dahl MD                                                         Specimen:    Pelvic washings, 1. PELVIC WASHINGS-FOR CYTOLOGY                                           General Categorization         Benign, inflammatory, reactive or reparative changes    Final Diagnosis:         Pelvic washings:   Adequacy: Satisfactory for evaluation  General Category:  Benign, inflammatory, reactive, or reparative changes  Diagnosis:  Benign mesothelial cells present  Histiocytes present  Peripheral blood elements present  No malignant cells identified        Embedded Images      Final Diagnosis Comment         Recommend correlation with histologic findings ([id number]).            Procedure       Monolayers:  1  Cytospins:     2      Clinical Information       Carcinoma, Malignant Neoplasm Of Right Ovary.        Non Gyne Interpretation  Benign      Gross Description       Volume (ml): 25    Color: Pink     Specimen to Pathology Tissue    Collection Time: 04/23/24  9:09 AM   Result Value Ref Range    Case Report       Surgical Pathology                                Case: [id number]                                  Authorizing Provider:  Rigoberto Couch MD         Collected:           04/23/2024 09:09 AM          Ordering Location:     [facility]          Received:            04/23/2024 11:24 AM                                 Operating Room                                                               Pathologist:           Cesia Escalante MD                                                                           Specimens:   A) - Uterus and cervix, bilateral tubes and ovaries, 1. UTERUS AND CERVIX, BILATERAL                TUBES AND OVARIES                                                                                   B) - Omentum, 2. INFRACOLIC OMENTUM, AND PORTION OF SUPRACOLIC OMENTUM                              C) - Lymph node pelvic right, 3. RIGHT PELVIC LYMPH NODES                                            D) - Lymph node pelvic left, 4. LEFT PELVIC LYMPH NODES                                             E) - Lymph node (ro-aortic), 5. RO-AORTIC LYMPH NODES                                  Final Diagnosis:         A. 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  Atrophic endometrium with cystically dilated glands.  Endosalpingosis and leiomyomas, 1 cm in greatest dimension grossly.  Cervix with metaplastic changes, calcifications, acute and chronic inflammation.  Bilateral multiple simple ovarian cysts.  Right fallopian tube with paratubal cyst 7 mm in greatest dimension grossly.  Left fallopian tube with no significant pathological abnormalities.  Negative inked surgical resection margins.  Pathological stage classification (pTNM, AJCC 8th edition): ypT1b ypN0.    B. Infracolic omentum and portion of supracolic omentum; omentectomy:   Fatty tissue with degenerative changes, calcifications, vascular congestion and fibrosis.  No definite evidence of carcinoma is identified.    C. Right pelvic lymph node; dissection:   One lymph node, negative for metastatic carcinoma (0/1).  AE1/AE3 immunohistochemical stain is negative (block C1).     D. Left pelvic lymph node; dissection:   One lymph node, negative for metastatic carcinoma (0/1).  AE1/AE3 immunohistochemical stain is negative (block D1).     E. Celina-aortic lymph nodes; dissection:   Five lymph nodes, negative for metastatic carcinoma (0/5).  AE1/AE3 immunohistochemical stain is negative (blocks E1-E2).      Embedded Images      Final Diagnosis Comment       Immunohistochemical stains (CK20, CK7, AE1/AE3, PAX8, p53 and WT1) are performed on block A18 for further characterization.  The neoplastic cells are positive for PAX8, AE1/AE3 and CK7.  The neoplastic cells are negative for CK20 and WT1.  P53 shows strong nuclear positivity consistent with aberrant (mutational type) expression.    For  purposes, this case was reviewed by a second pathologist who  concurred with the diagnosis.    The immunohistochemical stains interpreted in this case demonstrate appropriate reactivity of positive controls.  The stains were performed on formalin-fixed, paraffin-embedded tissue sections with each antibody analysis being performed on a separate slide.        Synoptic Report       OVARY or FALLOPIAN TUBE or PRIMARY PERITONEUM   OVARY OR FALLOPIAN TUBE OR PRIMARY PERITONEUM - All Specimens   8th Edition - Protocol posted: 3/22/2023      SPECIMEN      Procedure:    Total hysterectomy and bilateral salpingo-oophorectomy       Hysterectomy Type:    Laparoscopic, robotic-assisted       Specimen Integrity:            Right Ovary Integrity:    Capsule intact       Specimen Integrity:            Left Ovary Integrity:    Capsule intact       TUMOR      Tumor Site:    Bilateral ovaries       Tumor Size:    Cannot be determined: Status post neoadjuvant chemotherapy       Histologic Type:    High grade serous carcinoma       Other Tissue / Organ Involvement:    Not identified       Peritoneal / Ascitic Fluid Involvement:    Malignant cells not identified       Chemotherapy Response Score (CRS):    CRS2 (moderate response)       REGIONAL LYMPH NODES      Regional Lymph Node Status:            :    All regional lymph nodes negative for tumor cells         Number of Lymph Nodes Examined:    7       pTNM CLASSIFICATION (AJCC 8th Edition)      Reporting of pT, pN, and (when applicable) pM categories is based on information available to the pathologist at the time the report is issued. As per the AJCC (Chapter 1, 8th Ed.) it is the managing physician’s responsibility to establish the final pathologic stage based upon all pertinent information, including but potentially not limited to this pathology report.      Modified Classification:    y       pT Category:    pT1b       pN Category:    pN0       Clinical Information       Carcinoma, Malignant Neoplasm Of Right Ovary.        Gross Description        Specimen A is labeled \"Walke, uterus and cervix, bilateral tubes and ovaries\" received in formalin. The specimen consists of a 98 gram total hysterectomy including uterus with cervix, and bilateral fallopian tubes and ovaries. The uterus and cervix measure 7.0 cm in length, 3.0 cm from cornu to cornu, 2.8 cm from anterior to posterior. The uterus is covered with pink-red smooth serosa. The ectocervix is covered with pink-tan smooth mucosa and measures 3.4 x 3.0 cm in transverse dimension. The cervical os is ovoid and measures 0.4 x 0.3 cm. The anterior aspect is inked blue. the posterior aspect is inked black. The cervix and uterus are bivalved and fixed for a few hours. The uterus is further sectioned to reveal a pink-tan herringbone endocervix with a 2.6 cm endocervical canal. The endometrium is pink-gray, granular to smooth, with a 5.3 x 2.8 cm dilated endometrial cavity. The uterus is sectioned to reveal multiple intramural fibroids (0.2-1.0 cm in greatest dimension). The remaining myometrium is pink-tan and trabeculated with no other definitive masses or lesions grossly identified. The greatest endometrium thickness is 0.1 cm. The greatest myometrium thickness is 1.1 cm.     The right ovary is cystic/enlarged and measures 5.5 x 4.2 x 3.6 cm. The capsule is intact. The capsule is inked blue and the ovary is sectioned to reveal a inner white-tan mass and two bulging cystic lesions. The white-tan mass measures 2.5 x 2.0 x 1.8 cm. The larger cystic lesion (3.5 cm in greatest dimension) is filled with serosanguinous fluid. The small cystic lesion is filled with a yellow-tan gelatinous material. The cyst linings are smooth without papillary excrescences or solid areas identified. The right fallopian tube measures 5.5 cm in length by 0.5 cm in diameter and presents one paratubal cyst filled with clear fluid (0.7 cm in greatest dimension).     The left fallopian tube measures 3.6 cm in length by 0.4 cm in diameter  and is grossly unremarkable. The left ovary is yellowish pink and lobulated measures 1.9 x 1.5 x 1.0 cm and presents a inner white-tan mass (0.9 cm in greatest dimension). Also noted is a inner cyst filled with clear fluid (0.7 cm in greatest dimension). Additionally there is a left para-adnexal nodule (1.4 x 1.0 x 0.9 cm). This nodule is inked green.     Representative sections of the uterus are submitted as follows:   A1, anterior cervix  A2, posterior cervix   A3, anterior lower uterine segment, full-thickness transverse sections  A4-A5, full-thickness anterior endometrium from mid to upper including some fibroids (two per cassette)  A6, posterior lower uterine segment, full-thickness transverse sections  A7-A10, full-thickness posterior endomyometrium from mid to upper including fibroids  A11, left parametrial soft tissue  A12, right parametrial soft tissue   A13-A16, right ovarian mass with adjacent smaller cystic lesion   A17-A18, larger right ovarian cystic lesion   A19, right fallopian tube with paratubal cyst  A20, sections of left fallopian tube  A21, left ovary including inner mass and cyst, bisected, entirely  A22, left para-adnexa nodule, entirely.       Specimen B is labeled \"Walke, infracolic omentum and portion of supracolic omentum\" received in formalin. The specimen consists of one unoriented piece of yellow-tan omentum measuring 26.5 x 9.2 x 1.4 cm. There is a undesignated tag attached to the mid section of the omentum. The omentum is sectioned to reveal a homogenous fatty parenchyma with focal areas of vascular congestion. No lesions, masses or evidence of tumor invasion are grossly identified. Representative sections are submitted as follows: B1, omentum with tag; B2-B3, additional random omentum.      Specimen C is labeled \"Walke, right pelvic lymph nodes\" received in formalin. The specimen consists of one piece of yellow-tan fatty tissue measuring 4.5 x 3.2 x 0.9 cm. Dissection of the tissue  reveals one lymph node candidate (1.5 cm in greatest dimension). The candidate is bisected and submitted entirely in cassette C1.     Specimen D is labeled \"Walke, left pelvic lymph nodes\" received in formalin. The specimen consists of multiple piece of fatty tissue measuring in aggregate 4.5 x 4.0 x 0.9 cm. Dissection of the tissue reveals no lymph nodes. The tissue is submitted entirely in cassettes D1-D2.     Specimen E is labeled \"Walke, periaortic lymph nodes\" received in formalin. The specimen consists of multiple pieces of yellow-tan fatty tissue measuring in aggregate 3.2 x 3.0 x 1.0 cm. Dissection of the tissue reveals four lymph node candidates (0.1-0.5 cm in greatest dimension). The candidates are submitted entirely in cassette E1. The remaining tissue is submitted in cassette E2. (jq)     Cesia Escalante M.D./mtt        Interpretation Malignant (A)     POCT Glucose    Collection Time: 04/23/24 11:27 AM   Result Value Ref Range    POC Glucose  143 (H) 70 - 99 mg/dL   Basic Metabolic Panel (8)    Collection Time: 04/24/24 10:16 AM   Result Value Ref Range    Glucose 154 (H) 70 - 99 mg/dL    Sodium 141 136 - 145 mmol/L    Potassium 3.9 3.5 - 5.1 mmol/L    Chloride 109 98 - 112 mmol/L    CO2 26.0 21.0 - 32.0 mmol/L    Anion Gap 6 0 - 18 mmol/L    BUN 14 9 - 23 mg/dL    Creatinine 0.64 0.55 - 1.02 mg/dL    BUN/CREA Ratio 21.9 (H) 10.0 - 20.0    Calcium, Total 9.4 8.7 - 10.4 mg/dL    Calculated Osmolality 296 (H) 275 - 295 mOsm/kg    eGFR-Cr 88 >=60 mL/min/1.73m2   CBC W/ DIFFERENTIAL    Collection Time: 04/24/24 10:34 AM   Result Value Ref Range    WBC 12.2 (H) 4.0 - 11.0 x10(3) uL    RBC 2.16 (L) 3.80 - 5.30 x10(6)uL    HGB 7.9 (L) 12.0 - 16.0 g/dL    HCT 25.0 (L) 35.0 - 48.0 %    .7 (H) 80.0 - 100.0 fL    MCH 36.6 (H) 26.0 - 34.0 pg    MCHC 31.6 31.0 - 37.0 g/dL    RDW-SD 64.8 (H) 35.1 - 46.3 fL    RDW 15.0 11.0 - 15.0 %    .0 150.0 - 450.0 10(3)uL    Neutrophil Absolute Prelim 11.07  (H) 1.50 - 7.70 x10 (3) uL    Neutrophil Absolute 11.07 (H) 1.50 - 7.70 x10(3) uL    Lymphocyte Absolute 0.56 (L) 1.00 - 4.00 x10(3) uL    Monocyte Absolute 0.56 0.10 - 1.00 x10(3) uL    Eosinophil Absolute 0.00 0.00 - 0.70 x10(3) uL    Basophil Absolute 0.01 0.00 - 0.20 x10(3) uL    Immature Granulocyte Absolute 0.04 0.00 - 1.00 x10(3) uL    Neutrophil % 90.4 %    Lymphocyte % 4.6 %    Monocyte % 4.6 %    Eosinophil % 0.0 %    Basophil % 0.1 %    Immature Granulocyte % 0.3 %   Basic Metabolic Panel (8)    Collection Time: 04/25/24  5:47 AM   Result Value Ref Range    Glucose 113 (H) 70 - 99 mg/dL    Sodium 142 136 - 145 mmol/L    Potassium 3.7 3.5 - 5.1 mmol/L    Chloride 109 98 - 112 mmol/L    CO2 29.0 21.0 - 32.0 mmol/L    Anion Gap 4 0 - 18 mmol/L    BUN 11 9 - 23 mg/dL    Creatinine 0.55 0.55 - 1.02 mg/dL    BUN/CREA Ratio 20.0 10.0 - 20.0    Calcium, Total 9.5 8.7 - 10.4 mg/dL    Calculated Osmolality 294 275 - 295 mOsm/kg    eGFR-Cr 91 >=60 mL/min/1.73m2   CBC W/ DIFFERENTIAL    Collection Time: 04/25/24  6:20 AM   Result Value Ref Range    WBC 6.4 4.0 - 11.0 x10(3) uL    RBC 1.82 (L) 3.80 - 5.30 x10(6)uL    HGB 6.6 (LL) 12.0 - 16.0 g/dL    HCT 21.3 (L) 35.0 - 48.0 %    .0 (H) 80.0 - 100.0 fL    MCH 36.3 (H) 26.0 - 34.0 pg    MCHC 31.0 31.0 - 37.0 g/dL    RDW-SD 63.8 (H) 35.1 - 46.3 fL    RDW 15.0 11.0 - 15.0 %    .0 150.0 - 450.0 10(3)uL    Neutrophil Absolute Prelim 5.47 1.50 - 7.70 x10 (3) uL    Neutrophil Absolute 5.47 1.50 - 7.70 x10(3) uL    Lymphocyte Absolute 0.49 (L) 1.00 - 4.00 x10(3) uL    Monocyte Absolute 0.34 0.10 - 1.00 x10(3) uL    Eosinophil Absolute 0.05 0.00 - 0.70 x10(3) uL    Basophil Absolute 0.02 0.00 - 0.20 x10(3) uL    Immature Granulocyte Absolute 0.03 0.00 - 1.00 x10(3) uL    Neutrophil % 85.4 %    Lymphocyte % 7.7 %    Monocyte % 5.3 %    Eosinophil % 0.8 %    Basophil % 0.3 %    Immature Granulocyte % 0.5 %   ABORH (Blood Type)    Collection Time: 04/25/24  7:57 AM    Result Value Ref Range    ABO BLOOD TYPE A     RH BLOOD TYPE Positive    Antibody Screen    Collection Time: 04/25/24  7:57 AM   Result Value Ref Range    Antibody Screen Negative    Prepare RBC STAT    Collection Time: 04/25/24 10:44 AM   Result Value Ref Range    Blood Product [id number]     Unit Number [id number]     UNIT ABO A     UNIT RH POS     Product Status Released from [employer]match     Expiration Date [id number]     Blood Type Barcode 6200     Unit Volume 350 ml   Hemoglobin & Hematocrit    Collection Time: 04/25/24  2:07 PM   Result Value Ref Range    HGB 8.1 (L) 12.0 - 16.0 g/dL    HCT 23.9 (L) 35.0 - 48.0 %   Prepare RBC Once    Collection Time: 04/26/24 12:40 AM   Result Value Ref Range    Blood Product [id number]     Unit Number [id number]     UNIT ABO A     UNIT RH NEG     Product Status Presumed Transfused     Expiration Date [id number]     Blood Type Barcode 0600     Unit Volume 350 ml   Basic Metabolic Panel (8)    Collection Time: 04/26/24  5:26 AM   Result Value Ref Range    Glucose 116 (H) 70 - 99 mg/dL    Sodium 142 136 - 145 mmol/L    Potassium 3.5 3.5 - 5.1 mmol/L    Chloride 108 98 - 112 mmol/L    CO2 32.0 21.0 - 32.0 mmol/L    Anion Gap 2 0 - 18 mmol/L    BUN 10 9 - 23 mg/dL    Creatinine 0.66 0.55 - 1.02 mg/dL    BUN/CREA Ratio 15.2 10.0 - 20.0    Calcium, Total 9.7 8.7 - 10.4 mg/dL    Calculated Osmolality 294 275 - 295 mOsm/kg    eGFR-Cr 87 >=60 mL/min/1.73m2   CBC W/ DIFFERENTIAL    Collection Time: 04/26/24  5:26 AM   Result Value Ref Range    WBC 8.0 4.0 - 11.0 x10(3) uL    RBC 2.12 (L) 3.80 - 5.30 x10(6)uL    HGB 7.3 (L) 12.0 - 16.0 g/dL    HCT 22.0 (L) 35.0 - 48.0 %    .8 (H) 80.0 - 100.0 fL    MCH 34.4 (H) 26.0 - 34.0 pg    MCHC 33.2 31.0 - 37.0 g/dL    RDW-SD 86.3 (H) 35.1 - 46.3 fL    RDW 23.9 (H) 11.0 - 15.0 %    .0 150.0 - 450.0 10(3)uL    Neutrophil Absolute Prelim 7.03 1.50 - 7.70 x10 (3) uL    Neutrophil Absolute 7.03 1.50 - 7.70 x10(3) uL     Lymphocyte Absolute 0.49 (L) 1.00 - 4.00 x10(3) uL    Monocyte Absolute 0.32 0.10 - 1.00 x10(3) uL    Eosinophil Absolute 0.09 0.00 - 0.70 x10(3) uL    Basophil Absolute 0.01 0.00 - 0.20 x10(3) uL    Immature Granulocyte Absolute 0.03 0.00 - 1.00 x10(3) uL    Neutrophil % 88.3 %    Lymphocyte % 6.1 %    Monocyte % 4.0 %    Eosinophil % 1.1 %    Basophil % 0.1 %    Immature Granulocyte % 0.4 %   RBC Morphology Scan    Collection Time: 04/26/24  5:26 AM   Result Value Ref Range    RBC Morphology See morphology below (A) Normal, Slide reviewed, see previous RBC morphology.    Platelet Morphology Normal Normal    Macrocytosis 1+      Microcytosis 1+     Hemoglobin & Hematocrit    Collection Time: 04/26/24  2:18 PM   Result Value Ref Range    HGB 7.6 (L) 12.0 - 16.0 g/dL    HCT 22.2 (L) 35.0 - 48.0 %   Basic Metabolic Panel (8)    Collection Time: 04/27/24  6:06 AM   Result Value Ref Range    Glucose 111 (H) 70 - 99 mg/dL    Sodium 144 136 - 145 mmol/L    Potassium 3.4 (L) 3.5 - 5.1 mmol/L    Chloride 109 98 - 112 mmol/L    CO2 32.0 21.0 - 32.0 mmol/L    Anion Gap 3 0 - 18 mmol/L    BUN 11 9 - 23 mg/dL    Creatinine 0.54 (L) 0.55 - 1.02 mg/dL    BUN/CREA Ratio 20.4 (H) 10.0 - 20.0    Calcium, Total 9.8 8.7 - 10.4 mg/dL    Calculated Osmolality 298 (H) 275 - 295 mOsm/kg    eGFR-Cr 91 >=60 mL/min/1.73m2   CBC W/ DIFFERENTIAL    Collection Time: 04/27/24  6:06 AM   Result Value Ref Range    WBC 5.4 4.0 - 11.0 x10(3) uL    RBC 2.23 (L) 3.80 - 5.30 x10(6)uL    HGB 7.4 (L) 12.0 - 16.0 g/dL    HCT 23.2 (L) 35.0 - 48.0 %    .0 (H) 80.0 - 100.0 fL    MCH 33.2 26.0 - 34.0 pg    MCHC 31.9 31.0 - 37.0 g/dL    RDW-SD 82.0 (H) 35.1 - 46.3 fL    RDW 22.2 (H) 11.0 - 15.0 %    .0 150.0 - 450.0 10(3)uL    Neutrophil Absolute Prelim 4.31 1.50 - 7.70 x10 (3) uL    Neutrophil Absolute 4.31 1.50 - 7.70 x10(3) uL    Lymphocyte Absolute 0.72 (L) 1.00 - 4.00 x10(3) uL    Monocyte Absolute 0.27 0.10 - 1.00 x10(3) uL    Eosinophil  Absolute 0.10 0.00 - 0.70 x10(3) uL    Basophil Absolute 0.00 0.00 - 0.20 x10(3) uL    Immature Granulocyte Absolute 0.02 0.00 - 1.00 x10(3) uL    Neutrophil % 79.5 %    Lymphocyte % 13.3 %    Monocyte % 5.0 %    Eosinophil % 1.8 %    Basophil % 0.0 %    Immature Granulocyte % 0.4 %   Basic Metabolic Panel (8)    Collection Time: 04/28/24  5:57 AM   Result Value Ref Range    Glucose 182 (H) 70 - 99 mg/dL    Sodium 140 136 - 145 mmol/L    Potassium 3.7 3.5 - 5.1 mmol/L    Chloride 106 98 - 112 mmol/L    CO2 31.0 21.0 - 32.0 mmol/L    Anion Gap 3 0 - 18 mmol/L    BUN 15 9 - 23 mg/dL    Creatinine 0.60 0.55 - 1.02 mg/dL    BUN/CREA Ratio 25.0 (H) 10.0 - 20.0    Calcium, Total 9.6 8.7 - 10.4 mg/dL    Calculated Osmolality 295 275 - 295 mOsm/kg    eGFR-Cr 89 >=60 mL/min/1.73m2   Potassium    Collection Time: 04/28/24  5:57 AM   Result Value Ref Range    Potassium 3.7 3.5 - 5.1 mmol/L   CBC W/ DIFFERENTIAL    Collection Time: 04/28/24  5:57 AM   Result Value Ref Range    WBC 12.5 (H) 4.0 - 11.0 x10(3) uL    RBC 2.55 (L) 3.80 - 5.30 x10(6)uL    HGB 8.5 (L) 12.0 - 16.0 g/dL    HCT 26.2 (L) 35.0 - 48.0 %    .7 (H) 80.0 - 100.0 fL    MCH 33.3 26.0 - 34.0 pg    MCHC 32.4 31.0 - 37.0 g/dL    RDW-SD 76.4 (H) 35.1 - 46.3 fL    RDW 20.6 (H) 11.0 - 15.0 %    .0 150.0 - 450.0 10(3)uL    Neutrophil Absolute Prelim 11.27 (H) 1.50 - 7.70 x10 (3) uL    Neutrophil Absolute 11.27 (H) 1.50 - 7.70 x10(3) uL    Lymphocyte Absolute 0.49 (L) 1.00 - 4.00 x10(3) uL    Monocyte Absolute 0.65 0.10 - 1.00 x10(3) uL    Eosinophil Absolute 0.02 0.00 - 0.70 x10(3) uL    Basophil Absolute 0.02 0.00 - 0.20 x10(3) uL    Immature Granulocyte Absolute 0.05 0.00 - 1.00 x10(3) uL    Neutrophil % 90.1 %    Lymphocyte % 3.9 %    Monocyte % 5.2 %    Eosinophil % 0.2 %    Basophil % 0.2 %    Immature Granulocyte % 0.4 %   EKG 12 Lead    Collection Time: 04/28/24  9:35 AM   Result Value Ref Range    Ventricular rate 104 BPM    Atrial rate 104 BPM     P-R Interval 158 ms    QRS Duration 82 ms    Q-T Interval 326 ms    QTC Calculation (Bezet) 428 ms    P Axis 35 degrees    R Axis 14 degrees    T Axis 43 degrees   Urinalysis with Culture Reflex    Collection Time: 04/28/24 10:05 AM    Specimen: Urine, clean catch   Result Value Ref Range    Urine Color Light-Yellow Yellow    Clarity Urine Clear Clear    Spec Gravity >1.030 (H) 1.005 - 1.030    Glucose Urine Normal Normal mg/dL    Bilirubin Urine Negative Negative    Ketones Urine Negative Negative mg/dL    Blood Urine 1+ (A) Negative    pH Urine 8.5 (H) 5.0 - 8.0    Protein Urine 20 (A) Negative mg/dL    Urobilinogen Urine Normal Normal    Nitrite Urine Negative Negative    Leukocyte Esterase Urine 75 (A) Negative    WBC Urine 11-20 (A) 0 - 5 /HPF    RBC Urine 6-10 (A) 0 - 2 /HPF    Bacteria Urine None Seen None Seen /HPF    Squamous Epi. Cells Few (A) None Seen /HPF    Renal Tubular Epithelial Cells None Seen None Seen /HPF    Transitional Cells None Seen None Seen /HPF    Yeast Urine None Seen None Seen /HPF   Urine Culture, Routine    Collection Time: 04/28/24 10:05 AM    Specimen: Urine, clean catch   Result Value Ref Range    Urine Culture No Growth at 18-24 hrs.    Basic Metabolic Panel (8)    Collection Time: 04/29/24  5:50 AM   Result Value Ref Range    Glucose 119 (H) 70 - 99 mg/dL    Sodium 141 136 - 145 mmol/L    Potassium      Chloride 107 98 - 112 mmol/L    CO2 29.0 21.0 - 32.0 mmol/L    Anion Gap 5 0 - 18 mmol/L    BUN      Creatinine 0.61 0.55 - 1.02 mg/dL    BUN/CREA Ratio      Calcium, Total 7.9 (L) 8.7 - 10.4 mg/dL    Calculated Osmolality      eGFR-Cr 89 >=60 mL/min/1.73m2   Magnesium    Collection Time: 04/29/24  5:50 AM   Result Value Ref Range    Magnesium 0.9 (LL) 1.6 - 2.6 mg/dL   CBC W/ DIFFERENTIAL    Collection Time: 04/29/24  5:50 AM   Result Value Ref Range    WBC 11.9 (H) 4.0 - 11.0 x10(3) uL    RBC 1.98 (L) 3.80 - 5.30 x10(6)uL    HGB 6.7 (LL) 12.0 - 16.0 g/dL    HCT 20.9 (L) 35.0 -  48.0 %    .6 (H) 80.0 - 100.0 fL    MCH 33.8 26.0 - 34.0 pg    MCHC 32.1 31.0 - 37.0 g/dL    RDW-SD 78.2 (H) 35.1 - 46.3 fL    RDW 20.3 (H) 11.0 - 15.0 %    .0 150.0 - 450.0 10(3)uL    Neutrophil Absolute Prelim 10.31 (H) 1.50 - 7.70 x10 (3) uL    Neutrophil Absolute 10.31 (H) 1.50 - 7.70 x10(3) uL    Lymphocyte Absolute 0.69 (L) 1.00 - 4.00 x10(3) uL    Monocyte Absolute 0.76 0.10 - 1.00 x10(3) uL    Eosinophil Absolute 0.10 0.00 - 0.70 x10(3) uL    Basophil Absolute 0.02 0.00 - 0.20 x10(3) uL    Immature Granulocyte Absolute 0.04 0.00 - 1.00 x10(3) uL    Neutrophil % 86.5 %    Lymphocyte % 5.8 %    Monocyte % 6.4 %    Eosinophil % 0.8 %    Basophil % 0.2 %    Immature Granulocyte % 0.3 %   REDRAW BMP    Collection Time: 04/29/24  7:15 AM   Result Value Ref Range    Potassium 3.5 3.5 - 5.1 mmol/L    BUN 12 9 - 23 mg/dL   Hemoglobin & Hematocrit    Collection Time: 04/29/24  7:15 AM   Result Value Ref Range    HGB 7.4 (L) 12.0 - 16.0 g/dL    HCT 22.9 (L) 35.0 - 48.0 %   Magnesium    Collection Time: 04/29/24  1:29 PM   Result Value Ref Range    Magnesium 1.8 1.6 - 2.6 mg/dL       Labs reviewed    Physical examination:    /78   Pulse 95   Temp 98 °F (36.7 °C) (Oral)   Resp 16   Ht 4' 9\" (1.448 m)   Wt 102 lb (46.3 kg)   SpO2 92%   BMI 22.07 kg/m²     GENERAL: alert and oriented, cooperative, in no acute distress  ABDOMEN: soft, non-tender. Bowel sounds normal. 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 7.4  - trend hgb    PT/OT/SW    Planning to discharge to [facility]     Recheck labs in AM    The patient verbalized good understanding of this.  I will keep you informed of her progress.  Thank you for allowing me to participate in the care of this patient.    BARRINGTON Ireland  04/29/24

## 2024-04-30 ENCOUNTER — APPOINTMENT (OUTPATIENT)
Dept: CT IMAGING | Facility: HOSPITAL | Age: 84
End: 2024-04-30
Attending: CLINICAL NURSE SPECIALIST
Payer: MEDICARE

## 2024-04-30 LAB
ANION GAP SERPL CALC-SCNC: 2 MMOL/L (ref 0–18)
BASOPHILS # BLD AUTO: 0.02 X10(3) UL (ref 0–0.2)
BASOPHILS NFR BLD AUTO: 0.3 %
BUN BLD-MCNC: 13 MG/DL (ref 9–23)
BUN/CREAT SERPL: 23.2 (ref 10–20)
CALCIUM BLD-MCNC: 9.2 MG/DL (ref 8.7–10.4)
CHLORIDE SERPL-SCNC: 109 MMOL/L (ref 98–112)
CO2 SERPL-SCNC: 29 MMOL/L (ref 21–32)
CREAT BLD-MCNC: 0.56 MG/DL
DEPRECATED RDW RBC AUTO: 75.5 FL (ref 35.1–46.3)
EGFRCR SERPLBLD CKD-EPI 2021: 90 ML/MIN/1.73M2 (ref 60–?)
EOSINOPHIL # BLD AUTO: 0.1 X10(3) UL (ref 0–0.7)
EOSINOPHIL NFR BLD AUTO: 1.5 %
ERYTHROCYTE [DISTWIDTH] IN BLOOD BY AUTOMATED COUNT: 19.9 % (ref 11–15)
GLUCOSE BLD-MCNC: 90 MG/DL (ref 70–99)
HCT VFR BLD AUTO: 22.9 %
HGB BLD-MCNC: 7.5 G/DL
IMM GRANULOCYTES # BLD AUTO: 0.03 X10(3) UL (ref 0–1)
IMM GRANULOCYTES NFR BLD: 0.5 %
LYMPHOCYTES # BLD AUTO: 0.7 X10(3) UL (ref 1–4)
LYMPHOCYTES NFR BLD AUTO: 10.6 %
MAGNESIUM SERPL-MCNC: 1.6 MG/DL (ref 1.6–2.6)
MCH RBC QN AUTO: 34.2 PG (ref 26–34)
MCHC RBC AUTO-ENTMCNC: 32.8 G/DL (ref 31–37)
MCV RBC AUTO: 104.6 FL
MONOCYTES # BLD AUTO: 0.44 X10(3) UL (ref 0.1–1)
MONOCYTES NFR BLD AUTO: 6.7 %
NEUTROPHILS # BLD AUTO: 5.29 X10 (3) UL (ref 1.5–7.7)
NEUTROPHILS # BLD AUTO: 5.29 X10(3) UL (ref 1.5–7.7)
NEUTROPHILS NFR BLD AUTO: 80.4 %
OSMOLALITY SERPL CALC.SUM OF ELEC: 290 MOSM/KG (ref 275–295)
PHOSPHATE SERPL-MCNC: 3.8 MG/DL (ref 2.4–5.1)
PLATELET # BLD AUTO: 262 10(3)UL (ref 150–450)
POTASSIUM SERPL-SCNC: 3.8 MMOL/L (ref 3.5–5.1)
RBC # BLD AUTO: 2.19 X10(6)UL
SODIUM SERPL-SCNC: 140 MMOL/L (ref 136–145)
WBC # BLD AUTO: 6.6 X10(3) UL (ref 4–11)

## 2024-04-30 PROCEDURE — 85025 COMPLETE CBC W/AUTO DIFF WBC: CPT | Performed by: PHYSICIAN ASSISTANT

## 2024-04-30 PROCEDURE — 49406 IMAGE CATH FLUID PERI/RETRO: CPT | Performed by: CLINICAL NURSE SPECIALIST

## 2024-04-30 PROCEDURE — 88341 IMHCHEM/IMCYTCHM EA ADD ANTB: CPT | Performed by: STUDENT IN AN ORGANIZED HEALTH CARE EDUCATION/TRAINING PROGRAM

## 2024-04-30 PROCEDURE — 88112 CYTOPATH CELL ENHANCE TECH: CPT | Performed by: STUDENT IN AN ORGANIZED HEALTH CARE EDUCATION/TRAINING PROGRAM

## 2024-04-30 PROCEDURE — 80048 BASIC METABOLIC PNL TOTAL CA: CPT | Performed by: PHYSICIAN ASSISTANT

## 2024-04-30 PROCEDURE — 84100 ASSAY OF PHOSPHORUS: CPT | Performed by: HOSPITALIST

## 2024-04-30 PROCEDURE — 87070 CULTURE OTHR SPECIMN AEROBIC: CPT | Performed by: STUDENT IN AN ORGANIZED HEALTH CARE EDUCATION/TRAINING PROGRAM

## 2024-04-30 PROCEDURE — 87205 SMEAR GRAM STAIN: CPT | Performed by: STUDENT IN AN ORGANIZED HEALTH CARE EDUCATION/TRAINING PROGRAM

## 2024-04-30 PROCEDURE — 99153 MOD SED SAME PHYS/QHP EA: CPT | Performed by: CLINICAL NURSE SPECIALIST

## 2024-04-30 PROCEDURE — 87075 CULTR BACTERIA EXCEPT BLOOD: CPT | Performed by: STUDENT IN AN ORGANIZED HEALTH CARE EDUCATION/TRAINING PROGRAM

## 2024-04-30 PROCEDURE — 88305 TISSUE EXAM BY PATHOLOGIST: CPT | Performed by: STUDENT IN AN ORGANIZED HEALTH CARE EDUCATION/TRAINING PROGRAM

## 2024-04-30 PROCEDURE — 0W9G30Z DRAINAGE OF PERITONEAL CAVITY WITH DRAINAGE DEVICE, PERCUTANEOUS APPROACH: ICD-10-PCS | Performed by: STUDENT IN AN ORGANIZED HEALTH CARE EDUCATION/TRAINING PROGRAM

## 2024-04-30 PROCEDURE — 83735 ASSAY OF MAGNESIUM: CPT | Performed by: HOSPITALIST

## 2024-04-30 PROCEDURE — 88342 IMHCHEM/IMCYTCHM 1ST ANTB: CPT | Performed by: STUDENT IN AN ORGANIZED HEALTH CARE EDUCATION/TRAINING PROGRAM

## 2024-04-30 PROCEDURE — 99152 MOD SED SAME PHYS/QHP 5/>YRS: CPT | Performed by: CLINICAL NURSE SPECIALIST

## 2024-04-30 RX ORDER — MIDAZOLAM HYDROCHLORIDE 1 MG/ML
INJECTION INTRAMUSCULAR; INTRAVENOUS
Status: DISPENSED
Start: 2024-04-30 | End: 2024-05-01

## 2024-04-30 RX ORDER — HYDROMORPHONE HYDROCHLORIDE 1 MG/ML
0.4 INJECTION, SOLUTION INTRAMUSCULAR; INTRAVENOUS; SUBCUTANEOUS EVERY 2 HOUR PRN
Status: DISCONTINUED | OUTPATIENT
Start: 2024-04-30 | End: 2024-05-03

## 2024-04-30 RX ORDER — FLUMAZENIL 0.1 MG/ML
0.2 INJECTION INTRAVENOUS AS NEEDED
Status: DISCONTINUED | OUTPATIENT
Start: 2024-04-30 | End: 2024-05-03

## 2024-04-30 RX ORDER — MIDAZOLAM HYDROCHLORIDE 1 MG/ML
1 INJECTION INTRAMUSCULAR; INTRAVENOUS EVERY 5 MIN PRN
Status: DISCONTINUED | OUTPATIENT
Start: 2024-04-30 | End: 2024-05-03

## 2024-04-30 RX ORDER — NALOXONE HYDROCHLORIDE 0.4 MG/ML
80 INJECTION, SOLUTION INTRAMUSCULAR; INTRAVENOUS; SUBCUTANEOUS AS NEEDED
Status: DISCONTINUED | OUTPATIENT
Start: 2024-04-30 | End: 2024-05-03

## 2024-04-30 RX ORDER — HYDROMORPHONE HYDROCHLORIDE 1 MG/ML
0.2 INJECTION, SOLUTION INTRAMUSCULAR; INTRAVENOUS; SUBCUTANEOUS EVERY 2 HOUR PRN
Status: DISCONTINUED | OUTPATIENT
Start: 2024-04-30 | End: 2024-05-03

## 2024-04-30 RX ORDER — SODIUM CHLORIDE 9 MG/ML
INJECTION, SOLUTION INTRAVENOUS CONTINUOUS
Status: DISCONTINUED | OUTPATIENT
Start: 2024-04-30 | End: 2024-05-03

## 2024-04-30 RX ORDER — MAGNESIUM OXIDE 400 MG/1
400 TABLET ORAL ONCE
Status: COMPLETED | OUTPATIENT
Start: 2024-04-30 | End: 2024-04-30

## 2024-04-30 RX ORDER — HYDROMORPHONE HYDROCHLORIDE 1 MG/ML
0.1 INJECTION, SOLUTION INTRAMUSCULAR; INTRAVENOUS; SUBCUTANEOUS EVERY 2 HOUR PRN
Status: DISCONTINUED | OUTPATIENT
Start: 2024-04-30 | End: 2024-05-03

## 2024-04-30 NOTE — PROGRESS NOTES
[facility] POST-OPERATIVE VIS[facility]     MEDICAL RECORD #: [id number] DATE OF SERVICE: 4/30/2024             Reason for visit:  Post-operative day seven    HISTORY OF PRESENT ILLNESS:  83 year old female with a history of incidental finding of bladder mass, pelvic mass, and peritoneal nodules. She was found to have a low-grade papillary urothelial bladder cancer, underwent transurethral resection. PET scan showed peritoneal implants and the lesion in the right ovary. 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 Immunohistochemistry was not typical for gynecologic malignancies, although this was thought to be the leading differential diagnosis. Her tumor markers were all negative. The patient was started on neoadjuvant chemotherapy, has undergone 6 cycles.  She underwent Laparoscopic lysis of adhesions (encompassing over 20 minutes), robotic-assisted total laparoscopic hysterectomy and bilateral salpingo-oophorectomy with tumor debulking, bilateral pelvic and paraaortic lymph node dissection, infracolic and portion of supracolic omentectomy on 4/23/24.    GI//GYNE Complaint:   Other complaints: Tolerating diet, no N/V. Urinating freely with new dysuria. +belching, +BM. Pain controlled, however notes new mild lower pelvic pain. Denies vaginal bleeding. Desaturating with activity, off NC.    No new complaints    Interim History:  Underwent surgery  Febrile - CT A/P with fluid collection    Patient's medications, allergies, past medical, surgical, social and family histories were reviewed and updated as appropriate.    Pathology/Lab Results:  Recent Results (from the past 720 hour(s))   CBC, Platelet; No Differential    Collection Time: 04/19/24  1:54 PM   Result Value Ref Range    WBC 8.0 4.0 - 11.0 x10(3) uL    RBC 2.42 (L) 3.80 - 5.30 x10(6)uL    HGB 8.8 (L) 12.0 - 16.0 g/dL    HCT 28.5 (L) 35.0 - 48.0 %    .8 (H) 80.0 - 100.0 fL    MCH 36.4 (H) 26.0 - 34.0 pg    MCHC  30.9 (L) 31.0 - 37.0 g/dL    RDW 15.5 (H) 11.0 - 15.0 %    RDW-SD 67.1 (H) 35.1 - 46.3 fL    .0 150.0 - 450.0 10(3)uL   ABORH (Blood Type)    Collection Time: 04/19/24  1:54 PM   Result Value Ref Range    ABO BLOOD TYPE A     RH BLOOD TYPE Positive    Antibody Screen    Collection Time: 04/19/24  1:54 PM   Result Value Ref Range    Antibody Screen Negative    MD BLOOD SMEAR CONSULT    Collection Time: 04/19/24  1:54 PM   Result Value Ref Range    MD Blood Smear Consult       Evaluation of the peripheral blood smear demonstrates moderate macrocytic anemia characterized by moderate anisopoikilocytosis with macrocytes, ovalocytes and polychromasia. Neutrophils display distinct cytoplasmic granulation. No circulating blasts seen.    Differential considerations for macrocytic anemia may include vitamin B12 or folate deficiency, autoimmune hemolytic anemia, cold agglutinin disease, liver disease, some myelodysplasias, thyroid disease, excessive alcohol consumption and drug effects.     Clinical correlation is recommended.     Reviewed by IRAIDA Vázquez M.D.   Cytology fluids    Collection Time: 04/23/24  8:27 AM   Result Value Ref Range    Case Report       Non-Gynecologic Cytology                          Case: [id number]                                   Authorizing Provider:  Rigoberto Couch MD         Collected:           04/23/2024 08:27 AM          Ordering Location:     [facility]          Received:            04/23/2024 10:44 AM                                 Operating Room                                                               Pathologist:           Cooper Dahl MD                                                         Specimen:    Pelvic washings, 1. PELVIC WASHINGS-FOR CYTOLOGY                                           General Categorization         Benign, inflammatory, reactive or reparative changes    Final Diagnosis:         Pelvic washings:   Adequacy: Satisfactory for  evaluation  General Category: Benign, inflammatory, reactive, or reparative changes  Diagnosis:  Benign mesothelial cells present  Histiocytes present  Peripheral blood elements present  No malignant cells identified        Embedded Images      Final Diagnosis Comment         Recommend correlation with histologic findings ([id number]).            Procedure       Monolayers:  1  Cytospins:     2      Clinical Information       Carcinoma, Malignant Neoplasm Of Right Ovary.        Non Gyne Interpretation  Benign      Gross Description       Volume (ml): 25    Color: Pink     Specimen to Pathology Tissue    Collection Time: 04/23/24  9:09 AM   Result Value Ref Range    Case Report       Surgical Pathology                                Case: [id number]                                  Authorizing Provider:  Rigoberto Couch MD         Collected:           04/23/2024 09:09 AM          Ordering Location:     [facility]          Received:            04/23/2024 11:24 AM                                 Operating Room                                                               Pathologist:           Cesia Escalante MD                                                                           Specimens:   A) - Uterus and cervix, bilateral tubes and ovaries, 1. UTERUS AND CERVIX, BILATERAL                TUBES AND OVARIES                                                                                   B) - Omentum, 2. INFRACOLIC OMENTUM, AND PORTION OF SUPRACOLIC OMENTUM                              C) - Lymph node pelvic right, 3. RIGHT PELVIC LYMPH NODES                                            D) - Lymph node pelvic left, 4. LEFT PELVIC LYMPH NODES                                             E) - Lymph node (ro-aortic), 5. RO-AORTIC LYMPH NODES                                  Final Diagnosis:         A. 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  Atrophic endometrium with cystically dilated glands.  Endosalpingosis and leiomyomas, 1 cm in greatest dimension grossly.  Cervix with metaplastic changes, calcifications, acute and chronic inflammation.  Bilateral multiple simple ovarian cysts.  Right fallopian tube with paratubal cyst 7 mm in greatest dimension grossly.  Left fallopian tube with no significant pathological abnormalities.  Negative inked surgical resection margins.  Pathological stage classification (pTNM, AJCC 8th edition): ypT1b ypN0.    B. Infracolic omentum and portion of supracolic omentum; omentectomy:   Fatty tissue with degenerative changes, calcifications, vascular congestion and fibrosis.  No definite evidence of carcinoma is identified.    C. Right pelvic lymph node; dissection:   One lymph node, negative for metastatic carcinoma (0/1).  AE1/AE3 immunohistochemical stain is negative (block C1).     D. Left pelvic lymph node; dissection:   One lymph node, negative for metastatic carcinoma (0/1).  AE1/AE3 immunohistochemical stain is negative (block D1).     E. Celina-aortic lymph nodes; dissection:   Five lymph nodes, negative for metastatic carcinoma (0/5).  AE1/AE3 immunohistochemical stain is negative (blocks E1-E2).      Embedded Images      Final Diagnosis Comment       Immunohistochemical stains (CK20, CK7, AE1/AE3, PAX8, p53 and WT1) are performed on block A18 for further characterization.  The neoplastic cells are positive for PAX8, AE1/AE3 and CK7.  The neoplastic cells are negative for CK20 and WT1.  P53 shows strong nuclear positivity consistent with aberrant (mutational type) expression.    For  purposes, this case was reviewed by  a second pathologist who concurred with the diagnosis.    The immunohistochemical stains interpreted in this case demonstrate appropriate reactivity of positive controls.  The stains were performed on formalin-fixed, paraffin-embedded tissue sections with each antibody analysis being performed on a separate slide.        Synoptic Report       OVARY or FALLOPIAN TUBE or PRIMARY PERITONEUM   OVARY OR FALLOPIAN TUBE OR PRIMARY PERITONEUM - All Specimens   8th Edition - Protocol posted: 3/22/2023      SPECIMEN      Procedure:    Total hysterectomy and bilateral salpingo-oophorectomy       Hysterectomy Type:    Laparoscopic, robotic-assisted       Specimen Integrity:            Right Ovary Integrity:    Capsule intact       Specimen Integrity:            Left Ovary Integrity:    Capsule intact       TUMOR      Tumor Site:    Bilateral ovaries       Tumor Size:    Cannot be determined: Status post neoadjuvant chemotherapy       Histologic Type:    High grade serous carcinoma       Other Tissue / Organ Involvement:    Not identified       Peritoneal / Ascitic Fluid Involvement:    Malignant cells not identified       Chemotherapy Response Score (CRS):    CRS2 (moderate response)       REGIONAL LYMPH NODES      Regional Lymph Node Status:            :    All regional lymph nodes negative for tumor cells         Number of Lymph Nodes Examined:    7       pTNM CLASSIFICATION (AJCC 8th Edition)      Reporting of pT, pN, and (when applicable) pM categories is based on information available to the pathologist at the time the report is issued. As per the AJCC (Chapter 1, 8th Ed.) it is the managing physician’s responsibility to establish the final pathologic stage based upon all pertinent information, including but potentially not limited to this pathology report.      Modified Classification:    y       pT Category:    pT1b       pN Category:    pN0       Clinical Information       Carcinoma, Malignant Neoplasm Of Right Ovary.         Gross Description       Specimen A is labeled \"Walke, uterus and cervix, bilateral tubes and ovaries\" received in formalin. The specimen consists of a 98 gram total hysterectomy including uterus with cervix, and bilateral fallopian tubes and ovaries. The uterus and cervix measure 7.0 cm in length, 3.0 cm from cornu to cornu, 2.8 cm from anterior to posterior. The uterus is covered with pink-red smooth serosa. The ectocervix is covered with pink-tan smooth mucosa and measures 3.4 x 3.0 cm in transverse dimension. The cervical os is ovoid and measures 0.4 x 0.3 cm. The anterior aspect is inked blue. the posterior aspect is inked black. The cervix and uterus are bivalved and fixed for a few hours. The uterus is further sectioned to reveal a pink-tan herringbone endocervix with a 2.6 cm endocervical canal. The endometrium is pink-gray, granular to smooth, with a 5.3 x 2.8 cm dilated endometrial cavity. The uterus is sectioned to reveal multiple intramural fibroids (0.2-1.0 cm in greatest dimension). The remaining myometrium is pink-tan and trabeculated with no other definitive masses or lesions grossly identified. The greatest endometrium thickness is 0.1 cm. The greatest myometrium thickness is 1.1 cm.     The right ovary is cystic/enlarged and measures 5.5 x 4.2 x 3.6 cm. The capsule is intact. The capsule is inked blue and the ovary is sectioned to reveal a inner white-tan mass and two bulging cystic lesions. The white-tan mass measures 2.5 x 2.0 x 1.8 cm. The larger cystic lesion (3.5 cm in greatest dimension) is filled with serosanguinous fluid. The small cystic lesion is filled with a yellow-tan gelatinous material. The cyst linings are smooth without papillary excrescences or solid areas identified. The right fallopian tube measures 5.5 cm in length by 0.5 cm in diameter and presents one paratubal cyst filled with clear fluid (0.7 cm in greatest dimension).     The left fallopian tube measures 3.6 cm in  length by 0.4 cm in diameter and is grossly unremarkable. The left ovary is yellowish pink and lobulated measures 1.9 x 1.5 x 1.0 cm and presents a inner white-tan mass (0.9 cm in greatest dimension). Also noted is a inner cyst filled with clear fluid (0.7 cm in greatest dimension). Additionally there is a left para-adnexal nodule (1.4 x 1.0 x 0.9 cm). This nodule is inked green.     Representative sections of the uterus are submitted as follows:   A1, anterior cervix  A2, posterior cervix   A3, anterior lower uterine segment, full-thickness transverse sections  A4-A5, full-thickness anterior endometrium from mid to upper including some fibroids (two per cassette)  A6, posterior lower uterine segment, full-thickness transverse sections  A7-A10, full-thickness posterior endomyometrium from mid to upper including fibroids  A11, left parametrial soft tissue  A12, right parametrial soft tissue   A13-A16, right ovarian mass with adjacent smaller cystic lesion   A17-A18, larger right ovarian cystic lesion   A19, right fallopian tube with paratubal cyst  A20, sections of left fallopian tube  A21, left ovary including inner mass and cyst, bisected, entirely  A22, left para-adnexa nodule, entirely.       Specimen B is labeled \"Walke, infracolic omentum and portion of supracolic omentum\" received in formalin. The specimen consists of one unoriented piece of yellow-tan omentum measuring 26.5 x 9.2 x 1.4 cm. There is a undesignated tag attached to the mid section of the omentum. The omentum is sectioned to reveal a homogenous fatty parenchyma with focal areas of vascular congestion. No lesions, masses or evidence of tumor invasion are grossly identified. Representative sections are submitted as follows: B1, omentum with tag; B2-B3, additional random omentum.      Specimen C is labeled \"Walke, right pelvic lymph nodes\" received in formalin. The specimen consists of one piece of yellow-tan fatty tissue measuring 4.5 x 3.2 x 0.9  cm. Dissection of the tissue reveals one lymph node candidate (1.5 cm in greatest dimension). The candidate is bisected and submitted entirely in cassette C1.     Specimen D is labeled \"Walke, left pelvic lymph nodes\" received in formalin. The specimen consists of multiple piece of fatty tissue measuring in aggregate 4.5 x 4.0 x 0.9 cm. Dissection of the tissue reveals no lymph nodes. The tissue is submitted entirely in cassettes D1-D2.     Specimen E is labeled \"Walke, periaortic lymph nodes\" received in formalin. The specimen consists of multiple pieces of yellow-tan fatty tissue measuring in aggregate 3.2 x 3.0 x 1.0 cm. Dissection of the tissue reveals four lymph node candidates (0.1-0.5 cm in greatest dimension). The candidates are submitted entirely in cassette E1. The remaining tissue is submitted in cassette E2. (jq)     Cesia Escalante M.D./mtt        Interpretation Malignant (A)     POCT Glucose    Collection Time: 04/23/24 11:27 AM   Result Value Ref Range    POC Glucose  143 (H) 70 - 99 mg/dL   Basic Metabolic Panel (8)    Collection Time: 04/24/24 10:16 AM   Result Value Ref Range    Glucose 154 (H) 70 - 99 mg/dL    Sodium 141 136 - 145 mmol/L    Potassium 3.9 3.5 - 5.1 mmol/L    Chloride 109 98 - 112 mmol/L    CO2 26.0 21.0 - 32.0 mmol/L    Anion Gap 6 0 - 18 mmol/L    BUN 14 9 - 23 mg/dL    Creatinine 0.64 0.55 - 1.02 mg/dL    BUN/CREA Ratio 21.9 (H) 10.0 - 20.0    Calcium, Total 9.4 8.7 - 10.4 mg/dL    Calculated Osmolality 296 (H) 275 - 295 mOsm/kg    eGFR-Cr 88 >=60 mL/min/1.73m2   CBC W/ DIFFERENTIAL    Collection Time: 04/24/24 10:34 AM   Result Value Ref Range    WBC 12.2 (H) 4.0 - 11.0 x10(3) uL    RBC 2.16 (L) 3.80 - 5.30 x10(6)uL    HGB 7.9 (L) 12.0 - 16.0 g/dL    HCT 25.0 (L) 35.0 - 48.0 %    .7 (H) 80.0 - 100.0 fL    MCH 36.6 (H) 26.0 - 34.0 pg    MCHC 31.6 31.0 - 37.0 g/dL    RDW-SD 64.8 (H) 35.1 - 46.3 fL    RDW 15.0 11.0 - 15.0 %    .0 150.0 - 450.0 10(3)uL     Neutrophil Absolute Prelim 11.07 (H) 1.50 - 7.70 x10 (3) uL    Neutrophil Absolute 11.07 (H) 1.50 - 7.70 x10(3) uL    Lymphocyte Absolute 0.56 (L) 1.00 - 4.00 x10(3) uL    Monocyte Absolute 0.56 0.10 - 1.00 x10(3) uL    Eosinophil Absolute 0.00 0.00 - 0.70 x10(3) uL    Basophil Absolute 0.01 0.00 - 0.20 x10(3) uL    Immature Granulocyte Absolute 0.04 0.00 - 1.00 x10(3) uL    Neutrophil % 90.4 %    Lymphocyte % 4.6 %    Monocyte % 4.6 %    Eosinophil % 0.0 %    Basophil % 0.1 %    Immature Granulocyte % 0.3 %   Basic Metabolic Panel (8)    Collection Time: 04/25/24  5:47 AM   Result Value Ref Range    Glucose 113 (H) 70 - 99 mg/dL    Sodium 142 136 - 145 mmol/L    Potassium 3.7 3.5 - 5.1 mmol/L    Chloride 109 98 - 112 mmol/L    CO2 29.0 21.0 - 32.0 mmol/L    Anion Gap 4 0 - 18 mmol/L    BUN 11 9 - 23 mg/dL    Creatinine 0.55 0.55 - 1.02 mg/dL    BUN/CREA Ratio 20.0 10.0 - 20.0    Calcium, Total 9.5 8.7 - 10.4 mg/dL    Calculated Osmolality 294 275 - 295 mOsm/kg    eGFR-Cr 91 >=60 mL/min/1.73m2   CBC W/ DIFFERENTIAL    Collection Time: 04/25/24  6:20 AM   Result Value Ref Range    WBC 6.4 4.0 - 11.0 x10(3) uL    RBC 1.82 (L) 3.80 - 5.30 x10(6)uL    HGB 6.6 (LL) 12.0 - 16.0 g/dL    HCT 21.3 (L) 35.0 - 48.0 %    .0 (H) 80.0 - 100.0 fL    MCH 36.3 (H) 26.0 - 34.0 pg    MCHC 31.0 31.0 - 37.0 g/dL    RDW-SD 63.8 (H) 35.1 - 46.3 fL    RDW 15.0 11.0 - 15.0 %    .0 150.0 - 450.0 10(3)uL    Neutrophil Absolute Prelim 5.47 1.50 - 7.70 x10 (3) uL    Neutrophil Absolute 5.47 1.50 - 7.70 x10(3) uL    Lymphocyte Absolute 0.49 (L) 1.00 - 4.00 x10(3) uL    Monocyte Absolute 0.34 0.10 - 1.00 x10(3) uL    Eosinophil Absolute 0.05 0.00 - 0.70 x10(3) uL    Basophil Absolute 0.02 0.00 - 0.20 x10(3) uL    Immature Granulocyte Absolute 0.03 0.00 - 1.00 x10(3) uL    Neutrophil % 85.4 %    Lymphocyte % 7.7 %    Monocyte % 5.3 %    Eosinophil % 0.8 %    Basophil % 0.3 %    Immature Granulocyte % 0.5 %   ABORH (Blood Type)     Collection Time: 04/25/24  7:57 AM   Result Value Ref Range    ABO BLOOD TYPE A     RH BLOOD TYPE Positive    Antibody Screen    Collection Time: 04/25/24  7:57 AM   Result Value Ref Range    Antibody Screen Negative    Prepare RBC STAT    Collection Time: 04/25/24 10:44 AM   Result Value Ref Range    Blood Product J0131P80     Unit Number [id number]     UNIT ABO A     UNIT RH POS     Product Status Released from [employer]     Expiration Date [id number]     Blood Type Barcode 6200     Unit Volume 350 ml   Hemoglobin & Hematocrit    Collection Time: 04/25/24  2:07 PM   Result Value Ref Range    HGB 8.1 (L) 12.0 - 16.0 g/dL    HCT 23.9 (L) 35.0 - 48.0 %   Prepare RBC Once    Collection Time: 04/26/24 12:40 AM   Result Value Ref Range    Blood Product E5264P69     Unit Number [id number]     UNIT ABO A     UNIT RH NEG     Product Status Presumed Transfused     Expiration Date [id number]     Blood Type Barcode 0600     Unit Volume 350 ml   Basic Metabolic Panel (8)    Collection Time: 04/26/24  5:26 AM   Result Value Ref Range    Glucose 116 (H) 70 - 99 mg/dL    Sodium 142 136 - 145 mmol/L    Potassium 3.5 3.5 - 5.1 mmol/L    Chloride 108 98 - 112 mmol/L    CO2 32.0 21.0 - 32.0 mmol/L    Anion Gap 2 0 - 18 mmol/L    BUN 10 9 - 23 mg/dL    Creatinine 0.66 0.55 - 1.02 mg/dL    BUN/CREA Ratio 15.2 10.0 - 20.0    Calcium, Total 9.7 8.7 - 10.4 mg/dL    Calculated Osmolality 294 275 - 295 mOsm/kg    eGFR-Cr 87 >=60 mL/min/1.73m2   CBC W/ DIFFERENTIAL    Collection Time: 04/26/24  5:26 AM   Result Value Ref Range    WBC 8.0 4.0 - 11.0 x10(3) uL    RBC 2.12 (L) 3.80 - 5.30 x10(6)uL    HGB 7.3 (L) 12.0 - 16.0 g/dL    HCT 22.0 (L) 35.0 - 48.0 %    .8 (H) 80.0 - 100.0 fL    MCH 34.4 (H) 26.0 - 34.0 pg    MCHC 33.2 31.0 - 37.0 g/dL    RDW-SD 86.3 (H) 35.1 - 46.3 fL    RDW 23.9 (H) 11.0 - 15.0 %    .0 150.0 - 450.0 10(3)uL    Neutrophil Absolute Prelim 7.03 1.50 - 7.70 x10 (3) uL    Neutrophil Absolute  7.03 1.50 - 7.70 x10(3) uL    Lymphocyte Absolute 0.49 (L) 1.00 - 4.00 x10(3) uL    Monocyte Absolute 0.32 0.10 - 1.00 x10(3) uL    Eosinophil Absolute 0.09 0.00 - 0.70 x10(3) uL    Basophil Absolute 0.01 0.00 - 0.20 x10(3) uL    Immature Granulocyte Absolute 0.03 0.00 - 1.00 x10(3) uL    Neutrophil % 88.3 %    Lymphocyte % 6.1 %    Monocyte % 4.0 %    Eosinophil % 1.1 %    Basophil % 0.1 %    Immature Granulocyte % 0.4 %   RBC Morphology Scan    Collection Time: 04/26/24  5:26 AM   Result Value Ref Range    RBC Morphology See morphology below (A) Normal, Slide reviewed, see previous RBC morphology.    Platelet Morphology Normal Normal    Macrocytosis 1+      Microcytosis 1+     Hemoglobin & Hematocrit    Collection Time: 04/26/24  2:18 PM   Result Value Ref Range    HGB 7.6 (L) 12.0 - 16.0 g/dL    HCT 22.2 (L) 35.0 - 48.0 %   Basic Metabolic Panel (8)    Collection Time: 04/27/24  6:06 AM   Result Value Ref Range    Glucose 111 (H) 70 - 99 mg/dL    Sodium 144 136 - 145 mmol/L    Potassium 3.4 (L) 3.5 - 5.1 mmol/L    Chloride 109 98 - 112 mmol/L    CO2 32.0 21.0 - 32.0 mmol/L    Anion Gap 3 0 - 18 mmol/L    BUN 11 9 - 23 mg/dL    Creatinine 0.54 (L) 0.55 - 1.02 mg/dL    BUN/CREA Ratio 20.4 (H) 10.0 - 20.0    Calcium, Total 9.8 8.7 - 10.4 mg/dL    Calculated Osmolality 298 (H) 275 - 295 mOsm/kg    eGFR-Cr 91 >=60 mL/min/1.73m2   CBC W/ DIFFERENTIAL    Collection Time: 04/27/24  6:06 AM   Result Value Ref Range    WBC 5.4 4.0 - 11.0 x10(3) uL    RBC 2.23 (L) 3.80 - 5.30 x10(6)uL    HGB 7.4 (L) 12.0 - 16.0 g/dL    HCT 23.2 (L) 35.0 - 48.0 %    .0 (H) 80.0 - 100.0 fL    MCH 33.2 26.0 - 34.0 pg    MCHC 31.9 31.0 - 37.0 g/dL    RDW-SD 82.0 (H) 35.1 - 46.3 fL    RDW 22.2 (H) 11.0 - 15.0 %    .0 150.0 - 450.0 10(3)uL    Neutrophil Absolute Prelim 4.31 1.50 - 7.70 x10 (3) uL    Neutrophil Absolute 4.31 1.50 - 7.70 x10(3) uL    Lymphocyte Absolute 0.72 (L) 1.00 - 4.00 x10(3) uL    Monocyte Absolute 0.27 0.10 -  1.00 x10(3) uL    Eosinophil Absolute 0.10 0.00 - 0.70 x10(3) uL    Basophil Absolute 0.00 0.00 - 0.20 x10(3) uL    Immature Granulocyte Absolute 0.02 0.00 - 1.00 x10(3) uL    Neutrophil % 79.5 %    Lymphocyte % 13.3 %    Monocyte % 5.0 %    Eosinophil % 1.8 %    Basophil % 0.0 %    Immature Granulocyte % 0.4 %   Basic Metabolic Panel (8)    Collection Time: 04/28/24  5:57 AM   Result Value Ref Range    Glucose 182 (H) 70 - 99 mg/dL    Sodium 140 136 - 145 mmol/L    Potassium 3.7 3.5 - 5.1 mmol/L    Chloride 106 98 - 112 mmol/L    CO2 31.0 21.0 - 32.0 mmol/L    Anion Gap 3 0 - 18 mmol/L    BUN 15 9 - 23 mg/dL    Creatinine 0.60 0.55 - 1.02 mg/dL    BUN/CREA Ratio 25.0 (H) 10.0 - 20.0    Calcium, Total 9.6 8.7 - 10.4 mg/dL    Calculated Osmolality 295 275 - 295 mOsm/kg    eGFR-Cr 89 >=60 mL/min/1.73m2   Potassium    Collection Time: 04/28/24  5:57 AM   Result Value Ref Range    Potassium 3.7 3.5 - 5.1 mmol/L   CBC W/ DIFFERENTIAL    Collection Time: 04/28/24  5:57 AM   Result Value Ref Range    WBC 12.5 (H) 4.0 - 11.0 x10(3) uL    RBC 2.55 (L) 3.80 - 5.30 x10(6)uL    HGB 8.5 (L) 12.0 - 16.0 g/dL    HCT 26.2 (L) 35.0 - 48.0 %    .7 (H) 80.0 - 100.0 fL    MCH 33.3 26.0 - 34.0 pg    MCHC 32.4 31.0 - 37.0 g/dL    RDW-SD 76.4 (H) 35.1 - 46.3 fL    RDW 20.6 (H) 11.0 - 15.0 %    .0 150.0 - 450.0 10(3)uL    Neutrophil Absolute Prelim 11.27 (H) 1.50 - 7.70 x10 (3) uL    Neutrophil Absolute 11.27 (H) 1.50 - 7.70 x10(3) uL    Lymphocyte Absolute 0.49 (L) 1.00 - 4.00 x10(3) uL    Monocyte Absolute 0.65 0.10 - 1.00 x10(3) uL    Eosinophil Absolute 0.02 0.00 - 0.70 x10(3) uL    Basophil Absolute 0.02 0.00 - 0.20 x10(3) uL    Immature Granulocyte Absolute 0.05 0.00 - 1.00 x10(3) uL    Neutrophil % 90.1 %    Lymphocyte % 3.9 %    Monocyte % 5.2 %    Eosinophil % 0.2 %    Basophil % 0.2 %    Immature Granulocyte % 0.4 %   EKG 12 Lead    Collection Time: 04/28/24  9:35 AM   Result Value Ref Range    Ventricular rate 104  BPM    Atrial rate 104 BPM    P-R Interval 158 ms    QRS Duration 82 ms    Q-T Interval 326 ms    QTC Calculation (Bezet) 428 ms    P Axis 35 degrees    R Axis 14 degrees    T Axis 43 degrees   Urinalysis with Culture Reflex    Collection Time: 04/28/24 10:05 AM    Specimen: Urine, clean catch   Result Value Ref Range    Urine Color Light-Yellow Yellow    Clarity Urine Clear Clear    Spec Gravity >1.030 (H) 1.005 - 1.030    Glucose Urine Normal Normal mg/dL    Bilirubin Urine Negative Negative    Ketones Urine Negative Negative mg/dL    Blood Urine 1+ (A) Negative    pH Urine 8.5 (H) 5.0 - 8.0    Protein Urine 20 (A) Negative mg/dL    Urobilinogen Urine Normal Normal    Nitrite Urine Negative Negative    Leukocyte Esterase Urine 75 (A) Negative    WBC Urine 11-20 (A) 0 - 5 /HPF    RBC Urine 6-10 (A) 0 - 2 /HPF    Bacteria Urine None Seen None Seen /HPF    Squamous Epi. Cells Few (A) None Seen /HPF    Renal Tubular Epithelial Cells None Seen None Seen /HPF    Transitional Cells None Seen None Seen /HPF    Yeast Urine None Seen None Seen /HPF   Urine Culture, Routine    Collection Time: 04/28/24 10:05 AM    Specimen: Urine, clean catch   Result Value Ref Range    Urine Culture No Growth at 18-24 hrs.    Basic Metabolic Panel (8)    Collection Time: 04/29/24  5:50 AM   Result Value Ref Range    Glucose 119 (H) 70 - 99 mg/dL    Sodium 141 136 - 145 mmol/L    Potassium      Chloride 107 98 - 112 mmol/L    CO2 29.0 21.0 - 32.0 mmol/L    Anion Gap 5 0 - 18 mmol/L    BUN      Creatinine 0.61 0.55 - 1.02 mg/dL    BUN/CREA Ratio      Calcium, Total 7.9 (L) 8.7 - 10.4 mg/dL    Calculated Osmolality      eGFR-Cr 89 >=60 mL/min/1.73m2   Magnesium    Collection Time: 04/29/24  5:50 AM   Result Value Ref Range    Magnesium 0.9 (LL) 1.6 - 2.6 mg/dL   CBC W/ DIFFERENTIAL    Collection Time: 04/29/24  5:50 AM   Result Value Ref Range    WBC 11.9 (H) 4.0 - 11.0 x10(3) uL    RBC 1.98 (L) 3.80 - 5.30 x10(6)uL    HGB 6.7 (LL) 12.0 - 16.0  g/dL    HCT 20.9 (L) 35.0 - 48.0 %    .6 (H) 80.0 - 100.0 fL    MCH 33.8 26.0 - 34.0 pg    MCHC 32.1 31.0 - 37.0 g/dL    RDW-SD 78.2 (H) 35.1 - 46.3 fL    RDW 20.3 (H) 11.0 - 15.0 %    .0 150.0 - 450.0 10(3)uL    Neutrophil Absolute Prelim 10.31 (H) 1.50 - 7.70 x10 (3) uL    Neutrophil Absolute 10.31 (H) 1.50 - 7.70 x10(3) uL    Lymphocyte Absolute 0.69 (L) 1.00 - 4.00 x10(3) uL    Monocyte Absolute 0.76 0.10 - 1.00 x10(3) uL    Eosinophil Absolute 0.10 0.00 - 0.70 x10(3) uL    Basophil Absolute 0.02 0.00 - 0.20 x10(3) uL    Immature Granulocyte Absolute 0.04 0.00 - 1.00 x10(3) uL    Neutrophil % 86.5 %    Lymphocyte % 5.8 %    Monocyte % 6.4 %    Eosinophil % 0.8 %    Basophil % 0.2 %    Immature Granulocyte % 0.3 %   REDRAW BMP    Collection Time: 04/29/24  7:15 AM   Result Value Ref Range    Potassium 3.5 3.5 - 5.1 mmol/L    BUN 12 9 - 23 mg/dL   Hemoglobin & Hematocrit    Collection Time: 04/29/24  7:15 AM   Result Value Ref Range    HGB 7.4 (L) 12.0 - 16.0 g/dL    HCT 22.9 (L) 35.0 - 48.0 %   Magnesium    Collection Time: 04/29/24  1:29 PM   Result Value Ref Range    Magnesium 1.8 1.6 - 2.6 mg/dL   Basic Metabolic Panel (8)    Collection Time: 04/30/24  5:31 AM   Result Value Ref Range    Glucose 90 70 - 99 mg/dL    Sodium 140 136 - 145 mmol/L    Potassium 3.8 3.5 - 5.1 mmol/L    Chloride 109 98 - 112 mmol/L    CO2 29.0 21.0 - 32.0 mmol/L    Anion Gap 2 0 - 18 mmol/L    BUN 13 9 - 23 mg/dL    Creatinine 0.56 0.55 - 1.02 mg/dL    BUN/CREA Ratio 23.2 (H) 10.0 - 20.0    Calcium, Total 9.2 8.7 - 10.4 mg/dL    Calculated Osmolality 290 275 - 295 mOsm/kg    eGFR-Cr 90 >=60 mL/min/1.73m2   Magnesium    Collection Time: 04/30/24  5:31 AM   Result Value Ref Range    Magnesium 1.6 1.6 - 2.6 mg/dL   CBC W/ DIFFERENTIAL    Collection Time: 04/30/24  5:31 AM   Result Value Ref Range    WBC 6.6 4.0 - 11.0 x10(3) uL    RBC 2.19 (L) 3.80 - 5.30 x10(6)uL    HGB 7.5 (L) 12.0 - 16.0 g/dL    HCT 22.9 (L) 35.0 - 48.0  %    .6 (H) 80.0 - 100.0 fL    MCH 34.2 (H) 26.0 - 34.0 pg    MCHC 32.8 31.0 - 37.0 g/dL    RDW-SD 75.5 (H) 35.1 - 46.3 fL    RDW 19.9 (H) 11.0 - 15.0 %    .0 150.0 - 450.0 10(3)uL    Neutrophil Absolute Prelim 5.29 1.50 - 7.70 x10 (3) uL    Neutrophil Absolute 5.29 1.50 - 7.70 x10(3) uL    Lymphocyte Absolute 0.70 (L) 1.00 - 4.00 x10(3) uL    Monocyte Absolute 0.44 0.10 - 1.00 x10(3) uL    Eosinophil Absolute 0.10 0.00 - 0.70 x10(3) uL    Basophil Absolute 0.02 0.00 - 0.20 x10(3) uL    Immature Granulocyte Absolute 0.03 0.00 - 1.00 x10(3) uL    Neutrophil % 80.4 %    Lymphocyte % 10.6 %    Monocyte % 6.7 %    Eosinophil % 1.5 %    Basophil % 0.3 %    Immature Granulocyte % 0.5 %   Phosphorus    Collection Time: 04/30/24  5:31 AM   Result Value Ref Range    Phosphorus 3.8 2.4 - 5.1 mg/dL       Labs reviewed    Physical examination:    /65 (BP Location: Left arm)   Pulse 91   Temp 98.1 °F (36.7 °C) (Oral)   Resp 18   Ht 4' 9\" (1.448 m)   Wt 102 lb (46.3 kg)   SpO2 96%   BMI 22.07 kg/m²     GENERAL: alert and oriented, cooperative, in no acute distress  ABDOMEN: soft, non-tender. Bowel sounds normal. 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    Cr  WNL    Good UOP     LMWH - will go home on lmwh 40mg daily x 2 weeks    Soft diet, IVF, encouraged PO fluids    Encouraged IS    Febrile, leukocytosis, and tachycardic on 4/28:  - Urine cx w/ no growth  - CT Chest with  atelectasis, no PE  - CT A/P with 11 cm pelvic fluid collection   - Plan for IR drainage 4/30  - On empiric zosyn and vanco  - afebrile, WBC normalized    Anemia:  - Presume 2/2 expected post-op ABLA w dilutional component from IVF  - Low suspicion of active  bleeding  - 4/25 hgb < 7 - 1u RBC  - hgb 7.4-> 8.5-> 7.4 -> 7.5  - trend hgb    PT/OT/SW    Planning to discharge to [facility]     Recheck labs in AM    The patient verbalized good understanding of this.  I will keep you informed of her progress.  Thank you for allowing me to participate in the care of this patient.    BARRINGTON Ireland  04/30/24

## 2024-04-30 NOTE — PROCEDURES
Interventional Radiology  Brief Post-Procedure Note    Procedure(s): drain insertion    Indication: ovarian cancer post IKER/BSO, postop fluid collection, fever and leukocytosis    (s): Chandni    Anesthesia: Sedation    Findings:    -CT guided  -10 Yi drain insertion yielding serous fluid  -attached to bulb suction    Blood loss: <5 mL      Complications: None    Plan:   -flush drain with 10 mL saline q8h  -monitor drain output  -IR will arrange for drain evaluation in 1 week    Please refer to full dictation under the \"Imaging\" tab in Epic.    Cruz Tariq MD  4/30/2024  Interventional Radiology  [facility]

## 2024-04-30 NOTE — PLAN OF CARE
Patient alert and oriented x4. VSS. On room air. NPO for IR procedure. TEO drain placed to RLQ. Dressing c/d/I. Denies pain. To resume LF/ soft diet. Ambulating with 1 and RW. Fall precautions in place. Call light within reach. Possible discharge tomorrow.   Problem: Patient Centered Care  Goal: Patient preferences are identified and integrated in the patient's plan of care  Description: Interventions:  - What would you like us to know as we care for you? From home   - Provide timely, complete, and accurate information to patient/family  - Incorporate patient and family knowledge, values, beliefs, and cultural backgrounds into the planning and delivery of care  - Encourage patient/family to participate in care and decision-making at the level they choose  - Honor patient and family perspectives and choices  Outcome: Progressing     Problem: Patient/Family Goals  Goal: Patient/Family Long Term Goal  Description: Patient's Long Term Goal:     Interventions:  -   - See additional Care Plan goals for specific interventions  Outcome: Progressing  Goal: Patient/Family Short Term Goal  Description: Patient's Short Term Goal:     Interventions:   -   - See additional Care Plan goals for specific interventions  Outcome: Progressing     Problem: PAIN - ADULT  Goal: Verbalizes/displays adequate comfort level or patient's stated pain goal  Description: INTERVENTIONS:  - Encourage pt to monitor pain and request assistance  - Assess pain using appropriate pain scale  - Administer analgesics based on type and severity of pain and evaluate response  - Implement non-pharmacological measures as appropriate and evaluate response  - Consider cultural and social influences on pain and pain management  - Manage/alleviate anxiety  - Utilize distraction and/or relaxation techniques  - Monitor for opioid side effects  - Notify MD/LIP if interventions unsuccessful or patient reports new pain  - Anticipate increased pain with activity and  pre-medicate as appropriate  Outcome: Progressing     Problem: SAFETY ADULT - FALL  Goal: Free from fall injury  Description: INTERVENTIONS:  - Assess pt frequently for physical needs  - Identify cognitive and physical deficits and behaviors that affect risk of falls.  - [place] fall precautions as indicated by assessment.  - Educate pt/family on patient safety including physical limitations  - Instruct pt to call for assistance with activity based on assessment  - Modify environment to reduce risk of injury  - Provide assistive devices as appropriate  - Consider OT/PT consult to assist with strengthening/mobility  - Encourage toileting schedule  Outcome: Progressing     Problem: RISK FOR INFECTION - ADULT  Goal: Absence of fever/infection during anticipated neutropenic period  Description: INTERVENTIONS  - Monitor WBC  - Administer growth factors as ordered  - Implement neutropenic guidelines  Outcome: Progressing     Problem: DISCHARGE PLANNING  Goal: Discharge to home or other facility with appropriate resources  Description: INTERVENTIONS:  - Identify barriers to discharge w/pt and caregiver  - Include patient/family/discharge partner in discharge planning  - Arrange for needed discharge resources and transportation as appropriate  - Identify discharge learning needs (meds, wound care, etc)  - Arrange for interpreters to assist at discharge as needed  - Consider post-discharge preferences of patient/family/discharge partner  - Complete POLST form as appropriate  - Assess patient's ability to be responsible for managing their own health  - Refer to Case Management Department for coordinating discharge planning if the patient needs post-hospital services based on physician/LIP order or complex needs related to functional status, cognitive ability or social support system  Outcome: Progressing     Problem: RESPIRATORY - ADULT  Goal: Achieves optimal ventilation and oxygenation  Description: INTERVENTIONS:  -  Assess for changes in respiratory status  - Assess for changes in mentation and behavior  - Position to facilitate oxygenation and minimize respiratory effort  - Oxygen supplementation based on oxygen saturation or ABGs  - Provide Smoking Cessation handout, if applicable  - Encourage broncho-pulmonary hygiene including cough, deep breathe, Incentive Spirometry  - Assess the need for suctioning and perform as needed  - Assess and instruct to report SOB or any respiratory difficulty  - Respiratory Therapy support as indicated  - Manage/alleviate anxiety  - Monitor for signs/symptoms of CO2 retention  Outcome: Progressing     Problem: GASTROINTESTINAL - ADULT  Goal: Minimal or absence of nausea and vomiting  Description: INTERVENTIONS:  - Maintain adequate hydration with IV or PO as ordered and tolerated  - Nasogastric tube to low intermittent suction as ordered  - Evaluate effectiveness of ordered antiemetic medications  - Provide nonpharmacologic comfort measures as appropriate  - Advance diet as tolerated, if ordered  - Obtain nutritional consult as needed  - Evaluate fluid balance  Outcome: Progressing  Goal: Maintains or returns to baseline bowel function  Description: INTERVENTIONS:  - Assess bowel function  - Maintain adequate hydration with IV or PO as ordered and tolerated  - Evaluate effectiveness of GI medications  - Encourage mobilization and activity  - Obtain nutritional consult as needed  - Establish a toileting routine/schedule  - Consider collaborating with pharmacy to review patient's medication profile  Outcome: Progressing     Problem: GENITOURINARY - ADULT  Goal: Absence of urinary retention  Description: INTERVENTIONS:  - Assess patient’s ability to void and empty bladder  - Monitor intake/output and perform bladder scan as needed  - Follow urinary retention protocol/standard of care  - Consider collaborating with pharmacy to review patient's medication profile  - Implement strategies to promote  bladder emptying  Outcome: Progressing     Problem: METABOLIC/FLUID AND ELECTROLYTES - ADULT  Goal: Electrolytes maintained within normal limits  Description: INTERVENTIONS:  - Monitor labs and rhythm and assess patient for signs and symptoms of electrolyte imbalances  - Administer electrolyte replacement as ordered  - Monitor response to electrolyte replacements, including rhythm and repeat lab results as appropriate  - Fluid restriction as ordered  - Instruct patient on fluid and nutrition restrictions as appropriate  Outcome: Progressing     Problem: SKIN/TISSUE INTEGRITY - ADULT  Goal: Skin integrity remains intact  Description: INTERVENTIONS  - Assess and document risk factors for pressure ulcer development  - Assess and document skin integrity  - Monitor for areas of redness and/or skin breakdown  - Initiate interventions, skin care algorithm/standards of care as needed  Outcome: Progressing  Goal: Incision(s), wounds(s) or drain site(s) healing without S/S of infection  Description: INTERVENTIONS:  - Assess and document risk factors for pressure ulcer development  - Assess and document skin integrity  - Assess and document dressing/incision, wound bed, drain sites and surrounding tissue  - Implement wound care per orders  - Initiate isolation precautions as appropriate  - Initiate Pressure Ulcer prevention bundle as indicated  Outcome: Progressing     Problem: HEMATOLOGIC - ADULT  Goal: Maintains hematologic stability  Description: INTERVENTIONS  - Assess for signs and symptoms of bleeding or hemorrhage  - Monitor labs and vital signs for trends  - Administer supportive blood products/factors, fluids and medications as ordered and appropriate  - Administer supportive blood products/factors as ordered and appropriate  Outcome: Progressing  Goal: Free from bleeding injury  Description: (Example usage: patient with low platelets)  INTERVENTIONS:  - Avoid intramuscular injections, enemas and rectal medication  administration  - Ensure safe mobilization of patient  - Hold pressure on venipuncture sites to achieve adequate hemostasis  - Assess for signs and symptoms of internal bleeding  - Monitor lab trends  - Patient is to report abnormal signs of bleeding to staff  - Avoid use of toothpicks and dental floss  - Use electric shaver for shaving  - Use soft bristle tooth brush  - Limit straining and forceful nose blowing  Outcome: Progressing     Problem: MUSCULOSKELETAL - ADULT  Goal: Return mobility to safest level of function  Description: INTERVENTIONS:  - Assess patient stability and activity tolerance for standing, transferring and ambulating w/ or w/o assistive devices  - Assist with transfers and ambulation using safe patient handling equipment as needed  - Ensure adequate protection for wounds/incisions during mobilization  - Obtain PT/OT consults as needed  - Advance activity as appropriate  - Communicate ordered activity level and limitations with patient/family  Outcome: Progressing  Goal: Maintain proper alignment of affected body part  Description: INTERVENTIONS:  - Support and protect limb and body alignment per provider's orders  - Instruct and reinforce with patient and family use of appropriate assistive device and precautions (e.g. spinal or hip dislocation precautions)  Outcome: Progressing

## 2024-04-30 NOTE — PAYOR COMM NOTE
--------------  CONTINUED STAY REVIEW  4/30  Payor: [employer]  Subscriber #:  [id number]  Authorization Number: [id number]    Admit date: 4/23/24  Admit time:  5:46 AM    REVIEW DOCUMENTATION:  4/30  Assessment/Plan:      Active Problems:    Ovarian cancer (HCC)     Lizeth Mcgill is a 83 year old female with ovarian CA, HTN, breaset CA, DM2, hypothyroidism who presented for hysterectomy-BSO, course complicated by acute blood loss anemia, hypoxia, as well as fever and leukocytosis, with concerns of possible postoperative fluid collection/hematoma with possible infection, on IV Zosyn with plans for aspiration by IR on 4/30.     Ovarian CA  S/p robotic assisted total lap hysterectomy-BSO, debulking, omentectomy  - PRN pain meds, Transition to PO when able  - monitor for acute blood loss anemia   - Bowel reg, antiemetics  - DVT Prophy- lovenox (held with ABLA and hematoma per GYN)  - Bernardo removed     Fever / abd pain  Dysuria  Fluid collection/hematoma  - CT abd pelvis, with fluid collection, urine culture negative  - given fever, elevated WBC, and neg Ucx, will need to assume fluid collection is poss infected  - zosyn started on 4/28  - discussed with Dr. Couch, rec IR consult for aspiration  - IR consulted, plan for poss aspiration 4/30  -White blood cell count improving, fever curve improved     Acute resp failure w hypoxia  - 70% w ambulating per pt. On 3L NC 4/24 AM  - Suspect atelectasis. Minimal basilar air movement on exam  - Can hold off on imaging for now  - OOB, encourage IS, ambulate TID  - Supplemental O2 for goal SpO2 90-93%. On 2L NC 4/24 AM   - Encourage IS  - CXR mild/moderate Left LL with effusion/atelectasis   - give dose of IV lasix today  - on RA this morning but sats 90%  - ECG with PAc's not afib  - CT chest without PE  -Now on room air     Acute on chronic anemia   - Presume 2/2 expected post-op ABLA w dilutional component from IVF  - No active bleeding  - Trend CBC  - 4/25 hgb < 7 - 1u  RBC  - hgb 7.4-> 8.5-. 7.4--> 7.5     HTN  - BP stable  - hold home lisinopril for now     Anxiety  - clonazepam nightly     Hypothyroidism  - home sythroid     GERD  - PPI     ACP: Full Code  Ppx: DVT: Enox  Dispo  EDoD: pending course  F/u:   - PCP:  Chong Cornejo MD      MEDICATIONS ADMINISTERED IN LAST 1 DAY:  acetaminophen (Tylenol) tab 650 mg       Date Action Dose Route User    4/29/2024 2200 Given 650 mg Oral Rayne Boyd RN          atorvastatin (Lipitor) tab 10 mg       Date Action Dose Route User    4/29/2024 2201 Given 10 mg Oral Rayne Boyd RN          clonazePAM (KlonoPIN) tab 0.5 mg       Date Action Dose Route User    4/29/2024 2201 Given 0.5 mg Oral Rayne Boyd RN          DULoxetine (Cymbalta) DR cap 60 mg       Date Action Dose Route User    4/29/2024 2201 Given 60 mg Oral Rayne Boyd RN          gabapentin (Neurontin) cap 300 mg       Date Action Dose Route User    4/30/2024 0856 Given 300 mg Oral Nona Khan RN    4/29/2024 2201 Given 300 mg Oral Rayne Boyd RN          levothyroxine (Synthroid) tab 50 mcg       Date Action Dose Route User    4/30/2024 0858 Given 50 mcg Oral Nona Khan RN          magnesium oxide (Mag-Ox) tab 400 mg       Date Action Dose Route User    4/29/2024 1751 Given 400 mg Oral Ezio Lanza RN          magnesium oxide (Mag-Ox) tab 400 mg       Date Action Dose Route User    4/30/2024 0856 Given 400 mg Oral Nona Khan RN          pantoprazole (Protonix) DR tab 20 mg       Date Action Dose Route User    4/30/2024 0858 Given 20 mg Oral Nona Khan RN          piperacillin-tazobactam (Zosyn) 3.375 g in dextrose 5% 100 mL IVPB-ADDV       Date Action Dose Route User    4/30/2024 1300 New Bag 3.375 g Intravenous Nona Khan RN    4/30/2024 0528 New Bag 3.375 g Intravenous Rayne Boyd RN    4/29/2024 2201 New Bag 3.375 g Intravenous Rayne Boyd RN    4/29/2024 1325 New Bag 3.375 g Intravenous Ezio Lanza, RN          vancomycin (Vancocin) cap 125 mg        Date Action Dose Route User    4/30/2024 0856 Given 125 mg Oral Nona Khan RN            Vitals (last day)       Date/Time Temp Pulse Resp BP SpO2 Weight O2 Device O2 Flow Rate (L/min) Who    04/30/24 1239 98.1 °F (36.7 °C) 91 18 128/65 96 % -- None (Room air) -- BS    04/30/24 0439 98 °F (36.7 °C) 81 16 110/52 90 % -- None (Room air) -- DS    04/29/24 2039 98.4 °F (36.9 °C) 88 18 128/47 94 % -- None (Room air) -- DS    04/29/24 1751 98.5 °F (36.9 °C) -- -- -- -- -- -- -- AR    04/29/24 1322 98 °F (36.7 °C) -- -- 129/78 -- -- -- -- AR    04/29/24 1041 98 °F (36.7 °C) -- -- -- -- -- -- -- AR    04/29/24 1005 -- 95 -- 126/39 -- -- -- -- DT    04/29/24 0600 99.4 °F (37.4 °C) -- -- -- 92 % -- None (Room air) -- SR    04/29/24 0505 100.1 °F (37.8 °C) 92 16 126/56 91 % -- None (Room air) -- DS                 Blood Transfusion Record       Product Unit Status Volume Start End            Transfuse RBC       24  [id number]  [id number] Completed 04/25/24 1706 293.75 mL 04/25/24 1100 04/25/24 1300

## 2024-04-30 NOTE — PLAN OF CARE
No acute changes over night. Pt is alert and oriented on RA. Lovenox is on hold for CT guided draining of abscess. Pt voiding via purewick. Pt is NPO for procedure. Pt denies pain. Pt is up with 1 assist and a walker. Call light is within reach and safety within reach.     Problem: SKIN/TISSUE INTEGRITY - ADULT  Goal: Skin integrity remains intact  Description: INTERVENTIONS  - Assess and document risk factors for pressure ulcer development  - Assess and document skin integrity  - Monitor for areas of redness and/or skin breakdown  - Initiate interventions, skin care algorithm/standards of care as needed  Outcome: Progressing  Goal: Incision(s), wounds(s) or drain site(s) healing without S/S of infection  Description: INTERVENTIONS:  - Assess and document risk factors for pressure ulcer development  - Assess and document skin integrity  - Assess and document dressing/incision, wound bed, drain sites and surrounding tissue  - Implement wound care per orders  - Initiate isolation precautions as appropriate  - Initiate Pressure Ulcer prevention bundle as indicated  Outcome: Progressing     Problem: GASTROINTESTINAL - ADULT  Goal: Minimal or absence of nausea and vomiting  Description: INTERVENTIONS:  - Maintain adequate hydration with IV or PO as ordered and tolerated  - Nasogastric tube to low intermittent suction as ordered  - Evaluate effectiveness of ordered antiemetic medications  - Provide nonpharmacologic comfort measures as appropriate  - Advance diet as tolerated, if ordered  - Obtain nutritional consult as needed  - Evaluate fluid balance  Outcome: Progressing  Goal: Maintains or returns to baseline bowel function  Description: INTERVENTIONS:  - Assess bowel function  - Maintain adequate hydration with IV or PO as ordered and tolerated  - Evaluate effectiveness of GI medications  - Encourage mobilization and activity  - Obtain nutritional consult as needed  - Establish a toileting routine/schedule  - Consider  collaborating with pharmacy to review patient's medication profile  Outcome: Progressing     Problem: METABOLIC/FLUID AND ELECTROLYTES - ADULT  Goal: Electrolytes maintained within normal limits  Description: INTERVENTIONS:  - Monitor labs and rhythm and assess patient for signs and symptoms of electrolyte imbalances  - Administer electrolyte replacement as ordered  - Monitor response to electrolyte replacements, including rhythm and repeat lab results as appropriate  - Fluid restriction as ordered  - Instruct patient on fluid and nutrition restrictions as appropriate  Outcome: Progressing     Problem: GENITOURINARY - ADULT  Goal: Absence of urinary retention  Description: INTERVENTIONS:  - Assess patient’s ability to void and empty bladder  - Monitor intake/output and perform bladder scan as needed  - Follow urinary retention protocol/standard of care  - Consider collaborating with pharmacy to review patient's medication profile  - Implement strategies to promote bladder emptying  Outcome: Progressing     Problem: RESPIRATORY - ADULT  Goal: Achieves optimal ventilation and oxygenation  Description: INTERVENTIONS:  - Assess for changes in respiratory status  - Assess for changes in mentation and behavior  - Position to facilitate oxygenation and minimize respiratory effort  - Oxygen supplementation based on oxygen saturation or ABGs  - Provide Smoking Cessation handout, if applicable  - Encourage broncho-pulmonary hygiene including cough, deep breathe, Incentive Spirometry  - Assess the need for suctioning and perform as needed  - Assess and instruct to report SOB or any respiratory difficulty  - Respiratory Therapy support as indicated  - Manage/alleviate anxiety  - Monitor for signs/symptoms of CO2 retention  Outcome: Progressing     Problem: HEMATOLOGIC - ADULT  Goal: Maintains hematologic stability  Description: INTERVENTIONS  - Assess for signs and symptoms of bleeding or hemorrhage  - Monitor labs and vital  signs for trends  - Administer supportive blood products/factors, fluids and medications as ordered and appropriate  - Administer supportive blood products/factors as ordered and appropriate  Outcome: Progressing  Goal: Free from bleeding injury  Description: (Example usage: patient with low platelets)  INTERVENTIONS:  - Avoid intramuscular injections, enemas and rectal medication administration  - Ensure safe mobilization of patient  - Hold pressure on venipuncture sites to achieve adequate hemostasis  - Assess for signs and symptoms of internal bleeding  - Monitor lab trends  - Patient is to report abnormal signs of bleeding to staff  - Avoid use of toothpicks and dental floss  - Use electric shaver for shaving  - Use soft bristle tooth brush  - Limit straining and forceful nose blowing  Outcome: Progressing     Problem: MUSCULOSKELETAL - ADULT  Goal: Return mobility to safest level of function  Description: INTERVENTIONS:  - Assess patient stability and activity tolerance for standing, transferring and ambulating w/ or w/o assistive devices  - Assist with transfers and ambulation using safe patient handling equipment as needed  - Ensure adequate protection for wounds/incisions during mobilization  - Obtain PT/OT consults as needed  - Advance activity as appropriate  - Communicate ordered activity level and limitations with patient/family  Outcome: Progressing  Goal: Maintain proper alignment of affected body part  Description: INTERVENTIONS:  - Support and protect limb and body alignment per provider's orders  - Instruct and reinforce with patient and family use of appropriate assistive device and precautions (e.g. spinal or hip dislocation precautions)  Outcome: Progressing     Problem: SAFETY ADULT - FALL  Goal: Free from fall injury  Description: INTERVENTIONS:  - Assess pt frequently for physical needs  - Identify cognitive and physical deficits and behaviors that affect risk of falls.  - [place] fall  precautions as indicated by assessment.  - Educate pt/family on patient safety including physical limitations  - Instruct pt to call for assistance with activity based on assessment  - Modify environment to reduce risk of injury  - Provide assistive devices as appropriate  - Consider OT/PT consult to assist with strengthening/mobility  - Encourage toileting schedule  Outcome: Progressing     Problem: RISK FOR INFECTION - ADULT  Goal: Absence of fever/infection during anticipated neutropenic period  Description: INTERVENTIONS  - Monitor WBC  - Administer growth factors as ordered  - Implement neutropenic guidelines  Outcome: Progressing     Problem: DISCHARGE PLANNING  Goal: Discharge to home or other facility with appropriate resources  Description: INTERVENTIONS:  - Identify barriers to discharge w/pt and caregiver  - Include patient/family/discharge partner in discharge planning  - Arrange for needed discharge resources and transportation as appropriate  - Identify discharge learning needs (meds, wound care, etc)  - Arrange for interpreters to assist at discharge as needed  - Consider post-discharge preferences of patient/family/discharge partner  - Complete POLST form as appropriate  - Assess patient's ability to be responsible for managing their own health  - Refer to Case Management Department for coordinating discharge planning if the patient needs post-hospital services based on physician/LIP order or complex needs related to functional status, cognitive ability or social support system  Outcome: Progressing

## 2024-05-01 DIAGNOSIS — R18.8 PELVIC FLUID COLLECTION: Primary | ICD-10-CM

## 2024-05-01 LAB
ANION GAP SERPL CALC-SCNC: 7 MMOL/L (ref 0–18)
BASOPHILS # BLD AUTO: 0.02 X10(3) UL (ref 0–0.2)
BASOPHILS NFR BLD AUTO: 0.3 %
BUN BLD-MCNC: 11 MG/DL (ref 9–23)
BUN/CREAT SERPL: 17.5 (ref 10–20)
CALCIUM BLD-MCNC: 9.6 MG/DL (ref 8.7–10.4)
CHLORIDE SERPL-SCNC: 106 MMOL/L (ref 98–112)
CO2 SERPL-SCNC: 26 MMOL/L (ref 21–32)
CREAT BLD-MCNC: 0.63 MG/DL
DEPRECATED RDW RBC AUTO: 76.6 FL (ref 35.1–46.3)
EGFRCR SERPLBLD CKD-EPI 2021: 88 ML/MIN/1.73M2 (ref 60–?)
EOSINOPHIL # BLD AUTO: 0.15 X10(3) UL (ref 0–0.7)
EOSINOPHIL NFR BLD AUTO: 2.2 %
ERYTHROCYTE [DISTWIDTH] IN BLOOD BY AUTOMATED COUNT: 19.9 % (ref 11–15)
GLUCOSE BLD-MCNC: 113 MG/DL (ref 70–99)
HCT VFR BLD AUTO: 22.8 %
HGB BLD-MCNC: 7.3 G/DL
IMM GRANULOCYTES # BLD AUTO: 0.02 X10(3) UL (ref 0–1)
IMM GRANULOCYTES NFR BLD: 0.3 %
LYMPHOCYTES # BLD AUTO: 0.63 X10(3) UL (ref 1–4)
LYMPHOCYTES NFR BLD AUTO: 9.1 %
MAGNESIUM SERPL-MCNC: 1.4 MG/DL (ref 1.6–2.6)
MCH RBC QN AUTO: 33.8 PG (ref 26–34)
MCHC RBC AUTO-ENTMCNC: 32 G/DL (ref 31–37)
MCV RBC AUTO: 105.6 FL
MONOCYTES # BLD AUTO: 0.52 X10(3) UL (ref 0.1–1)
MONOCYTES NFR BLD AUTO: 7.5 %
NEUTROPHILS # BLD AUTO: 5.62 X10 (3) UL (ref 1.5–7.7)
NEUTROPHILS # BLD AUTO: 5.62 X10(3) UL (ref 1.5–7.7)
NEUTROPHILS NFR BLD AUTO: 80.6 %
OSMOLALITY SERPL CALC.SUM OF ELEC: 288 MOSM/KG (ref 275–295)
PHOSPHATE SERPL-MCNC: 4.2 MG/DL (ref 2.4–5.1)
PLATELET # BLD AUTO: 321 10(3)UL (ref 150–450)
POTASSIUM SERPL-SCNC: 3.8 MMOL/L (ref 3.5–5.1)
RBC # BLD AUTO: 2.16 X10(6)UL
SODIUM SERPL-SCNC: 139 MMOL/L (ref 136–145)
WBC # BLD AUTO: 7 X10(3) UL (ref 4–11)

## 2024-05-01 PROCEDURE — 85025 COMPLETE CBC W/AUTO DIFF WBC: CPT | Performed by: PHYSICIAN ASSISTANT

## 2024-05-01 PROCEDURE — 84100 ASSAY OF PHOSPHORUS: CPT | Performed by: INTERNAL MEDICINE

## 2024-05-01 PROCEDURE — 83735 ASSAY OF MAGNESIUM: CPT | Performed by: HOSPITALIST

## 2024-05-01 PROCEDURE — 97530 THERAPEUTIC ACTIVITIES: CPT

## 2024-05-01 PROCEDURE — 80048 BASIC METABOLIC PNL TOTAL CA: CPT | Performed by: PHYSICIAN ASSISTANT

## 2024-05-01 RX ORDER — MAGNESIUM OXIDE 400 MG/1
800 TABLET ORAL ONCE
Status: COMPLETED | OUTPATIENT
Start: 2024-05-01 | End: 2024-05-01

## 2024-05-01 NOTE — PROGRESS NOTES
[facility] POST-OPERATIVE VIS[facility]     MEDICAL RECORD #: [id number] DATE OF SERVICE: 5/1/2024             Reason for visit:  Post-operative day eight     HISTORY OF PRESENT ILLNESS:  83 year old female with a history of incidental finding of bladder mass, pelvic mass, and peritoneal nodules. She was found to have a low-grade papillary urothelial bladder cancer, underwent transurethral resection. PET scan showed peritoneal implants and the lesion in the right ovary. 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 Immunohistochemistry was not typical for gynecologic malignancies, although this was thought to be the leading differential diagnosis. Her tumor markers were all negative. The patient was started on neoadjuvant chemotherapy, has undergone 6 cycles.  She underwent Laparoscopic lysis of adhesions (encompassing over 20 minutes), robotic-assisted total laparoscopic hysterectomy and bilateral salpingo-oophorectomy with tumor debulking, bilateral pelvic and paraaortic lymph node dissection, infracolic and portion of supracolic omentectomy on 4/23/24.    GI//GYNE Complaint:   Other complaints: Tolerating diet, no N/V. Urinating freely with new dysuria. +belching, +BM. Pain controlled, however was having drain site pain this AM. Denies vaginal bleeding.         Interim History:  Underwent surgery  Febrile - CT A/P with fluid collection  S/p IR drain placement    Patient's medications, allergies, past medical, surgical, social and family histories were reviewed and updated as appropriate.    Pathology/Lab Results:  Recent Results (from the past 720 hour(s))   CBC, Platelet; No Differential    Collection Time: 04/19/24  1:54 PM   Result Value Ref Range    WBC 8.0 4.0 - 11.0 x10(3) uL    RBC 2.42 (L) 3.80 - 5.30 x10(6)uL    HGB 8.8 (L) 12.0 - 16.0 g/dL    HCT 28.5 (L) 35.0 - 48.0 %    .8 (H) 80.0 - 100.0 fL    MCH 36.4 (H) 26.0 - 34.0 pg    MCHC 30.9 (L) 31.0 - 37.0 g/dL     RDW 15.5 (H) 11.0 - 15.0 %    RDW-SD 67.1 (H) 35.1 - 46.3 fL    .0 150.0 - 450.0 10(3)uL   ABORH (Blood Type)    Collection Time: 04/19/24  1:54 PM   Result Value Ref Range    ABO BLOOD TYPE A     RH BLOOD TYPE Positive    Antibody Screen    Collection Time: 04/19/24  1:54 PM   Result Value Ref Range    Antibody Screen Negative    MD BLOOD SMEAR CONSULT    Collection Time: 04/19/24  1:54 PM   Result Value Ref Range    MD Blood Smear Consult       Evaluation of the peripheral blood smear demonstrates moderate macrocytic anemia characterized by moderate anisopoikilocytosis with macrocytes, ovalocytes and polychromasia. Neutrophils display distinct cytoplasmic granulation. No circulating blasts seen.    Differential considerations for macrocytic anemia may include vitamin B12 or folate deficiency, autoimmune hemolytic anemia, cold agglutinin disease, liver disease, some myelodysplasias, thyroid disease, excessive alcohol consumption and drug effects.     Clinical correlation is recommended.     Reviewed by IRAIDA Vázquez M.D.   Cytology fluids    Collection Time: 04/23/24  8:27 AM   Result Value Ref Range    Case Report       Non-Gynecologic Cytology                          Case: [id number]                                   Authorizing Provider:  Rigoberto Couch MD         Collected:           04/23/2024 08:27 AM          Ordering Location:     [facility]          Received:            04/23/2024 10:44 AM                                 Operating Room                                                               Pathologist:           Cooper Dahl MD                                                         Specimen:    Pelvic washings, 1. PELVIC WASHINGS-FOR CYTOLOGY                                           General Categorization         Benign, inflammatory, reactive or reparative changes    Final Diagnosis:         Pelvic washings:   Adequacy: Satisfactory for evaluation  General Category: Benign,  inflammatory, reactive, or reparative changes  Diagnosis:  Benign mesothelial cells present  Histiocytes present  Peripheral blood elements present  No malignant cells identified        Embedded Images      Final Diagnosis Comment         Recommend correlation with histologic findings ([id number]).            Procedure       Monolayers:  1  Cytospins:     2      Clinical Information       Carcinoma, Malignant Neoplasm Of Right Ovary.        Non Gyne Interpretation  Benign      Gross Description       Volume (ml): 25    Color: Pink     Specimen to Pathology Tissue    Collection Time: 04/23/24  9:09 AM   Result Value Ref Range    Case Report       Surgical Pathology                                Case: [id number]                                  Authorizing Provider:  Rigoberto Couch MD         Collected:           04/23/2024 09:09 AM          Ordering Location:     [facility]          Received:            04/23/2024 11:24 AM                                 Operating Room                                                               Pathologist:           Cesia Escalante MD                                                                           Specimens:   A) - Uterus and cervix, bilateral tubes and ovaries, 1. UTERUS AND CERVIX, BILATERAL                TUBES AND OVARIES                                                                                   B) - Omentum, 2. INFRACOLIC OMENTUM, AND PORTION OF SUPRACOLIC OMENTUM                              C) - Lymph node pelvic right, 3. RIGHT PELVIC LYMPH NODES                                            D) - Lymph node pelvic left, 4. LEFT PELVIC LYMPH NODES                                             E) - Lymph node (ro-aortic), 5. RO-AORTIC LYMPH NODES                                  Final Diagnosis:         A. 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  Atrophic endometrium with cystically dilated glands.  Endosalpingosis and leiomyomas, 1 cm in greatest dimension grossly.  Cervix with metaplastic changes, calcifications, acute and chronic inflammation.  Bilateral multiple simple ovarian cysts.  Right fallopian tube with paratubal cyst 7 mm in greatest dimension grossly.  Left fallopian tube with no significant pathological abnormalities.  Negative inked surgical resection margins.  Pathological stage classification (pTNM, AJCC 8th edition): ypT1b ypN0.    B. Infracolic omentum and portion of supracolic omentum; omentectomy:   Fatty tissue with degenerative changes, calcifications, vascular congestion and fibrosis.  No definite evidence of carcinoma is identified.    C. Right pelvic lymph node; dissection:   One lymph node, negative for metastatic carcinoma (0/1).  AE1/AE3 immunohistochemical stain is negative (block C1).     D. Left pelvic lymph node; dissection:   One lymph node, negative for metastatic carcinoma (0/1).  AE1/AE3 immunohistochemical stain is negative (block D1).     E. Celina-aortic lymph nodes; dissection:   Five lymph nodes, negative for metastatic carcinoma (0/5).  AE1/AE3 immunohistochemical stain is negative (blocks E1-E2).      Embedded Images      Final Diagnosis Comment       Immunohistochemical stains (CK20, CK7, AE1/AE3, PAX8, p53 and WT1) are performed on block A18 for further characterization.  The neoplastic cells are positive for PAX8, AE1/AE3 and CK7.  The neoplastic cells are negative for CK20 and WT1.  P53 shows strong nuclear positivity consistent with aberrant (mutational type) expression.    For  purposes, this case was reviewed by a second pathologist who concurred  with the diagnosis.    The immunohistochemical stains interpreted in this case demonstrate appropriate reactivity of positive controls.  The stains were performed on formalin-fixed, paraffin-embedded tissue sections with each antibody analysis being performed on a separate slide.        Synoptic Report       OVARY or FALLOPIAN TUBE or PRIMARY PERITONEUM   OVARY OR FALLOPIAN TUBE OR PRIMARY PERITONEUM - All Specimens   8th Edition - Protocol posted: 3/22/2023      SPECIMEN      Procedure:    Total hysterectomy and bilateral salpingo-oophorectomy       Hysterectomy Type:    Laparoscopic, robotic-assisted       Specimen Integrity:            Right Ovary Integrity:    Capsule intact       Specimen Integrity:            Left Ovary Integrity:    Capsule intact       TUMOR      Tumor Site:    Bilateral ovaries       Tumor Size:    Cannot be determined: Status post neoadjuvant chemotherapy       Histologic Type:    High grade serous carcinoma       Other Tissue / Organ Involvement:    Not identified       Peritoneal / Ascitic Fluid Involvement:    Malignant cells not identified       Chemotherapy Response Score (CRS):    CRS2 (moderate response)       REGIONAL LYMPH NODES      Regional Lymph Node Status:            :    All regional lymph nodes negative for tumor cells         Number of Lymph Nodes Examined:    7       pTNM CLASSIFICATION (AJCC 8th Edition)      Reporting of pT, pN, and (when applicable) pM categories is based on information available to the pathologist at the time the report is issued. As per the AJCC (Chapter 1, 8th Ed.) it is the managing physician’s responsibility to establish the final pathologic stage based upon all pertinent information, including but potentially not limited to this pathology report.      Modified Classification:    y       pT Category:    pT1b       pN Category:    pN0       Clinical Information       Carcinoma, Malignant Neoplasm Of Right Ovary.        Gross Description        Specimen A is labeled \"Walke, uterus and cervix, bilateral tubes and ovaries\" received in formalin. The specimen consists of a 98 gram total hysterectomy including uterus with cervix, and bilateral fallopian tubes and ovaries. The uterus and cervix measure 7.0 cm in length, 3.0 cm from cornu to cornu, 2.8 cm from anterior to posterior. The uterus is covered with pink-red smooth serosa. The ectocervix is covered with pink-tan smooth mucosa and measures 3.4 x 3.0 cm in transverse dimension. The cervical os is ovoid and measures 0.4 x 0.3 cm. The anterior aspect is inked blue. the posterior aspect is inked black. The cervix and uterus are bivalved and fixed for a few hours. The uterus is further sectioned to reveal a pink-tan herringbone endocervix with a 2.6 cm endocervical canal. The endometrium is pink-gray, granular to smooth, with a 5.3 x 2.8 cm dilated endometrial cavity. The uterus is sectioned to reveal multiple intramural fibroids (0.2-1.0 cm in greatest dimension). The remaining myometrium is pink-tan and trabeculated with no other definitive masses or lesions grossly identified. The greatest endometrium thickness is 0.1 cm. The greatest myometrium thickness is 1.1 cm.     The right ovary is cystic/enlarged and measures 5.5 x 4.2 x 3.6 cm. The capsule is intact. The capsule is inked blue and the ovary is sectioned to reveal a inner white-tan mass and two bulging cystic lesions. The white-tan mass measures 2.5 x 2.0 x 1.8 cm. The larger cystic lesion (3.5 cm in greatest dimension) is filled with serosanguinous fluid. The small cystic lesion is filled with a yellow-tan gelatinous material. The cyst linings are smooth without papillary excrescences or solid areas identified. The right fallopian tube measures 5.5 cm in length by 0.5 cm in diameter and presents one paratubal cyst filled with clear fluid (0.7 cm in greatest dimension).     The left fallopian tube measures 3.6 cm in length by 0.4 cm in diameter and  is grossly unremarkable. The left ovary is yellowish pink and lobulated measures 1.9 x 1.5 x 1.0 cm and presents a inner white-tan mass (0.9 cm in greatest dimension). Also noted is a inner cyst filled with clear fluid (0.7 cm in greatest dimension). Additionally there is a left para-adnexal nodule (1.4 x 1.0 x 0.9 cm). This nodule is inked green.     Representative sections of the uterus are submitted as follows:   A1, anterior cervix  A2, posterior cervix   A3, anterior lower uterine segment, full-thickness transverse sections  A4-A5, full-thickness anterior endometrium from mid to upper including some fibroids (two per cassette)  A6, posterior lower uterine segment, full-thickness transverse sections  A7-A10, full-thickness posterior endomyometrium from mid to upper including fibroids  A11, left parametrial soft tissue  A12, right parametrial soft tissue   A13-A16, right ovarian mass with adjacent smaller cystic lesion   A17-A18, larger right ovarian cystic lesion   A19, right fallopian tube with paratubal cyst  A20, sections of left fallopian tube  A21, left ovary including inner mass and cyst, bisected, entirely  A22, left para-adnexa nodule, entirely.       Specimen B is labeled \"Walke, infracolic omentum and portion of supracolic omentum\" received in formalin. The specimen consists of one unoriented piece of yellow-tan omentum measuring 26.5 x 9.2 x 1.4 cm. There is a undesignated tag attached to the mid section of the omentum. The omentum is sectioned to reveal a homogenous fatty parenchyma with focal areas of vascular congestion. No lesions, masses or evidence of tumor invasion are grossly identified. Representative sections are submitted as follows: B1, omentum with tag; B2-B3, additional random omentum.      Specimen C is labeled \"Walke, right pelvic lymph nodes\" received in formalin. The specimen consists of one piece of yellow-tan fatty tissue measuring 4.5 x 3.2 x 0.9 cm. Dissection of the tissue  reveals one lymph node candidate (1.5 cm in greatest dimension). The candidate is bisected and submitted entirely in cassette C1.     Specimen D is labeled \"Walke, left pelvic lymph nodes\" received in formalin. The specimen consists of multiple piece of fatty tissue measuring in aggregate 4.5 x 4.0 x 0.9 cm. Dissection of the tissue reveals no lymph nodes. The tissue is submitted entirely in cassettes D1-D2.     Specimen E is labeled \"Walke, periaortic lymph nodes\" received in formalin. The specimen consists of multiple pieces of yellow-tan fatty tissue measuring in aggregate 3.2 x 3.0 x 1.0 cm. Dissection of the tissue reveals four lymph node candidates (0.1-0.5 cm in greatest dimension). The candidates are submitted entirely in cassette E1. The remaining tissue is submitted in cassette E2. (jq)     eCsia Escalante M.D./mtt        Interpretation Malignant (A)     POCT Glucose    Collection Time: 04/23/24 11:27 AM   Result Value Ref Range    POC Glucose  143 (H) 70 - 99 mg/dL   Basic Metabolic Panel (8)    Collection Time: 04/24/24 10:16 AM   Result Value Ref Range    Glucose 154 (H) 70 - 99 mg/dL    Sodium 141 136 - 145 mmol/L    Potassium 3.9 3.5 - 5.1 mmol/L    Chloride 109 98 - 112 mmol/L    CO2 26.0 21.0 - 32.0 mmol/L    Anion Gap 6 0 - 18 mmol/L    BUN 14 9 - 23 mg/dL    Creatinine 0.64 0.55 - 1.02 mg/dL    BUN/CREA Ratio 21.9 (H) 10.0 - 20.0    Calcium, Total 9.4 8.7 - 10.4 mg/dL    Calculated Osmolality 296 (H) 275 - 295 mOsm/kg    eGFR-Cr 88 >=60 mL/min/1.73m2   CBC W/ DIFFERENTIAL    Collection Time: 04/24/24 10:34 AM   Result Value Ref Range    WBC 12.2 (H) 4.0 - 11.0 x10(3) uL    RBC 2.16 (L) 3.80 - 5.30 x10(6)uL    HGB 7.9 (L) 12.0 - 16.0 g/dL    HCT 25.0 (L) 35.0 - 48.0 %    .7 (H) 80.0 - 100.0 fL    MCH 36.6 (H) 26.0 - 34.0 pg    MCHC 31.6 31.0 - 37.0 g/dL    RDW-SD 64.8 (H) 35.1 - 46.3 fL    RDW 15.0 11.0 - 15.0 %    .0 150.0 - 450.0 10(3)uL    Neutrophil Absolute Prelim 11.07  (H) 1.50 - 7.70 x10 (3) uL    Neutrophil Absolute 11.07 (H) 1.50 - 7.70 x10(3) uL    Lymphocyte Absolute 0.56 (L) 1.00 - 4.00 x10(3) uL    Monocyte Absolute 0.56 0.10 - 1.00 x10(3) uL    Eosinophil Absolute 0.00 0.00 - 0.70 x10(3) uL    Basophil Absolute 0.01 0.00 - 0.20 x10(3) uL    Immature Granulocyte Absolute 0.04 0.00 - 1.00 x10(3) uL    Neutrophil % 90.4 %    Lymphocyte % 4.6 %    Monocyte % 4.6 %    Eosinophil % 0.0 %    Basophil % 0.1 %    Immature Granulocyte % 0.3 %   Basic Metabolic Panel (8)    Collection Time: 04/25/24  5:47 AM   Result Value Ref Range    Glucose 113 (H) 70 - 99 mg/dL    Sodium 142 136 - 145 mmol/L    Potassium 3.7 3.5 - 5.1 mmol/L    Chloride 109 98 - 112 mmol/L    CO2 29.0 21.0 - 32.0 mmol/L    Anion Gap 4 0 - 18 mmol/L    BUN 11 9 - 23 mg/dL    Creatinine 0.55 0.55 - 1.02 mg/dL    BUN/CREA Ratio 20.0 10.0 - 20.0    Calcium, Total 9.5 8.7 - 10.4 mg/dL    Calculated Osmolality 294 275 - 295 mOsm/kg    eGFR-Cr 91 >=60 mL/min/1.73m2   CBC W/ DIFFERENTIAL    Collection Time: 04/25/24  6:20 AM   Result Value Ref Range    WBC 6.4 4.0 - 11.0 x10(3) uL    RBC 1.82 (L) 3.80 - 5.30 x10(6)uL    HGB 6.6 (LL) 12.0 - 16.0 g/dL    HCT 21.3 (L) 35.0 - 48.0 %    .0 (H) 80.0 - 100.0 fL    MCH 36.3 (H) 26.0 - 34.0 pg    MCHC 31.0 31.0 - 37.0 g/dL    RDW-SD 63.8 (H) 35.1 - 46.3 fL    RDW 15.0 11.0 - 15.0 %    .0 150.0 - 450.0 10(3)uL    Neutrophil Absolute Prelim 5.47 1.50 - 7.70 x10 (3) uL    Neutrophil Absolute 5.47 1.50 - 7.70 x10(3) uL    Lymphocyte Absolute 0.49 (L) 1.00 - 4.00 x10(3) uL    Monocyte Absolute 0.34 0.10 - 1.00 x10(3) uL    Eosinophil Absolute 0.05 0.00 - 0.70 x10(3) uL    Basophil Absolute 0.02 0.00 - 0.20 x10(3) uL    Immature Granulocyte Absolute 0.03 0.00 - 1.00 x10(3) uL    Neutrophil % 85.4 %    Lymphocyte % 7.7 %    Monocyte % 5.3 %    Eosinophil % 0.8 %    Basophil % 0.3 %    Immature Granulocyte % 0.5 %   ABORH (Blood Type)    Collection Time: 04/25/24  7:57 AM    Result Value Ref Range    ABO BLOOD TYPE A     RH BLOOD TYPE Positive    Antibody Screen    Collection Time: 04/25/24  7:57 AM   Result Value Ref Range    Antibody Screen Negative    Prepare RBC STAT    Collection Time: 04/25/24 10:44 AM   Result Value Ref Range    Blood Product [id number]     Unit Number [id number]     UNIT ABO A     UNIT RH POS     Product Status Released from [employer]     Expiration Date [id number]     Blood Type Barcode 6200     Unit Volume 350 ml   Hemoglobin & Hematocrit    Collection Time: 04/25/24  2:07 PM   Result Value Ref Range    HGB 8.1 (L) 12.0 - 16.0 g/dL    HCT 23.9 (L) 35.0 - 48.0 %   Prepare RBC Once    Collection Time: 04/26/24 12:40 AM   Result Value Ref Range    Blood Product [id number]     Unit Number [id number]     UNIT ABO A     UNIT RH NEG     Product Status Presumed Transfused     Expiration Date [id number]     Blood Type Barcode 0600     Unit Volume 350 ml   Basic Metabolic Panel (8)    Collection Time: 04/26/24  5:26 AM   Result Value Ref Range    Glucose 116 (H) 70 - 99 mg/dL    Sodium 142 136 - 145 mmol/L    Potassium 3.5 3.5 - 5.1 mmol/L    Chloride 108 98 - 112 mmol/L    CO2 32.0 21.0 - 32.0 mmol/L    Anion Gap 2 0 - 18 mmol/L    BUN 10 9 - 23 mg/dL    Creatinine 0.66 0.55 - 1.02 mg/dL    BUN/CREA Ratio 15.2 10.0 - 20.0    Calcium, Total 9.7 8.7 - 10.4 mg/dL    Calculated Osmolality 294 275 - 295 mOsm/kg    eGFR-Cr 87 >=60 mL/min/1.73m2   CBC W/ DIFFERENTIAL    Collection Time: 04/26/24  5:26 AM   Result Value Ref Range    WBC 8.0 4.0 - 11.0 x10(3) uL    RBC 2.12 (L) 3.80 - 5.30 x10(6)uL    HGB 7.3 (L) 12.0 - 16.0 g/dL    HCT 22.0 (L) 35.0 - 48.0 %    .8 (H) 80.0 - 100.0 fL    MCH 34.4 (H) 26.0 - 34.0 pg    MCHC 33.2 31.0 - 37.0 g/dL    RDW-SD 86.3 (H) 35.1 - 46.3 fL    RDW 23.9 (H) 11.0 - 15.0 %    .0 150.0 - 450.0 10(3)uL    Neutrophil Absolute Prelim 7.03 1.50 - 7.70 x10 (3) uL    Neutrophil Absolute 7.03 1.50 - 7.70 x10(3) uL     Lymphocyte Absolute 0.49 (L) 1.00 - 4.00 x10(3) uL    Monocyte Absolute 0.32 0.10 - 1.00 x10(3) uL    Eosinophil Absolute 0.09 0.00 - 0.70 x10(3) uL    Basophil Absolute 0.01 0.00 - 0.20 x10(3) uL    Immature Granulocyte Absolute 0.03 0.00 - 1.00 x10(3) uL    Neutrophil % 88.3 %    Lymphocyte % 6.1 %    Monocyte % 4.0 %    Eosinophil % 1.1 %    Basophil % 0.1 %    Immature Granulocyte % 0.4 %   RBC Morphology Scan    Collection Time: 04/26/24  5:26 AM   Result Value Ref Range    RBC Morphology See morphology below (A) Normal, Slide reviewed, see previous RBC morphology.    Platelet Morphology Normal Normal    Macrocytosis 1+      Microcytosis 1+     Hemoglobin & Hematocrit    Collection Time: 04/26/24  2:18 PM   Result Value Ref Range    HGB 7.6 (L) 12.0 - 16.0 g/dL    HCT 22.2 (L) 35.0 - 48.0 %   Basic Metabolic Panel (8)    Collection Time: 04/27/24  6:06 AM   Result Value Ref Range    Glucose 111 (H) 70 - 99 mg/dL    Sodium 144 136 - 145 mmol/L    Potassium 3.4 (L) 3.5 - 5.1 mmol/L    Chloride 109 98 - 112 mmol/L    CO2 32.0 21.0 - 32.0 mmol/L    Anion Gap 3 0 - 18 mmol/L    BUN 11 9 - 23 mg/dL    Creatinine 0.54 (L) 0.55 - 1.02 mg/dL    BUN/CREA Ratio 20.4 (H) 10.0 - 20.0    Calcium, Total 9.8 8.7 - 10.4 mg/dL    Calculated Osmolality 298 (H) 275 - 295 mOsm/kg    eGFR-Cr 91 >=60 mL/min/1.73m2   CBC W/ DIFFERENTIAL    Collection Time: 04/27/24  6:06 AM   Result Value Ref Range    WBC 5.4 4.0 - 11.0 x10(3) uL    RBC 2.23 (L) 3.80 - 5.30 x10(6)uL    HGB 7.4 (L) 12.0 - 16.0 g/dL    HCT 23.2 (L) 35.0 - 48.0 %    .0 (H) 80.0 - 100.0 fL    MCH 33.2 26.0 - 34.0 pg    MCHC 31.9 31.0 - 37.0 g/dL    RDW-SD 82.0 (H) 35.1 - 46.3 fL    RDW 22.2 (H) 11.0 - 15.0 %    .0 150.0 - 450.0 10(3)uL    Neutrophil Absolute Prelim 4.31 1.50 - 7.70 x10 (3) uL    Neutrophil Absolute 4.31 1.50 - 7.70 x10(3) uL    Lymphocyte Absolute 0.72 (L) 1.00 - 4.00 x10(3) uL    Monocyte Absolute 0.27 0.10 - 1.00 x10(3) uL    Eosinophil  Absolute 0.10 0.00 - 0.70 x10(3) uL    Basophil Absolute 0.00 0.00 - 0.20 x10(3) uL    Immature Granulocyte Absolute 0.02 0.00 - 1.00 x10(3) uL    Neutrophil % 79.5 %    Lymphocyte % 13.3 %    Monocyte % 5.0 %    Eosinophil % 1.8 %    Basophil % 0.0 %    Immature Granulocyte % 0.4 %   Basic Metabolic Panel (8)    Collection Time: 04/28/24  5:57 AM   Result Value Ref Range    Glucose 182 (H) 70 - 99 mg/dL    Sodium 140 136 - 145 mmol/L    Potassium 3.7 3.5 - 5.1 mmol/L    Chloride 106 98 - 112 mmol/L    CO2 31.0 21.0 - 32.0 mmol/L    Anion Gap 3 0 - 18 mmol/L    BUN 15 9 - 23 mg/dL    Creatinine 0.60 0.55 - 1.02 mg/dL    BUN/CREA Ratio 25.0 (H) 10.0 - 20.0    Calcium, Total 9.6 8.7 - 10.4 mg/dL    Calculated Osmolality 295 275 - 295 mOsm/kg    eGFR-Cr 89 >=60 mL/min/1.73m2   Potassium    Collection Time: 04/28/24  5:57 AM   Result Value Ref Range    Potassium 3.7 3.5 - 5.1 mmol/L   CBC W/ DIFFERENTIAL    Collection Time: 04/28/24  5:57 AM   Result Value Ref Range    WBC 12.5 (H) 4.0 - 11.0 x10(3) uL    RBC 2.55 (L) 3.80 - 5.30 x10(6)uL    HGB 8.5 (L) 12.0 - 16.0 g/dL    HCT 26.2 (L) 35.0 - 48.0 %    .7 (H) 80.0 - 100.0 fL    MCH 33.3 26.0 - 34.0 pg    MCHC 32.4 31.0 - 37.0 g/dL    RDW-SD 76.4 (H) 35.1 - 46.3 fL    RDW 20.6 (H) 11.0 - 15.0 %    .0 150.0 - 450.0 10(3)uL    Neutrophil Absolute Prelim 11.27 (H) 1.50 - 7.70 x10 (3) uL    Neutrophil Absolute 11.27 (H) 1.50 - 7.70 x10(3) uL    Lymphocyte Absolute 0.49 (L) 1.00 - 4.00 x10(3) uL    Monocyte Absolute 0.65 0.10 - 1.00 x10(3) uL    Eosinophil Absolute 0.02 0.00 - 0.70 x10(3) uL    Basophil Absolute 0.02 0.00 - 0.20 x10(3) uL    Immature Granulocyte Absolute 0.05 0.00 - 1.00 x10(3) uL    Neutrophil % 90.1 %    Lymphocyte % 3.9 %    Monocyte % 5.2 %    Eosinophil % 0.2 %    Basophil % 0.2 %    Immature Granulocyte % 0.4 %   EKG 12 Lead    Collection Time: 04/28/24  9:35 AM   Result Value Ref Range    Ventricular rate 104 BPM    Atrial rate 104 BPM     P-R Interval 158 ms    QRS Duration 82 ms    Q-T Interval 326 ms    QTC Calculation (Bezet) 428 ms    P Axis 35 degrees    R Axis 14 degrees    T Axis 43 degrees   Urinalysis with Culture Reflex    Collection Time: 04/28/24 10:05 AM    Specimen: Urine, clean catch   Result Value Ref Range    Urine Color Light-Yellow Yellow    Clarity Urine Clear Clear    Spec Gravity >1.030 (H) 1.005 - 1.030    Glucose Urine Normal Normal mg/dL    Bilirubin Urine Negative Negative    Ketones Urine Negative Negative mg/dL    Blood Urine 1+ (A) Negative    pH Urine 8.5 (H) 5.0 - 8.0    Protein Urine 20 (A) Negative mg/dL    Urobilinogen Urine Normal Normal    Nitrite Urine Negative Negative    Leukocyte Esterase Urine 75 (A) Negative    WBC Urine 11-20 (A) 0 - 5 /HPF    RBC Urine 6-10 (A) 0 - 2 /HPF    Bacteria Urine None Seen None Seen /HPF    Squamous Epi. Cells Few (A) None Seen /HPF    Renal Tubular Epithelial Cells None Seen None Seen /HPF    Transitional Cells None Seen None Seen /HPF    Yeast Urine None Seen None Seen /HPF   Urine Culture, Routine    Collection Time: 04/28/24 10:05 AM    Specimen: Urine, clean catch   Result Value Ref Range    Urine Culture No Growth at 18-24 hrs.    Basic Metabolic Panel (8)    Collection Time: 04/29/24  5:50 AM   Result Value Ref Range    Glucose 119 (H) 70 - 99 mg/dL    Sodium 141 136 - 145 mmol/L    Potassium      Chloride 107 98 - 112 mmol/L    CO2 29.0 21.0 - 32.0 mmol/L    Anion Gap 5 0 - 18 mmol/L    BUN      Creatinine 0.61 0.55 - 1.02 mg/dL    BUN/CREA Ratio      Calcium, Total 7.9 (L) 8.7 - 10.4 mg/dL    Calculated Osmolality      eGFR-Cr 89 >=60 mL/min/1.73m2   Magnesium    Collection Time: 04/29/24  5:50 AM   Result Value Ref Range    Magnesium 0.9 (LL) 1.6 - 2.6 mg/dL   CBC W/ DIFFERENTIAL    Collection Time: 04/29/24  5:50 AM   Result Value Ref Range    WBC 11.9 (H) 4.0 - 11.0 x10(3) uL    RBC 1.98 (L) 3.80 - 5.30 x10(6)uL    HGB 6.7 (LL) 12.0 - 16.0 g/dL    HCT 20.9 (L) 35.0 -  48.0 %    .6 (H) 80.0 - 100.0 fL    MCH 33.8 26.0 - 34.0 pg    MCHC 32.1 31.0 - 37.0 g/dL    RDW-SD 78.2 (H) 35.1 - 46.3 fL    RDW 20.3 (H) 11.0 - 15.0 %    .0 150.0 - 450.0 10(3)uL    Neutrophil Absolute Prelim 10.31 (H) 1.50 - 7.70 x10 (3) uL    Neutrophil Absolute 10.31 (H) 1.50 - 7.70 x10(3) uL    Lymphocyte Absolute 0.69 (L) 1.00 - 4.00 x10(3) uL    Monocyte Absolute 0.76 0.10 - 1.00 x10(3) uL    Eosinophil Absolute 0.10 0.00 - 0.70 x10(3) uL    Basophil Absolute 0.02 0.00 - 0.20 x10(3) uL    Immature Granulocyte Absolute 0.04 0.00 - 1.00 x10(3) uL    Neutrophil % 86.5 %    Lymphocyte % 5.8 %    Monocyte % 6.4 %    Eosinophil % 0.8 %    Basophil % 0.2 %    Immature Granulocyte % 0.3 %   REDRAW BMP    Collection Time: 04/29/24  7:15 AM   Result Value Ref Range    Potassium 3.5 3.5 - 5.1 mmol/L    BUN 12 9 - 23 mg/dL   Hemoglobin & Hematocrit    Collection Time: 04/29/24  7:15 AM   Result Value Ref Range    HGB 7.4 (L) 12.0 - 16.0 g/dL    HCT 22.9 (L) 35.0 - 48.0 %   Magnesium    Collection Time: 04/29/24  1:29 PM   Result Value Ref Range    Magnesium 1.8 1.6 - 2.6 mg/dL   Basic Metabolic Panel (8)    Collection Time: 04/30/24  5:31 AM   Result Value Ref Range    Glucose 90 70 - 99 mg/dL    Sodium 140 136 - 145 mmol/L    Potassium 3.8 3.5 - 5.1 mmol/L    Chloride 109 98 - 112 mmol/L    CO2 29.0 21.0 - 32.0 mmol/L    Anion Gap 2 0 - 18 mmol/L    BUN 13 9 - 23 mg/dL    Creatinine 0.56 0.55 - 1.02 mg/dL    BUN/CREA Ratio 23.2 (H) 10.0 - 20.0    Calcium, Total 9.2 8.7 - 10.4 mg/dL    Calculated Osmolality 290 275 - 295 mOsm/kg    eGFR-Cr 90 >=60 mL/min/1.73m2   Magnesium    Collection Time: 04/30/24  5:31 AM   Result Value Ref Range    Magnesium 1.6 1.6 - 2.6 mg/dL   CBC W/ DIFFERENTIAL    Collection Time: 04/30/24  5:31 AM   Result Value Ref Range    WBC 6.6 4.0 - 11.0 x10(3) uL    RBC 2.19 (L) 3.80 - 5.30 x10(6)uL    HGB 7.5 (L) 12.0 - 16.0 g/dL    HCT 22.9 (L) 35.0 - 48.0 %    .6 (H) 80.0 -  100.0 fL    MCH 34.2 (H) 26.0 - 34.0 pg    MCHC 32.8 31.0 - 37.0 g/dL    RDW-SD 75.5 (H) 35.1 - 46.3 fL    RDW 19.9 (H) 11.0 - 15.0 %    .0 150.0 - 450.0 10(3)uL    Neutrophil Absolute Prelim 5.29 1.50 - 7.70 x10 (3) uL    Neutrophil Absolute 5.29 1.50 - 7.70 x10(3) uL    Lymphocyte Absolute 0.70 (L) 1.00 - 4.00 x10(3) uL    Monocyte Absolute 0.44 0.10 - 1.00 x10(3) uL    Eosinophil Absolute 0.10 0.00 - 0.70 x10(3) uL    Basophil Absolute 0.02 0.00 - 0.20 x10(3) uL    Immature Granulocyte Absolute 0.03 0.00 - 1.00 x10(3) uL    Neutrophil % 80.4 %    Lymphocyte % 10.6 %    Monocyte % 6.7 %    Eosinophil % 1.5 %    Basophil % 0.3 %    Immature Granulocyte % 0.5 %   Phosphorus    Collection Time: 04/30/24  5:31 AM   Result Value Ref Range    Phosphorus 3.8 2.4 - 5.1 mg/dL   Aerobic Bacterial Culture    Collection Time: 04/30/24  3:07 PM    Specimen: Abdomen; Body fluid, unspecified   Result Value Ref Range    Aerobic Culture Result No Growth at 18-24 hrs.     Aerobic Smear 2+ WBCs seen     Aerobic Smear No organisms seen    Anaerobic Culture    Collection Time: 04/30/24  3:07 PM    Specimen: Abdomen; Body fluid, unspecified   Result Value Ref Range    Anaerobic Culture Pending    Basic Metabolic Panel (8)    Collection Time: 05/01/24  5:41 AM   Result Value Ref Range    Glucose 113 (H) 70 - 99 mg/dL    Sodium 139 136 - 145 mmol/L    Potassium 3.8 3.5 - 5.1 mmol/L    Chloride 106 98 - 112 mmol/L    CO2 26.0 21.0 - 32.0 mmol/L    Anion Gap 7 0 - 18 mmol/L    BUN 11 9 - 23 mg/dL    Creatinine 0.63 0.55 - 1.02 mg/dL    BUN/CREA Ratio 17.5 10.0 - 20.0    Calcium, Total 9.6 8.7 - 10.4 mg/dL    Calculated Osmolality 288 275 - 295 mOsm/kg    eGFR-Cr 88 >=60 mL/min/1.73m2   Magnesium    Collection Time: 05/01/24  5:41 AM   Result Value Ref Range    Magnesium 1.4 (L) 1.6 - 2.6 mg/dL   CBC W/ DIFFERENTIAL    Collection Time: 05/01/24  5:41 AM   Result Value Ref Range    WBC 7.0 4.0 - 11.0 x10(3) uL    RBC 2.16 (L) 3.80 -  5.30 x10(6)uL    HGB 7.3 (L) 12.0 - 16.0 g/dL    HCT 22.8 (L) 35.0 - 48.0 %    .6 (H) 80.0 - 100.0 fL    MCH 33.8 26.0 - 34.0 pg    MCHC 32.0 31.0 - 37.0 g/dL    RDW-SD 76.6 (H) 35.1 - 46.3 fL    RDW 19.9 (H) 11.0 - 15.0 %    .0 150.0 - 450.0 10(3)uL    Neutrophil Absolute Prelim 5.62 1.50 - 7.70 x10 (3) uL    Neutrophil Absolute 5.62 1.50 - 7.70 x10(3) uL    Lymphocyte Absolute 0.63 (L) 1.00 - 4.00 x10(3) uL    Monocyte Absolute 0.52 0.10 - 1.00 x10(3) uL    Eosinophil Absolute 0.15 0.00 - 0.70 x10(3) uL    Basophil Absolute 0.02 0.00 - 0.20 x10(3) uL    Immature Granulocyte Absolute 0.02 0.00 - 1.00 x10(3) uL    Neutrophil % 80.6 %    Lymphocyte % 9.1 %    Monocyte % 7.5 %    Eosinophil % 2.2 %    Basophil % 0.3 %    Immature Granulocyte % 0.3 %   Phosphorus    Collection Time: 05/01/24  5:41 AM   Result Value Ref Range    Phosphorus 4.2 2.4 - 5.1 mg/dL       Labs reviewed    Physical examination:    /51 (BP Location: Right arm)   Pulse 88   Temp 98.7 °F (37.1 °C) (Oral)   Resp 16   Ht 4' 9\" (1.448 m)   Wt 102 lb (46.3 kg)   SpO2 91%   BMI 22.07 kg/m²     GENERAL: alert and oriented, cooperative, in no acute distress  ABDOMEN: soft, non-tender. Bowel sounds normal. No masses,  no organomegaly RLQ crepitus  INCISION/SURGICAL WOUNDS: clean, dry and intact trocar incisions, IR drain site dressing c/d/i  PELVIC: Pelvic exam deferred today, previous exam: vaginal cuff intact with purulent drainage in the vaginal vault, bilateral adnexal tenderness R>L, R adnexal fullness    ASSESSMENT/PLAN:  Dx: ovarian cancer    Cr  WNL    Good UOP     LMWH - will go home on lmwh 40mg daily x 2 weeks post op    Soft diet    Encouraged IS    Febrile, leukocytosis, and tachycardic on 4/28:  - Urine cx w/ no growth  - CT Chest with  atelectasis, no PE  - CT A/P with 11 cm pelvic fluid collection   - On empiric zosyn and vanco; plan for ID consult to transition to PO abx  - afebrile, WBC normalized  - s/p IR  drain placement    Anemia:  - Presume 2/2 expected post-op ABLA w dilutional component from IVF  - Low suspicion of active bleeding  - 4/25 hgb < 7 - 1u RBC  - hgb  7.4 -> 7.5 -> 7.3  - trend hgb    PT/OT/SW    Planning to discharge to [facility]     Recheck labs in AM    The patient verbalized good understanding of this.  I will keep you informed of her progress.  Thank you for allowing me to participate in the care of this patient.    BARRINGTON Ireland  05/1/24

## 2024-05-01 NOTE — PLAN OF CARE
Patient alert and oriented x4. VSS. On room air. Tolerating LF/ soft diet. TEO drain placed to RLQ. Dressing c/d/I. Irrigated per order.PRN norco for pain control. IV abx as ordered. Ambulating with 1 and RW. Fall precautions in place. Call light within reach. Possible discharge tomorrow pending culture results.   Problem: Patient Centered Care  Goal: Patient preferences are identified and integrated in the patient's plan of care  Description: Interventions:  - What would you like us to know as we care for you? From home   - Provide timely, complete, and accurate information to patient/family  - Incorporate patient and family knowledge, values, beliefs, and cultural backgrounds into the planning and delivery of care  - Encourage patient/family to participate in care and decision-making at the level they choose  - Honor patient and family perspectives and choices  Outcome: Progressing     Problem: Patient/Family Goals  Goal: Patient/Family Long Term Goal  Description: Patient's Long Term Goal:     Interventions:  -   - See additional Care Plan goals for specific interventions  Outcome: Progressing  Goal: Patient/Family Short Term Goal  Description: Patient's Short Term Goal:     Interventions:   -   - See additional Care Plan goals for specific interventions  Outcome: Progressing     Problem: PAIN - ADULT  Goal: Verbalizes/displays adequate comfort level or patient's stated pain goal  Description: INTERVENTIONS:  - Encourage pt to monitor pain and request assistance  - Assess pain using appropriate pain scale  - Administer analgesics based on type and severity of pain and evaluate response  - Implement non-pharmacological measures as appropriate and evaluate response  - Consider cultural and social influences on pain and pain management  - Manage/alleviate anxiety  - Utilize distraction and/or relaxation techniques  - Monitor for opioid side effects  - Notify MD/LIP if interventions unsuccessful or patient reports new  pain  - Anticipate increased pain with activity and pre-medicate as appropriate  Outcome: Progressing     Problem: SAFETY ADULT - FALL  Goal: Free from fall injury  Description: INTERVENTIONS:  - Assess pt frequently for physical needs  - Identify cognitive and physical deficits and behaviors that affect risk of falls.  - [place] fall precautions as indicated by assessment.  - Educate pt/family on patient safety including physical limitations  - Instruct pt to call for assistance with activity based on assessment  - Modify environment to reduce risk of injury  - Provide assistive devices as appropriate  - Consider OT/PT consult to assist with strengthening/mobility  - Encourage toileting schedule  Outcome: Progressing     Problem: RISK FOR INFECTION - ADULT  Goal: Absence of fever/infection during anticipated neutropenic period  Description: INTERVENTIONS  - Monitor WBC  - Administer growth factors as ordered  - Implement neutropenic guidelines  Outcome: Progressing     Problem: DISCHARGE PLANNING  Goal: Discharge to home or other facility with appropriate resources  Description: INTERVENTIONS:  - Identify barriers to discharge w/pt and caregiver  - Include patient/family/discharge partner in discharge planning  - Arrange for needed discharge resources and transportation as appropriate  - Identify discharge learning needs (meds, wound care, etc)  - Arrange for interpreters to assist at discharge as needed  - Consider post-discharge preferences of patient/family/discharge partner  - Complete POLST form as appropriate  - Assess patient's ability to be responsible for managing their own health  - Refer to Case Management Department for coordinating discharge planning if the patient needs post-hospital services based on physician/LIP order or complex needs related to functional status, cognitive ability or social support system  Outcome: Progressing     Problem: RESPIRATORY - ADULT  Goal: Achieves optimal ventilation  and oxygenation  Description: INTERVENTIONS:  - Assess for changes in respiratory status  - Assess for changes in mentation and behavior  - Position to facilitate oxygenation and minimize respiratory effort  - Oxygen supplementation based on oxygen saturation or ABGs  - Provide Smoking Cessation handout, if applicable  - Encourage broncho-pulmonary hygiene including cough, deep breathe, Incentive Spirometry  - Assess the need for suctioning and perform as needed  - Assess and instruct to report SOB or any respiratory difficulty  - Respiratory Therapy support as indicated  - Manage/alleviate anxiety  - Monitor for signs/symptoms of CO2 retention  Outcome: Progressing     Problem: GASTROINTESTINAL - ADULT  Goal: Minimal or absence of nausea and vomiting  Description: INTERVENTIONS:  - Maintain adequate hydration with IV or PO as ordered and tolerated  - Nasogastric tube to low intermittent suction as ordered  - Evaluate effectiveness of ordered antiemetic medications  - Provide nonpharmacologic comfort measures as appropriate  - Advance diet as tolerated, if ordered  - Obtain nutritional consult as needed  - Evaluate fluid balance  Outcome: Progressing  Goal: Maintains or returns to baseline bowel function  Description: INTERVENTIONS:  - Assess bowel function  - Maintain adequate hydration with IV or PO as ordered and tolerated  - Evaluate effectiveness of GI medications  - Encourage mobilization and activity  - Obtain nutritional consult as needed  - Establish a toileting routine/schedule  - Consider collaborating with pharmacy to review patient's medication profile  Outcome: Progressing     Problem: GENITOURINARY - ADULT  Goal: Absence of urinary retention  Description: INTERVENTIONS:  - Assess patient’s ability to void and empty bladder  - Monitor intake/output and perform bladder scan as needed  - Follow urinary retention protocol/standard of care  - Consider collaborating with pharmacy to review patient's  medication profile  - Implement strategies to promote bladder emptying  Outcome: Progressing     Problem: METABOLIC/FLUID AND ELECTROLYTES - ADULT  Goal: Electrolytes maintained within normal limits  Description: INTERVENTIONS:  - Monitor labs and rhythm and assess patient for signs and symptoms of electrolyte imbalances  - Administer electrolyte replacement as ordered  - Monitor response to electrolyte replacements, including rhythm and repeat lab results as appropriate  - Fluid restriction as ordered  - Instruct patient on fluid and nutrition restrictions as appropriate  Outcome: Progressing     Problem: SKIN/TISSUE INTEGRITY - ADULT  Goal: Skin integrity remains intact  Description: INTERVENTIONS  - Assess and document risk factors for pressure ulcer development  - Assess and document skin integrity  - Monitor for areas of redness and/or skin breakdown  - Initiate interventions, skin care algorithm/standards of care as needed  Outcome: Progressing  Goal: Incision(s), wounds(s) or drain site(s) healing without S/S of infection  Description: INTERVENTIONS:  - Assess and document risk factors for pressure ulcer development  - Assess and document skin integrity  - Assess and document dressing/incision, wound bed, drain sites and surrounding tissue  - Implement wound care per orders  - Initiate isolation precautions as appropriate  - Initiate Pressure Ulcer prevention bundle as indicated  Outcome: Progressing     Problem: HEMATOLOGIC - ADULT  Goal: Maintains hematologic stability  Description: INTERVENTIONS  - Assess for signs and symptoms of bleeding or hemorrhage  - Monitor labs and vital signs for trends  - Administer supportive blood products/factors, fluids and medications as ordered and appropriate  - Administer supportive blood products/factors as ordered and appropriate  Outcome: Progressing  Goal: Free from bleeding injury  Description: (Example usage: patient with low platelets)  INTERVENTIONS:  - Avoid  intramuscular injections, enemas and rectal medication administration  - Ensure safe mobilization of patient  - Hold pressure on venipuncture sites to achieve adequate hemostasis  - Assess for signs and symptoms of internal bleeding  - Monitor lab trends  - Patient is to report abnormal signs of bleeding to staff  - Avoid use of toothpicks and dental floss  - Use electric shaver for shaving  - Use soft bristle tooth brush  - Limit straining and forceful nose blowing  Outcome: Progressing     Problem: MUSCULOSKELETAL - ADULT  Goal: Return mobility to safest level of function  Description: INTERVENTIONS:  - Assess patient stability and activity tolerance for standing, transferring and ambulating w/ or w/o assistive devices  - Assist with transfers and ambulation using safe patient handling equipment as needed  - Ensure adequate protection for wounds/incisions during mobilization  - Obtain PT/OT consults as needed  - Advance activity as appropriate  - Communicate ordered activity level and limitations with patient/family  Outcome: Progressing  Goal: Maintain proper alignment of affected body part  Description: INTERVENTIONS:  - Support and protect limb and body alignment per provider's orders  - Instruct and reinforce with patient and family use of appropriate assistive device and precautions (e.g. spinal or hip dislocation precautions)  Outcome: Progressing

## 2024-05-01 NOTE — PLAN OF CARE
Patient is alert and orientated by 4.  Patient is ambulating to restroom standby with walker.  Patient had abdominal  fluid IR put TEO drain on RLQ it is draining serosangous 25 ml overnight.  Patient having pain from procedure patient has dilaudid and Norco for pain.  Patient on IV Zosyn.  Patient has personal belongings and call light within reach.  Safety measures in place.  Problem: Patient Centered Care  Goal: Patient preferences are identified and integrated in the patient's plan of care  Description: Interventions:  - What would you like us to know as we care for you? From home   - Provide timely, complete, and accurate information to patient/family  - Incorporate patient and family knowledge, values, beliefs, and cultural backgrounds into the planning and delivery of care  - Encourage patient/family to participate in care and decision-making at the level they choose  - Honor patient and family perspectives and choices  Outcome: Progressing     Problem: Patient/Family Goals  Goal: Patient/Family Long Term Goal  Description: Patient's Long Term Goal: To go back to [facility] rehab    Interventions:  - Continue to follow plan of care  - See additional Care Plan goals for specific interventions  Outcome: Progressing  Goal: Patient/Family Short Term Goal  Description: Patient's Short Term Goal: To get rid of this new pain from procedure    Interventions:   - continue to take iv medications for infection  - See additional Care Plan goals for specific interventions  Outcome: Progressing     Problem: PAIN - ADULT  Goal: Verbalizes/displays adequate comfort level or patient's stated pain goal  Description: INTERVENTIONS:  - Encourage pt to monitor pain and request assistance  - Assess pain using appropriate pain scale  - Administer analgesics based on type and severity of pain and evaluate response  - Implement non-pharmacological measures as appropriate and evaluate response  - Consider cultural and social  influences on pain and pain management  - Manage/alleviate anxiety  - Utilize distraction and/or relaxation techniques  - Monitor for opioid side effects  - Notify MD/LIP if interventions unsuccessful or patient reports new pain  - Anticipate increased pain with activity and pre-medicate as appropriate  Outcome: Progressing     Problem: SAFETY ADULT - FALL  Goal: Free from fall injury  Description: INTERVENTIONS:  - Assess pt frequently for physical needs  - Identify cognitive and physical deficits and behaviors that affect risk of falls.  - [place] fall precautions as indicated by assessment.  - Educate pt/family on patient safety including physical limitations  - Instruct pt to call for assistance with activity based on assessment  - Modify environment to reduce risk of injury  - Provide assistive devices as appropriate  - Consider OT/PT consult to assist with strengthening/mobility  - Encourage toileting schedule  Outcome: Progressing     Problem: RISK FOR INFECTION - ADULT  Goal: Absence of fever/infection during anticipated neutropenic period  Description: INTERVENTIONS  - Monitor WBC  - Administer growth factors as ordered  - Implement neutropenic guidelines  Outcome: Progressing     Problem: DISCHARGE PLANNING  Goal: Discharge to home or other facility with appropriate resources  Description: INTERVENTIONS:  - Identify barriers to discharge w/pt and caregiver  - Include patient/family/discharge partner in discharge planning  - Arrange for needed discharge resources and transportation as appropriate  - Identify discharge learning needs (meds, wound care, etc)  - Arrange for interpreters to assist at discharge as needed  - Consider post-discharge preferences of patient/family/discharge partner  - Complete POLST form as appropriate  - Assess patient's ability to be responsible for managing their own health  - Refer to Case Management Department for coordinating discharge planning if the patient needs  post-hospital services based on physician/LIP order or complex needs related to functional status, cognitive ability or social support system  Outcome: Progressing     Problem: RESPIRATORY - ADULT  Goal: Achieves optimal ventilation and oxygenation  Description: INTERVENTIONS:  - Assess for changes in respiratory status  - Assess for changes in mentation and behavior  - Position to facilitate oxygenation and minimize respiratory effort  - Oxygen supplementation based on oxygen saturation or ABGs  - Provide Smoking Cessation handout, if applicable  - Encourage broncho-pulmonary hygiene including cough, deep breathe, Incentive Spirometry  - Assess the need for suctioning and perform as needed  - Assess and instruct to report SOB or any respiratory difficulty  - Respiratory Therapy support as indicated  - Manage/alleviate anxiety  - Monitor for signs/symptoms of CO2 retention  Outcome: Progressing     Problem: GASTROINTESTINAL - ADULT  Goal: Minimal or absence of nausea and vomiting  Description: INTERVENTIONS:  - Maintain adequate hydration with IV or PO as ordered and tolerated  - Nasogastric tube to low intermittent suction as ordered  - Evaluate effectiveness of ordered antiemetic medications  - Provide nonpharmacologic comfort measures as appropriate  - Advance diet as tolerated, if ordered  - Obtain nutritional consult as needed  - Evaluate fluid balance  Outcome: Progressing  Goal: Maintains or returns to baseline bowel function  Description: INTERVENTIONS:  - Assess bowel function  - Maintain adequate hydration with IV or PO as ordered and tolerated  - Evaluate effectiveness of GI medications  - Encourage mobilization and activity  - Obtain nutritional consult as needed  - Establish a toileting routine/schedule  - Consider collaborating with pharmacy to review patient's medication profile  Outcome: Progressing     Problem: GENITOURINARY - ADULT  Goal: Absence of urinary retention  Description: INTERVENTIONS:  -  Assess patient’s ability to void and empty bladder  - Monitor intake/output and perform bladder scan as needed  - Follow urinary retention protocol/standard of care  - Consider collaborating with pharmacy to review patient's medication profile  - Implement strategies to promote bladder emptying  Outcome: Progressing     Problem: METABOLIC/FLUID AND ELECTROLYTES - ADULT  Goal: Electrolytes maintained within normal limits  Description: INTERVENTIONS:  - Monitor labs and rhythm and assess patient for signs and symptoms of electrolyte imbalances  - Administer electrolyte replacement as ordered  - Monitor response to electrolyte replacements, including rhythm and repeat lab results as appropriate  - Fluid restriction as ordered  - Instruct patient on fluid and nutrition restrictions as appropriate  Outcome: Progressing     Problem: SKIN/TISSUE INTEGRITY - ADULT  Goal: Skin integrity remains intact  Description: INTERVENTIONS  - Assess and document risk factors for pressure ulcer development  - Assess and document skin integrity  - Monitor for areas of redness and/or skin breakdown  - Initiate interventions, skin care algorithm/standards of care as needed  Outcome: Progressing  Goal: Incision(s), wounds(s) or drain site(s) healing without S/S of infection  Description: INTERVENTIONS:  - Assess and document risk factors for pressure ulcer development  - Assess and document skin integrity  - Assess and document dressing/incision, wound bed, drain sites and surrounding tissue  - Implement wound care per orders  - Initiate isolation precautions as appropriate  - Initiate Pressure Ulcer prevention bundle as indicated  Outcome: Progressing     Problem: HEMATOLOGIC - ADULT  Goal: Maintains hematologic stability  Description: INTERVENTIONS  - Assess for signs and symptoms of bleeding or hemorrhage  - Monitor labs and vital signs for trends  - Administer supportive blood products/factors, fluids and medications as ordered and  appropriate  - Administer supportive blood products/factors as ordered and appropriate  Outcome: Progressing  Goal: Free from bleeding injury  Description: (Example usage: patient with low platelets)  INTERVENTIONS:  - Avoid intramuscular injections, enemas and rectal medication administration  - Ensure safe mobilization of patient  - Hold pressure on venipuncture sites to achieve adequate hemostasis  - Assess for signs and symptoms of internal bleeding  - Monitor lab trends  - Patient is to report abnormal signs of bleeding to staff  - Avoid use of toothpicks and dental floss  - Use electric shaver for shaving  - Use soft bristle tooth brush  - Limit straining and forceful nose blowing  Outcome: Progressing     Problem: MUSCULOSKELETAL - ADULT  Goal: Return mobility to safest level of function  Description: INTERVENTIONS:  - Assess patient stability and activity tolerance for standing, transferring and ambulating w/ or w/o assistive devices  - Assist with transfers and ambulation using safe patient handling equipment as needed  - Ensure adequate protection for wounds/incisions during mobilization  - Obtain PT/OT consults as needed  - Advance activity as appropriate  - Communicate ordered activity level and limitations with patient/family  Outcome: Progressing  Goal: Maintain proper alignment of affected body part  Description: INTERVENTIONS:  - Support and protect limb and body alignment per provider's orders  - Instruct and reinforce with patient and family use of appropriate assistive device and precautions (e.g. spinal or hip dislocation precautions)  Outcome: Progressing

## 2024-05-01 NOTE — PHYSICAL THERAPY NOTE
PHYSICAL THERAPY TREATMENT NOTE - INPATIENT     Room Number: 456/456-A       Presenting Problem: s/p Robotic assisted total laparoscopic hysterectomy, bilateral salpingo-oophorectomy, staging, debulking; Omentectomy; lysis of adhesions on 4/23  Co-Morbidities : ovarian cancer    Problem List  Active Problems:    Ovarian cancer (HCC)    PHYSICAL THERAPY ASSESSMENT   Patient demonstrates good  progress this session, goals  remain in progress.    Patient continues to function below baseline with bed mobility, transfers, gait, maintaining seated position, standing prolonged periods, and performing household tasks.  Contributing factors to remaining limitations include decreased functional strength, pain, impaired dynamic standing balance, decreased muscular endurance, and medical status.  Next session anticipate patient to progress bed mobility, transfers, gait, maintaining seated position, standing prolonged periods, and performing household tasks.  Physical Therapy will continue to follow patient for duration of hospitalization.    Patient continues to benefit from continued skilled PT services: to promote return to prior level of function and safety with continuous assistance and gradual rehabilitative therapy .    PLAN  PT Treatment Plan: Bed mobility;Body mechanics;Coordination;Endurance;Energy conservation;Patient education;Gait training;Strengthening;Transfer training;Balance training  Frequency (Obs): 3-5x/week    SUBJECTIVE  \"I would like to try to walk\"    OBJECTIVE  Precautions: Abdominal protective strategies;Drain(s);Bed/chair alarm    WEIGHT BEARING RESTRICTION  none    PAIN ASSESSMENT   Rating: Unable to rate  Location: abdomen/drain site  Management Techniques: Activity promotion;Body mechanics;Breathing techniques;Repositioning    BALANCE  Static Sitting: Good  Dynamic Sitting: Fair +  Static Standing: Fair  Dynamic Standing: Fair -    AM-PAC '6-Clicks' INPATIENT SHORT FORM - BASIC MOBILITY  How much  difficulty does the patient currently have...  Patient Difficulty: Turning over in bed (including adjusting bedclothes, sheets and blankets)?: A Little   Patient Difficulty: Sitting down on and standing up from a chair with arms (e.g., wheelchair, bedside commode, etc.): A Little   Patient Difficulty: Moving from lying on back to sitting on the side of the bed?: A Little   How much help from another person does the patient currently need...   Help from Another: Moving to and from a bed to a chair (including a wheelchair)?: A Little   Help from Another: Need to walk in hospital room?: A Little   Help from Another: Climbing 3-5 steps with a railing?: A Little     AM-PAC Score:  Raw Score: 18   Approx Degree of Impairment: 46.58%   Standardized Score (AM-PAC Scale): 43.63   CMS Modifier (G-Code): CK    FUNCTIONAL ABILITY STATUS  Functional Mobility/Gait Assessment  Gait Assistance: Contact guard assist  Distance (ft): 100  Assistive Device: Rolling walker  Pattern: Shuffle (slow pace, slightly unsteady, VC for upright posture and to maintain proximity to RW. no LOB)  Rolling: contact guard assist with VC for sequencing of log rolling   Supine to Sit: contact guard assist  Sit to Stand: contact guard assist     Additional information: pt received resting in bed. Introduced self and role. Educated on goals for today. Pt verbalized understanding. C/o moderate abdominal pain with movement. Demos log rolling and supine>sit at EOB. STS transfer with RW. Ambulated in hallway with RW. No overt LOB. Returned to room and was left sitting in chair with chair alarm on, needs within reach, handoff to RN complete.     The patient's Approx Degree of Impairment: 46.58% has been calculated based on documentation in the [facility] '6 clicks' Inpatient Daily Activity Short Form.  Research supports that patients with this level of impairment may benefit from home with HH PT however anticipate benefit from rehab.  Final disposition will be made  by interdisciplinary medical team.    Patient End of Session: Up in chair;Needs met;Call light within reach;RN aware of session/findings;All patient questions and concerns addressed;Ice applied;Alarm set    CURRENT GOALS   Goals to be met by: 5/1/24  Patient Goal Patient's self-stated goal is: go to rehab   Goal #1 Patient is able to demonstrate supine - sit EOB @ level: supervision      Goal #1   Current Status CGA   Goal #2 Patient is able to demonstrate transfers Sit to/from Stand at assistance level: supervision with walker - rolling      Goal #2  Current Status CGA with RW    Goal #3 Patient is able to ambulate 150 feet with assist device: walker - rolling at assistance level: supervision   Goal #3   Current Status 100 ft with RW and CGA    Goal #4     Goal #4   Current Status     Goal #5 Patient to demonstrate independence with home activity/exercise instructions provided to patient in preparation for discharge.   Goal #5   Current Status IN PROGRESS   Goal #6     Goal #6  Current Status           Therapeutic Activity: 24 minutes

## 2024-05-01 NOTE — SPIRITUAL CARE NOTE
Spiritual Care Visit Note    Patient Name: Lizeth Mcgill Date of Spiritual Care Visit: 24   : 10/26/1940 Primary Dx: <principal problem not specified>       Referred By: Referral From:     Spiritual Care Taxonomy:    Intended Effects: Demonstrate caring and concern    Methods: Offer support    Interventions: Active listening;Ask guided questions    Visit Type/Summary:   met with patient per the length of stay initiative.  Patient appreciated visit and stated no needs at this time.   continues to be available.    Chaplain Longoria [phone]

## 2024-05-01 NOTE — PROGRESS NOTES
[facility]  part of [facility]  Progress Note      Lizeth Mcgill Patient Status:  Inpatient    10/26/1940 MRN [id number]   Location [facility] 4W/SW/SE Attending Rigoberto oCuch MD   Hosp Day # 8 PCP Chong Cornejo MD       Subjective:   In bed, comfortable. Pain at drain site.    Objective:   R lower pelvic drain site is intact, serous fluid in bulb    Vital Signs:  Blood pressure 129/59, pulse 85, temperature 97.7 °F (36.5 °C), temperature source Axillary, resp. rate 16, height 57\", weight 102 lb (46.3 kg), SpO2 91%, not currently breastfeeding.    Input/Output:    Intake/Output Summary (Last 24 hours) at 2024 0810  Last data filed at 2024 0522  Gross per 24 hour   Intake 330 ml   Output 320 ml   Net 10 ml     24 hour drain output: 45mL    Results:   Labs:  Lab Results   Component Value Date    WBC 7.0 2024    RBC 2.16 2024    HGB 7.3 2024    HCT 22.8 2024    .6 2024    MCH 33.8 2024    MCHC 32.0 2024    RDW 19.9 2024    .0 2024     Recent Labs   Lab 24  0550 24  0715 24  0531 24  0541   *  --  90 113*   BUN  --  12 13 11   CREATSERUM 0.61  --  0.56 0.63   EGFRCR 89  --  90 88   CA 7.9*  --  9.2 9.6     --  140 139   K  --  3.5 3.8 3.8     --  109 106   CO2 29.0  --  29.0 26.0     CT DRAIN ABSCESS PERITONEAL (CPT=49406)    Result Date: 2024  CONCLUSION: Insertion of drainage catheter into pelvic fluid collection yielding serous fluid.  IR will follow.    Dictated by (CST): Cruz Tariq MD on 2024 at 4:53 PM     Finalized by (CST): Cruz Tariq MD on 2024 at 5:03 PM             Assessment and Plan:   82 yo female with ovarian ca s/p IKER/BSO with post fluid collection   Day #1 s/p percutaneous drain placement  24 hour output: 45mL   Outpatient drain check in 1 week     YVETTE Aguilar  2024  8:10 AM

## 2024-05-01 NOTE — PROGRESS NOTES
[facility] Hospitalist Progress Note     CC: Hospital Follow up    PCP: Chong Cornejo MD       Assessment/Plan:     Active Problems:    Ovarian cancer (HCC)    Lizeth Mcgill is a 83 year old female with ovarian CA, HTN, breaset CA, DM2, hypothyroidism who presented for hysterectomy-BSO, course complicated by acute blood loss anemia, hypoxia, as well as fever and leukocytosis, with concerns of possible postoperative fluid collection/hematoma with possible infection, on IV Zosyn s/p aspiration of postoperative fluid collection by IR and drain placed on 4/30.    Ovarian CA  S/p robotic assisted total lap hysterectomy-BSO, debulking, omentectomy  - PRN pain meds, Transition to PO when able  - monitor for acute blood loss anemia   - Bowel reg, antiemetics  - DVT Prophy- lovenox (held with ABLA and hematoma per GYN)  - Bernardo removed    Fever / abd pain  Dysuria  Fluid collection/hematoma  - CT abd pelvis, with fluid collection, urine culture negative  - given fever, elevated WBC, and neg Ucx, will need to assume fluid collection is poss infected  - zosyn started on 4/28  - discussed with Dr. Couch, rec IR consult for aspiration  - IR consulted  - s/p aspiration of postoperative fluid collection by IR and drain placed on 4/30  - White blood cell count improving, fever curve improved  - f/u drain cx      Acute resp failure w hypoxia, now resolved   - 70% w ambulating per pt. On 3L NC 4/24 AM  - Suspect atelectasis. Minimal basilar air movement on exam  - Can hold off on imaging for now  - OOB, encourage IS, ambulate TID  - Supplemental O2 for goal SpO2 90-93%. On 2L NC 4/24 AM   - Encourage IS  - CXR mild/moderate Left LL with effusion/atelectasis   - give dose of IV lasix today  - on RA this morning but sats 90%  - ECG with PAc's not afib  - CT chest without PE  - Now on room air     Acute on chronic anemia   - Presume 2/2 expected post-op ABLA w dilutional component from IVF  - No active bleeding  - Trend  CBC  - 4/25 hgb < 7 - 1u RBC     HTN  - BP stable  - hold home lisinopril for now     Anxiety  - clonazepam nightly     Hypothyroidism  - home sythroid     GERD  - PPI     ACP: Full Code  Ppx: DVT: Enox when able   Dispo  EDoD: pending course  F/u:   - PCP:  Chong Cornejo MD    Questions/concerns were discussed with patient and/or family by bedside.    Thank You,  Nasim Davila MD    Hospitalist with [facility]     Subjective:     Feeling ok, breathing improved, denies chest pain, no nausea, no fevers. Pain well controlled. Ambulating well.     OBJECTIVE:    Blood pressure 113/51, pulse 88, temperature 98.7 °F (37.1 °C), temperature source Oral, resp. rate 16, height 4' 9\" (1.448 m), weight 102 lb (46.3 kg), SpO2 91%, not currently breastfeeding.    Temp:  [97.7 °F (36.5 °C)-98.7 °F (37.1 °C)] 98.7 °F (37.1 °C)  Pulse:  [] 88  Resp:  [12-27] 16  BP: ()/(37-65) 113/51  SpO2:  [90 %-100 %] 91 %      Intake/Output:    Intake/Output Summary (Last 24 hours) at 5/1/2024 1141  Last data filed at 5/1/2024 0950  Gross per 24 hour   Intake 500 ml   Output 45 ml   Net 455 ml       Last 3 Weights   04/23/24 0624 102 lb (46.3 kg)   04/18/24 1032 103 lb (46.7 kg)   10/10/23 1117 106 lb (48.1 kg)   10/06/23 1816 105 lb (47.6 kg)   08/19/22 0242 112 lb (50.8 kg)   08/18/22 2020 109 lb (49.4 kg)       Exam   Gen: No acute distress  Heent: NC AT, neck supple  Pulm: Lungs clear, normal respiratory effort  CV: Heart with regular rate and rhythm,    Abd: Abdomen soft, mild TTP in lower quadrant, BS +, drain in place   MSK: no clubbing, no cyanosis  Skin: no rashes or lesions  Neuro: AO*3, motor intact  Psyc: appropriate mood and affect      Data Review:       Labs:     Recent Labs   Lab 04/29/24  0550 04/29/24  0715 04/30/24  0531 05/01/24  0541   RBC 1.98*  --  2.19* 2.16*   HGB 6.7* 7.4* 7.5* 7.3*   HCT 20.9* 22.9* 22.9* 22.8*   .6*  --  104.6* 105.6*   MCH 33.8  --  34.2* 33.8   MCHC 32.1  --  32.8  32.0   RDW 20.3*  --  19.9* 19.9*   NEPRELIM 10.31*  --  5.29 5.62   WBC 11.9*  --  6.6 7.0   .0  --  262.0 321.0         Recent Labs   Lab 04/29/24  0550 04/29/24  0715 04/30/24  0531 05/01/24  0541   *  --  90 113*   BUN  --  12 13 11   CREATSERUM 0.61  --  0.56 0.63   EGFRCR 89  --  90 88   CA 7.9*  --  9.2 9.6     --  140 139   K  --  3.5 3.8 3.8     --  109 106   CO2 29.0  --  29.0 26.0       No results for input(s): \"ALT\", \"AST\", \"ALB\", \"AMYLASE\", \"LIPASE\", \"LDH\" in the last 168 hours.    Invalid input(s): \"ALPHOS\", \"TBIL\", \"DBIL\", \"TPROT\"      Imaging:  CT DRAIN ABSCESS PERITONEAL (CPT=49406)    Result Date: 4/30/2024  CONCLUSION: Insertion of drainage catheter into pelvic fluid collection yielding serous fluid.  IR will follow.    Dictated by (CST): Cruz Tariq MD on 4/30/2024 at 4:53 PM     Finalized by (CST): Cruz Tariq MD on 4/30/2024 at 5:03 PM             Meds:      vancomycin  125 mg Oral Daily    piperacillin-tazobactam  3.375 g Intravenous Q8H    [Held by provider] enoxaparin  40 mg Subcutaneous Daily    docusate sodium  100 mg Oral BID    clonazePAM  0.5 mg Oral Nightly    DULoxetine  60 mg Oral Nightly    gabapentin  300 mg Oral BID    levothyroxine  50 mcg Oral Daily @ 0700    pantoprazole  20 mg Oral QAM AC    atorvastatin  10 mg Oral Nightly    [Held by provider] lisinopril  10 mg Oral QAM    [Held by provider] amLODIPine  2.5 mg Oral Nightly      sodium chloride         midazolam    naloxone    flumazenil    HYDROmorphone **OR** HYDROmorphone **OR** HYDROmorphone    iopamidol    ondansetron **OR** ondansetron    acetaminophen    HYDROcodone-acetaminophen    simethicone

## 2024-05-02 LAB
ANION GAP SERPL CALC-SCNC: 4 MMOL/L (ref 0–18)
BASOPHILS # BLD AUTO: 0.01 X10(3) UL (ref 0–0.2)
BASOPHILS NFR BLD AUTO: 0.2 %
BUN BLD-MCNC: 11 MG/DL (ref 9–23)
BUN/CREAT SERPL: 18 (ref 10–20)
CALCIUM BLD-MCNC: 9.8 MG/DL (ref 8.7–10.4)
CHLORIDE SERPL-SCNC: 107 MMOL/L (ref 98–112)
CO2 SERPL-SCNC: 28 MMOL/L (ref 21–32)
CREAT BLD-MCNC: 0.61 MG/DL
DEPRECATED RDW RBC AUTO: 75.6 FL (ref 35.1–46.3)
EGFRCR SERPLBLD CKD-EPI 2021: 89 ML/MIN/1.73M2 (ref 60–?)
EOSINOPHIL # BLD AUTO: 0.11 X10(3) UL (ref 0–0.7)
EOSINOPHIL NFR BLD AUTO: 1.8 %
ERYTHROCYTE [DISTWIDTH] IN BLOOD BY AUTOMATED COUNT: 19.5 % (ref 11–15)
GLUCOSE BLD-MCNC: 122 MG/DL (ref 70–99)
HCT VFR BLD AUTO: 23.1 %
HGB BLD-MCNC: 7.3 G/DL
IMM GRANULOCYTES # BLD AUTO: 0.04 X10(3) UL (ref 0–1)
IMM GRANULOCYTES NFR BLD: 0.6 %
LYMPHOCYTES # BLD AUTO: 0.81 X10(3) UL (ref 1–4)
LYMPHOCYTES NFR BLD AUTO: 13 %
MAGNESIUM SERPL-MCNC: 1.3 MG/DL (ref 1.6–2.6)
MCH RBC QN AUTO: 33.5 PG (ref 26–34)
MCHC RBC AUTO-ENTMCNC: 31.6 G/DL (ref 31–37)
MCV RBC AUTO: 106 FL
MONOCYTES # BLD AUTO: 0.51 X10(3) UL (ref 0.1–1)
MONOCYTES NFR BLD AUTO: 8.2 %
NEUTROPHILS # BLD AUTO: 4.73 X10 (3) UL (ref 1.5–7.7)
NEUTROPHILS # BLD AUTO: 4.73 X10(3) UL (ref 1.5–7.7)
NEUTROPHILS NFR BLD AUTO: 76.2 %
OSMOLALITY SERPL CALC.SUM OF ELEC: 289 MOSM/KG (ref 275–295)
PLATELET # BLD AUTO: 359 10(3)UL (ref 150–450)
POTASSIUM SERPL-SCNC: 3.9 MMOL/L (ref 3.5–5.1)
RBC # BLD AUTO: 2.18 X10(6)UL
SODIUM SERPL-SCNC: 139 MMOL/L (ref 136–145)
WBC # BLD AUTO: 6.2 X10(3) UL (ref 4–11)

## 2024-05-02 PROCEDURE — 80048 BASIC METABOLIC PNL TOTAL CA: CPT | Performed by: PHYSICIAN ASSISTANT

## 2024-05-02 PROCEDURE — 83735 ASSAY OF MAGNESIUM: CPT | Performed by: INTERNAL MEDICINE

## 2024-05-02 PROCEDURE — 85025 COMPLETE CBC W/AUTO DIFF WBC: CPT | Performed by: PHYSICIAN ASSISTANT

## 2024-05-02 RX ORDER — MAGNESIUM OXIDE 400 MG/1
800 TABLET ORAL ONCE
Status: COMPLETED | OUTPATIENT
Start: 2024-05-02 | End: 2024-05-02

## 2024-05-02 NOTE — PROGRESS NOTES
[facility] POST-OPERATIVE VIS[facility]     MEDICAL RECORD #: [id number] DATE OF SERVICE: 5/2/2024             Reason for visit:  Post-operative day nine    HISTORY OF PRESENT ILLNESS:  83 year old female with a history of incidental finding of bladder mass, pelvic mass, and peritoneal nodules. She was found to have a low-grade papillary urothelial bladder cancer, underwent transurethral resection. PET scan showed peritoneal implants and the lesion in the right ovary. 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 Immunohistochemistry was not typical for gynecologic malignancies, although this was thought to be the leading differential diagnosis. Her tumor markers were all negative. The patient was started on neoadjuvant chemotherapy, has undergone 6 cycles.  She underwent Laparoscopic lysis of adhesions (encompassing over 20 minutes), robotic-assisted total laparoscopic hysterectomy and bilateral salpingo-oophorectomy with tumor debulking, bilateral pelvic and paraaortic lymph node dissection, infracolic and portion of supracolic omentectomy on 4/23/24.    GI//GYNE Complaint:   Other complaints: Tolerating diet, no N/V. Urinating freely with new dysuria. +belching, +BM. Pain controlled, however was having drain site pain this AM. Denies vaginal bleeding.         Interim History:  Underwent surgery  Febrile - CT A/P with fluid collection  S/p IR drain placement    Patient's medications, allergies, past medical, surgical, social and family histories were reviewed and updated as appropriate.    Pathology/Lab Results:  Recent Results (from the past 720 hour(s))   CBC, Platelet; No Differential    Collection Time: 04/19/24  1:54 PM   Result Value Ref Range    WBC 8.0 4.0 - 11.0 x10(3) uL    RBC 2.42 (L) 3.80 - 5.30 x10(6)uL    HGB 8.8 (L) 12.0 - 16.0 g/dL    HCT 28.5 (L) 35.0 - 48.0 %    .8 (H) 80.0 - 100.0 fL    MCH 36.4 (H) 26.0 - 34.0 pg    MCHC 30.9 (L) 31.0 - 37.0 g/dL     RDW 15.5 (H) 11.0 - 15.0 %    RDW-SD 67.1 (H) 35.1 - 46.3 fL    .0 150.0 - 450.0 10(3)uL   ABORH (Blood Type)    Collection Time: 04/19/24  1:54 PM   Result Value Ref Range    ABO BLOOD TYPE A     RH BLOOD TYPE Positive    Antibody Screen    Collection Time: 04/19/24  1:54 PM   Result Value Ref Range    Antibody Screen Negative    MD BLOOD SMEAR CONSULT    Collection Time: 04/19/24  1:54 PM   Result Value Ref Range    MD Blood Smear Consult       Evaluation of the peripheral blood smear demonstrates moderate macrocytic anemia characterized by moderate anisopoikilocytosis with macrocytes, ovalocytes and polychromasia. Neutrophils display distinct cytoplasmic granulation. No circulating blasts seen.    Differential considerations for macrocytic anemia may include vitamin B12 or folate deficiency, autoimmune hemolytic anemia, cold agglutinin disease, liver disease, some myelodysplasias, thyroid disease, excessive alcohol consumption and drug effects.     Clinical correlation is recommended.     Reviewed by IRAIDA Vázquez M.D.   Cytology fluids    Collection Time: 04/23/24  8:27 AM   Result Value Ref Range    Case Report       Non-Gynecologic Cytology                          Case: [id number]                                   Authorizing Provider:  Rigoberto Couch MD         Collected:           04/23/2024 08:27 AM          Ordering Location:     [facility]          Received:            04/23/2024 10:44 AM                                 Operating Room                                                               Pathologist:           Cooper Dahl MD                                                         Specimen:    Pelvic washings, 1. PELVIC WASHINGS-FOR CYTOLOGY                                           General Categorization         Benign, inflammatory, reactive or reparative changes    Final Diagnosis:         Pelvic washings:   Adequacy: Satisfactory for evaluation  General Category: Benign,  inflammatory, reactive, or reparative changes  Diagnosis:  Benign mesothelial cells present  Histiocytes present  Peripheral blood elements present  No malignant cells identified        Embedded Images      Final Diagnosis Comment         Recommend correlation with histologic findings ([id number]).            Procedure       Monolayers:  1  Cytospins:     2      Clinical Information       Carcinoma, Malignant Neoplasm Of Right Ovary.        Non Gyne Interpretation  Benign      Gross Description       Volume (ml): 25    Color: Pink     Specimen to Pathology Tissue    Collection Time: 04/23/24  9:09 AM   Result Value Ref Range    Case Report       Surgical Pathology                                Case: [id number]                                  Authorizing Provider:  Rigoberto Couch MD         Collected:           04/23/2024 09:09 AM          Ordering Location:     [facility]          Received:            04/23/2024 11:24 AM                                 Operating Room                                                               Pathologist:           Cesia Escalante MD                                                                           Specimens:   A) - Uterus and cervix, bilateral tubes and ovaries, 1. UTERUS AND CERVIX, BILATERAL                TUBES AND OVARIES                                                                                   B) - Omentum, 2. INFRACOLIC OMENTUM, AND PORTION OF SUPRACOLIC OMENTUM                              C) - Lymph node pelvic right, 3. RIGHT PELVIC LYMPH NODES                                            D) - Lymph node pelvic left, 4. LEFT PELVIC LYMPH NODES                                             E) - Lymph node (ro-aortic), 5. RO-AORTIC LYMPH NODES                                  Final Diagnosis:         A. 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  Atrophic endometrium with cystically dilated glands.  Endosalpingosis and leiomyomas, 1 cm in greatest dimension grossly.  Cervix with metaplastic changes, calcifications, acute and chronic inflammation.  Bilateral multiple simple ovarian cysts.  Right fallopian tube with paratubal cyst 7 mm in greatest dimension grossly.  Left fallopian tube with no significant pathological abnormalities.  Negative inked surgical resection margins.  Pathological stage classification (pTNM, AJCC 8th edition): ypT1b ypN0.    B. Infracolic omentum and portion of supracolic omentum; omentectomy:   Fatty tissue with degenerative changes, calcifications, vascular congestion and fibrosis.  No definite evidence of carcinoma is identified.    C. Right pelvic lymph node; dissection:   One lymph node, negative for metastatic carcinoma (0/1).  AE1/AE3 immunohistochemical stain is negative (block C1).     D. Left pelvic lymph node; dissection:   One lymph node, negative for metastatic carcinoma (0/1).  AE1/AE3 immunohistochemical stain is negative (block D1).     E. Celina-aortic lymph nodes; dissection:   Five lymph nodes, negative for metastatic carcinoma (0/5).  AE1/AE3 immunohistochemical stain is negative (blocks E1-E2).      Embedded Images      Final Diagnosis Comment       Immunohistochemical stains (CK20, CK7, AE1/AE3, PAX8, p53 and WT1) are performed on block A18 for further characterization.  The neoplastic cells are positive for PAX8, AE1/AE3 and CK7.  The neoplastic cells are negative for CK20 and WT1.  P53 shows strong nuclear positivity consistent with aberrant (mutational type) expression.    For  purposes, this case was reviewed by a second pathologist who concurred  devorah with the diagnosis.    The immunohistochemical stains interpreted in this case demonstrate appropriate reactivity of positive controls.  The stains were performed on formalin-fixed, paraffin-embedded tissue sections with each antibody analysis being performed on a separate slide.        Synoptic Report       OVARY or FALLOPIAN TUBE or PRIMARY PERITONEUM   OVARY OR FALLOPIAN TUBE OR PRIMARY PERITONEUM - All Specimens   8th Edition - Protocol posted: 3/22/2023      SPECIMEN      Procedure:    Total hysterectomy and bilateral salpingo-oophorectomy       Hysterectomy Type:    Laparoscopic, robotic-assisted       Specimen Integrity:            Right Ovary Integrity:    Capsule intact       Specimen Integrity:            Left Ovary Integrity:    Capsule intact       TUMOR      Tumor Site:    Bilateral ovaries       Tumor Size:    Cannot be determined: Status post neoadjuvant chemotherapy       Histologic Type:    High grade serous carcinoma       Other Tissue / Organ Involvement:    Not identified       Peritoneal / Ascitic Fluid Involvement:    Malignant cells not identified       Chemotherapy Response Score (CRS):    CRS2 (moderate response)       REGIONAL LYMPH NODES      Regional Lymph Node Status:            :    All regional lymph nodes negative for tumor cells         Number of Lymph Nodes Examined:    7       pTNM CLASSIFICATION (AJCC 8th Edition)      Reporting of pT, pN, and (when applicable) pM categories is based on information available to the pathologist at the time the report is issued. As per the AJCC (Chapter 1, 8th Ed.) it is the managing physician’s responsibility to establish the final pathologic stage based upon all pertinent information, including but potentially not limited to this pathology report.      Modified Classification:    y       pT Category:    pT1b       pN Category:    pN0       Clinical Information       Carcinoma, Malignant Neoplasm Of Right Ovary.        Gross Description        Specimen A is labeled \"Walke, uterus and cervix, bilateral tubes and ovaries\" received in formalin. The specimen consists of a 98 gram total hysterectomy including uterus with cervix, and bilateral fallopian tubes and ovaries. The uterus and cervix measure 7.0 cm in length, 3.0 cm from cornu to cornu, 2.8 cm from anterior to posterior. The uterus is covered with pink-red smooth serosa. The ectocervix is covered with pink-tan smooth mucosa and measures 3.4 x 3.0 cm in transverse dimension. The cervical os is ovoid and measures 0.4 x 0.3 cm. The anterior aspect is inked blue. the posterior aspect is inked black. The cervix and uterus are bivalved and fixed for a few hours. The uterus is further sectioned to reveal a pink-tan herringbone endocervix with a 2.6 cm endocervical canal. The endometrium is pink-gray, granular to smooth, with a 5.3 x 2.8 cm dilated endometrial cavity. The uterus is sectioned to reveal multiple intramural fibroids (0.2-1.0 cm in greatest dimension). The remaining myometrium is pink-tan and trabeculated with no other definitive masses or lesions grossly identified. The greatest endometrium thickness is 0.1 cm. The greatest myometrium thickness is 1.1 cm.     The right ovary is cystic/enlarged and measures 5.5 x 4.2 x 3.6 cm. The capsule is intact. The capsule is inked blue and the ovary is sectioned to reveal a inner white-tan mass and two bulging cystic lesions. The white-tan mass measures 2.5 x 2.0 x 1.8 cm. The larger cystic lesion (3.5 cm in greatest dimension) is filled with serosanguinous fluid. The small cystic lesion is filled with a yellow-tan gelatinous material. The cyst linings are smooth without papillary excrescences or solid areas identified. The right fallopian tube measures 5.5 cm in length by 0.5 cm in diameter and presents one paratubal cyst filled with clear fluid (0.7 cm in greatest dimension).     The left fallopian tube measures 3.6 cm in length by 0.4 cm in diameter and  is grossly unremarkable. The left ovary is yellowish pink and lobulated measures 1.9 x 1.5 x 1.0 cm and presents a inner white-tan mass (0.9 cm in greatest dimension). Also noted is a inner cyst filled with clear fluid (0.7 cm in greatest dimension). Additionally there is a left para-adnexal nodule (1.4 x 1.0 x 0.9 cm). This nodule is inked green.     Representative sections of the uterus are submitted as follows:   A1, anterior cervix  A2, posterior cervix   A3, anterior lower uterine segment, full-thickness transverse sections  A4-A5, full-thickness anterior endometrium from mid to upper including some fibroids (two per cassette)  A6, posterior lower uterine segment, full-thickness transverse sections  A7-A10, full-thickness posterior endomyometrium from mid to upper including fibroids  A11, left parametrial soft tissue  A12, right parametrial soft tissue   A13-A16, right ovarian mass with adjacent smaller cystic lesion   A17-A18, larger right ovarian cystic lesion   A19, right fallopian tube with paratubal cyst  A20, sections of left fallopian tube  A21, left ovary including inner mass and cyst, bisected, entirely  A22, left para-adnexa nodule, entirely.       Specimen B is labeled \"Walke, infracolic omentum and portion of supracolic omentum\" received in formalin. The specimen consists of one unoriented piece of yellow-tan omentum measuring 26.5 x 9.2 x 1.4 cm. There is a undesignated tag attached to the mid section of the omentum. The omentum is sectioned to reveal a homogenous fatty parenchyma with focal areas of vascular congestion. No lesions, masses or evidence of tumor invasion are grossly identified. Representative sections are submitted as follows: B1, omentum with tag; B2-B3, additional random omentum.      Specimen C is labeled \"Walke, right pelvic lymph nodes\" received in formalin. The specimen consists of one piece of yellow-tan fatty tissue measuring 4.5 x 3.2 x 0.9 cm. Dissection of the tissue  reveals one lymph node candidate (1.5 cm in greatest dimension). The candidate is bisected and submitted entirely in cassette C1.     Specimen D is labeled \"Walke, left pelvic lymph nodes\" received in formalin. The specimen consists of multiple piece of fatty tissue measuring in aggregate 4.5 x 4.0 x 0.9 cm. Dissection of the tissue reveals no lymph nodes. The tissue is submitted entirely in cassettes D1-D2.     Specimen E is labeled \"Walke, periaortic lymph nodes\" received in formalin. The specimen consists of multiple pieces of yellow-tan fatty tissue measuring in aggregate 3.2 x 3.0 x 1.0 cm. Dissection of the tissue reveals four lymph node candidates (0.1-0.5 cm in greatest dimension). The candidates are submitted entirely in cassette E1. The remaining tissue is submitted in cassette E2. (jq)     Cesia Escalante M.D./mtt        Interpretation Malignant (A)     POCT Glucose    Collection Time: 04/23/24 11:27 AM   Result Value Ref Range    POC Glucose  143 (H) 70 - 99 mg/dL   Basic Metabolic Panel (8)    Collection Time: 04/24/24 10:16 AM   Result Value Ref Range    Glucose 154 (H) 70 - 99 mg/dL    Sodium 141 136 - 145 mmol/L    Potassium 3.9 3.5 - 5.1 mmol/L    Chloride 109 98 - 112 mmol/L    CO2 26.0 21.0 - 32.0 mmol/L    Anion Gap 6 0 - 18 mmol/L    BUN 14 9 - 23 mg/dL    Creatinine 0.64 0.55 - 1.02 mg/dL    BUN/CREA Ratio 21.9 (H) 10.0 - 20.0    Calcium, Total 9.4 8.7 - 10.4 mg/dL    Calculated Osmolality 296 (H) 275 - 295 mOsm/kg    eGFR-Cr 88 >=60 mL/min/1.73m2   CBC W/ DIFFERENTIAL    Collection Time: 04/24/24 10:34 AM   Result Value Ref Range    WBC 12.2 (H) 4.0 - 11.0 x10(3) uL    RBC 2.16 (L) 3.80 - 5.30 x10(6)uL    HGB 7.9 (L) 12.0 - 16.0 g/dL    HCT 25.0 (L) 35.0 - 48.0 %    .7 (H) 80.0 - 100.0 fL    MCH 36.6 (H) 26.0 - 34.0 pg    MCHC 31.6 31.0 - 37.0 g/dL    RDW-SD 64.8 (H) 35.1 - 46.3 fL    RDW 15.0 11.0 - 15.0 %    .0 150.0 - 450.0 10(3)uL    Neutrophil Absolute Prelim 11.07  (H) 1.50 - 7.70 x10 (3) uL    Neutrophil Absolute 11.07 (H) 1.50 - 7.70 x10(3) uL    Lymphocyte Absolute 0.56 (L) 1.00 - 4.00 x10(3) uL    Monocyte Absolute 0.56 0.10 - 1.00 x10(3) uL    Eosinophil Absolute 0.00 0.00 - 0.70 x10(3) uL    Basophil Absolute 0.01 0.00 - 0.20 x10(3) uL    Immature Granulocyte Absolute 0.04 0.00 - 1.00 x10(3) uL    Neutrophil % 90.4 %    Lymphocyte % 4.6 %    Monocyte % 4.6 %    Eosinophil % 0.0 %    Basophil % 0.1 %    Immature Granulocyte % 0.3 %   Basic Metabolic Panel (8)    Collection Time: 04/25/24  5:47 AM   Result Value Ref Range    Glucose 113 (H) 70 - 99 mg/dL    Sodium 142 136 - 145 mmol/L    Potassium 3.7 3.5 - 5.1 mmol/L    Chloride 109 98 - 112 mmol/L    CO2 29.0 21.0 - 32.0 mmol/L    Anion Gap 4 0 - 18 mmol/L    BUN 11 9 - 23 mg/dL    Creatinine 0.55 0.55 - 1.02 mg/dL    BUN/CREA Ratio 20.0 10.0 - 20.0    Calcium, Total 9.5 8.7 - 10.4 mg/dL    Calculated Osmolality 294 275 - 295 mOsm/kg    eGFR-Cr 91 >=60 mL/min/1.73m2   CBC W/ DIFFERENTIAL    Collection Time: 04/25/24  6:20 AM   Result Value Ref Range    WBC 6.4 4.0 - 11.0 x10(3) uL    RBC 1.82 (L) 3.80 - 5.30 x10(6)uL    HGB 6.6 (LL) 12.0 - 16.0 g/dL    HCT 21.3 (L) 35.0 - 48.0 %    .0 (H) 80.0 - 100.0 fL    MCH 36.3 (H) 26.0 - 34.0 pg    MCHC 31.0 31.0 - 37.0 g/dL    RDW-SD 63.8 (H) 35.1 - 46.3 fL    RDW 15.0 11.0 - 15.0 %    .0 150.0 - 450.0 10(3)uL    Neutrophil Absolute Prelim 5.47 1.50 - 7.70 x10 (3) uL    Neutrophil Absolute 5.47 1.50 - 7.70 x10(3) uL    Lymphocyte Absolute 0.49 (L) 1.00 - 4.00 x10(3) uL    Monocyte Absolute 0.34 0.10 - 1.00 x10(3) uL    Eosinophil Absolute 0.05 0.00 - 0.70 x10(3) uL    Basophil Absolute 0.02 0.00 - 0.20 x10(3) uL    Immature Granulocyte Absolute 0.03 0.00 - 1.00 x10(3) uL    Neutrophil % 85.4 %    Lymphocyte % 7.7 %    Monocyte % 5.3 %    Eosinophil % 0.8 %    Basophil % 0.3 %    Immature Granulocyte % 0.5 %   ABORH (Blood Type)    Collection Time: 04/25/24  7:57 AM    Result Value Ref Range    ABO BLOOD TYPE A     RH BLOOD TYPE Positive    Antibody Screen    Collection Time: 04/25/24  7:57 AM   Result Value Ref Range    Antibody Screen Negative    Prepare RBC STAT    Collection Time: 04/25/24 10:44 AM   Result Value Ref Range    Blood Product [id number]     Unit Number [id number]     UNIT ABO A     UNIT RH POS     Product Status Released from [employer]     Expiration Date [id number]     Blood Type Barcode 6200     Unit Volume 350 ml   Hemoglobin & Hematocrit    Collection Time: 04/25/24  2:07 PM   Result Value Ref Range    HGB 8.1 (L) 12.0 - 16.0 g/dL    HCT 23.9 (L) 35.0 - 48.0 %   Prepare RBC Once    Collection Time: 04/26/24 12:40 AM   Result Value Ref Range    Blood Product [id number]     Unit Number [id number]     UNIT ABO A     UNIT RH NEG     Product Status Presumed Transfused     Expiration Date [id number]     Blood Type Barcode 0600     Unit Volume 350 ml   Basic Metabolic Panel (8)    Collection Time: 04/26/24  5:26 AM   Result Value Ref Range    Glucose 116 (H) 70 - 99 mg/dL    Sodium 142 136 - 145 mmol/L    Potassium 3.5 3.5 - 5.1 mmol/L    Chloride 108 98 - 112 mmol/L    CO2 32.0 21.0 - 32.0 mmol/L    Anion Gap 2 0 - 18 mmol/L    BUN 10 9 - 23 mg/dL    Creatinine 0.66 0.55 - 1.02 mg/dL    BUN/CREA Ratio 15.2 10.0 - 20.0    Calcium, Total 9.7 8.7 - 10.4 mg/dL    Calculated Osmolality 294 275 - 295 mOsm/kg    eGFR-Cr 87 >=60 mL/min/1.73m2   CBC W/ DIFFERENTIAL    Collection Time: 04/26/24  5:26 AM   Result Value Ref Range    WBC 8.0 4.0 - 11.0 x10(3) uL    RBC 2.12 (L) 3.80 - 5.30 x10(6)uL    HGB 7.3 (L) 12.0 - 16.0 g/dL    HCT 22.0 (L) 35.0 - 48.0 %    .8 (H) 80.0 - 100.0 fL    MCH 34.4 (H) 26.0 - 34.0 pg    MCHC 33.2 31.0 - 37.0 g/dL    RDW-SD 86.3 (H) 35.1 - 46.3 fL    RDW 23.9 (H) 11.0 - 15.0 %    .0 150.0 - 450.0 10(3)uL    Neutrophil Absolute Prelim 7.03 1.50 - 7.70 x10 (3) uL    Neutrophil Absolute 7.03 1.50 - 7.70 x10(3) uL     Lymphocyte Absolute 0.49 (L) 1.00 - 4.00 x10(3) uL    Monocyte Absolute 0.32 0.10 - 1.00 x10(3) uL    Eosinophil Absolute 0.09 0.00 - 0.70 x10(3) uL    Basophil Absolute 0.01 0.00 - 0.20 x10(3) uL    Immature Granulocyte Absolute 0.03 0.00 - 1.00 x10(3) uL    Neutrophil % 88.3 %    Lymphocyte % 6.1 %    Monocyte % 4.0 %    Eosinophil % 1.1 %    Basophil % 0.1 %    Immature Granulocyte % 0.4 %   RBC Morphology Scan    Collection Time: 04/26/24  5:26 AM   Result Value Ref Range    RBC Morphology See morphology below (A) Normal, Slide reviewed, see previous RBC morphology.    Platelet Morphology Normal Normal    Macrocytosis 1+      Microcytosis 1+     Hemoglobin & Hematocrit    Collection Time: 04/26/24  2:18 PM   Result Value Ref Range    HGB 7.6 (L) 12.0 - 16.0 g/dL    HCT 22.2 (L) 35.0 - 48.0 %   Basic Metabolic Panel (8)    Collection Time: 04/27/24  6:06 AM   Result Value Ref Range    Glucose 111 (H) 70 - 99 mg/dL    Sodium 144 136 - 145 mmol/L    Potassium 3.4 (L) 3.5 - 5.1 mmol/L    Chloride 109 98 - 112 mmol/L    CO2 32.0 21.0 - 32.0 mmol/L    Anion Gap 3 0 - 18 mmol/L    BUN 11 9 - 23 mg/dL    Creatinine 0.54 (L) 0.55 - 1.02 mg/dL    BUN/CREA Ratio 20.4 (H) 10.0 - 20.0    Calcium, Total 9.8 8.7 - 10.4 mg/dL    Calculated Osmolality 298 (H) 275 - 295 mOsm/kg    eGFR-Cr 91 >=60 mL/min/1.73m2   CBC W/ DIFFERENTIAL    Collection Time: 04/27/24  6:06 AM   Result Value Ref Range    WBC 5.4 4.0 - 11.0 x10(3) uL    RBC 2.23 (L) 3.80 - 5.30 x10(6)uL    HGB 7.4 (L) 12.0 - 16.0 g/dL    HCT 23.2 (L) 35.0 - 48.0 %    .0 (H) 80.0 - 100.0 fL    MCH 33.2 26.0 - 34.0 pg    MCHC 31.9 31.0 - 37.0 g/dL    RDW-SD 82.0 (H) 35.1 - 46.3 fL    RDW 22.2 (H) 11.0 - 15.0 %    .0 150.0 - 450.0 10(3)uL    Neutrophil Absolute Prelim 4.31 1.50 - 7.70 x10 (3) uL    Neutrophil Absolute 4.31 1.50 - 7.70 x10(3) uL    Lymphocyte Absolute 0.72 (L) 1.00 - 4.00 x10(3) uL    Monocyte Absolute 0.27 0.10 - 1.00 x10(3) uL    Eosinophil  Absolute 0.10 0.00 - 0.70 x10(3) uL    Basophil Absolute 0.00 0.00 - 0.20 x10(3) uL    Immature Granulocyte Absolute 0.02 0.00 - 1.00 x10(3) uL    Neutrophil % 79.5 %    Lymphocyte % 13.3 %    Monocyte % 5.0 %    Eosinophil % 1.8 %    Basophil % 0.0 %    Immature Granulocyte % 0.4 %   Basic Metabolic Panel (8)    Collection Time: 04/28/24  5:57 AM   Result Value Ref Range    Glucose 182 (H) 70 - 99 mg/dL    Sodium 140 136 - 145 mmol/L    Potassium 3.7 3.5 - 5.1 mmol/L    Chloride 106 98 - 112 mmol/L    CO2 31.0 21.0 - 32.0 mmol/L    Anion Gap 3 0 - 18 mmol/L    BUN 15 9 - 23 mg/dL    Creatinine 0.60 0.55 - 1.02 mg/dL    BUN/CREA Ratio 25.0 (H) 10.0 - 20.0    Calcium, Total 9.6 8.7 - 10.4 mg/dL    Calculated Osmolality 295 275 - 295 mOsm/kg    eGFR-Cr 89 >=60 mL/min/1.73m2   Potassium    Collection Time: 04/28/24  5:57 AM   Result Value Ref Range    Potassium 3.7 3.5 - 5.1 mmol/L   CBC W/ DIFFERENTIAL    Collection Time: 04/28/24  5:57 AM   Result Value Ref Range    WBC 12.5 (H) 4.0 - 11.0 x10(3) uL    RBC 2.55 (L) 3.80 - 5.30 x10(6)uL    HGB 8.5 (L) 12.0 - 16.0 g/dL    HCT 26.2 (L) 35.0 - 48.0 %    .7 (H) 80.0 - 100.0 fL    MCH 33.3 26.0 - 34.0 pg    MCHC 32.4 31.0 - 37.0 g/dL    RDW-SD 76.4 (H) 35.1 - 46.3 fL    RDW 20.6 (H) 11.0 - 15.0 %    .0 150.0 - 450.0 10(3)uL    Neutrophil Absolute Prelim 11.27 (H) 1.50 - 7.70 x10 (3) uL    Neutrophil Absolute 11.27 (H) 1.50 - 7.70 x10(3) uL    Lymphocyte Absolute 0.49 (L) 1.00 - 4.00 x10(3) uL    Monocyte Absolute 0.65 0.10 - 1.00 x10(3) uL    Eosinophil Absolute 0.02 0.00 - 0.70 x10(3) uL    Basophil Absolute 0.02 0.00 - 0.20 x10(3) uL    Immature Granulocyte Absolute 0.05 0.00 - 1.00 x10(3) uL    Neutrophil % 90.1 %    Lymphocyte % 3.9 %    Monocyte % 5.2 %    Eosinophil % 0.2 %    Basophil % 0.2 %    Immature Granulocyte % 0.4 %   EKG 12 Lead    Collection Time: 04/28/24  9:35 AM   Result Value Ref Range    Ventricular rate 104 BPM    Atrial rate 104 BPM     P-R Interval 158 ms    QRS Duration 82 ms    Q-T Interval 326 ms    QTC Calculation (Bezet) 428 ms    P Axis 35 degrees    R Axis 14 degrees    T Axis 43 degrees   Urinalysis with Culture Reflex    Collection Time: 04/28/24 10:05 AM    Specimen: Urine, clean catch   Result Value Ref Range    Urine Color Light-Yellow Yellow    Clarity Urine Clear Clear    Spec Gravity >1.030 (H) 1.005 - 1.030    Glucose Urine Normal Normal mg/dL    Bilirubin Urine Negative Negative    Ketones Urine Negative Negative mg/dL    Blood Urine 1+ (A) Negative    pH Urine 8.5 (H) 5.0 - 8.0    Protein Urine 20 (A) Negative mg/dL    Urobilinogen Urine Normal Normal    Nitrite Urine Negative Negative    Leukocyte Esterase Urine 75 (A) Negative    WBC Urine 11-20 (A) 0 - 5 /HPF    RBC Urine 6-10 (A) 0 - 2 /HPF    Bacteria Urine None Seen None Seen /HPF    Squamous Epi. Cells Few (A) None Seen /HPF    Renal Tubular Epithelial Cells None Seen None Seen /HPF    Transitional Cells None Seen None Seen /HPF    Yeast Urine None Seen None Seen /HPF   Urine Culture, Routine    Collection Time: 04/28/24 10:05 AM    Specimen: Urine, clean catch   Result Value Ref Range    Urine Culture No Growth at 18-24 hrs.    Basic Metabolic Panel (8)    Collection Time: 04/29/24  5:50 AM   Result Value Ref Range    Glucose 119 (H) 70 - 99 mg/dL    Sodium 141 136 - 145 mmol/L    Potassium      Chloride 107 98 - 112 mmol/L    CO2 29.0 21.0 - 32.0 mmol/L    Anion Gap 5 0 - 18 mmol/L    BUN      Creatinine 0.61 0.55 - 1.02 mg/dL    BUN/CREA Ratio      Calcium, Total 7.9 (L) 8.7 - 10.4 mg/dL    Calculated Osmolality      eGFR-Cr 89 >=60 mL/min/1.73m2   Magnesium    Collection Time: 04/29/24  5:50 AM   Result Value Ref Range    Magnesium 0.9 (LL) 1.6 - 2.6 mg/dL   CBC W/ DIFFERENTIAL    Collection Time: 04/29/24  5:50 AM   Result Value Ref Range    WBC 11.9 (H) 4.0 - 11.0 x10(3) uL    RBC 1.98 (L) 3.80 - 5.30 x10(6)uL    HGB 6.7 (LL) 12.0 - 16.0 g/dL    HCT 20.9 (L) 35.0 -  48.0 %    .6 (H) 80.0 - 100.0 fL    MCH 33.8 26.0 - 34.0 pg    MCHC 32.1 31.0 - 37.0 g/dL    RDW-SD 78.2 (H) 35.1 - 46.3 fL    RDW 20.3 (H) 11.0 - 15.0 %    .0 150.0 - 450.0 10(3)uL    Neutrophil Absolute Prelim 10.31 (H) 1.50 - 7.70 x10 (3) uL    Neutrophil Absolute 10.31 (H) 1.50 - 7.70 x10(3) uL    Lymphocyte Absolute 0.69 (L) 1.00 - 4.00 x10(3) uL    Monocyte Absolute 0.76 0.10 - 1.00 x10(3) uL    Eosinophil Absolute 0.10 0.00 - 0.70 x10(3) uL    Basophil Absolute 0.02 0.00 - 0.20 x10(3) uL    Immature Granulocyte Absolute 0.04 0.00 - 1.00 x10(3) uL    Neutrophil % 86.5 %    Lymphocyte % 5.8 %    Monocyte % 6.4 %    Eosinophil % 0.8 %    Basophil % 0.2 %    Immature Granulocyte % 0.3 %   REDRAW BMP    Collection Time: 04/29/24  7:15 AM   Result Value Ref Range    Potassium 3.5 3.5 - 5.1 mmol/L    BUN 12 9 - 23 mg/dL   Hemoglobin & Hematocrit    Collection Time: 04/29/24  7:15 AM   Result Value Ref Range    HGB 7.4 (L) 12.0 - 16.0 g/dL    HCT 22.9 (L) 35.0 - 48.0 %   Magnesium    Collection Time: 04/29/24  1:29 PM   Result Value Ref Range    Magnesium 1.8 1.6 - 2.6 mg/dL   Basic Metabolic Panel (8)    Collection Time: 04/30/24  5:31 AM   Result Value Ref Range    Glucose 90 70 - 99 mg/dL    Sodium 140 136 - 145 mmol/L    Potassium 3.8 3.5 - 5.1 mmol/L    Chloride 109 98 - 112 mmol/L    CO2 29.0 21.0 - 32.0 mmol/L    Anion Gap 2 0 - 18 mmol/L    BUN 13 9 - 23 mg/dL    Creatinine 0.56 0.55 - 1.02 mg/dL    BUN/CREA Ratio 23.2 (H) 10.0 - 20.0    Calcium, Total 9.2 8.7 - 10.4 mg/dL    Calculated Osmolality 290 275 - 295 mOsm/kg    eGFR-Cr 90 >=60 mL/min/1.73m2   Magnesium    Collection Time: 04/30/24  5:31 AM   Result Value Ref Range    Magnesium 1.6 1.6 - 2.6 mg/dL   CBC W/ DIFFERENTIAL    Collection Time: 04/30/24  5:31 AM   Result Value Ref Range    WBC 6.6 4.0 - 11.0 x10(3) uL    RBC 2.19 (L) 3.80 - 5.30 x10(6)uL    HGB 7.5 (L) 12.0 - 16.0 g/dL    HCT 22.9 (L) 35.0 - 48.0 %    .6 (H) 80.0 -  100.0 fL    MCH 34.2 (H) 26.0 - 34.0 pg    MCHC 32.8 31.0 - 37.0 g/dL    RDW-SD 75.5 (H) 35.1 - 46.3 fL    RDW 19.9 (H) 11.0 - 15.0 %    .0 150.0 - 450.0 10(3)uL    Neutrophil Absolute Prelim 5.29 1.50 - 7.70 x10 (3) uL    Neutrophil Absolute 5.29 1.50 - 7.70 x10(3) uL    Lymphocyte Absolute 0.70 (L) 1.00 - 4.00 x10(3) uL    Monocyte Absolute 0.44 0.10 - 1.00 x10(3) uL    Eosinophil Absolute 0.10 0.00 - 0.70 x10(3) uL    Basophil Absolute 0.02 0.00 - 0.20 x10(3) uL    Immature Granulocyte Absolute 0.03 0.00 - 1.00 x10(3) uL    Neutrophil % 80.4 %    Lymphocyte % 10.6 %    Monocyte % 6.7 %    Eosinophil % 1.5 %    Basophil % 0.3 %    Immature Granulocyte % 0.5 %   Phosphorus    Collection Time: 04/30/24  5:31 AM   Result Value Ref Range    Phosphorus 3.8 2.4 - 5.1 mg/dL   Aerobic Bacterial Culture    Collection Time: 04/30/24  3:07 PM    Specimen: Abdomen; Body fluid, unspecified   Result Value Ref Range    Aerobic Culture Result No Growth 2 Days     Aerobic Smear 2+ WBCs seen     Aerobic Smear No organisms seen    Anaerobic Culture    Collection Time: 04/30/24  3:07 PM    Specimen: Abdomen; Body fluid, unspecified   Result Value Ref Range    Anaerobic Culture Pending    Basic Metabolic Panel (8)    Collection Time: 05/01/24  5:41 AM   Result Value Ref Range    Glucose 113 (H) 70 - 99 mg/dL    Sodium 139 136 - 145 mmol/L    Potassium 3.8 3.5 - 5.1 mmol/L    Chloride 106 98 - 112 mmol/L    CO2 26.0 21.0 - 32.0 mmol/L    Anion Gap 7 0 - 18 mmol/L    BUN 11 9 - 23 mg/dL    Creatinine 0.63 0.55 - 1.02 mg/dL    BUN/CREA Ratio 17.5 10.0 - 20.0    Calcium, Total 9.6 8.7 - 10.4 mg/dL    Calculated Osmolality 288 275 - 295 mOsm/kg    eGFR-Cr 88 >=60 mL/min/1.73m2   Magnesium    Collection Time: 05/01/24  5:41 AM   Result Value Ref Range    Magnesium 1.4 (L) 1.6 - 2.6 mg/dL   CBC W/ DIFFERENTIAL    Collection Time: 05/01/24  5:41 AM   Result Value Ref Range    WBC 7.0 4.0 - 11.0 x10(3) uL    RBC 2.16 (L) 3.80 - 5.30  x10(6)uL    HGB 7.3 (L) 12.0 - 16.0 g/dL    HCT 22.8 (L) 35.0 - 48.0 %    .6 (H) 80.0 - 100.0 fL    MCH 33.8 26.0 - 34.0 pg    MCHC 32.0 31.0 - 37.0 g/dL    RDW-SD 76.6 (H) 35.1 - 46.3 fL    RDW 19.9 (H) 11.0 - 15.0 %    .0 150.0 - 450.0 10(3)uL    Neutrophil Absolute Prelim 5.62 1.50 - 7.70 x10 (3) uL    Neutrophil Absolute 5.62 1.50 - 7.70 x10(3) uL    Lymphocyte Absolute 0.63 (L) 1.00 - 4.00 x10(3) uL    Monocyte Absolute 0.52 0.10 - 1.00 x10(3) uL    Eosinophil Absolute 0.15 0.00 - 0.70 x10(3) uL    Basophil Absolute 0.02 0.00 - 0.20 x10(3) uL    Immature Granulocyte Absolute 0.02 0.00 - 1.00 x10(3) uL    Neutrophil % 80.6 %    Lymphocyte % 9.1 %    Monocyte % 7.5 %    Eosinophil % 2.2 %    Basophil % 0.3 %    Immature Granulocyte % 0.3 %   Phosphorus    Collection Time: 05/01/24  5:41 AM   Result Value Ref Range    Phosphorus 4.2 2.4 - 5.1 mg/dL   Basic Metabolic Panel (8)    Collection Time: 05/02/24  5:35 AM   Result Value Ref Range    Glucose 122 (H) 70 - 99 mg/dL    Sodium 139 136 - 145 mmol/L    Potassium 3.9 3.5 - 5.1 mmol/L    Chloride 107 98 - 112 mmol/L    CO2 28.0 21.0 - 32.0 mmol/L    Anion Gap 4 0 - 18 mmol/L    BUN 11 9 - 23 mg/dL    Creatinine 0.61 0.55 - 1.02 mg/dL    BUN/CREA Ratio 18.0 10.0 - 20.0    Calcium, Total 9.8 8.7 - 10.4 mg/dL    Calculated Osmolality 289 275 - 295 mOsm/kg    eGFR-Cr 89 >=60 mL/min/1.73m2   Magnesium    Collection Time: 05/02/24  5:35 AM   Result Value Ref Range    Magnesium 1.3 (L) 1.6 - 2.6 mg/dL   CBC W/ DIFFERENTIAL    Collection Time: 05/02/24  5:35 AM   Result Value Ref Range    WBC 6.2 4.0 - 11.0 x10(3) uL    RBC 2.18 (L) 3.80 - 5.30 x10(6)uL    HGB 7.3 (L) 12.0 - 16.0 g/dL    HCT 23.1 (L) 35.0 - 48.0 %    .0 (H) 80.0 - 100.0 fL    MCH 33.5 26.0 - 34.0 pg    MCHC 31.6 31.0 - 37.0 g/dL    RDW-SD 75.6 (H) 35.1 - 46.3 fL    RDW 19.5 (H) 11.0 - 15.0 %    .0 150.0 - 450.0 10(3)uL    Neutrophil Absolute Prelim 4.73 1.50 - 7.70 x10 (3) uL     Neutrophil Absolute 4.73 1.50 - 7.70 x10(3) uL    Lymphocyte Absolute 0.81 (L) 1.00 - 4.00 x10(3) uL    Monocyte Absolute 0.51 0.10 - 1.00 x10(3) uL    Eosinophil Absolute 0.11 0.00 - 0.70 x10(3) uL    Basophil Absolute 0.01 0.00 - 0.20 x10(3) uL    Immature Granulocyte Absolute 0.04 0.00 - 1.00 x10(3) uL    Neutrophil % 76.2 %    Lymphocyte % 13.0 %    Monocyte % 8.2 %    Eosinophil % 1.8 %    Basophil % 0.2 %    Immature Granulocyte % 0.6 %       Labs reviewed    Physical examination:    /56 (BP Location: Right arm)   Pulse 95   Temp 98.2 °F (36.8 °C) (Oral)   Resp 16   Ht 4' 9\" (1.448 m)   Wt 102 lb (46.3 kg)   SpO2 97%   BMI 22.07 kg/m²     GENERAL: alert and oriented, cooperative, in no acute distress  ABDOMEN: soft, non-tender. Bowel sounds normal. No masses,  no organomegaly RLQ crepitus  INCISION/SURGICAL WOUNDS: clean, dry and intact trocar incisions, IR drain site dressing c/d/i  PELVIC: Pelvic exam deferred today, previous exam: vaginal cuff intact with purulent drainage in the vaginal vault, bilateral adnexal tenderness R>L, R adnexal fullness    ASSESSMENT/PLAN:  Dx: ovarian cancer    Cr  WNL    Good UOP     LMWH - will go home on lmwh 40mg daily x 2 weeks post op    Soft diet    Encouraged IS    Febrile, leukocytosis, and tachycardic on 4/28:  - Urine cx w/ no growth  - CT Chest with  atelectasis, no PE  - CT A/P with 11 cm pelvic fluid collection   - On empiric zosyn and vanco; plan for ID consult to transition to PO abx  - afebrile, WBC normalized  - s/p IR drain placement, draining SS fluid, management per IR    Anemia:  - Presume 2/2 expected post-op ABLA w dilutional component from IVF  - Low suspicion of active bleeding  - 4/25 hgb < 7 - 1u RBC  - hgb  7.4 -> 7.5 -> 7.3 -> 7.3  - trend hgb    PT/OT/SW    Ok to discharge to [facility] once seen by ID       The patient verbalized good understanding of this.  I will keep you informed of her progress.  Thank you for allowing me to  participate in the care of this patient.    BARRINGTON Ireland  05/2/24

## 2024-05-02 NOTE — PLAN OF CARE
Problem: Patient Centered Care  Goal: Patient preferences are identified and integrated in the patient's plan of care  Description: Interventions:  - What would you like us to know as we care for you? From home   - Provide timely, complete, and accurate information to patient/family  - Incorporate patient and family knowledge, values, beliefs, and cultural backgrounds into the planning and delivery of care  - Encourage patient/family to participate in care and decision-making at the level they choose  - Honor patient and family perspectives and choices  Outcome: Progressing     Problem: Patient/Family Goals  Goal: Patient/Family Long Term Goal  Description: Patient's Long Term Goal:     Interventions:  -   - See additional Care Plan goals for specific interventions  Outcome: Progressing  Goal: Patient/Family Short Term Goal  Description: Patient's Short Term Goal:     Interventions:   -  - See additional Care Plan goals for specific interventions  Outcome: Progressing     Problem: PAIN - ADULT  Goal: Verbalizes/displays adequate comfort level or patient's stated pain goal  Description: INTERVENTIONS:  - Encourage pt to monitor pain and request assistance  - Assess pain using appropriate pain scale  - Administer analgesics based on type and severity of pain and evaluate response  - Implement non-pharmacological measures as appropriate and evaluate response  - Consider cultural and social influences on pain and pain management  - Manage/alleviate anxiety  - Utilize distraction and/or relaxation techniques  - Monitor for opioid side effects  - Notify MD/LIP if interventions unsuccessful or patient reports new pain  - Anticipate increased pain with activity and pre-medicate as appropriate  Outcome: Progressing     Problem: SAFETY ADULT - FALL  Goal: Free from fall injury  Description: INTERVENTIONS:  - Assess pt frequently for physical needs  - Identify cognitive and physical deficits and behaviors that affect risk of  falls.  - Olpe fall precautions as indicated by assessment.  - Educate pt/family on patient safety including physical limitations  - Instruct pt to call for assistance with activity based on assessment  - Modify environment to reduce risk of injury  - Provide assistive devices as appropriate  - Consider OT/PT consult to assist with strengthening/mobility  - Encourage toileting schedule  Outcome: Progressing     Problem: GASTROINTESTINAL - ADULT  Goal: Minimal or absence of nausea and vomiting  Description: INTERVENTIONS:  - Maintain adequate hydration with IV or PO as ordered and tolerated  - Nasogastric tube to low intermittent suction as ordered  - Evaluate effectiveness of ordered antiemetic medications  - Provide nonpharmacologic comfort measures as appropriate  - Advance diet as tolerated, if ordered  - Obtain nutritional consult as needed  - Evaluate fluid balance  Outcome: Progressing     Problem: GENITOURINARY - ADULT  Goal: Absence of urinary retention  Description: INTERVENTIONS:  - Assess patient’s ability to void and empty bladder  - Monitor intake/output and perform bladder scan as needed  - Follow urinary retention protocol/standard of care  - Consider collaborating with pharmacy to review patient's medication profile  - Implement strategies to promote bladder emptying  Outcome: Progressing     Problem: MUSCULOSKELETAL - ADULT  Goal: Return mobility to safest level of function  Description: INTERVENTIONS:  - Assess patient stability and activity tolerance for standing, transferring and ambulating w/ or w/o assistive devices  - Assist with transfers and ambulation using safe patient handling equipment as needed  - Ensure adequate protection for wounds/incisions during mobilization  - Obtain PT/OT consults as needed  - Advance activity as appropriate  - Communicate ordered activity level and limitations with patient/family  Outcome: Progressing     Pt resting in bed, ambulates in room with walker  and assist. Pt was a little unsteady getting up at times. Norco and dilaudid prn for pain. IV antibiotic given. Lap sites c/d/I. TEO to bulb suction-serous output. Voiding without difficulty- mild incontinence at times, depends in place. Safety measures maintained. Frequent rounding being done.

## 2024-05-02 NOTE — PROGRESS NOTES
[facility] Hospitalist Progress Note     CC: Hospital Follow up    PCP: Chong Cornejo MD       Assessment/Plan:     Active Problems:    Ovarian cancer (HCC)    Lizeth Mcgill is a 83 year old female with ovarian CA, HTN, breaset CA, DM2, hypothyroidism who presented for hysterectomy-BSO, course complicated by acute blood loss anemia, hypoxia, as well as fever and leukocytosis, with concerns of possible postoperative fluid collection/hematoma with possible infection, on IV Zosyn s/p aspiration of postoperative fluid collection by IR and drain placed on 4/30.    Ovarian CA  S/p robotic assisted total lap hysterectomy-BSO, debulking, omentectomy  - PRN pain meds, Transition to PO when able  - monitor for acute blood loss anemia   - Bowel reg, antiemetics  - DVT Prophy- lovenox (held with ABLA and hematoma per GYN)  - Bernardo removed    Fever / abd pain  Dysuria  Fluid collection/hematoma  - CT abd pelvis, with fluid collection, urine culture negative  - given fever, elevated WBC, and neg Ucx, will need to assume fluid collection is poss infected  - zosyn started on 4/28  - discussed with Dr. Couch, rec IR consult for aspiration  - IR consulted  - s/p aspiration of postoperative fluid collection by IR and drain placed on 4/30  - White blood cell count improving, fever curve improved  - f/u drain cx   - ID consulted      Acute resp failure w hypoxia, now resolved   - 70% w ambulating per pt. On 3L NC 4/24 AM  - Suspect atelectasis. Minimal basilar air movement on exam  - Can hold off on imaging for now  - OOB, encourage IS, ambulate TID  - Supplemental O2 for goal SpO2 90-93%. On 2L NC 4/24 AM   - Encourage IS  - CXR mild/moderate Left LL with effusion/atelectasis   - give dose of IV lasix today  - on RA this morning but sats 90%  - ECG with PAc's not afib  - CT chest without PE  - Now on room air     Acute on chronic anemia   - Presume 2/2 expected post-op ABLA w dilutional component from IVF  - No active  bleeding  - Trend CBC  - 4/25 hgb < 7 - 1u RBC     HTN  - BP stable  - hold home lisinopril for now     Anxiety  - clonazepam nightly     Hypothyroidism  - home sythroid     GERD  - PPI     ACP: Full Code  Ppx: DVT: Enox when able   Dispo  EDoD: pending course  F/u:   - PCP:  Chong Cornejo MD    Questions/concerns were discussed with patient and/or family by bedside.    Thank You,  Nasim Davila MD    Hospitalist with [facility]     Subjective:     Feeling ok, breathing improved, denies chest pain, no nausea, no fevers. Pain well controlled. Ambulating well.     OBJECTIVE:    Blood pressure 138/56, pulse 95, temperature 98.2 °F (36.8 °C), temperature source Oral, resp. rate 16, height 4' 9\" (1.448 m), weight 102 lb (46.3 kg), SpO2 97%, not currently breastfeeding.    Temp:  [98.2 °F (36.8 °C)] 98.2 °F (36.8 °C)  Pulse:  [95] 95  Resp:  [16] 16  BP: (138)/(56) 138/56  SpO2:  [97 %] 97 %      Intake/Output:    Intake/Output Summary (Last 24 hours) at 5/2/2024 1310  Last data filed at 5/2/2024 0533  Gross per 24 hour   Intake 640 ml   Output 30 ml   Net 610 ml       Last 3 Weights   04/23/24 0624 102 lb (46.3 kg)   04/18/24 1032 103 lb (46.7 kg)   10/10/23 1117 106 lb (48.1 kg)   10/06/23 1816 105 lb (47.6 kg)   08/19/22 0242 112 lb (50.8 kg)   08/18/22 2020 109 lb (49.4 kg)       Exam   Gen: No acute distress  Heent: NC AT, neck supple  Pulm: Lungs clear, normal respiratory effort  CV: Heart with regular rate and rhythm,    Abd: Abdomen soft, mild TTP in lower quadrant, BS +, drain in place   MSK: no clubbing, no cyanosis  Skin: no rashes or lesions  Neuro: AO*3, motor intact  Psyc: appropriate mood and affect      Data Review:       Labs:     Recent Labs   Lab 04/30/24  0531 05/01/24  0541 05/02/24  0535   RBC 2.19* 2.16* 2.18*   HGB 7.5* 7.3* 7.3*   HCT 22.9* 22.8* 23.1*   .6* 105.6* 106.0*   MCH 34.2* 33.8 33.5   MCHC 32.8 32.0 31.6   RDW 19.9* 19.9* 19.5*   NEPRELIM 5.29 5.62 4.73   WBC 6.6  7.0 6.2   .0 321.0 359.0         Recent Labs   Lab 04/30/24  0531 05/01/24  0541 05/02/24  0535   GLU 90 113* 122*   BUN 13 11 11   CREATSERUM 0.56 0.63 0.61   EGFRCR 90 88 89   CA 9.2 9.6 9.8    139 139   K 3.8 3.8 3.9    106 107   CO2 29.0 26.0 28.0       No results for input(s): \"ALT\", \"AST\", \"ALB\", \"AMYLASE\", \"LIPASE\", \"LDH\" in the last 168 hours.    Invalid input(s): \"ALPHOS\", \"TBIL\", \"DBIL\", \"TPROT\"      Imaging:  CT DRAIN ABSCESS PERITONEAL (CPT=49406)    Result Date: 4/30/2024  CONCLUSION: Insertion of drainage catheter into pelvic fluid collection yielding serous fluid.  IR will follow.    Dictated by (CST): Cruz Tariq MD on 4/30/2024 at 4:53 PM     Finalized by (CST): Cruz Tariq MD on 4/30/2024 at 5:03 PM             Meds:      vancomycin  125 mg Oral Daily    piperacillin-tazobactam  3.375 g Intravenous Q8H    [Held by provider] enoxaparin  40 mg Subcutaneous Daily    docusate sodium  100 mg Oral BID    clonazePAM  0.5 mg Oral Nightly    DULoxetine  60 mg Oral Nightly    gabapentin  300 mg Oral BID    levothyroxine  50 mcg Oral Daily @ 0700    pantoprazole  20 mg Oral QAM AC    atorvastatin  10 mg Oral Nightly    [Held by provider] lisinopril  10 mg Oral QAM    [Held by provider] amLODIPine  2.5 mg Oral Nightly      sodium chloride         midazolam    naloxone    flumazenil    HYDROmorphone **OR** HYDROmorphone **OR** HYDROmorphone    iopamidol    ondansetron **OR** ondansetron    acetaminophen    HYDROcodone-acetaminophen    simethicone

## 2024-05-02 NOTE — PLAN OF CARE
Patient alert and oriented x4. VSS. On room air. Tolerating LF/ soft diet. TEO drain placed to RLQ. Dressing c/d/I. Irrigated per order. PRN norco for pain control. IV abx as ordered. Port needle changed. Ambulating with 1 and RW. Fall precautions in place. Call light within reach. Plan for discharge tomorrow to [facility] pending final cx. Patient aware and agreeable to POC.   Problem: Patient Centered Care  Goal: Patient preferences are identified and integrated in the patient's plan of care  Description: Interventions:  - What would you like us to know as we care for you? From home   - Provide timely, complete, and accurate information to patient/family  - Incorporate patient and family knowledge, values, beliefs, and cultural backgrounds into the planning and delivery of care  - Encourage patient/family to participate in care and decision-making at the level they choose  - Honor patient and family perspectives and choices  Outcome: Progressing     Problem: Patient/Family Goals  Goal: Patient/Family Long Term Goal  Description: Patient's Long Term Goal:     Interventions:  -   - See additional Care Plan goals for specific interventions  Outcome: Progressing  Goal: Patient/Family Short Term Goal  Description: Patient's Short Term Goal:     Interventions:   -  - See additional Care Plan goals for specific interventions  Outcome: Progressing     Problem: PAIN - ADULT  Goal: Verbalizes/displays adequate comfort level or patient's stated pain goal  Description: INTERVENTIONS:  - Encourage pt to monitor pain and request assistance  - Assess pain using appropriate pain scale  - Administer analgesics based on type and severity of pain and evaluate response  - Implement non-pharmacological measures as appropriate and evaluate response  - Consider cultural and social influences on pain and pain management  - Manage/alleviate anxiety  - Utilize distraction and/or relaxation techniques  - Monitor for opioid side  effects  - Notify MD/LIP if interventions unsuccessful or patient reports new pain  - Anticipate increased pain with activity and pre-medicate as appropriate  Outcome: Progressing     Problem: SAFETY ADULT - FALL  Goal: Free from fall injury  Description: INTERVENTIONS:  - Assess pt frequently for physical needs  - Identify cognitive and physical deficits and behaviors that affect risk of falls.  - [place] fall precautions as indicated by assessment.  - Educate pt/family on patient safety including physical limitations  - Instruct pt to call for assistance with activity based on assessment  - Modify environment to reduce risk of injury  - Provide assistive devices as appropriate  - Consider OT/PT consult to assist with strengthening/mobility  - Encourage toileting schedule  Outcome: Progressing     Problem: RISK FOR INFECTION - ADULT  Goal: Absence of fever/infection during anticipated neutropenic period  Description: INTERVENTIONS  - Monitor WBC  - Administer growth factors as ordered  - Implement neutropenic guidelines  Outcome: Progressing     Problem: DISCHARGE PLANNING  Goal: Discharge to home or other facility with appropriate resources  Description: INTERVENTIONS:  - Identify barriers to discharge w/pt and caregiver  - Include patient/family/discharge partner in discharge planning  - Arrange for needed discharge resources and transportation as appropriate  - Identify discharge learning needs (meds, wound care, etc)  - Arrange for interpreters to assist at discharge as needed  - Consider post-discharge preferences of patient/family/discharge partner  - Complete POLST form as appropriate  - Assess patient's ability to be responsible for managing their own health  - Refer to Case Management Department for coordinating discharge planning if the patient needs post-hospital services based on physician/LIP order or complex needs related to functional status, cognitive ability or social support system  Outcome:  Progressing     Problem: RESPIRATORY - ADULT  Goal: Achieves optimal ventilation and oxygenation  Description: INTERVENTIONS:  - Assess for changes in respiratory status  - Assess for changes in mentation and behavior  - Position to facilitate oxygenation and minimize respiratory effort  - Oxygen supplementation based on oxygen saturation or ABGs  - Provide Smoking Cessation handout, if applicable  - Encourage broncho-pulmonary hygiene including cough, deep breathe, Incentive Spirometry  - Assess the need for suctioning and perform as needed  - Assess and instruct to report SOB or any respiratory difficulty  - Respiratory Therapy support as indicated  - Manage/alleviate anxiety  - Monitor for signs/symptoms of CO2 retention  Outcome: Progressing     Problem: GASTROINTESTINAL - ADULT  Goal: Minimal or absence of nausea and vomiting  Description: INTERVENTIONS:  - Maintain adequate hydration with IV or PO as ordered and tolerated  - Nasogastric tube to low intermittent suction as ordered  - Evaluate effectiveness of ordered antiemetic medications  - Provide nonpharmacologic comfort measures as appropriate  - Advance diet as tolerated, if ordered  - Obtain nutritional consult as needed  - Evaluate fluid balance  Outcome: Progressing  Goal: Maintains or returns to baseline bowel function  Description: INTERVENTIONS:  - Assess bowel function  - Maintain adequate hydration with IV or PO as ordered and tolerated  - Evaluate effectiveness of GI medications  - Encourage mobilization and activity  - Obtain nutritional consult as needed  - Establish a toileting routine/schedule  - Consider collaborating with pharmacy to review patient's medication profile  Outcome: Progressing     Problem: GENITOURINARY - ADULT  Goal: Absence of urinary retention  Description: INTERVENTIONS:  - Assess patient’s ability to void and empty bladder  - Monitor intake/output and perform bladder scan as needed  - Follow urinary retention  protocol/standard of care  - Consider collaborating with pharmacy to review patient's medication profile  - Implement strategies to promote bladder emptying  Outcome: Progressing     Problem: METABOLIC/FLUID AND ELECTROLYTES - ADULT  Goal: Electrolytes maintained within normal limits  Description: INTERVENTIONS:  - Monitor labs and rhythm and assess patient for signs and symptoms of electrolyte imbalances  - Administer electrolyte replacement as ordered  - Monitor response to electrolyte replacements, including rhythm and repeat lab results as appropriate  - Fluid restriction as ordered  - Instruct patient on fluid and nutrition restrictions as appropriate  Outcome: Progressing     Problem: SKIN/TISSUE INTEGRITY - ADULT  Goal: Skin integrity remains intact  Description: INTERVENTIONS  - Assess and document risk factors for pressure ulcer development  - Assess and document skin integrity  - Monitor for areas of redness and/or skin breakdown  - Initiate interventions, skin care algorithm/standards of care as needed  Outcome: Progressing  Goal: Incision(s), wounds(s) or drain site(s) healing without S/S of infection  Description: INTERVENTIONS:  - Assess and document risk factors for pressure ulcer development  - Assess and document skin integrity  - Assess and document dressing/incision, wound bed, drain sites and surrounding tissue  - Implement wound care per orders  - Initiate isolation precautions as appropriate  - Initiate Pressure Ulcer prevention bundle as indicated  Outcome: Progressing     Problem: HEMATOLOGIC - ADULT  Goal: Maintains hematologic stability  Description: INTERVENTIONS  - Assess for signs and symptoms of bleeding or hemorrhage  - Monitor labs and vital signs for trends  - Administer supportive blood products/factors, fluids and medications as ordered and appropriate  - Administer supportive blood products/factors as ordered and appropriate  Outcome: Progressing  Goal: Free from bleeding  injury  Description: (Example usage: patient with low platelets)  INTERVENTIONS:  - Avoid intramuscular injections, enemas and rectal medication administration  - Ensure safe mobilization of patient  - Hold pressure on venipuncture sites to achieve adequate hemostasis  - Assess for signs and symptoms of internal bleeding  - Monitor lab trends  - Patient is to report abnormal signs of bleeding to staff  - Avoid use of toothpicks and dental floss  - Use electric shaver for shaving  - Use soft bristle tooth brush  - Limit straining and forceful nose blowing  Outcome: Progressing     Problem: MUSCULOSKELETAL - ADULT  Goal: Return mobility to safest level of function  Description: INTERVENTIONS:  - Assess patient stability and activity tolerance for standing, transferring and ambulating w/ or w/o assistive devices  - Assist with transfers and ambulation using safe patient handling equipment as needed  - Ensure adequate protection for wounds/incisions during mobilization  - Obtain PT/OT consults as needed  - Advance activity as appropriate  - Communicate ordered activity level and limitations with patient/family  Outcome: Progressing  Goal: Maintain proper alignment of affected body part  Description: INTERVENTIONS:  - Support and protect limb and body alignment per provider's orders  - Instruct and reinforce with patient and family use of appropriate assistive device and precautions (e.g. spinal or hip dislocation precautions)  Outcome: Progressing
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
## 2024-05-02 NOTE — CM/SW NOTE
Patient with discharge order in. Discussed with patient's RN and Dr Davila. Final cultures pending, anticipate discharge tomorrow.    Updated Gabi with [facility]. Insurance auth is good until 5/5. Gabi aware to anticipate transfer tomorrow, she confirmed they will have a private room for patient.    Gabi spoke to facility and requested that if patient needs IV abx at discharge- that her port remains accessed at discharge. Patient's RN updated.     Updates sent via aidin referral. Gabi updated on TEO drain and orders for flushing.     Per RN, patient will require medicar at discharge. [facility] placed on will call for 5/2 and 5/3. Quoted cost is $60. PCS done, will need updated date.     Plan: [facility] for [facility] pending medical clearance (plan 5/3)    Melina Guajardo, RN, BSN

## 2024-05-02 NOTE — CONSULTS
[facility]    PATIENT'S NAME: GÉNESIS RIVERA   ATTENDING PHYSICIAN: Rigoberto Couch MD   CONSULTING PHYSICIAN: Demarcus Stanton MD   PATIENT ACCOUNT#:   [id number]    LOCATION:  [address] [place] [address]  MEDICAL RECORD #:   [id number]       YOB: 1940  ADMISSION DATE:       04/23/2024      CONSULT DATE:  05/02/2024    REPORT OF CONSULTATION    HISTORY OF PRESENT ILLNESS:  This is an 83-year-old woman who was found to have bladder cancer but something more extensive on a PET scan.  She was found to have ovarian cancer.  She underwent 6 rounds of chemotherapy and more recently had surgery where tumor debulking, omentectomy, and bilateral lymph node dissection were done.  She returns with postop fever and fluid collections noted on CT scan.  This was drained by IR on May 1, and I am asked to help with antibiotics.  It looks like whatever fever she had has resolved.  I can elicit no other current GI, , cardiovascular, CNS, or respiratory symptoms.    PAST MEDICAL HISTORY:  Breast cancer, diabetes, thyroid, diverticulosis, GERD, blood pressure, heart murmur, history of psoriasis, emphysema.     PAST SURGICAL HISTORY:  Spine surgery in the past, appendectomy, L5-S1 fusion in 2022, four C-sections, gastritis, cholecystectomy, parathyroidectomy.  Left and right knees have been replaced.    MEDICATIONS:  Currently on Zosyn.    ALLERGIES:  Listed cephalosporin, rash; perfumes, runny nose.    SOCIAL HISTORY:  Negative for cigarettes and alcohol.    FAMILY HISTORY:  Nobody else ill.     REVIEW OF SYSTEMS:  No URI symptoms.      PHYSICAL EXAMINATION:    GENERAL:  This is a thin, elderly patient, but no acute distress.   VITAL SIGNS:  She is afebrile.  Vitals stable.  HEENT:  Pale conjunctivae.  No oral lesions.  NECK:  Supple.  No JVD or adenopathy.  LUNGS:  Clear.  HEART:  A 2/6 systolic murmur.  ABDOMEN:  Not clearly tender.  No masses, rebound, or organomegaly.  A drain is in place.  EXTREMITIES:  No  End of Shift Note: Medical    Significant Events: Patient very fidgeting, anxious, diaphoretic with presence of tremors.  CIWA's scoring higher.  Patient continues to deny alcohol consumption.  Ativan 4mg given x3 for elevated CIWA's.  Patient now experiencing visual and audible hallucinations while trying to get outside and ambulating unsafely.  Sitter placed at bedside.      Patient currently asleep with sitter at bedside.  VSS.     Diet: Regular Diet       Activity:  Activity: Resting in bed (05/02/24 1456)  Baseline independent.  Needing a gait belt since receiving ativan.    Level of Assistance:  Level of Assistance: Supervision (05/02/24 1140)  Positioning: Positioning: Low Fowlers (15-30 degrees) (05/02/24 1456)  LDAs: peripheral IV   Patient's Anticipated Discharge Needs: Home with no anticipated needs (05/02/24 0830)  Expected Discharge Date: TBD         clubbing, cyanosis, edema, phlebitis, or cellulitis.    NEUROLOGIC:  Grossly intact.  Moving all extremities.    LABORATORY DATA:  A 04/30 body fluid culture is no growth at 2 days.  Gram stain had white cells, no organisms.  Urine culture no growth.  Urinalysis, 11 to 20 white cells, some leukocyte esterase present.  I do not see any blood cultures.  A 04/30 tissue pathology, no malignant cells.  Hysterectomy itself not mentioned above was done on April 23.  Please see the path report for where the cancer is.  White count was as high as 12.5 on the 28th and is now 6.2, hemoglobin 7.3, platelets 359, polys 76, lymphs 13, monos 8.  BUN and creatinine 11 and 0.61.    A 04/28 CT:  An 11.8 x 4.6 bilobed, rimmed enhance fluid collection, gas in the pelvic operative bed.      IMPRESSION:    1.   A postoperative complication with fever from what I think is a lymphocele following pelvic node dissection for ovarian cancer.  This may or may not be infected.  It looks fairly clear on my exam, which speaks against infection, but it still remains a possibility.  2.   Zosyn seems like a reasonable choice for now as we await the culture report.  3.   Assuming the culture remains negative, oral Augmentin seems like a reasonable choice by my view for another 10 to 14 days.  4.   This was discussed with the patient.  Some questions answered.  5.   There is a heart murmur.  This is not new, and I doubt endocarditis.  Blood cultures will not be obtained, as the patient is already on antibiotics.  Further suggestions to follow.      Thank you very much for allowing me to see this patient.    Dictated By Demarcus Stanton MD  d: 05/02/2024 14:22:39  t: 05/02/2024 14:51:44  Job [id number]  [facility]/    cc: Rigoberto Couch MD

## 2024-05-02 NOTE — H&P (VIEW-ONLY)
[facility]    PATIENT'S NAME: GÉNESIS RIVERA   ATTENDING PHYSICIAN: Rigoberto Couch MD   CONSULTING PHYSICIAN: Demarcus Stanton MD   PATIENT ACCOUNT#:   [id number]    LOCATION:  [address] [place] [address]  MEDICAL RECORD #:   [id number]       YOB: 1940  ADMISSION DATE:       04/23/2024      CONSULT DATE:  05/02/2024    REPORT OF CONSULTATION    HISTORY OF PRESENT ILLNESS:  This is an 83-year-old woman who was found to have bladder cancer but something more extensive on a PET scan.  She was found to have ovarian cancer.  She underwent 6 rounds of chemotherapy and more recently had surgery where tumor debulking, omentectomy, and bilateral lymph node dissection were done.  She returns with postop fever and fluid collections noted on CT scan.  This was drained by IR on May 1, and I am asked to help with antibiotics.  It looks like whatever fever she had has resolved.  I can elicit no other current GI, , cardiovascular, CNS, or respiratory symptoms.    PAST MEDICAL HISTORY:  Breast cancer, diabetes, thyroid, diverticulosis, GERD, blood pressure, heart murmur, history of psoriasis, emphysema.     PAST SURGICAL HISTORY:  Spine surgery in the past, appendectomy, L5-S1 fusion in 2022, four C-sections, gastritis, cholecystectomy, parathyroidectomy.  Left and right knees have been replaced.    MEDICATIONS:  Currently on Zosyn.    ALLERGIES:  Listed cephalosporin, rash; perfumes, runny nose.    SOCIAL HISTORY:  Negative for cigarettes and alcohol.    FAMILY HISTORY:  Nobody else ill.     REVIEW OF SYSTEMS:  No URI symptoms.      PHYSICAL EXAMINATION:    GENERAL:  This is a thin, elderly patient, but no acute distress.   VITAL SIGNS:  She is afebrile.  Vitals stable.  HEENT:  Pale conjunctivae.  No oral lesions.  NECK:  Supple.  No JVD or adenopathy.  LUNGS:  Clear.  HEART:  A 2/6 systolic murmur.  ABDOMEN:  Not clearly tender.  No masses, rebound, or organomegaly.  A drain is in place.  EXTREMITIES:  No  clubbing, cyanosis, edema, phlebitis, or cellulitis.    NEUROLOGIC:  Grossly intact.  Moving all extremities.    LABORATORY DATA:  A 04/30 body fluid culture is no growth at 2 days.  Gram stain had white cells, no organisms.  Urine culture no growth.  Urinalysis, 11 to 20 white cells, some leukocyte esterase present.  I do not see any blood cultures.  A 04/30 tissue pathology, no malignant cells.  Hysterectomy itself not mentioned above was done on April 23.  Please see the path report for where the cancer is.  White count was as high as 12.5 on the 28th and is now 6.2, hemoglobin 7.3, platelets 359, polys 76, lymphs 13, monos 8.  BUN and creatinine 11 and 0.61.    A 04/28 CT:  An 11.8 x 4.6 bilobed, rimmed enhance fluid collection, gas in the pelvic operative bed.      IMPRESSION:    1.   A postoperative complication with fever from what I think is a lymphocele following pelvic node dissection for ovarian cancer.  This may or may not be infected.  It looks fairly clear on my exam, which speaks against infection, but it still remains a possibility.  2.   Zosyn seems like a reasonable choice for now as we await the culture report.  3.   Assuming the culture remains negative, oral Augmentin seems like a reasonable choice by my view for another 10 to 14 days.  4.   This was discussed with the patient.  Some questions answered.  5.   There is a heart murmur.  This is not new, and I doubt endocarditis.  Blood cultures will not be obtained, as the patient is already on antibiotics.  Further suggestions to follow.      Thank you very much for allowing me to see this patient.    Dictated By Demarcus Stanton MD  d: 05/02/2024 14:22:39  t: 05/02/2024 14:51:44  Job [id number]  [facility]/    cc: Rigoberto Couch MD

## 2024-05-03 VITALS
WEIGHT: 102 LBS | HEIGHT: 57 IN | TEMPERATURE: 98 F | SYSTOLIC BLOOD PRESSURE: 130 MMHG | DIASTOLIC BLOOD PRESSURE: 56 MMHG | RESPIRATION RATE: 18 BRPM | BODY MASS INDEX: 22.01 KG/M2 | HEART RATE: 97 BPM | OXYGEN SATURATION: 96 %

## 2024-05-03 LAB
ANION GAP SERPL CALC-SCNC: 5 MMOL/L (ref 0–18)
BASOPHILS # BLD AUTO: 0.03 X10(3) UL (ref 0–0.2)
BASOPHILS NFR BLD AUTO: 0.5 %
BUN BLD-MCNC: 11 MG/DL (ref 9–23)
BUN/CREAT SERPL: 17.2 (ref 10–20)
CALCIUM BLD-MCNC: 10.1 MG/DL (ref 8.7–10.4)
CHLORIDE SERPL-SCNC: 110 MMOL/L (ref 98–112)
CO2 SERPL-SCNC: 28 MMOL/L (ref 21–32)
CREAT BLD-MCNC: 0.64 MG/DL
DEPRECATED RDW RBC AUTO: 73.9 FL (ref 35.1–46.3)
EGFRCR SERPLBLD CKD-EPI 2021: 88 ML/MIN/1.73M2 (ref 60–?)
EOSINOPHIL # BLD AUTO: 0.1 X10(3) UL (ref 0–0.7)
EOSINOPHIL NFR BLD AUTO: 1.6 %
ERYTHROCYTE [DISTWIDTH] IN BLOOD BY AUTOMATED COUNT: 19.6 % (ref 11–15)
GLUCOSE BLD-MCNC: 113 MG/DL (ref 70–99)
HCT VFR BLD AUTO: 23.2 %
HGB BLD-MCNC: 7.4 G/DL
IMM GRANULOCYTES # BLD AUTO: 0.05 X10(3) UL (ref 0–1)
IMM GRANULOCYTES NFR BLD: 0.8 %
LYMPHOCYTES # BLD AUTO: 0.94 X10(3) UL (ref 1–4)
LYMPHOCYTES NFR BLD AUTO: 15.2 %
MAGNESIUM SERPL-MCNC: 1.3 MG/DL (ref 1.6–2.6)
MCH RBC QN AUTO: 33.2 PG (ref 26–34)
MCHC RBC AUTO-ENTMCNC: 31.9 G/DL (ref 31–37)
MCV RBC AUTO: 104 FL
MONOCYTES # BLD AUTO: 0.51 X10(3) UL (ref 0.1–1)
MONOCYTES NFR BLD AUTO: 8.3 %
NEUTROPHILS # BLD AUTO: 4.55 X10 (3) UL (ref 1.5–7.7)
NEUTROPHILS # BLD AUTO: 4.55 X10(3) UL (ref 1.5–7.7)
NEUTROPHILS NFR BLD AUTO: 73.6 %
OSMOLALITY SERPL CALC.SUM OF ELEC: 296 MOSM/KG (ref 275–295)
PLATELET # BLD AUTO: 413 10(3)UL (ref 150–450)
PLATELET MORPHOLOGY: NORMAL
POTASSIUM SERPL-SCNC: 3.7 MMOL/L (ref 3.5–5.1)
RBC # BLD AUTO: 2.23 X10(6)UL
SODIUM SERPL-SCNC: 143 MMOL/L (ref 136–145)
WBC # BLD AUTO: 6.2 X10(3) UL (ref 4–11)

## 2024-05-03 PROCEDURE — 85025 COMPLETE CBC W/AUTO DIFF WBC: CPT | Performed by: PHYSICIAN ASSISTANT

## 2024-05-03 PROCEDURE — 83735 ASSAY OF MAGNESIUM: CPT | Performed by: INTERNAL MEDICINE

## 2024-05-03 PROCEDURE — 80048 BASIC METABOLIC PNL TOTAL CA: CPT | Performed by: PHYSICIAN ASSISTANT

## 2024-05-03 RX ORDER — MAGNESIUM OXIDE 400 MG/1
800 TABLET ORAL ONCE
Status: COMPLETED | OUTPATIENT
Start: 2024-05-03 | End: 2024-05-03

## 2024-05-03 RX ORDER — AMOXICILLIN AND CLAVULANATE POTASSIUM 500; 125 MG/1; MG/1
1 TABLET, FILM COATED ORAL 2 TIMES DAILY
Qty: 28 TABLET | Refills: 0 | Status: SHIPPED | OUTPATIENT
Start: 2024-05-04 | End: 2024-05-18

## 2024-05-03 RX ORDER — SODIUM CHLORIDE 9 MG/ML
INJECTION, SOLUTION INTRAMUSCULAR; INTRAVENOUS; SUBCUTANEOUS
Status: COMPLETED
Start: 2024-05-03 | End: 2024-05-03
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
## 2024-05-03 NOTE — DISCHARGE INSTRUCTIONS
Keep all follow up appointments and continue current medications.   Continue drain care as instructed. Flush TEO drain daily with 10 ml of normal saline

## 2024-05-03 NOTE — DISCHARGE SUMMARY
General Medicine Discharge Summary     Patient ID:  Lizeth Mcgill  83 year old  10/26/1940    Admit date: 4/23/2024    Discharge date and time: 5/3/24    Attending Physician: Rigoberto Couch MD     Consults: IP CONSULT TO FAMILY/INTERNAL MED  IP CONSULT TO RESPIRATORY CARE  IP CONSULT TO SOCIAL WORK  IP CONSULT TO INTERVENTIONAL RADIOLOGY  IP CONSULT TO INFECTIOUS DISEASE    Primary Care Physician: Chong Cornejo MD     Reason for admission:   S/p robotic assisted total lap hysterectomy-BSO, debulking, omentectomy     Risk For Readmission: low    Discharge Diagnoses: Carcinoma, malignant neoplasm of right ovary  Ovarian cancer (HCC)  See Additional Discharge Diagnoses in Hospital Course    Discharged Condition: stable    Follow-up with labs/images appointments: PCP, GYN, IR    Exam  Gen: No acute distress  Pulm: Lungs clear, normal respiratory effort  CV: Heart with regular rate and rhythm  Abd: Abdomen soft,     HPI: per chart  Ms. Mcgill is an 82 yo F with PMH of ovarian CA, HTN, breaset CA, DM2, hypothyroidism who presented for hysterectomy-BSO. Patient seen post op in PACU, sleepy, complains of some abdominal pain.     Hospital Course:   Lizeth Mcgill is a 83 year old female with ovarian CA, HTN, breaset CA, DM2, hypothyroidism who presented for hysterectomy-BSO, course complicated by acute blood loss anemia, hypoxia, as well as fever and leukocytosis, with concerns of possible postoperative fluid collection/hematoma with possible infection, on IV Zosyn s/p aspiration of postoperative fluid collection by IR and drain placed on 4/30.  No growth on cultures to date.  Seen by ID and will plan to dc on Augmentin.  Continue drain care per IR.      Ovarian CA  S/p robotic assisted total lap hysterectomy-BSO, debulking, omentectomy  - PRN pain meds, Transition to PO when able  - monitor for acute blood loss anemia   - Bowel reg, antiemetics  - DVT Prophy- lovenox (held with ABLA and hematoma per GYN)  - Bernardo  removed     Fever / abd pain  Dysuria  Fluid collection/hematoma  - CT abd pelvis, with fluid collection, urine culture negative  - given fever, elevated WBC, and neg Ucx, will need to assume fluid collection is poss infected  - zosyn started on 4/28  - discussed with Dr. Couch, rec IR consult for aspiration  - IR consulted  - s/p aspiration of postoperative fluid collection by IR and drain placed on 4/30  - White blood cell count improving, fever curve improved  - drain cx   - ID consulted and will dc on Augmentin      Acute resp failure w hypoxia, now resolved   - 70% w ambulating per pt. On 3L NC 4/24 AM  - Suspect atelectasis. Minimal basilar air movement on exam  - Can hold off on imaging for now  - OOB, encourage IS, ambulate TID  - Supplemental O2 for goal SpO2 90-93%. On 2L NC 4/24 AM   - Encourage IS  - CXR mild/moderate Left LL with effusion/atelectasis   - give dose of IV lasix today  - on RA this morning but sats 90%  - ECG with PAc's not afib  - CT chest without PE  - Now on room air     Acute on chronic anemia   - Presume 2/2 expected post-op ABLA w dilutional component from IVF  - No active bleeding  - Trend CBC  - 4/25 hgb < 7 - 1u RBC     HTN  - BP stable  - hold home lisinopril for now     Anxiety  - clonazepam nightly     Hypothyroidism  - home sythroid     GERD  - PPI    Operative Procedures: Procedure(s) (LRB):  Robotic assisted total laparoscopic hysterectomy, bilateral salpingo-oophorectomy, staging, debulking; Omentectomy; lysis of adhesions (N/A)  XI ROBOT-ASSISTED LAPAROSCOPIC OVARIAN CYSTECTOMY/ SALPINGO-OOPHORECTOMY (Bilateral)     Imaging: CT DRAIN ABSCESS PERITONEAL (CPT=49406)    Result Date: 4/30/2024  CONCLUSION: Insertion of drainage catheter into pelvic fluid collection yielding serous fluid.  IR will follow.    Dictated by (CST): Cruz Tariq MD on 4/30/2024 at 4:53 PM     Finalized by (CST): Cruz Tariq MD on 4/30/2024 at 5:03 PM           Disposition:  [facility]    Activity: as tolerated   Diet: general   Wound Care: no needs  Code Status: Full Code  O2: no needs    Home Medication Changes: as below   All discharge medications have been reconciled with current medication list.     Med list     Medication List        START taking these medications      amoxicillin clavulanate 500-125 MG Tabs  Commonly known as: Augmentin  Take 1 tablet by mouth in the morning and 1 tablet before bedtime. Do all this for 14 days.  Start taking on: May 4, 2024     docusate sodium 100 MG Caps  Commonly known as: COLACE  Take 100 mg by mouth 2 (two) times daily.  Notes to patient: Stool softener     enoxaparin 40 MG/0.4ML Sosy  Commonly known as: Lovenox  Inject 0.4 mL (40 mg total) into the skin daily for 14 days.  Notes to patient: To prevent blood clots     HYDROcodone-acetaminophen 5-325 MG Tabs  Commonly known as: Norco  Take 1 tablet by mouth every 6 (six) hours as needed.  Notes to patient: For pain -- Do Not take at same time as Tylenol because this medicine has Tylenol in it     simethicone 80 MG Chew  Commonly known as: Mylicon  Chew 1 tablet (80 mg total) by mouth 3 (three) times daily as needed (gas pain).  Notes to patient: For gas pain            CHANGE how you take these medications      Halobetasol Propionate 0.05 % Oint  Commonly known as: ULTRAVATE  APPLY EXTERNALLY DAILY AS DIRECTED  What changed:   when to take this  reasons to take this     omeprazole 20 MG Cpdr  Commonly known as: PriLOSEC  Take 1 capsule (20 mg total) by mouth daily.  What changed: when to take this     pravastatin 40 MG Tabs  Commonly known as: Pravachol  Take 1 tablet (40 mg total) by mouth daily.  What changed: when to take this            CONTINUE taking these medications      acetaminophen 500 MG Tabs  Commonly known as: Tylenol Extra Strength     amLODIPine 2.5 MG Tabs  Commonly known as: Norvasc     clonazePAM 0.5 MG Tabs  Commonly known as: KlonoPIN     denosumab 60 MG/ML Sosy  Commonly known  as: Prolia     DULoxetine 60 MG Cpep  Commonly known as: Cymbalta     ferrous sulfate 325 (65 FE) MG Tbec     gabapentin 300 MG Caps  Commonly known as: Neurontin     levothyroxine 50 MCG Tabs  Commonly known as: Synthroid  Take 1 tablet (50 mcg total) by mouth every morning.     lisinopril 10 MG Tabs  Commonly known as: Zestril     loratadine 10 MG Tabs  Commonly known as: Claritin     magnesium 250 MG Tabs     Vitamin D 50 MCG (2000 UT) Tabs               Where to Get Your Medications        You can get these medications from any pharmacy    Bring a paper prescription for each of these medications  amoxicillin clavulanate 500-125 MG Tabs  docusate sodium 100 MG Caps  enoxaparin 40 MG/0.4ML Sosy  HYDROcodone-acetaminophen 5-325 MG Tabs  simethicone 80 MG Chew         FU   Follow-up Information       Amanda Garza PA Follow up on 5/10/2024.    Specialty: Physician Assistant  Why: 5/10 as previously scheduled  Contact information:  [address]  [address]  [facility] [id number] [phone]               Chong Cornejo MD Follow up.    Specialty: Internal Medicine  Why: within 1 week of discharge from rehab  Contact information:  [address]  [address]  [facility] [id number] [phone]               Lizeth Manzano APRN Follow up in 1 week(s).    Specialties: Nurse Practitioner, INTERVENTIONAL, RADIOLOGY  Contact information:  [address]  [address]  [facility] [address]  [phone]                             DC instructions:      Other Discharge Instructions:         Keep all follow up appointments and continue current medications.   Continue drain care as instructed.         Patient had opportunity to ask questions and state understand and agree with therapeutic plan as outlined    Thank You,    Nasim Davila M.D.  [facility] Hospitalist

## 2024-05-03 NOTE — PLAN OF CARE
Report given to Greta at [place]. Discharge instructions and follow ups discussed with patient and sons at bedside.   Port needle removed at time of discharge.  RLQ TEO Drain flushed per order, will continue to flush daily per IR, and follow up 05/09 for drain check.   Follow up with Khoa 05/10.   Scripts sent with patient and discharge paperwork.   Tolerating diet. Denies nausea or vomiting. Pain controlled with PRN Norco.  Ambulating with one assist and walker.   +BM. +Voiding.   Sons transported patient to [facility].     Problem: Patient Centered Care  Goal: Patient preferences are identified and integrated in the patient's plan of care  Description: Interventions:  - What would you like us to know as we care for you? From home   - Provide timely, complete, and accurate information to patient/family  - Incorporate patient and family knowledge, values, beliefs, and cultural backgrounds into the planning and delivery of care  - Encourage patient/family to participate in care and decision-making at the level they choose  - Honor patient and family perspectives and choices  Outcome: Adequate for Discharge        Problem: PAIN - ADULT  Goal: Verbalizes/displays adequate comfort level or patient's stated pain goal  Description: INTERVENTIONS:  - Encourage pt to monitor pain and request assistance  - Assess pain using appropriate pain scale  - Administer analgesics based on type and severity of pain and evaluate response  - Implement non-pharmacological measures as appropriate and evaluate response  - Consider cultural and social influences on pain and pain management  - Manage/alleviate anxiety  - Utilize distraction and/or relaxation techniques  - Monitor for opioid side effects  - Notify MD/LIP if interventions unsuccessful or patient reports new pain  - Anticipate increased pain with activity and pre-medicate as appropriate  Outcome: Adequate for Discharge     Problem: SAFETY ADULT - FALL  Goal: Free from  fall injury  Description: INTERVENTIONS:  - Assess pt frequently for physical needs  - Identify cognitive and physical deficits and behaviors that affect risk of falls.  - [place] fall precautions as indicated by assessment.  - Educate pt/family on patient safety including physical limitations  - Instruct pt to call for assistance with activity based on assessment  - Modify environment to reduce risk of injury  - Provide assistive devices as appropriate  - Consider OT/PT consult to assist with strengthening/mobility  - Encourage toileting schedule  Outcome: Adequate for Discharge     Problem: RISK FOR INFECTION - ADULT  Goal: Absence of fever/infection during anticipated neutropenic period  Description: INTERVENTIONS  - Monitor WBC  - Administer growth factors as ordered  - Implement neutropenic guidelines  Outcome: Adequate for Discharge     Problem: DISCHARGE PLANNING  Goal: Discharge to home or other facility with appropriate resources  Description: INTERVENTIONS:  - Identify barriers to discharge w/pt and caregiver  - Include patient/family/discharge partner in discharge planning  - Arrange for needed discharge resources and transportation as appropriate  - Identify discharge learning needs (meds, wound care, etc)  - Arrange for interpreters to assist at discharge as needed  - Consider post-discharge preferences of patient/family/discharge partner  - Complete POLST form as appropriate  - Assess patient's ability to be responsible for managing their own health  - Refer to Case Management Department for coordinating discharge planning if the patient needs post-hospital services based on physician/LIP order or complex needs related to functional status, cognitive ability or social support system  Outcome: Adequate for Discharge     Problem: RESPIRATORY - ADULT  Goal: Achieves optimal ventilation and oxygenation  Description: INTERVENTIONS:  - Assess for changes in respiratory status  - Assess for changes in  mentation and behavior  - Position to facilitate oxygenation and minimize respiratory effort  - Oxygen supplementation based on oxygen saturation or ABGs  - Provide Smoking Cessation handout, if applicable  - Encourage broncho-pulmonary hygiene including cough, deep breathe, Incentive Spirometry  - Assess the need for suctioning and perform as needed  - Assess and instruct to report SOB or any respiratory difficulty  - Respiratory Therapy support as indicated  - Manage/alleviate anxiety  - Monitor for signs/symptoms of CO2 retention  Outcome: Adequate for Discharge     Problem: GASTROINTESTINAL - ADULT  Goal: Minimal or absence of nausea and vomiting  Description: INTERVENTIONS:  - Maintain adequate hydration with IV or PO as ordered and tolerated  - Nasogastric tube to low intermittent suction as ordered  - Evaluate effectiveness of ordered antiemetic medications  - Provide nonpharmacologic comfort measures as appropriate  - Advance diet as tolerated, if ordered  - Obtain nutritional consult as needed  - Evaluate fluid balance  Outcome: Adequate for Discharge  Goal: Maintains or returns to baseline bowel function  Description: INTERVENTIONS:  - Assess bowel function  - Maintain adequate hydration with IV or PO as ordered and tolerated  - Evaluate effectiveness of GI medications  - Encourage mobilization and activity  - Obtain nutritional consult as needed  - Establish a toileting routine/schedule  - Consider collaborating with pharmacy to review patient's medication profile  Outcome: Adequate for Discharge     Problem: GENITOURINARY - ADULT  Goal: Absence of urinary retention  Description: INTERVENTIONS:  - Assess patient’s ability to void and empty bladder  - Monitor intake/output and perform bladder scan as needed  - Follow urinary retention protocol/standard of care  - Consider collaborating with pharmacy to review patient's medication profile  - Implement strategies to promote bladder emptying  Outcome:  Adequate for Discharge     Problem: METABOLIC/FLUID AND ELECTROLYTES - ADULT  Goal: Electrolytes maintained within normal limits  Description: INTERVENTIONS:  - Monitor labs and rhythm and assess patient for signs and symptoms of electrolyte imbalances  - Administer electrolyte replacement as ordered  - Monitor response to electrolyte replacements, including rhythm and repeat lab results as appropriate  - Fluid restriction as ordered  - Instruct patient on fluid and nutrition restrictions as appropriate  Outcome: Adequate for Discharge     Problem: SKIN/TISSUE INTEGRITY - ADULT  Goal: Skin integrity remains intact  Description: INTERVENTIONS  - Assess and document risk factors for pressure ulcer development  - Assess and document skin integrity  - Monitor for areas of redness and/or skin breakdown  - Initiate interventions, skin care algorithm/standards of care as needed  Outcome: Adequate for Discharge  Goal: Incision(s), wounds(s) or drain site(s) healing without S/S of infection  Description: INTERVENTIONS:  - Assess and document risk factors for pressure ulcer development  - Assess and document skin integrity  - Assess and document dressing/incision, wound bed, drain sites and surrounding tissue  - Implement wound care per orders  - Initiate isolation precautions as appropriate  - Initiate Pressure Ulcer prevention bundle as indicated  Outcome: Adequate for Discharge     Problem: HEMATOLOGIC - ADULT  Goal: Maintains hematologic stability  Description: INTERVENTIONS  - Assess for signs and symptoms of bleeding or hemorrhage  - Monitor labs and vital signs for trends  - Administer supportive blood products/factors, fluids and medications as ordered and appropriate  - Administer supportive blood products/factors as ordered and appropriate  Outcome: Adequate for Discharge  Goal: Free from bleeding injury  Description: (Example usage: patient with low platelets)  INTERVENTIONS:  - Avoid intramuscular injections,  enemas and rectal medication administration  - Ensure safe mobilization of patient  - Hold pressure on venipuncture sites to achieve adequate hemostasis  - Assess for signs and symptoms of internal bleeding  - Monitor lab trends  - Patient is to report abnormal signs of bleeding to staff  - Avoid use of toothpicks and dental floss  - Use electric shaver for shaving  - Use soft bristle tooth brush  - Limit straining and forceful nose blowing  Outcome: Adequate for Discharge     Problem: MUSCULOSKELETAL - ADULT  Goal: Return mobility to safest level of function  Description: INTERVENTIONS:  - Assess patient stability and activity tolerance for standing, transferring and ambulating w/ or w/o assistive devices  - Assist with transfers and ambulation using safe patient handling equipment as needed  - Ensure adequate protection for wounds/incisions during mobilization  - Obtain PT/OT consults as needed  - Advance activity as appropriate  - Communicate ordered activity level and limitations with patient/family  Outcome: Adequate for Discharge  Goal: Maintain proper alignment of affected body part  Description: INTERVENTIONS:  - Support and protect limb and body alignment per provider's orders  - Instruct and reinforce with patient and family use of appropriate assistive device and precautions (e.g. spinal or hip dislocation precautions)  Outcome: Adequate for Discharge

## 2024-05-03 NOTE — CM/SW NOTE
05/03/24 1400   Discharge disposition   Expected discharge disposition subacute   Post Acute Care Provider [facility]   Discharge transportation [facility]     Per RN pt is cleared for dc on po abx.    CM notified facility    Plan  [facility] 5pm  Son will transport  RN report : [phone]     / to remain available for support and/or discharge planning.     Mayuri Almendarez RN    Ext [phone]

## 2024-05-03 NOTE — PLAN OF CARE
A&ox4. RA. Lovenox for dvt prophylaxis. PRN norco for pain management. No reports of n/v overnight. 5 lap sites covered in dermabond. TEO drain to RLQ. IVF and IV abx to R chest port. Up w/ walker and 1 assist. Call light in reach and using appropriately.   Problem: Patient Centered Care  Goal: Patient preferences are identified and integrated in the patient's plan of care  Description: Interventions:  - What would you like us to know as we care for you? From home   - Provide timely, complete, and accurate information to patient/family  - Incorporate patient and family knowledge, values, beliefs, and cultural backgrounds into the planning and delivery of care  - Encourage patient/family to participate in care and decision-making at the level they choose  - Honor patient and family perspectives and choices  Outcome: Progressing     Problem: PAIN - ADULT  Goal: Verbalizes/displays adequate comfort level or patient's stated pain goal  Description: INTERVENTIONS:  - Encourage pt to monitor pain and request assistance  - Assess pain using appropriate pain scale  - Administer analgesics based on type and severity of pain and evaluate response  - Implement non-pharmacological measures as appropriate and evaluate response  - Consider cultural and social influences on pain and pain management  - Manage/alleviate anxiety  - Utilize distraction and/or relaxation techniques  - Monitor for opioid side effects  - Notify MD/LIP if interventions unsuccessful or patient reports new pain  - Anticipate increased pain with activity and pre-medicate as appropriate  Outcome: Progressing     Problem: SAFETY ADULT - FALL  Goal: Free from fall injury  Description: INTERVENTIONS:  - Assess pt frequently for physical needs  - Identify cognitive and physical deficits and behaviors that affect risk of falls.  - [place] fall precautions as indicated by assessment.  - Educate pt/family on patient safety including physical limitations  - Instruct  pt to call for assistance with activity based on assessment  - Modify environment to reduce risk of injury  - Provide assistive devices as appropriate  - Consider OT/PT consult to assist with strengthening/mobility  - Encourage toileting schedule  Outcome: Progressing     Problem: RISK FOR INFECTION - ADULT  Goal: Absence of fever/infection during anticipated neutropenic period  Description: INTERVENTIONS  - Monitor WBC  - Administer growth factors as ordered  - Implement neutropenic guidelines  Outcome: Progressing

## 2024-05-06 NOTE — PAYOR COMM NOTE
--------------  DISCHARGE REVIEW    Payor: [employer]  Subscriber #:  [id number]  Authorization Number: [id number]    Admit date: 4/23/24  Admit time:   5:46 AM  Discharge Date: 5/3/2024  6:25 PM     Admitting Physician: Rigoberto Couch MD  Attending Physician:  No att. providers found  Primary Care Physician: Chong Cornejo MD          Discharge Summary Notes        Discharge Summary signed by Nasim Davila MD at 5/3/2024  1:41 PM       Author: Nasim Davila MD Specialty: HOSPITALIST Author Type: Physician    Filed: 5/3/2024  1:41 PM Date of Service: 5/3/2024  1:35 PM Status: Signed    : Nasim Davila MD (Physician)           General Medicine Discharge Summary     Patient ID:  Lizeth Mcgill  83 year old  10/26/1940    Admit date: 4/23/2024    Discharge date and time: 5/3/24    Attending Physician: Rigoberto Couch MD     Consults: IP CONSULT TO FAMILY/INTERNAL MED  IP CONSULT TO RESPIRATORY CARE  IP CONSULT TO SOCIAL WORK  IP CONSULT TO INTERVENTIONAL RADIOLOGY  IP CONSULT TO INFECTIOUS DISEASE    Primary Care Physician: Chong Cornejo MD     Reason for admission:   S/p robotic assisted total lap hysterectomy-BSO, debulking, omentectomy     Risk For Readmission: low    Discharge Diagnoses: Carcinoma, malignant neoplasm of right ovary  Ovarian cancer (HCC)  See Additional Discharge Diagnoses in Hospital Course    Discharged Condition: stable    Follow-up with labs/images appointments: PCP, GYN, IR    Exam  Gen: No acute distress  Pulm: Lungs clear, normal respiratory effort  CV: Heart with regular rate and rhythm  Abd: Abdomen soft,     HPI: per chart  Ms. Mcgill is an 82 yo F with PMH of ovarian CA, HTN, breaset CA, DM2, hypothyroidism who presented for hysterectomy-BSO. Patient seen post op in PACU, sleepy, complains of some abdominal pain.     Hospital Course:   Lizeth Mcgill is a 83 year old female with ovarian CA, HTN, breaset CA, DM2, hypothyroidism who presented for hysterectomy-BSO,  course complicated by acute blood loss anemia, hypoxia, as well as fever and leukocytosis, with concerns of possible postoperative fluid collection/hematoma with possible infection, on IV Zosyn s/p aspiration of postoperative fluid collection by IR and drain placed on 4/30.  No growth on cultures to date.  Seen by ID and will plan to dc on Augmentin.  Continue drain care per IR.      Ovarian CA  S/p robotic assisted total lap hysterectomy-BSO, debulking, omentectomy  - PRN pain meds, Transition to PO when able  - monitor for acute blood loss anemia   - Bowel reg, antiemetics  - DVT Prophy- lovenox (held with ABLA and hematoma per GYN)  - Bernardo removed     Fever / abd pain  Dysuria  Fluid collection/hematoma  - CT abd pelvis, with fluid collection, urine culture negative  - given fever, elevated WBC, and neg Ucx, will need to assume fluid collection is poss infected  - zosyn started on 4/28  - discussed with Dr. Couch, rec IR consult for aspiration  - IR consulted  - s/p aspiration of postoperative fluid collection by IR and drain placed on 4/30  - White blood cell count improving, fever curve improved  - drain cx   - ID consulted and will dc on Augmentin      Acute resp failure w hypoxia, now resolved   - 70% w ambulating per pt. On 3L NC 4/24 AM  - Suspect atelectasis. Minimal basilar air movement on exam  - Can hold off on imaging for now  - OOB, encourage IS, ambulate TID  - Supplemental O2 for goal SpO2 90-93%. On 2L NC 4/24 AM   - Encourage IS  - CXR mild/moderate Left LL with effusion/atelectasis   - give dose of IV lasix today  - on RA this morning but sats 90%  - ECG with PAc's not afib  - CT chest without PE  - Now on room air     Acute on chronic anemia   - Presume 2/2 expected post-op ABLA w dilutional component from IVF  - No active bleeding  - Trend CBC  - 4/25 hgb < 7 - 1u RBC     HTN  - BP stable  - hold home lisinopril for now     Anxiety  - clonazepam nightly     Hypothyroidism  - home sythroid      GERD  - PPI    Operative Procedures: Procedure(s) (LRB):  Robotic assisted total laparoscopic hysterectomy, bilateral salpingo-oophorectomy, staging, debulking; Omentectomy; lysis of adhesions (N/A)  XI ROBOT-ASSISTED LAPAROSCOPIC OVARIAN CYSTECTOMY/ SALPINGO-OOPHORECTOMY (Bilateral)     Imaging: CT DRAIN ABSCESS PERITONEAL (CPT=49406)    Result Date: 4/30/2024  CONCLUSION: Insertion of drainage catheter into pelvic fluid collection yielding serous fluid.  IR will follow.    Dictated by (CST): Cruz Tariq MD on 4/30/2024 at 4:53 PM     Finalized by (CST): Cruz Tariq MD on 4/30/2024 at 5:03 PM           Disposition: ALLISON    Activity: as tolerated   Diet: general   Wound Care: no needs  Code Status: Full Code  O2: no needs    Home Medication Changes: as below   All discharge medications have been reconciled with current medication list.     Med list     Medication List        START taking these medications      amoxicillin clavulanate 500-125 MG Tabs  Commonly known as: Augmentin  Take 1 tablet by mouth in the morning and 1 tablet before bedtime. Do all this for 14 days.  Start taking on: May 4, 2024     docusate sodium 100 MG Caps  Commonly known as: COLACE  Take 100 mg by mouth 2 (two) times daily.  Notes to patient: Stool softener     enoxaparin 40 MG/0.4ML Sosy  Commonly known as: Lovenox  Inject 0.4 mL (40 mg total) into the skin daily for 14 days.  Notes to patient: To prevent blood clots     HYDROcodone-acetaminophen 5-325 MG Tabs  Commonly known as: Norco  Take 1 tablet by mouth every 6 (six) hours as needed.  Notes to patient: For pain -- Do Not take at same time as Tylenol because this medicine has Tylenol in it     simethicone 80 MG Chew  Commonly known as: Mylicon  Chew 1 tablet (80 mg total) by mouth 3 (three) times daily as needed (gas pain).  Notes to patient: For gas pain            CHANGE how you take these medications      Halobetasol Propionate 0.05 % Oint  Commonly known as:  ULTRAVATE  APPLY EXTERNALLY DAILY AS DIRECTED  What changed:   when to take this  reasons to take this     omeprazole 20 MG Cpdr  Commonly known as: PriLOSEC  Take 1 capsule (20 mg total) by mouth daily.  What changed: when to take this     pravastatin 40 MG Tabs  Commonly known as: Pravachol  Take 1 tablet (40 mg total) by mouth daily.  What changed: when to take this            CONTINUE taking these medications      acetaminophen 500 MG Tabs  Commonly known as: Tylenol Extra Strength     amLODIPine 2.5 MG Tabs  Commonly known as: Norvasc     clonazePAM 0.5 MG Tabs  Commonly known as: KlonoPIN     denosumab 60 MG/ML Sosy  Commonly known as: Prolia     DULoxetine 60 MG Cpep  Commonly known as: Cymbalta     ferrous sulfate 325 (65 FE) MG Tbec     gabapentin 300 MG Caps  Commonly known as: Neurontin     levothyroxine 50 MCG Tabs  Commonly known as: Synthroid  Take 1 tablet (50 mcg total) by mouth every morning.     lisinopril 10 MG Tabs  Commonly known as: Zestril     loratadine 10 MG Tabs  Commonly known as: Claritin     magnesium 250 MG Tabs     Vitamin D 50 MCG (2000 UT) Tabs               Where to Get Your Medications        You can get these medications from any pharmacy    Bring a paper prescription for each of these medications  amoxicillin clavulanate 500-125 MG Tabs  docusate sodium 100 MG Caps  enoxaparin 40 MG/0.4ML Sosy  HYDROcodone-acetaminophen 5-325 MG Tabs  simethicone 80 MG Chew         FU   Follow-up Information       Amanda Garza PA Follow up on 5/10/2024.    Specialty: Physician Assistant  Why: 5/10 as previously scheduled  Contact information:  [address]  [address]  [facility] [id number] [phone]               Chong Cornejo MD Follow up.    Specialty: Internal Medicine  Why: within 1 week of discharge from rehab  Contact information:  [address]  [address]  [place] [facility] [address] [phone]               Lizeth Manzano APRN Follow up in 1 week(s).    Specialties: Nurse  Practitioner, INTERVENTIONAL, RADIOLOGY  Contact information:  [facility]  [place] [address]  [phone]                             DC instructions:      Other Discharge Instructions:         Keep all follow up appointments and continue current medications.   Continue drain care as instructed.         Patient had opportunity to ask questions and state understand and agree with therapeutic plan as outlined    Thank You,    Nasim Davila M.D.  [facility] Hospitalist      Electronically signed by Nasim Davila MD on 5/3/2024  1:41 PM         REVIEWER COMMENTS

## 2024-05-09 ENCOUNTER — HOSPITAL ENCOUNTER (OUTPATIENT)
Dept: INTERVENTIONAL RADIOLOGY/VASCULAR | Facility: HOSPITAL | Age: 84
Discharge: HOME | End: 2024-05-09
Attending: NURSE PRACTITIONER | Admitting: RADIOLOGY

## 2024-05-09 VITALS
SYSTOLIC BLOOD PRESSURE: 112 MMHG | BODY MASS INDEX: 22.22 KG/M2 | OXYGEN SATURATION: 96 % | HEIGHT: 57 IN | DIASTOLIC BLOOD PRESSURE: 56 MMHG | WEIGHT: 103 LBS | TEMPERATURE: 97 F | HEART RATE: 88 BPM | RESPIRATION RATE: 10 BRPM

## 2024-05-09 DIAGNOSIS — R18.8 PELVIC FLUID COLLECTION: ICD-10-CM

## 2024-05-09 DIAGNOSIS — R18.8 PELVIC FLUID COLLECTION: Primary | ICD-10-CM

## 2024-05-09 PROCEDURE — 49424 ASSESS CYST CONTRAST INJECT: CPT | Performed by: RADIOLOGY

## 2024-05-09 PROCEDURE — 76080 X-RAY EXAM OF FISTULA: CPT | Performed by: RADIOLOGY

## 2024-05-09 PROCEDURE — BW111ZZ FLUOROSCOPY OF ABDOMEN AND PELVIS USING LOW OSMOLAR CONTRAST: ICD-10-PCS | Performed by: RADIOLOGY

## 2024-05-09 NOTE — DISCHARGE INSTRUCTIONS
INTERVENTIONAL RADIOLOGY  [facility]  [phone]     Patient Name:  Lizeth Mcgill    Procedure:  IR DRAIN CHECK/EXCHANGE    Site Care: continue with site care as previously instructed                                       Activity/Diet  No heavy lifting or strenuous activity for 48 hours.  Drink plenty of fluids, unless you have otherwise been told to restrict your fluid intake.  Do not drink alcohol for 24 hours.  Do not drive,  operate heavy machinery, make important decisions or sign legal documents today.    Medications:  Take acetaminophen if needed for pain. Do not exceed 4000mg of acetaminophen in a 24-hour period. and Make no changes to your existing medications.    Contact Interventional Radiology at [phone] if you have severe/unrelieved pain, fever, chills, dizziness/lightheadedness, or drainage/bleeding from your incision site.

## 2024-05-09 NOTE — IVS NOTE
DISCHARGE NOTE     Pt is able to sit up without difficulty.  Procedural site remains dry and intact.  No signs and symptoms of bleeding/hematoma noted.   IV access removed.  Instruction provided, patient/family verbalizes understanding.   Dr. Mcconnell spoke with patient/family post procedure.     Pt discharge via wheelchair to Main [facility].    Follow up Appointment: PAT to schedule for drain check in 1 week.  Order for future procedure in place.    New Prescription: none

## 2024-05-10 NOTE — INTERVAL H&P NOTE
The above referenced H&P was reviewed by David Mcconnell MD on 5/10/2024, the patient was examined and no significant changes have occurred in the patient's condition since the H&P was performed.  Risks, benefits, alternative treatments and consequences of no treatment were discussed.  We will proceed with procedure as planned.      David Mcconnell MD  5/10/2024  2:57 PM

## 2024-05-16 ENCOUNTER — HOSPITAL ENCOUNTER (OUTPATIENT)
Dept: INTERVENTIONAL RADIOLOGY/VASCULAR | Facility: HOSPITAL | Age: 84
Discharge: HOME | End: 2024-05-16
Attending: RADIOLOGY | Admitting: RADIOLOGY

## 2024-05-16 VITALS
OXYGEN SATURATION: 94 % | BODY MASS INDEX: 22.22 KG/M2 | HEIGHT: 57 IN | TEMPERATURE: 96 F | HEART RATE: 100 BPM | SYSTOLIC BLOOD PRESSURE: 115 MMHG | WEIGHT: 103 LBS | DIASTOLIC BLOOD PRESSURE: 65 MMHG

## 2024-05-16 DIAGNOSIS — R18.8 PELVIC FLUID COLLECTION: ICD-10-CM

## 2024-05-16 PROCEDURE — 49424 ASSESS CYST CONTRAST INJECT: CPT | Performed by: STUDENT IN AN ORGANIZED HEALTH CARE EDUCATION/TRAINING PROGRAM

## 2024-05-16 PROCEDURE — 76080 X-RAY EXAM OF FISTULA: CPT | Performed by: STUDENT IN AN ORGANIZED HEALTH CARE EDUCATION/TRAINING PROGRAM

## 2024-05-16 NOTE — IVS NOTE
DISCHARGE NOTE     Pt is able to sit up and ambulate without difficulty.   Procedural site remains dry and intact with good circulation, motion and sensation.   No signs or symptoms of bleeding/hematoma noted.   Pt denies any pain or discomfort at this time.  Instructions provided, patient/family verbalizes understanding.   Dr. Tariq spoke with patient/family post procedure.     [facility]     Follow up Appointment: n/a    New Prescription: n/a

## 2024-05-16 NOTE — DISCHARGE INSTRUCTIONS
INTERVENTIONAL RADIOLOGY  [facility]  [phone]     Patient Name:  Lizeth Mcgill    Procedure:  Drain Removal    Site Care: Remove your band-aid or dressing in 24 hours.  Gently wash area with soap and water.                                       Activity/Diet  No heavy lifting or strenuous activity for 48 hours.  Drink plenty of fluids, unless you have otherwise been told to restrict your fluid intake.  Do not drink alcohol for 24 hours.  Do not drive,  operate heavy machinery, make important decisions or sign legal documents today.    Medications:  Make no changes to your existing medications.    Contact Interventional Radiology at [phone] if you have severe/unrelieved pain, fever, chills, dizziness/lightheadedness, or drainage/bleeding from your incision site.

## 2024-05-16 NOTE — PROCEDURES
Interventional Radiology  Brief Post-Procedure Note    Procedure(s): drain check/removal    Indication: IKER/BSO, postop fluid collection post IR drain, drain check    (s): Chandni    Anesthesia:  None    Findings:    -scant serous output from drain  -drain check demonstrating small irregular collection and bowel loops, though contrast felt to be outside bowel loops rather than within  -drain removed    Blood loss: <1 mL      Complications: None    Plan: if patient develops fevers/abdominal pain, she will return to ED    Please refer to full dictation under the \"Imaging\" tab in Epic.    Cruz Tariq MD  5/16/2024  Interventional Radiology  [facility]

## 2024-05-16 NOTE — INTERVAL H&P NOTE
The above referenced H&P was reviewed by Cruz Tariq MD on 5/16/2024, the patient was examined and no significant changes have occurred in the patient's condition since the H&P was performed.  Risks, benefits, alternative treatments and consequences of no treatment were discussed.  We will proceed with procedure as planned.      Cruz Tariq MD  5/16/2024  8:36 AM

## 2024-05-17 PROCEDURE — 3E0P3KZ INTRODUCTION OF OTHER DIAGNOSTIC SUBSTANCE INTO FEMALE REPRODUCTIVE, PERCUTANEOUS APPROACH: ICD-10-PCS | Performed by: STUDENT IN AN ORGANIZED HEALTH CARE EDUCATION/TRAINING PROGRAM

## 2024-06-27 ENCOUNTER — LAB REQUISITION (OUTPATIENT)
Dept: LAB | Facility: HOSPITAL | Age: 84
End: 2024-06-27
Payer: MEDICARE

## 2024-06-27 DIAGNOSIS — C56.1 MALIGNANT NEOPLASM OF RIGHT OVARY (HCC): ICD-10-CM

## 2024-06-27 PROCEDURE — 88363 XM ARCHIVE TISSUE MOLEC ANAL: CPT | Performed by: PATHOLOGY

## 2025-02-28 ENCOUNTER — APPOINTMENT (OUTPATIENT)
Dept: CT IMAGING | Facility: HOSPITAL | Age: 85
DRG: 336 | End: 2025-02-28
Attending: EMERGENCY MEDICINE
Payer: MEDICARE

## 2025-02-28 ENCOUNTER — ANESTHESIA EVENT (OUTPATIENT)
Dept: SURGERY | Facility: HOSPITAL | Age: 85
End: 2025-02-28
Payer: MEDICARE

## 2025-02-28 ENCOUNTER — APPOINTMENT (OUTPATIENT)
Dept: GENERAL RADIOLOGY | Facility: HOSPITAL | Age: 85
DRG: 336 | End: 2025-02-28
Attending: EMERGENCY MEDICINE
Payer: MEDICARE

## 2025-02-28 ENCOUNTER — ANESTHESIA (OUTPATIENT)
Dept: SURGERY | Facility: HOSPITAL | Age: 85
End: 2025-02-28
Payer: MEDICARE

## 2025-02-28 ENCOUNTER — HOSPITAL ENCOUNTER (INPATIENT)
Facility: HOSPITAL | Age: 85
LOS: 8 days | Discharge: NURSING, REHAB OR LONG-TERM CARE | DRG: 336 | End: 2025-03-08
Attending: EMERGENCY MEDICINE
Payer: MEDICARE

## 2025-02-28 ENCOUNTER — HOSPITAL ENCOUNTER (INPATIENT)
Facility: HOSPITAL | Age: 85
LOS: 8 days | Discharge: NURSING, REHAB OR LONG-TERM CARE | DRG: 336 | End: 2025-03-08
Attending: EMERGENCY MEDICINE | Admitting: SURGERY
Payer: MEDICARE

## 2025-02-28 DIAGNOSIS — K56.601 COMPLETE INTESTINAL OBSTRUCTION, UNSPECIFIED CAUSE (HCC): Primary | ICD-10-CM

## 2025-02-28 DIAGNOSIS — G47.00 INSOMNIA, UNSPECIFIED TYPE: ICD-10-CM

## 2025-02-28 DIAGNOSIS — N30.00 ACUTE CYSTITIS WITHOUT HEMATURIA: ICD-10-CM

## 2025-02-28 DIAGNOSIS — N17.9 AKI (ACUTE KIDNEY INJURY): ICD-10-CM

## 2025-02-28 PROBLEM — I95.9 HYPOTENSION: Status: ACTIVE | Noted: 2025-02-28

## 2025-02-28 LAB
ALBUMIN SERPL-MCNC: 3.1 G/DL (ref 3.2–4.8)
ALBUMIN SERPL-MCNC: 4.6 G/DL (ref 3.2–4.8)
ALP LIVER SERPL-CCNC: 56 U/L
ALP LIVER SERPL-CCNC: 74 U/L
ALT SERPL-CCNC: 16 U/L
ALT SERPL-CCNC: 23 U/L
ANION GAP SERPL CALC-SCNC: 10 MMOL/L (ref 0–18)
ANION GAP SERPL CALC-SCNC: 12 MMOL/L (ref 0–18)
ANION GAP SERPL CALC-SCNC: 13 MMOL/L (ref 0–18)
ANTIBODY SCREEN: NEGATIVE
AST SERPL-CCNC: 15 U/L (ref ?–34)
AST SERPL-CCNC: 17 U/L (ref ?–34)
ATRIAL RATE: 94 BPM
BASOPHILS # BLD AUTO: 0.06 X10(3) UL (ref 0–0.2)
BASOPHILS # BLD: 0 X10(3) UL (ref 0–0.2)
BASOPHILS NFR BLD AUTO: 0.4 %
BASOPHILS NFR BLD: 0 %
BILIRUB DIRECT SERPL-MCNC: 0.2 MG/DL (ref ?–0.3)
BILIRUB DIRECT SERPL-MCNC: <0.1 MG/DL (ref ?–0.3)
BILIRUB SERPL-MCNC: 0.2 MG/DL (ref 0.2–1.1)
BILIRUB SERPL-MCNC: 0.4 MG/DL (ref 0.2–1.1)
BILIRUB UR QL: NEGATIVE
BUN BLD-MCNC: 31 MG/DL (ref 9–23)
BUN BLD-MCNC: 45 MG/DL (ref 9–23)
BUN BLD-MCNC: 46 MG/DL (ref 9–23)
BUN/CREAT SERPL: 15.7 (ref 10–20)
BUN/CREAT SERPL: 18.7 (ref 10–20)
BUN/CREAT SERPL: 24.3 (ref 10–20)
CALCIUM BLD-MCNC: 10.2 MG/DL (ref 8.7–10.4)
CALCIUM BLD-MCNC: 8.5 MG/DL (ref 8.7–10.4)
CALCIUM BLD-MCNC: 8.5 MG/DL (ref 8.7–10.4)
CHLORIDE SERPL-SCNC: 108 MMOL/L (ref 98–112)
CHLORIDE SERPL-SCNC: 109 MMOL/L (ref 98–112)
CHLORIDE SERPL-SCNC: 96 MMOL/L (ref 98–112)
CO2 SERPL-SCNC: 13 MMOL/L (ref 21–32)
CO2 SERPL-SCNC: 19 MMOL/L (ref 21–32)
CO2 SERPL-SCNC: 23 MMOL/L (ref 21–32)
COLOR UR: YELLOW
CREAT BLD-MCNC: 1.89 MG/DL
CREAT BLD-MCNC: 1.98 MG/DL
CREAT BLD-MCNC: 2.41 MG/DL
DEPRECATED RDW RBC AUTO: 48.5 FL (ref 35.1–46.3)
DEPRECATED RDW RBC AUTO: 49.1 FL (ref 35.1–46.3)
EGFRCR SERPLBLD CKD-EPI 2021: 19 ML/MIN/1.73M2 (ref 60–?)
EGFRCR SERPLBLD CKD-EPI 2021: 24 ML/MIN/1.73M2 (ref 60–?)
EGFRCR SERPLBLD CKD-EPI 2021: 26 ML/MIN/1.73M2 (ref 60–?)
EOSINOPHIL # BLD AUTO: 0 X10(3) UL (ref 0–0.7)
EOSINOPHIL # BLD: 0 X10(3) UL (ref 0–0.7)
EOSINOPHIL NFR BLD AUTO: 0 %
EOSINOPHIL NFR BLD: 0 %
ERYTHROCYTE [DISTWIDTH] IN BLOOD BY AUTOMATED COUNT: 14.2 % (ref 11–15)
ERYTHROCYTE [DISTWIDTH] IN BLOOD BY AUTOMATED COUNT: 14.2 % (ref 11–15)
FLUAV + FLUBV RNA SPEC NAA+PROBE: NEGATIVE
FLUAV + FLUBV RNA SPEC NAA+PROBE: NEGATIVE
GLUCOSE BLD-MCNC: 129 MG/DL (ref 70–99)
GLUCOSE BLD-MCNC: 161 MG/DL (ref 70–99)
GLUCOSE BLD-MCNC: 208 MG/DL (ref 70–99)
GLUCOSE BLDC GLUCOMTR-MCNC: 123 MG/DL (ref 70–99)
GLUCOSE BLDC GLUCOMTR-MCNC: 176 MG/DL (ref 70–99)
GLUCOSE UR-MCNC: NORMAL MG/DL
HCT VFR BLD AUTO: 30.4 %
HCT VFR BLD AUTO: 31.8 %
HGB BLD-MCNC: 9.5 G/DL
HGB BLD-MCNC: 9.7 G/DL
IMM GRANULOCYTES # BLD AUTO: 0.17 X10(3) UL (ref 0–1)
IMM GRANULOCYTES NFR BLD: 1 %
KETONES UR-MCNC: NEGATIVE MG/DL
LACTATE SERPL-SCNC: 1.9 MMOL/L (ref 0.5–2)
LEUKOCYTE ESTERASE UR QL STRIP.AUTO: 500
LIPASE SERPL-CCNC: 27 U/L (ref 12–53)
LYMPHOCYTES # BLD AUTO: 0.51 X10(3) UL (ref 1–4)
LYMPHOCYTES NFR BLD AUTO: 3.1 %
LYMPHOCYTES NFR BLD: 1.31 X10(3) UL (ref 1–4)
LYMPHOCYTES NFR BLD: 6 %
MAGNESIUM SERPL-MCNC: 2.2 MG/DL (ref 1.6–2.6)
MCH RBC QN AUTO: 28.6 PG (ref 26–34)
MCH RBC QN AUTO: 29.1 PG (ref 26–34)
MCHC RBC AUTO-ENTMCNC: 30.5 G/DL (ref 31–37)
MCHC RBC AUTO-ENTMCNC: 31.3 G/DL (ref 31–37)
MCV RBC AUTO: 93.3 FL
MCV RBC AUTO: 93.8 FL
MONOCYTES # BLD AUTO: 0.77 X10(3) UL (ref 0.1–1)
MONOCYTES # BLD: 0.44 X10(3) UL (ref 0.1–1)
MONOCYTES NFR BLD AUTO: 4.7 %
MONOCYTES NFR BLD: 2 %
MORPHOLOGY: NORMAL
NEUTROPHILS # BLD AUTO: 14.99 X10 (3) UL (ref 1.5–7.7)
NEUTROPHILS # BLD AUTO: 14.99 X10(3) UL (ref 1.5–7.7)
NEUTROPHILS # BLD AUTO: 19.38 X10 (3) UL (ref 1.5–7.7)
NEUTROPHILS NFR BLD AUTO: 90.8 %
NEUTROPHILS NFR BLD: 91 %
NEUTS BAND NFR BLD: 1 %
NEUTS HYPERSEG # BLD: 20.06 X10(3) UL (ref 1.5–7.7)
NITRITE UR QL STRIP.AUTO: NEGATIVE
NT-PROBNP SERPL-MCNC: 2785 PG/ML (ref ?–450)
OSMOLALITY SERPL CALC.SUM OF ELEC: 288 MOSM/KG (ref 275–295)
OSMOLALITY SERPL CALC.SUM OF ELEC: 292 MOSM/KG (ref 275–295)
OSMOLALITY SERPL CALC.SUM OF ELEC: 298 MOSM/KG (ref 275–295)
P AXIS: -22 DEGREES
P-R INTERVAL: 154 MS
PH UR: 5 [PH] (ref 5–8)
PHOSPHATE SERPL-MCNC: 6.2 MG/DL (ref 2.4–5.1)
PLATELET # BLD AUTO: 497 10(3)UL (ref 150–450)
PLATELET # BLD AUTO: 670 10(3)UL (ref 150–450)
PLATELET MORPHOLOGY: NORMAL
POTASSIUM SERPL-SCNC: 5 MMOL/L (ref 3.5–5.1)
POTASSIUM SERPL-SCNC: 5.7 MMOL/L (ref 3.5–5.1)
POTASSIUM SERPL-SCNC: 5.8 MMOL/L (ref 3.5–5.1)
PROT SERPL-MCNC: 5.2 G/DL (ref 5.7–8.2)
PROT SERPL-MCNC: 7.1 G/DL (ref 5.7–8.2)
PROT UR-MCNC: 20 MG/DL
Q-T INTERVAL: 350 MS
QRS DURATION: 88 MS
QTC CALCULATION (BEZET): 437 MS
R AXIS: -29 DEGREES
RBC # BLD AUTO: 3.26 X10(6)UL
RBC # BLD AUTO: 3.39 X10(6)UL
RBC #/AREA URNS AUTO: >10 /HPF
RH BLOOD TYPE: POSITIVE
RSV RNA SPEC NAA+PROBE: NEGATIVE
SARS-COV-2 RNA RESP QL NAA+PROBE: NOT DETECTED
SODIUM SERPL-SCNC: 132 MMOL/L (ref 136–145)
SODIUM SERPL-SCNC: 134 MMOL/L (ref 136–145)
SODIUM SERPL-SCNC: 137 MMOL/L (ref 136–145)
SP GR UR STRIP: 1.03 (ref 1–1.03)
T AXIS: 149 DEGREES
TOTAL CELLS COUNTED BLD: 100
TROPONIN I SERPL HS-MCNC: 5 NG/L
UROBILINOGEN UR STRIP-ACNC: NORMAL
VENTRICULAR RATE: 94 BPM
WBC # BLD AUTO: 16.5 X10(3) UL (ref 4–11)
WBC # BLD AUTO: 21.8 X10(3) UL (ref 4–11)
WBC #/AREA URNS AUTO: >50 /HPF

## 2025-02-28 PROCEDURE — 93010 ELECTROCARDIOGRAM REPORT: CPT

## 2025-02-28 PROCEDURE — 83880 ASSAY OF NATRIURETIC PEPTIDE: CPT | Performed by: EMERGENCY MEDICINE

## 2025-02-28 PROCEDURE — 85027 COMPLETE CBC AUTOMATED: CPT | Performed by: EMERGENCY MEDICINE

## 2025-02-28 PROCEDURE — 85025 COMPLETE CBC W/AUTO DIFF WBC: CPT | Performed by: EMERGENCY MEDICINE

## 2025-02-28 PROCEDURE — 71045 X-RAY EXAM CHEST 1 VIEW: CPT | Performed by: EMERGENCY MEDICINE

## 2025-02-28 PROCEDURE — 81001 URINALYSIS AUTO W/SCOPE: CPT | Performed by: EMERGENCY MEDICINE

## 2025-02-28 PROCEDURE — 80076 HEPATIC FUNCTION PANEL: CPT | Performed by: EMERGENCY MEDICINE

## 2025-02-28 PROCEDURE — 80048 BASIC METABOLIC PNL TOTAL CA: CPT | Performed by: EMERGENCY MEDICINE

## 2025-02-28 PROCEDURE — 83605 ASSAY OF LACTIC ACID: CPT | Performed by: EMERGENCY MEDICINE

## 2025-02-28 PROCEDURE — 80048 BASIC METABOLIC PNL TOTAL CA: CPT | Performed by: SURGERY

## 2025-02-28 PROCEDURE — 86901 BLOOD TYPING SEROLOGIC RH(D): CPT | Performed by: EMERGENCY MEDICINE

## 2025-02-28 PROCEDURE — 96361 HYDRATE IV INFUSION ADD-ON: CPT

## 2025-02-28 PROCEDURE — 86920 COMPATIBILITY TEST SPIN: CPT

## 2025-02-28 PROCEDURE — 84484 ASSAY OF TROPONIN QUANT: CPT | Performed by: EMERGENCY MEDICINE

## 2025-02-28 PROCEDURE — 83036 HEMOGLOBIN GLYCOSYLATED A1C: CPT | Performed by: INTERNAL MEDICINE

## 2025-02-28 PROCEDURE — 99291 CRITICAL CARE FIRST HOUR: CPT

## 2025-02-28 PROCEDURE — 36415 COLL VENOUS BLD VENIPUNCTURE: CPT

## 2025-02-28 PROCEDURE — 74176 CT ABD & PELVIS W/O CONTRAST: CPT | Performed by: EMERGENCY MEDICINE

## 2025-02-28 PROCEDURE — 0WQF0ZZ REPAIR ABDOMINAL WALL, OPEN APPROACH: ICD-10-PCS | Performed by: SURGERY

## 2025-02-28 PROCEDURE — 0DN80ZZ RELEASE SMALL INTESTINE, OPEN APPROACH: ICD-10-PCS | Performed by: SURGERY

## 2025-02-28 PROCEDURE — 85007 BL SMEAR W/DIFF WBC COUNT: CPT | Performed by: EMERGENCY MEDICINE

## 2025-02-28 PROCEDURE — 86850 RBC ANTIBODY SCREEN: CPT | Performed by: EMERGENCY MEDICINE

## 2025-02-28 PROCEDURE — 88302 TISSUE EXAM BY PATHOLOGIST: CPT | Performed by: SURGERY

## 2025-02-28 PROCEDURE — 87205 SMEAR GRAM STAIN: CPT | Performed by: EMERGENCY MEDICINE

## 2025-02-28 PROCEDURE — 87040 BLOOD CULTURE FOR BACTERIA: CPT | Performed by: EMERGENCY MEDICINE

## 2025-02-28 PROCEDURE — 36430 TRANSFUSION BLD/BLD COMPNT: CPT | Performed by: ANESTHESIOLOGY

## 2025-02-28 PROCEDURE — 93005 ELECTROCARDIOGRAM TRACING: CPT

## 2025-02-28 PROCEDURE — 82962 GLUCOSE BLOOD TEST: CPT

## 2025-02-28 PROCEDURE — 87086 URINE CULTURE/COLONY COUNT: CPT | Performed by: EMERGENCY MEDICINE

## 2025-02-28 PROCEDURE — 80076 HEPATIC FUNCTION PANEL: CPT | Performed by: SURGERY

## 2025-02-28 PROCEDURE — 80048 BASIC METABOLIC PNL TOTAL CA: CPT | Performed by: INTERNAL MEDICINE

## 2025-02-28 PROCEDURE — 87150 DNA/RNA AMPLIFIED PROBE: CPT | Performed by: EMERGENCY MEDICINE

## 2025-02-28 PROCEDURE — 83735 ASSAY OF MAGNESIUM: CPT | Performed by: SURGERY

## 2025-02-28 PROCEDURE — 84100 ASSAY OF PHOSPHORUS: CPT | Performed by: SURGERY

## 2025-02-28 PROCEDURE — 85025 COMPLETE CBC W/AUTO DIFF WBC: CPT | Performed by: SURGERY

## 2025-02-28 PROCEDURE — 96365 THER/PROPH/DIAG IV INF INIT: CPT

## 2025-02-28 PROCEDURE — 99285 EMERGENCY DEPT VISIT HI MDM: CPT

## 2025-02-28 PROCEDURE — 0241U SARS-COV-2/FLU A AND B/RSV BY PCR (GENEXPERT): CPT | Performed by: EMERGENCY MEDICINE

## 2025-02-28 PROCEDURE — 87077 CULTURE AEROBIC IDENTIFY: CPT | Performed by: EMERGENCY MEDICINE

## 2025-02-28 PROCEDURE — 86900 BLOOD TYPING SEROLOGIC ABO: CPT | Performed by: EMERGENCY MEDICINE

## 2025-02-28 PROCEDURE — 83690 ASSAY OF LIPASE: CPT | Performed by: EMERGENCY MEDICINE

## 2025-02-28 PROCEDURE — 0DQV0ZZ REPAIR MESENTERY, OPEN APPROACH: ICD-10-PCS | Performed by: SURGERY

## 2025-02-28 RX ORDER — NICOTINE POLACRILEX 4 MG
15 LOZENGE BUCCAL
Status: DISCONTINUED | OUTPATIENT
Start: 2025-02-28 | End: 2025-02-28 | Stop reason: HOSPADM

## 2025-02-28 RX ORDER — HYDROMORPHONE HYDROCHLORIDE 1 MG/ML
0.4 INJECTION, SOLUTION INTRAMUSCULAR; INTRAVENOUS; SUBCUTANEOUS EVERY 5 MIN PRN
Status: DISCONTINUED | OUTPATIENT
Start: 2025-02-28 | End: 2025-02-28 | Stop reason: HOSPADM

## 2025-02-28 RX ORDER — EPHEDRINE SULFATE 50 MG/ML
INJECTION, SOLUTION INTRAVENOUS AS NEEDED
Status: DISCONTINUED | OUTPATIENT
Start: 2025-02-28 | End: 2025-02-28 | Stop reason: SURG

## 2025-02-28 RX ORDER — MORPHINE SULFATE 4 MG/ML
4 INJECTION, SOLUTION INTRAMUSCULAR; INTRAVENOUS EVERY 2 HOUR PRN
Status: DISCONTINUED | OUTPATIENT
Start: 2025-02-28 | End: 2025-03-07

## 2025-02-28 RX ORDER — ROCURONIUM BROMIDE 10 MG/ML
INJECTION, SOLUTION INTRAVENOUS AS NEEDED
Status: DISCONTINUED | OUTPATIENT
Start: 2025-02-28 | End: 2025-02-28 | Stop reason: SURG

## 2025-02-28 RX ORDER — ACETAMINOPHEN 10 MG/ML
INJECTION, SOLUTION INTRAVENOUS AS NEEDED
Status: DISCONTINUED | OUTPATIENT
Start: 2025-02-28 | End: 2025-02-28 | Stop reason: SURG

## 2025-02-28 RX ORDER — MORPHINE SULFATE 10 MG/ML
6 INJECTION, SOLUTION INTRAMUSCULAR; INTRAVENOUS EVERY 10 MIN PRN
Status: DISCONTINUED | OUTPATIENT
Start: 2025-02-28 | End: 2025-02-28 | Stop reason: HOSPADM

## 2025-02-28 RX ORDER — PHENYLEPHRINE HCL 10 MG/ML
VIAL (ML) INJECTION AS NEEDED
Status: DISCONTINUED | OUTPATIENT
Start: 2025-02-28 | End: 2025-02-28 | Stop reason: SURG

## 2025-02-28 RX ORDER — ACETAMINOPHEN 10 MG/ML
15 INJECTION, SOLUTION INTRAVENOUS EVERY 6 HOURS
Status: DISCONTINUED | OUTPATIENT
Start: 2025-02-28 | End: 2025-03-03

## 2025-02-28 RX ORDER — CALCIUM GLUCONATE 94 MG/ML
INJECTION, SOLUTION INTRAVENOUS AS NEEDED
Status: DISCONTINUED | OUTPATIENT
Start: 2025-02-28 | End: 2025-02-28 | Stop reason: SURG

## 2025-02-28 RX ORDER — SODIUM CHLORIDE 9 MG/ML
INJECTION, SOLUTION INTRAVENOUS CONTINUOUS PRN
Status: DISCONTINUED | OUTPATIENT
Start: 2025-02-28 | End: 2025-02-28 | Stop reason: SURG

## 2025-02-28 RX ORDER — ONDANSETRON 2 MG/ML
4 INJECTION INTRAMUSCULAR; INTRAVENOUS EVERY 6 HOURS PRN
Status: DISCONTINUED | OUTPATIENT
Start: 2025-02-28 | End: 2025-03-05

## 2025-02-28 RX ORDER — HYDROMORPHONE HYDROCHLORIDE 1 MG/ML
0.6 INJECTION, SOLUTION INTRAMUSCULAR; INTRAVENOUS; SUBCUTANEOUS EVERY 5 MIN PRN
Status: DISCONTINUED | OUTPATIENT
Start: 2025-02-28 | End: 2025-02-28 | Stop reason: HOSPADM

## 2025-02-28 RX ORDER — DEXTROSE MONOHYDRATE, SODIUM CHLORIDE, AND POTASSIUM CHLORIDE 50; 1.49; 4.5 G/1000ML; G/1000ML; G/1000ML
INJECTION, SOLUTION INTRAVENOUS CONTINUOUS
Status: DISCONTINUED | OUTPATIENT
Start: 2025-02-28 | End: 2025-02-28 | Stop reason: ALTCHOICE

## 2025-02-28 RX ORDER — DEXTROSE MONOHYDRATE 25 G/50ML
50 INJECTION, SOLUTION INTRAVENOUS
Status: DISCONTINUED | OUTPATIENT
Start: 2025-02-28 | End: 2025-02-28 | Stop reason: HOSPADM

## 2025-02-28 RX ORDER — HEPARIN SODIUM 5000 [USP'U]/ML
5000 INJECTION, SOLUTION INTRAVENOUS; SUBCUTANEOUS EVERY 8 HOURS SCHEDULED
Status: DISCONTINUED | OUTPATIENT
Start: 2025-02-28 | End: 2025-02-28 | Stop reason: ALTCHOICE

## 2025-02-28 RX ORDER — ONDANSETRON 2 MG/ML
4 INJECTION INTRAMUSCULAR; INTRAVENOUS ONCE
Status: DISCONTINUED | OUTPATIENT
Start: 2025-02-28 | End: 2025-03-08

## 2025-02-28 RX ORDER — HYDROMORPHONE HYDROCHLORIDE 1 MG/ML
0.4 INJECTION, SOLUTION INTRAMUSCULAR; INTRAVENOUS; SUBCUTANEOUS EVERY 2 HOUR PRN
Status: DISCONTINUED | OUTPATIENT
Start: 2025-02-28 | End: 2025-02-28 | Stop reason: ALTCHOICE

## 2025-02-28 RX ORDER — HYDROMORPHONE HYDROCHLORIDE 1 MG/ML
0.2 INJECTION, SOLUTION INTRAMUSCULAR; INTRAVENOUS; SUBCUTANEOUS EVERY 5 MIN PRN
Status: DISCONTINUED | OUTPATIENT
Start: 2025-02-28 | End: 2025-02-28 | Stop reason: HOSPADM

## 2025-02-28 RX ORDER — HEPARIN SODIUM 5000 [USP'U]/ML
5000 INJECTION, SOLUTION INTRAVENOUS; SUBCUTANEOUS EVERY 12 HOURS
Status: DISCONTINUED | OUTPATIENT
Start: 2025-03-01 | End: 2025-03-08

## 2025-02-28 RX ORDER — ONDANSETRON 2 MG/ML
INJECTION INTRAMUSCULAR; INTRAVENOUS AS NEEDED
Status: DISCONTINUED | OUTPATIENT
Start: 2025-02-28 | End: 2025-02-28 | Stop reason: SURG

## 2025-02-28 RX ORDER — DEXTROSE MONOHYDRATE AND SODIUM CHLORIDE 5; .45 G/100ML; G/100ML
INJECTION, SOLUTION INTRAVENOUS CONTINUOUS
Status: DISCONTINUED | OUTPATIENT
Start: 2025-02-28 | End: 2025-03-01

## 2025-02-28 RX ORDER — MORPHINE SULFATE 4 MG/ML
4 INJECTION, SOLUTION INTRAMUSCULAR; INTRAVENOUS EVERY 10 MIN PRN
Status: DISCONTINUED | OUTPATIENT
Start: 2025-02-28 | End: 2025-02-28 | Stop reason: HOSPADM

## 2025-02-28 RX ORDER — DEXAMETHASONE SODIUM PHOSPHATE 4 MG/ML
VIAL (ML) INJECTION AS NEEDED
Status: DISCONTINUED | OUTPATIENT
Start: 2025-02-28 | End: 2025-02-28 | Stop reason: SURG

## 2025-02-28 RX ORDER — HYDROMORPHONE HYDROCHLORIDE 1 MG/ML
0.2 INJECTION, SOLUTION INTRAMUSCULAR; INTRAVENOUS; SUBCUTANEOUS EVERY 2 HOUR PRN
Status: DISCONTINUED | OUTPATIENT
Start: 2025-02-28 | End: 2025-02-28 | Stop reason: ALTCHOICE

## 2025-02-28 RX ORDER — MORPHINE SULFATE 2 MG/ML
2 INJECTION, SOLUTION INTRAMUSCULAR; INTRAVENOUS EVERY 2 HOUR PRN
Status: DISCONTINUED | OUTPATIENT
Start: 2025-02-28 | End: 2025-03-07

## 2025-02-28 RX ORDER — GLYCOPYRROLATE 0.2 MG/ML
INJECTION, SOLUTION INTRAMUSCULAR; INTRAVENOUS AS NEEDED
Status: DISCONTINUED | OUTPATIENT
Start: 2025-02-28 | End: 2025-02-28 | Stop reason: SURG

## 2025-02-28 RX ORDER — NICOTINE POLACRILEX 4 MG
30 LOZENGE BUCCAL
Status: DISCONTINUED | OUTPATIENT
Start: 2025-02-28 | End: 2025-02-28 | Stop reason: HOSPADM

## 2025-02-28 RX ORDER — NALOXONE HYDROCHLORIDE 0.4 MG/ML
0.08 INJECTION, SOLUTION INTRAMUSCULAR; INTRAVENOUS; SUBCUTANEOUS AS NEEDED
Status: DISCONTINUED | OUTPATIENT
Start: 2025-02-28 | End: 2025-02-28 | Stop reason: HOSPADM

## 2025-02-28 RX ORDER — SODIUM CHLORIDE 9 MG/ML
INJECTION, SOLUTION INTRAVENOUS CONTINUOUS
Status: DISCONTINUED | OUTPATIENT
Start: 2025-02-28 | End: 2025-03-02

## 2025-02-28 RX ORDER — SODIUM CHLORIDE, SODIUM LACTATE, POTASSIUM CHLORIDE, CALCIUM CHLORIDE 600; 310; 30; 20 MG/100ML; MG/100ML; MG/100ML; MG/100ML
INJECTION, SOLUTION INTRAVENOUS CONTINUOUS
Status: DISCONTINUED | OUTPATIENT
Start: 2025-02-28 | End: 2025-02-28 | Stop reason: HOSPADM

## 2025-02-28 RX ORDER — FAMOTIDINE 10 MG/ML
20 INJECTION, SOLUTION INTRAVENOUS DAILY
Status: DISCONTINUED | OUTPATIENT
Start: 2025-03-01 | End: 2025-03-03

## 2025-02-28 RX ORDER — MORPHINE SULFATE 4 MG/ML
2 INJECTION, SOLUTION INTRAMUSCULAR; INTRAVENOUS EVERY 10 MIN PRN
Status: DISCONTINUED | OUTPATIENT
Start: 2025-02-28 | End: 2025-02-28 | Stop reason: HOSPADM

## 2025-02-28 RX ADMIN — ONDANSETRON 4 MG: 2 INJECTION INTRAMUSCULAR; INTRAVENOUS at 15:59:00

## 2025-02-28 RX ADMIN — ACETAMINOPHEN 1000 MG: 10 INJECTION, SOLUTION INTRAVENOUS at 17:50:00

## 2025-02-28 RX ADMIN — SODIUM CHLORIDE: 9 INJECTION, SOLUTION INTRAVENOUS at 16:41:00

## 2025-02-28 RX ADMIN — SODIUM CHLORIDE: 9 INJECTION, SOLUTION INTRAVENOUS at 17:07:00

## 2025-02-28 RX ADMIN — CALCIUM GLUCONATE 1 G: 94 INJECTION, SOLUTION INTRAVENOUS at 16:15:00

## 2025-02-28 RX ADMIN — DEXAMETHASONE SODIUM PHOSPHATE 4 MG: 4 MG/ML VIAL (ML) INJECTION at 15:59:00

## 2025-02-28 RX ADMIN — SODIUM CHLORIDE: 9 INJECTION, SOLUTION INTRAVENOUS at 16:16:00

## 2025-02-28 RX ADMIN — PHENYLEPHRINE HCL 100 MCG: 10 MG/ML VIAL (ML) INJECTION at 16:16:00

## 2025-02-28 RX ADMIN — SODIUM CHLORIDE: 9 INJECTION, SOLUTION INTRAVENOUS at 15:55:00

## 2025-02-28 RX ADMIN — GLYCOPYRROLATE 0.2 MG: 0.2 INJECTION, SOLUTION INTRAMUSCULAR; INTRAVENOUS at 15:59:00

## 2025-02-28 RX ADMIN — EPHEDRINE SULFATE 10 MG: 50 INJECTION, SOLUTION INTRAVENOUS at 16:17:00

## 2025-02-28 RX ADMIN — ROCURONIUM BROMIDE 50 MG: 10 INJECTION, SOLUTION INTRAVENOUS at 15:59:00

## 2025-02-28 NOTE — ANESTHESIA PROCEDURE NOTES
Airway  Date/Time: 2/28/2025 4:00 PM  Urgency: elective    Airway not difficult    General Information and Staff    Patient location during procedure: OR  Anesthesiologist: Maciej Burden MD  Performed: anesthesiologist   Performed by: Maciej Burden MD  Authorized by: Maciej Burden MD      Indications and Patient Condition  Indications for airway management: anesthesia  Spontaneous Ventilation: absent  Sedation level: deep  Preoxygenated: yes  Patient position: sniffing  Mask difficulty assessment: 1 - vent by mask    Final Airway Details  Final airway type: endotracheal airway      Successful airway: ETT  Cuffed: yes   Successful intubation technique: direct laryngoscopy  Facilitating devices/methods: cricoid pressure and intubating stylet  Endotracheal tube insertion site: oral  Blade: Marita  Blade size: #3  ETT size (mm): 7.0    Cormack-Lehane Classification: grade I - full view of glottis  Placement verified by: capnometry   Cuff volume (mL): 10  Measured from: teeth  ETT to teeth (cm): 21  Number of attempts at approach: 1  Number of other approaches attempted: 0
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
## 2025-02-28 NOTE — ED INITIAL ASSESSMENT (HPI)
Patient arrives to ER for evaluation of diarrhea for a week. Patient also endorses vomiting. Patient seen in immediate care at beginning of week.     Patient weak and fatigued.
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
## 2025-03-01 ENCOUNTER — APPOINTMENT (OUTPATIENT)
Dept: GENERAL RADIOLOGY | Facility: HOSPITAL | Age: 85
DRG: 336 | End: 2025-03-01
Attending: INTERNAL MEDICINE
Payer: MEDICARE

## 2025-03-01 LAB
ALBUMIN SERPL-MCNC: 3 G/DL (ref 3.2–4.8)
ALP LIVER SERPL-CCNC: 45 U/L
ALT SERPL-CCNC: 13 U/L
ANION GAP SERPL CALC-SCNC: 10 MMOL/L (ref 0–18)
ANION GAP SERPL CALC-SCNC: 12 MMOL/L (ref 0–18)
AST SERPL-CCNC: 12 U/L (ref ?–34)
BASOPHILS # BLD AUTO: 0.03 X10(3) UL (ref 0–0.2)
BASOPHILS # BLD AUTO: 0.04 X10(3) UL (ref 0–0.2)
BASOPHILS NFR BLD AUTO: 0.2 %
BASOPHILS NFR BLD AUTO: 0.3 %
BILIRUB DIRECT SERPL-MCNC: 0.1 MG/DL (ref ?–0.3)
BILIRUB SERPL-MCNC: 0.3 MG/DL (ref 0.2–1.1)
BLOOD TYPE BARCODE: 6200
BUN BLD-MCNC: 37 MG/DL (ref 9–23)
BUN BLD-MCNC: 45 MG/DL (ref 9–23)
BUN/CREAT SERPL: 16.2 (ref 10–20)
BUN/CREAT SERPL: 19.5 (ref 10–20)
CALCIUM BLD-MCNC: 8.3 MG/DL (ref 8.7–10.4)
CALCIUM BLD-MCNC: 8.6 MG/DL (ref 8.7–10.4)
CHLORIDE SERPL-SCNC: 104 MMOL/L (ref 98–112)
CHLORIDE SERPL-SCNC: 106 MMOL/L (ref 98–112)
CO2 SERPL-SCNC: 17 MMOL/L (ref 21–32)
CO2 SERPL-SCNC: 20 MMOL/L (ref 21–32)
CREAT BLD-MCNC: 2.28 MG/DL
CREAT BLD-MCNC: 2.31 MG/DL
DEPRECATED RDW RBC AUTO: 50.4 FL (ref 35.1–46.3)
DEPRECATED RDW RBC AUTO: 51.9 FL (ref 35.1–46.3)
EGFRCR SERPLBLD CKD-EPI 2021: 20 ML/MIN/1.73M2 (ref 60–?)
EGFRCR SERPLBLD CKD-EPI 2021: 21 ML/MIN/1.73M2 (ref 60–?)
EOSINOPHIL # BLD AUTO: 0.01 X10(3) UL (ref 0–0.7)
EOSINOPHIL # BLD AUTO: 0.04 X10(3) UL (ref 0–0.7)
EOSINOPHIL NFR BLD AUTO: 0.1 %
EOSINOPHIL NFR BLD AUTO: 0.3 %
ERYTHROCYTE [DISTWIDTH] IN BLOOD BY AUTOMATED COUNT: 14.9 % (ref 11–15)
ERYTHROCYTE [DISTWIDTH] IN BLOOD BY AUTOMATED COUNT: 15.1 % (ref 11–15)
EST. AVERAGE GLUCOSE BLD GHB EST-MCNC: 171 MG/DL (ref 68–126)
GLUCOSE BLD-MCNC: 120 MG/DL (ref 70–99)
GLUCOSE BLD-MCNC: 256 MG/DL (ref 70–99)
GLUCOSE BLDC GLUCOMTR-MCNC: 116 MG/DL (ref 70–99)
GLUCOSE BLDC GLUCOMTR-MCNC: 128 MG/DL (ref 70–99)
GLUCOSE BLDC GLUCOMTR-MCNC: 150 MG/DL (ref 70–99)
GLUCOSE BLDC GLUCOMTR-MCNC: 257 MG/DL (ref 70–99)
HBA1C MFR BLD: 7.6 % (ref ?–5.7)
HCT VFR BLD AUTO: 27 %
HCT VFR BLD AUTO: 31.9 %
HGB BLD-MCNC: 8.2 G/DL
HGB BLD-MCNC: 9.8 G/DL
IMM GRANULOCYTES # BLD AUTO: 0.13 X10(3) UL (ref 0–1)
IMM GRANULOCYTES # BLD AUTO: 0.18 X10(3) UL (ref 0–1)
IMM GRANULOCYTES NFR BLD: 1 %
IMM GRANULOCYTES NFR BLD: 1.3 %
LYMPHOCYTES # BLD AUTO: 0.64 X10(3) UL (ref 1–4)
LYMPHOCYTES # BLD AUTO: 0.7 X10(3) UL (ref 1–4)
LYMPHOCYTES NFR BLD AUTO: 4.8 %
LYMPHOCYTES NFR BLD AUTO: 5.1 %
MAGNESIUM SERPL-MCNC: 2.2 MG/DL (ref 1.6–2.6)
MAGNESIUM SERPL-MCNC: 2.3 MG/DL (ref 1.6–2.6)
MCH RBC QN AUTO: 28.2 PG (ref 26–34)
MCH RBC QN AUTO: 28.7 PG (ref 26–34)
MCHC RBC AUTO-ENTMCNC: 30.4 G/DL (ref 31–37)
MCHC RBC AUTO-ENTMCNC: 30.7 G/DL (ref 31–37)
MCV RBC AUTO: 92.8 FL
MCV RBC AUTO: 93.5 FL
MONOCYTES # BLD AUTO: 0.57 X10(3) UL (ref 0.1–1)
MONOCYTES # BLD AUTO: 0.6 X10(3) UL (ref 0.1–1)
MONOCYTES NFR BLD AUTO: 4.2 %
MONOCYTES NFR BLD AUTO: 4.4 %
NEUTROPHILS # BLD AUTO: 12.06 X10 (3) UL (ref 1.5–7.7)
NEUTROPHILS # BLD AUTO: 12.06 X10(3) UL (ref 1.5–7.7)
NEUTROPHILS # BLD AUTO: 12.22 X10 (3) UL (ref 1.5–7.7)
NEUTROPHILS # BLD AUTO: 12.22 X10(3) UL (ref 1.5–7.7)
NEUTROPHILS NFR BLD AUTO: 88.6 %
NEUTROPHILS NFR BLD AUTO: 89.7 %
OSMOLALITY SERPL CALC.SUM OF ELEC: 290 MOSM/KG (ref 275–295)
OSMOLALITY SERPL CALC.SUM OF ELEC: 298 MOSM/KG (ref 275–295)
PHOSPHATE SERPL-MCNC: 6.1 MG/DL (ref 2.4–5.1)
PHOSPHATE SERPL-MCNC: 6.8 MG/DL (ref 2.4–5.1)
PLATELET # BLD AUTO: 439 10(3)UL (ref 150–450)
PLATELET # BLD AUTO: 459 10(3)UL (ref 150–450)
POTASSIUM SERPL-SCNC: 4.9 MMOL/L (ref 3.5–5.1)
POTASSIUM SERPL-SCNC: 5 MMOL/L (ref 3.5–5.1)
PROT SERPL-MCNC: 5 G/DL (ref 5.7–8.2)
RBC # BLD AUTO: 2.91 X10(6)UL
RBC # BLD AUTO: 3.41 X10(6)UL
SODIUM SERPL-SCNC: 134 MMOL/L (ref 136–145)
SODIUM SERPL-SCNC: 135 MMOL/L (ref 136–145)
UNIT VOLUME: 350 ML
WBC # BLD AUTO: 13.4 X10(3) UL (ref 4–11)
WBC # BLD AUTO: 13.8 X10(3) UL (ref 4–11)

## 2025-03-01 PROCEDURE — S0028 INJECTION, FAMOTIDINE, 20 MG: HCPCS | Performed by: SURGERY

## 2025-03-01 PROCEDURE — 85025 COMPLETE CBC W/AUTO DIFF WBC: CPT | Performed by: SURGERY

## 2025-03-01 PROCEDURE — 84100 ASSAY OF PHOSPHORUS: CPT | Performed by: SURGERY

## 2025-03-01 PROCEDURE — 82962 GLUCOSE BLOOD TEST: CPT

## 2025-03-01 PROCEDURE — 71045 X-RAY EXAM CHEST 1 VIEW: CPT | Performed by: INTERNAL MEDICINE

## 2025-03-01 PROCEDURE — 80048 BASIC METABOLIC PNL TOTAL CA: CPT | Performed by: SURGERY

## 2025-03-01 PROCEDURE — 80076 HEPATIC FUNCTION PANEL: CPT | Performed by: SURGERY

## 2025-03-01 PROCEDURE — 83735 ASSAY OF MAGNESIUM: CPT | Performed by: SURGERY

## 2025-03-01 PROCEDURE — 83036 HEMOGLOBIN GLYCOSYLATED A1C: CPT | Performed by: INTERNAL MEDICINE

## 2025-03-01 PROCEDURE — 87641 MR-STAPH DNA AMP PROBE: CPT | Performed by: INTERNAL MEDICINE

## 2025-03-01 RX ORDER — DEXTROSE MONOHYDRATE 25 G/50ML
50 INJECTION, SOLUTION INTRAVENOUS
Status: DISCONTINUED | OUTPATIENT
Start: 2025-03-01 | End: 2025-03-08

## 2025-03-01 RX ORDER — NICOTINE POLACRILEX 4 MG
15 LOZENGE BUCCAL
Status: DISCONTINUED | OUTPATIENT
Start: 2025-03-01 | End: 2025-03-08

## 2025-03-01 RX ORDER — NICOTINE POLACRILEX 4 MG
30 LOZENGE BUCCAL
Status: DISCONTINUED | OUTPATIENT
Start: 2025-03-01 | End: 2025-03-08

## 2025-03-01 NOTE — BRIEF OP NOTE
Pre-Operative Diagnosis: bowel obstruction; acute abdomen     Post-Operative Diagnosis: bowel obstruction; acute abdomen; abdominal adhesions; messentery defect; incisional ventral hernia      Procedure Performed:   EXPLORATORY LAPAROTOMY, lysis of adhesions, closure of mesentery defect, repair of ventral hernia    Surgeons and Role:     * Paul Schroeder MD - Primary    Assistant(s):  PA: Joby Patel PA     Surgical Findings: High-grade small bowel obstruction with internal hernia and ventral hernia     Specimen: Hernia sac     Estimated Blood Loss: Blood Output: 20 mL (2/28/2025  5:55 PM)      Dictation Number:  [id number]    Paul Schroeder MD  2/28/2025  6:03 PM

## 2025-03-01 NOTE — PROGRESS NOTES
[facility]  part of [facility]    Progress Note    Lizeth Mcgill Patient Status:  Inpatient    10/26/1940 MRN [id number]   Location [facility] 2W/SW Attending Sukh Leary,    Hosp Day # 1 PCP Chong Cornejo MD       Subjective:   Lizeth Mcgill is a(n) 84 year old   female without complaints    Assessment and Plan:   Lizeth Mcgill is a(n) 84 year old female    POD #1 status post exploratory laparotomy with lysis of adhesions for strangulated small bowel  Patient doing well  Pulmonary toilet  Ambulate  Patient with positive blood cultures  Will continue IV antibiotics  Repeat laboratory data later today  Follow renal function improved urine output  Continue NG tube  VTE prophylaxis  Discussed intraoperative findings with the patient      Objective:   Blood pressure 120/87, pulse 93, temperature 98.1 °F (36.7 °C), temperature source Temporal, resp. rate 17, weight 112 lb 7 oz (51 kg), SpO2 95%, not currently breastfeeding. I/O last 3 completed shifts:  In: 6206.7 [I.V.:5496.7; Blood:350; NG/GT:60; IV PIGGYBACK:300]  Out: 5845 [Urine:1105; Emesis/NG output:4700; Blood:40]     General appearance: alert, appears stated age, and cooperative  Neck: no JVD and supple, symmetrical, trachea midline  Pulmonary: No labored breathing, equal respiratory excursion  Cardiovascular: regular rate and rhythm  Abdominal: soft, non-tender; bowel sounds normal; no masses,  no organomegaly and Prevena wound dressing in place  No flatus, no BM  Extremities: extremities normal, atraumatic, no cyanosis or edema          Results:       Recent Labs   Lab 25  1138 25  1833 25  0439   RBC 3.26* 3.39* 2.91*   HGB 9.5* 9.7* 8.2*   HCT 30.4* 31.8* 27.0*   MCV 93.3 93.8 92.8   MCH 29.1 28.6 28.2   MCHC 31.3 30.5* 30.4*   RDW 14.2 14.2 14.9   NEPRELIM 19.38* 14.99* 12.06*   WBC 21.8* 16.5* 13.4*   .0* 497.0* 459.0*     Lab Results   Component Value Date    INR 1.02 2022        Recent Labs   Lab 02/28/25  1138 02/28/25  1833 02/28/25  2034 03/01/25  0439   * 129* 161* 256*   BUN 45* 46* 31* 45*   CREATSERUM 2.41* 1.89* 1.98* 2.31*   CA 10.2 8.5* 8.5* 8.3*   ALB 4.6 3.1*  --  3.0*   * 137 134* 134*   K 5.8* 5.0 5.7* 5.0   CL 96* 108 109 104   CO2 23.0 19.0* 13.0* 20.0*   ALKPHO 74 56  --  45*   AST 17 15  --  12   ALT 23 16  --  13   BILT 0.2 0.4  --  0.3   TP 7.1 5.2*  --  5.0*             XR CHEST AP/PA (1 VIEW) (CPT=71045)    Result Date: 2/28/2025  CONCLUSION:  1. Malpositioned enteric tube, which is coiled in the mid esophagus.  Repositioning advised. 2. Probable bibasilar atelectasis/scarring.  Large retrocardiac hiatal hernia.  No other focal airspace disease or significant pleural effusion.   Dictated by (CST): Mookie Castillo MD on 2/28/2025 at 4:04 PM     Finalized by (CST): Mookie Castillo MD on 2/28/2025 at 4:05 PM          XR CHEST AP PORTABLE  (CPT=71045)    Result Date: 2/28/2025  CONCLUSION:  1. Cardiomegaly.  Tortuous thoracic aorta. 2. Large retrocardiac hiatal hernia confirmed on recent abdominal CT.  Minimal bibasilar atelectasis/scarring.  No acute airspace consolidation or pleural effusions     Dictated by (CST): Ellis Agarwal MD on 2/28/2025 at 2:17 PM     Finalized by (CST): Ellis Agarwal MD on 2/28/2025 at 2:24 PM          CT ABDOMEN+PELVIS(CPT=74176)    Result Date: 2/28/2025  CONCLUSION:  1.  There is a large hiatal hernia.  Above the diaphragm the herniated segment of stomach contains a large volume of fluid.  The portion of the stomach below the diaphragm also contains a large volume of fluid and there is contiguous dilatation of the duodenum, jejunum and majority the ileum with fluid.  Recommend gastric decompression.  The bowel transitions fairly abruptly in the right lower quadrant in the distribution of the distal ileum.  The bowel has a spiral / corkscrew configuration in the region of transition, and there is also a twisting  of the mesentery, raising the possibility of a narrowed/twisted bowel loop.  This could predispose to ischemic change, and at this time there is no pneumatosis and no mesenteric/portal venous gas.  No fecalization of bowel loops proximal to the transition.  Recommend surgical evaluation.  The terminal ileum is located immediately distal to the transition site and is completely collapsed.  Large bowel is incompletely distended and otherwise contains a small volume of fecal material as well as scattered diverticulosis.  2.  Bilateral fat containing inguinal hernias.  There is also a small volume of ascites in the left inguinal canal.  Exam discussed with Dr. Higuera on 2/28/25 at 1:43 p.m.  Dictated by (CST): Lavon Raza MD on 2/28/2025 at 1:29 PM     Finalized by (CST): Lavon Raza MD on 2/28/2025 at 1:44 PM         EKG    Result Date: 2/28/2025  Normal sinus rhythm ST & T wave abnormality, consider lateral ischemia Abnormal ECG When compared with ECG of 28-APR-2024 09:35, Premature atrial complexes are no longer Present T wave inversion now evident in Lateral leads Confirmed by SIMONE HILTON ([id number]) on 2/28/2025 5:12:13 PM       Time spent in direct patient contact and decision making as well as counseling/coordination of care:    59719- 35 min      GREGORY BENITES MD [facility]  GENERAL SURGERY  [facility]  3/1/2025  9:54 AM

## 2025-03-01 NOTE — OPERATIVE REPORT
[facility]    PATIENT'S NAME: GÉNESIS RIVERA   ATTENDING PHYSICIAN: Paul Schroeder Jr., MD   OPERATING PHYSICIAN: Paul Schroeder Jr., MD   PATIENT ACCOUNT#:   [id number]    LOCATION:  [facility] PACU 4 [facility] 10  MEDICAL RECORD #:   [id number]       YOB: 1940  ADMISSION DATE:       02/28/2025      OPERATION DATE:  02/28/2025    OPERATIVE REPORT      PREOPERATIVE DIAGNOSIS:  Acute abdomen with high-grade small bowel obstruction and ventral hernia.  POSTOPERATIVE DIAGNOSIS:  Acute abdomen with high-grade small bowel obstruction, abdominal adhesions, mesenteric defect, and incisional ventral hernia.  PROCEDURE:  Exploratory laparotomy, lysis of adhesions, closure of mesenteric defect, repair of ventral hernia (fascial defect 4.5 cm).    ASSISTANT:  BARRINGTON Oneal (the role of my assistant was critical to the operation.  He acted in positioning the patient, exposure, retraction, assisting with closure).    ANESTHESIA:  General endotracheal tube.    ESTIMATED BLOOD LOSS:   20 mL.    COMPLICATIONS:  None.    INDICATIONS:  An 84-year-old white female who presents with acute abdomen, acute renal failure, and septic shock.  The patient has evidence for high-grade small-bowel obstruction on CT scan.  A lengthy discussion with the patient as well as her son as to the etiology of the above.  Discussed concerns for possible ischemic and necrotic small bowel, need for emergent exploration.  Risks of procedure including bleeding, infection, postoperative hematoma, wound infection, nonhealing wound, scar formation, need for possible bowel resection, need for possible colostomy, as well as risk with anesthesia including myocardial infarction, respiratory failure, renal failure, pulmonary embolus, DVT, and even death as well as CVA and possible need for blood transfusion, risk of bloodborne possible infection were all discussed in detail with the patient as well as her son.  Both understand,  consent, and wish to proceed with the operation.      OPERATIVE TECHNIQUE:  The patient was brought to the operating room, placed on the operating table in supine position.  General anesthetic was administered via endotracheal tube anesthesia.  The patient's abdomen was prepped and draped in routine sterile fashion.  A midline incision was made with a 10 blade.  Dissection continued down through the subcutaneous tissue using electrocautery.  The fascia was opened with the incision.  Hernia defect was identified and the fat that was incarcerated with the hernia defect was reduced.  The peritoneum was opened with the incision.  Exploration of the abdominal cavity revealed high-grade small bowel obstruction with ascites present.  The adhesive band with a high-grade complete strangulated small bowel obstruction was found in the right lower quadrant.  Using a scissor, blunt and sharp dissection, this was freed up and untwisting the complete high-grade small-bowel obstruction.  The patient had a previous small bowel resection for intussusception which was 20 years old.  There was an internal defect in the mesentery near the terminal ileum.  This also contributed to the bowel obstruction.  The small bowel was freed up completely from the ligament of Treitz to ileocecal valve.  Small bowel contents were milked back into the duodenum and stomach.  Approximately 2200 mL of contents were removed with nasogastric tube.  Nasogastric tube was in good position.  The remaining portion of the small bowel from the ligament Treitz to the ileocecal valve was viable.  The small bowel was run final time.  There were no evidence of adhesions or twisting present.  No evidence of ischemia or necrotic bowel present.  The abdominal cavity was irrigated with saline solution.  There was no active bleeding present.  The small bowel was then placed back into abdominal cavity.  The hernia defect, incisional ventral hernia was identified.  Hernia  sac was excised with electrocautery and removed.  This was excised down to region of normal fascia bilaterally.  This was a separate portion of the operation.  The hernia defect was approximately 4.5 cm in length.  At this time, the fascia was then closed in midline using 0 looped PDS running suture multiple short segments.  Hernia defect was identified and then closed in a similar fashion.  The wounds were irrigated thoroughly with saline solution.  Subcutaneous tissue was approximated with a 2-0 plain gut simple interrupted sutures.  The skin was approximated with surgical staples.  Sterile dressings were applied to the wound.  Prior to closing the abdominal cavity, the mesenteric defect was reapproximated with a 2-0 chromic running suture to prevent further bowel obstruction after the wound was closed with staples.  Prevena wound VAC was applied to the wound to prevent decrease wound complications.  The patient was transferred off the operative table to recovery room, extubated in stable condition.  All counts were correct at the end of the case.     Dictated By Paul Schroeder Jr., MD  d: 02/28/2025 18:08:40  t: 02/28/2025 18:47:48  [facility] [id number]  MM/    cc: Paul Schroeder Jr., MD

## 2025-03-01 NOTE — ANESTHESIA POSTPROCEDURE EVALUATION
Patient: Lizeth Mcgill    Procedure Summary       Date: 02/28/25 Room / Location: [facility] MAIN OR  / [facility] MAIN OR    Anesthesia Start: 1555 Anesthesia Stop:     Procedures:       EXPLORATORY LAPAROTOMY, lysis of adhesions, closure of mesentery defect, repair of ventral hernia (Abdomen)      SMALL BOWEL OBSTRUCTION RELEASE (Abdomen) Diagnosis: (bowel obstruction; acute abdomen; abdominal adhesions; messentery defect; incisional ventral hernia)    Surgeons: Paul Schroeder MD Anesthesiologist: Maciej Burden MD    Anesthesia Type: general ASA Status: 4 - Emergent            Anesthesia Type: general    Vitals Value Taken Time   /57 02/28/25 1814   Temp 97.7 °F (36.5 °C) 02/28/25 1814   Pulse 103 02/28/25 1814   Resp 16 02/28/25 1814   SpO2 95 % 02/28/25 1814   Vitals shown include unfiled device data.    EM AN Post Evaluation:   Patient Evaluated in PACU  Patient Participation: complete - patient participated  Level of Consciousness: awake  Pain Management: adequate  Airway Patency:patent  Dental exam unchanged from preop  Yes    Cardiovascular Status: acceptable  Respiratory Status: acceptable  Postoperative Hydration acceptable      Maciej Burden MD  2/28/2025 6:15 PM

## 2025-03-01 NOTE — PLAN OF CARE
Patient is awake/alert ox4; at ease,  no acute changes overnight and slept well.   Pain is controlled with tylenol as scheduled, no c/o nausea.   Abdomen is soft, hypoacive bowel sounds has provena wound vac to surgical incision.   Ngt to low interm sxn has dark green bile 200ml drainage overnight.   Safety measures in place, using call light appropriately and family updated on plan of care.   Problem: GASTROINTESTINAL - ADULT  Goal: Minimal or absence of nausea and vomiting  Description: INTERVENTIONS:  - Maintain adequate hydration with IV or PO as ordered and tolerated  - Nasogastric tube to low intermittent suction as ordered  - Evaluate effectiveness of ordered antiemetic medications  - Provide nonpharmacologic comfort measures as appropriate  - Advance diet as tolerated, if ordered  - Obtain nutritional consult as needed  - Evaluate fluid balance  3/1/2025 0744 by Estelle Campa RN  Outcome: Progressing  3/1/2025 0744 by Estelle Campa, RN  Outcome: Progressing  Goal: Maintains or returns to baseline bowel function  Description: INTERVENTIONS:  - Assess bowel function  - Maintain adequate hydration with IV or PO as ordered and tolerated  - Evaluate effectiveness of GI medications  - Encourage mobilization and activity  - Obtain nutritional consult as needed  - Establish a toileting routine/schedule  - Consider collaborating with pharmacy to review patient's medication profile  3/1/2025 0744 by Estelle Campa RN  Outcome: Not Progressing  3/1/2025 0744 by Estelle Campa RN  Outcome: Not Progressing     Problem: SKIN/TISSUE INTEGRITY - ADULT  Goal: Incision(s), wounds(s) or drain site(s) healing without S/S of infection  Description: INTERVENTIONS:  - Assess and document risk factors for pressure ulcer development  - Assess and document skin integrity  - Assess and document dressing/incision, wound bed, drain sites and surrounding tissue  - Implement wound care per orders  - Initiate isolation precautions  as appropriate  - Initiate Pressure Ulcer prevention bundle as indicated  3/1/2025 0744 by Estelle Campa, RN  Outcome: Progressing  3/1/2025 0744 by Estelle Campa, RN  Outcome: Progressing     Problem: PAIN - ADULT  Goal: Verbalizes/displays adequate comfort level or patient's stated pain goal  Description: INTERVENTIONS:  - Encourage pt to monitor pain and request assistance  - Assess pain using appropriate pain scale  - Administer analgesics based on type and severity of pain and evaluate response  - Implement non-pharmacological measures as appropriate and evaluate response  - Consider cultural and social influences on pain and pain management  - Manage/alleviate anxiety  - Utilize distraction and/or relaxation techniques  - Monitor for opioid side effects  - Notify MD/LIP if interventions unsuccessful or patient reports new pain  - Anticipate increased pain with activity and pre-medicate as appropriate  Outcome: Progressing

## 2025-03-02 LAB
ANION GAP SERPL CALC-SCNC: 9 MMOL/L (ref 0–18)
BASOPHILS # BLD AUTO: 0.04 X10(3) UL (ref 0–0.2)
BASOPHILS NFR BLD AUTO: 0.3 %
BUN BLD-MCNC: 42 MG/DL (ref 9–23)
BUN/CREAT SERPL: 25.9 (ref 10–20)
CALCIUM BLD-MCNC: 7.8 MG/DL (ref 8.7–10.4)
CHLORIDE SERPL-SCNC: 112 MMOL/L (ref 98–112)
CO2 SERPL-SCNC: 19 MMOL/L (ref 21–32)
CREAT BLD-MCNC: 1.62 MG/DL
DEPRECATED RDW RBC AUTO: 50.7 FL (ref 35.1–46.3)
EGFRCR SERPLBLD CKD-EPI 2021: 31 ML/MIN/1.73M2 (ref 60–?)
EOSINOPHIL # BLD AUTO: 0.1 X10(3) UL (ref 0–0.7)
EOSINOPHIL NFR BLD AUTO: 0.7 %
ERYTHROCYTE [DISTWIDTH] IN BLOOD BY AUTOMATED COUNT: 15 % (ref 11–15)
GLUCOSE BLD-MCNC: 92 MG/DL (ref 70–99)
GLUCOSE BLDC GLUCOMTR-MCNC: 117 MG/DL (ref 70–99)
GLUCOSE BLDC GLUCOMTR-MCNC: 120 MG/DL (ref 70–99)
GLUCOSE BLDC GLUCOMTR-MCNC: 95 MG/DL (ref 70–99)
HCT VFR BLD AUTO: 26.6 %
HGB BLD-MCNC: 8.2 G/DL
IMM GRANULOCYTES # BLD AUTO: 0.26 X10(3) UL (ref 0–1)
IMM GRANULOCYTES NFR BLD: 1.7 %
LYMPHOCYTES # BLD AUTO: 0.84 X10(3) UL (ref 1–4)
LYMPHOCYTES NFR BLD AUTO: 5.6 %
MCH RBC QN AUTO: 28.4 PG (ref 26–34)
MCHC RBC AUTO-ENTMCNC: 30.8 G/DL (ref 31–37)
MCV RBC AUTO: 92 FL
MONOCYTES # BLD AUTO: 0.66 X10(3) UL (ref 0.1–1)
MONOCYTES NFR BLD AUTO: 4.4 %
MRSA DNA SPEC QL NAA+PROBE: NEGATIVE
NEUTROPHILS # BLD AUTO: 13.01 X10 (3) UL (ref 1.5–7.7)
NEUTROPHILS # BLD AUTO: 13.01 X10(3) UL (ref 1.5–7.7)
NEUTROPHILS NFR BLD AUTO: 87.3 %
OSMOLALITY SERPL CALC.SUM OF ELEC: 300 MOSM/KG (ref 275–295)
PLATELET # BLD AUTO: 427 10(3)UL (ref 150–450)
POTASSIUM SERPL-SCNC: 4.6 MMOL/L (ref 3.5–5.1)
RBC # BLD AUTO: 2.89 X10(6)UL
SODIUM SERPL-SCNC: 140 MMOL/L (ref 136–145)
WBC # BLD AUTO: 14.9 X10(3) UL (ref 4–11)

## 2025-03-02 PROCEDURE — 82962 GLUCOSE BLOOD TEST: CPT

## 2025-03-02 PROCEDURE — 85025 COMPLETE CBC W/AUTO DIFF WBC: CPT | Performed by: INTERNAL MEDICINE

## 2025-03-02 PROCEDURE — 84100 ASSAY OF PHOSPHORUS: CPT | Performed by: SURGERY

## 2025-03-02 PROCEDURE — 97161 PT EVAL LOW COMPLEX 20 MIN: CPT

## 2025-03-02 PROCEDURE — 97530 THERAPEUTIC ACTIVITIES: CPT

## 2025-03-02 PROCEDURE — 83735 ASSAY OF MAGNESIUM: CPT | Performed by: SURGERY

## 2025-03-02 PROCEDURE — S0028 INJECTION, FAMOTIDINE, 20 MG: HCPCS | Performed by: SURGERY

## 2025-03-02 PROCEDURE — 80048 BASIC METABOLIC PNL TOTAL CA: CPT | Performed by: INTERNAL MEDICINE

## 2025-03-02 RX ORDER — DEXTROSE MONOHYDRATE AND SODIUM CHLORIDE 5; .45 G/100ML; G/100ML
INJECTION, SOLUTION INTRAVENOUS CONTINUOUS
Status: DISCONTINUED | OUTPATIENT
Start: 2025-03-02 | End: 2025-03-03

## 2025-03-02 NOTE — PLAN OF CARE
Patient is awake/alert ox4; at ease, no acute changes overnight.  Slept well and pain well controlled with scheduled tylenol. Abdominal incision with prevena wound vac, no c/o nausea and ngt to low interm suction draining green bile 300ml overnight.   Gets out of bed with one person asst and use of walker, has a steady gait.  Encouraged breathing exercises, uses IS 10x's/ hour while awake. Safety measures in place, call light within reach and bed alarm on.   Problem: GASTROINTESTINAL - ADULT  Goal: Minimal or absence of nausea and vomiting  Description: INTERVENTIONS:  - Maintain adequate hydration with IV or PO as ordered and tolerated  - Nasogastric tube to low intermittent suction as ordered  - Evaluate effectiveness of ordered antiemetic medications  - Provide nonpharmacologic comfort measures as appropriate  - Advance diet as tolerated, if ordered  - Obtain nutritional consult as needed  - Evaluate fluid balance  Outcome: Progressing  Goal: Maintains or returns to baseline bowel function  Description: INTERVENTIONS:  - Assess bowel function  - Maintain adequate hydration with IV or PO as ordered and tolerated  - Evaluate effectiveness of GI medications  - Encourage mobilization and activity  - Obtain nutritional consult as needed  - Establish a toileting routine/schedule  - Consider collaborating with pharmacy to review patient's medication profile  Outcome: Not Progressing     Problem: SKIN/TISSUE INTEGRITY - ADULT  Goal: Incision(s), wounds(s) or drain site(s) healing without S/S of infection  Description: INTERVENTIONS:  - Assess and document risk factors for pressure ulcer development  - Assess and document skin integrity  - Assess and document dressing/incision, wound bed, drain sites and surrounding tissue  - Implement wound care per orders  - Initiate isolation precautions as appropriate  - Initiate Pressure Ulcer prevention bundle as indicated  Outcome: Progressing     Problem: PAIN - ADULT  Goal:  Verbalizes/displays adequate comfort level or patient's stated pain goal  Description: INTERVENTIONS:  - Encourage pt to monitor pain and request assistance  - Assess pain using appropriate pain scale  - Administer analgesics based on type and severity of pain and evaluate response  - Implement non-pharmacological measures as appropriate and evaluate response  - Consider cultural and social influences on pain and pain management  - Manage/alleviate anxiety  - Utilize distraction and/or relaxation techniques  - Monitor for opioid side effects  - Notify MD/LIP if interventions unsuccessful or patient reports new pain  - Anticipate increased pain with activity and pre-medicate as appropriate  Outcome: Progressing

## 2025-03-02 NOTE — PROGRESS NOTES
Double RN skin check done prior to transfer off Unit. Skin check performed by this RN and rasta     Wounds are as follows:surgical abdominal incison, clean dry and intact.      Will remain available for any further questions or concerns.

## 2025-03-02 NOTE — PLAN OF CARE
Pt is alert & or x 4, following cammands, sinus r. Sub cut heparin as sched.  Prn morphine x 2 doses for pain. Accuchecks q 6 hrs. Pt is npo except ice chips.  Pt walked in hallway over 50 ft with walker assist. Up to chair   2 different times.  Bernardo catheter removed. Pt wants to go to bathroom. She requested wanting to wear depends as she is incont of urine freq.   Mrsa sent. Pt had recent abdominal surgery no bm yet. Reviewed call light usage. Much emotional support. Spoke with other family members as well.  Dr Schroeder updated of labs. She was + for gram + cocci in clusters Dr willis and Dr Schroeder updated of this.  Problem: GASTROINTESTINAL - ADULT  Goal: Minimal or absence of nausea and vomiting  Description: INTERVENTIONS:  - Maintain adequate hydration with IV or PO as ordered and tolerated  - Nasogastric tube to low intermittent suction as ordered  - Evaluate effectiveness of ordered antiemetic medications  - Provide nonpharmacologic comfort measures as appropriate  - Advance diet as tolerated, if ordered  - Obtain nutritional consult as needed  - Evaluate fluid balance  Outcome: Progressing  Goal: Maintains or returns to baseline bowel function  Description: INTERVENTIONS:  - Assess bowel function  - Maintain adequate hydration with IV or PO as ordered and tolerated  - Evaluate effectiveness of GI medications  - Encourage mobilization and activity  - Obtain nutritional consult as needed  - Establish a toileting routine/schedule  - Consider collaborating with pharmacy to review patient's medication profile  Outcome: Progressing     Problem: SKIN/TISSUE INTEGRITY - ADULT  Goal: Incision(s), wounds(s) or drain site(s) healing without S/S of infection  Description: INTERVENTIONS:  - Assess and document risk factors for pressure ulcer development  - Assess and document skin integrity  - Assess and document dressing/incision, wound bed, drain sites and surrounding tissue  - Implement wound care per orders  -  Initiate isolation precautions as appropriate  - Initiate Pressure Ulcer prevention bundle as indicated  Outcome: Progressing     Problem: PAIN - ADULT  Goal: Verbalizes/displays adequate comfort level or patient's stated pain goal  Description: INTERVENTIONS:  - Encourage pt to monitor pain and request assistance  - Assess pain using appropriate pain scale  - Administer analgesics based on type and severity of pain and evaluate response  - Implement non-pharmacological measures as appropriate and evaluate response  - Consider cultural and social influences on pain and pain management  - Manage/alleviate anxiety  - Utilize distraction and/or relaxation techniques  - Monitor for opioid side effects  - Notify MD/LIP if interventions unsuccessful or patient reports new pain  - Anticipate increased pain with activity and pre-medicate as appropriate  Outcome: Progressing

## 2025-03-02 NOTE — PHYSICAL THERAPY NOTE
PHYSICAL THERAPY EVALUATION - INPATIENT     Room Number: 215/215-A  Evaluation Date: 3/2/2025  Type of Evaluation: Initial   Physician Order: PT Eval and Treat    Presenting Problem: intestinal obstruction with ventral hernia  Co-Morbidities : ovarian ca, HTN, CHF, DM  Reason for Therapy: Mobility Dysfunction and Discharge Planning    PHYSICAL THERAPY ASSESSMENT   Patient is a 84 year old female admitted 2/28/2025 for abdominal pain, vomiting. Pt to OR on 2/28 for lysis of adhesions, closure of mesenteric defect, repair of ventral hernia. Prior to admission, patient's baseline is mod indep with ADLs and mobility.  Patient is currently functioning near baseline with bed mobility, transfers, and gait.  Patient is requiring contact guard assist as a result of the following impairments: decreased functional strength, pain, and decreased muscular endurance.  Physical Therapy will continue to follow for duration of hospitalization.    Patient will benefit from continued skilled PT Services at discharge to promote prior level of function.  Anticipate patient will return home with home health PT.    PLAN DURING HOSPITALIZATION  Nursing Mobility Recommendation : 1 Assist  PT Device Recommendation: Rolling walker  PT Treatment Plan: Bed mobility;Body mechanics;Endurance;Energy conservation;Patient education;Gait training;Strengthening;Transfer training;Balance training  Rehab Potential : Good  Frequency (Obs): 5x/week     PHYSICAL THERAPY MEDICAL/SOCIAL HISTORY       Problem List  Principal Problem:    Complete intestinal obstruction, unspecified cause (HCC)  Active Problems:    Hypotension    HERI (acute kidney injury)    Acute cystitis without hematuria      HOME SITUATION  Type of Home: Independent living facility  Home Layout: One level  Stairs to Enter : 0        Stairs to Bedroom: 0         Lives With: Alone (family assists intermittently)    Drives: Yes   Patient Regularly Uses: Rolling walker;Rollator        SUBJECTIVE  \"I live at [facility].\"    PHYSICAL THERAPY EXAMINATION   OBJECTIVE  Precautions: Abdominal protective strategies  Fall Risk: Standard fall risk    WEIGHT BEARING RESTRICTION       PAIN ASSESSMENT  Ratin  Location: abdomen  Management Techniques: Activity promotion;Body mechanics;Repositioning    COGNITION  Overall Cognitive Status:  WFL - within functional limits    RANGE OF MOTION AND STRENGTH ASSESSMENT  Upper extremity ROM and strength are within functional limits   Lower extremity ROM is within functional limits   Lower extremity strength is a little weaker than baseline    BALANCE  Static Sitting: Fair +  Dynamic Sitting: Fair  Static Standing: Fair  Dynamic Standing: Fair -    ADDITIONAL TESTS                                    NEUROLOGICAL FINDINGS                      ACTIVITY TOLERANCE  Pulse: 101  Heart Rate Source: Monitor                   O2 WALK  Oxygen Therapy  SPO2% on Room Air at Rest: 95    AM-PAC '6-Clicks' INPATIENT SHORT FORM - BASIC MOBILITY  How much difficulty does the patient currently have...  Patient Difficulty: Turning over in bed (including adjusting bedclothes, sheets and blankets)?: A Little   Patient Difficulty: Sitting down on and standing up from a chair with arms (e.g., wheelchair, bedside commode, etc.): A Little   Patient Difficulty: Moving from lying on back to sitting on the side of the bed?: A Little   How much help from another person does the patient currently need...   Help from Another: Moving to and from a bed to a chair (including a wheelchair)?: A Little   Help from Another: Need to walk in hospital room?: A Little   Help from Another: Climbing 3-5 steps with a railing?: A Little     AM-PAC Score:  Raw Score: 18   Approx Degree of Impairment: 46.58%   Standardized Score (AM-PAC Scale): 43.63   CMS Modifier (G-Code): CK    FUNCTIONAL ABILITY STATUS  Functional Mobility/Gait Assessment  Gait Assistance: Contact guard assist  Distance (ft):  140  Assistive Device: Rolling walker  Pattern:  (dec steve)    Sit to Stand: contact guard assist    Exercise/Education Provided:  Body mechanics  Gait training  Transfer training  PT Eval    Skilled Therapy Provided: Pt ok to be seen per WIL Massey. Pt received up in chair, receiving meds. Pt educ in role of PT and PT POC. Pt educ in log roll and abdominal precautions. Pt endorsed understanding, stated she does the roll at baseline. Log roll still demo'd for pt. Pt agreeable to amb, amb with RW and CGA, no LOB. Pt demo'd safety with transfers and gait. HR inc to 116 with amb, O2 96%. No change in pain with ambulation. Pt wanted to return to sit up in chair, bed mobility not observed this session, but pt did receive verbal and visual educ.    The patient's Approx Degree of Impairment: 46.58% has been calculated based on documentation in the [facility] '6 clicks' Inpatient Basic Mobility Short Form.  Research supports that patients with this level of impairment may benefit from home with HHPT. Pt has 4 children who live semi-locally and who can assist intermittently as needed.  Final disposition will be made by interdisciplinary medical team.    Patient End of Session: Up in chair;Needs met;Call light within reach;RN aware of session/findings;All patient questions and concerns addressed;Hospital anti-slip socks    CURRENT GOALS  Goals to be met by: 3/9/25  Patient Goal Patient's self-stated goal is: walk   Goal #1 Patient is able to demonstrate supine - sit EOB @ level: modified independent     Goal #1   Current Status    Goal #2 Patient is able to demonstrate transfers Sit to/from Stand at assistance level: modified independent with walker - rolling     Goal #2  Current Status    Goal #3 Patient is able to ambulate 300 feet with assist device: walker - rolling at assistance level: modified independent   Goal #3   Current Status    Goal #4    Goal #4   Current Status    Goal #5 Patient to demonstrate independence with  home activity/exercise instructions provided to patient in preparation for discharge.   Goal #5   Current Status    Goal #6    Goal #6  Current Status      Patient Evaluation Complexity Level:  History Moderate - 1 or 2 personal factors and/or co-morbidities   Examination of body systems Moderate - addressing a total of 3 or more elements   Clinical Presentation Low- Stable   Clinical Decision Making  Low Complexity     Gait Training: 10 minutes  Therapeutic Activity:  13 minutes

## 2025-03-02 NOTE — PLAN OF CARE
Pt is alert & or x 4, periods of forgetfulness. Sinus r. 2 liters n/c.  Enc incentive spirometry. Up with pt this am.Ng to lis. Bile colored 300 output until 3 pm. Up to bathroom with 1 and walker assist.  She lives in independent living [facility]. Prn morphine 2 mg this am. Sched iv tylenol. Report given to lea smith room 463. Up to bathroom to urinate. D5.45 at 75 rate.  Abdominal incison clean and dry. Provena small wound vac in place. No output.  Family at bedside when trasnfashleighing, son america simon will update the rest of the family.  Problem: GASTROINTESTINAL - ADULT  Goal: Minimal or absence of nausea and vomiting  Description: INTERVENTIONS:  - Maintain adequate hydration with IV or PO as ordered and tolerated  - Nasogastric tube to low intermittent suction as ordered  - Evaluate effectiveness of ordered antiemetic medications  - Provide nonpharmacologic comfort measures as appropriate  - Advance diet as tolerated, if ordered  - Obtain nutritional consult as needed  - Evaluate fluid balance  Outcome: Progressing  Goal: Maintains or returns to baseline bowel function  Description: INTERVENTIONS:  - Assess bowel function  - Maintain adequate hydration with IV or PO as ordered and tolerated  - Evaluate effectiveness of GI medications  - Encourage mobilization and activity  - Obtain nutritional consult as needed  - Establish a toileting routine/schedule  - Consider collaborating with pharmacy to review patient's medication profile  Outcome: Progressing     Problem: SKIN/TISSUE INTEGRITY - ADULT  Goal: Incision(s), wounds(s) or drain site(s) healing without S/S of infection  Description: INTERVENTIONS:  - Assess and document risk factors for pressure ulcer development  - Assess and document skin integrity  - Assess and document dressing/incision, wound bed, drain sites and surrounding tissue  - Implement wound care per orders  - Initiate isolation precautions as appropriate  - Initiate Pressure Ulcer  prevention bundle as indicated  Outcome: Progressing     Problem: PAIN - ADULT  Goal: Verbalizes/displays adequate comfort level or patient's stated pain goal  Description: INTERVENTIONS:  - Encourage pt to monitor pain and request assistance  - Assess pain using appropriate pain scale  - Administer analgesics based on type and severity of pain and evaluate response  - Implement non-pharmacological measures as appropriate and evaluate response  - Consider cultural and social influences on pain and pain management  - Manage/alleviate anxiety  - Utilize distraction and/or relaxation techniques  - Monitor for opioid side effects  - Notify MD/LIP if interventions unsuccessful or patient reports new pain  - Anticipate increased pain with activity and pre-medicate as appropriate  Outcome: Progressing

## 2025-03-02 NOTE — PROGRESS NOTES
[facility]  part of [facility]    Progress Note    Lizeth Mcgill Patient Status:  Inpatient    10/26/1940 MRN [id number]   Location [facility] 2W/SW Attending Sukh Leary,    Hosp Day # 2 PCP Chong Cornejo MD       Subjective:   Lizeth Mcgill is a(n) 84 year old   female without complaints    Assessment and Plan:   Lizeth Mcgill is a(n) 84 year old female    POD #2 status post exploratory laparotomy with lysis of adhesions for strangulated small bowel  Patient doing well  Pulmonary toilet  Ambulate  Patient with positive blood cultures  Will continue IV antibiotics  Renal function improving  Follow WBC  Continue NG tube  VTE prophylaxis  Transfer to fourth floo      Objective:   Blood pressure 123/59, pulse 95, temperature 98.2 °F (36.8 °C), temperature source Temporal, resp. rate 16, weight 114 lb 10.2 oz (52 kg), SpO2 93%, not currently breastfeeding. I/O last 3 completed shifts:  In: 3699.7 [P.O.:30; I.V.:3104.7; NG/GT:90; IV PIGGYBACK:475]  Out: 3400 [Urine:2400; Emesis/NG output:1000]     General appearance: alert, appears stated age, and cooperative  Neck: no JVD and supple, symmetrical, trachea midline  Pulmonary: No labored breathing, equal respiratory excursion  Cardiovascular: regular rate and rhythm  Abdominal: soft, non-tender; bowel sounds normal; no masses,  no organomegaly and Prevena wound dressing in place  No flatus, no BM  Extremities: extremities normal, atraumatic, no cyanosis or edema          Results:       Recent Labs   Lab 25  0439 25  1504 25  0554   RBC 2.91* 3.41* 2.89*   HGB 8.2* 9.8* 8.2*   HCT 27.0* 31.9* 26.6*   MCV 92.8 93.5 92.0   MCH 28.2 28.7 28.4   MCHC 30.4* 30.7* 30.8*   RDW 14.9 15.1* 15.0   NEPRELIM 12.06* 12.22* 13.01*   WBC 13.4* 13.8* 14.9*   .0* 439.0 427.0     Lab Results   Component Value Date    INR 1.02 2022       Recent Labs   Lab 25  1138 25  1833 254 25  0439  03/01/25  1504 03/02/25  0554   * 129*   < > 256* 120* 92   BUN 45* 46*   < > 45* 37* 42*   CREATSERUM 2.41* 1.89*   < > 2.31* 2.28* 1.62*   CA 10.2 8.5*   < > 8.3* 8.6* 7.8*   ALB 4.6 3.1*  --  3.0*  --   --    * 137   < > 134* 135* 140   K 5.8* 5.0   < > 5.0 4.9 4.6   CL 96* 108   < > 104 106 112   CO2 23.0 19.0*   < > 20.0* 17.0* 19.0*   ALKPHO 74 56  --  45*  --   --    AST 17 15  --  12  --   --    ALT 23 16  --  13  --   --    BILT 0.2 0.4  --  0.3  --   --    TP 7.1 5.2*  --  5.0*  --   --     < > = values in this interval not displayed.             XR CHEST AP PORTABLE  (CPT=71045)    Result Date: 3/1/2025  CONCLUSION:   Nasogastric tube seen within distal stomach and below the diaphragm.  Leftward deviation of the nasogastric tube consistent with known hiatal hernia.  Left lower lobe atelectasis is unchanged.  Remainder of the findings otherwise unchanged.    Dictated by (CST): Philip Mauricio MD on 3/01/2025 at 1:02 PM     Finalized by (CST): Philip Mauricio MD on 3/01/2025 at 1:03 PM          XR CHEST AP/PA (1 VIEW) (CPT=71045)    Result Date: 2/28/2025  CONCLUSION:  1. Malpositioned enteric tube, which is coiled in the mid esophagus.  Repositioning advised. 2. Probable bibasilar atelectasis/scarring.  Large retrocardiac hiatal hernia.  No other focal airspace disease or significant pleural effusion.   Dictated by (CST): Mookie Castillo MD on 2/28/2025 at 4:04 PM     Finalized by (CST): Mookie Castillo MD on 2/28/2025 at 4:05 PM          XR CHEST AP PORTABLE  (CPT=71045)    Result Date: 2/28/2025  CONCLUSION:  1. Cardiomegaly.  Tortuous thoracic aorta. 2. Large retrocardiac hiatal hernia confirmed on recent abdominal CT.  Minimal bibasilar atelectasis/scarring.  No acute airspace consolidation or pleural effusions     Dictated by (CST): Ellis Agarwal MD on 2/28/2025 at 2:17 PM     Finalized by (CST): Ellis Agarwal MD on 2/28/2025 at 2:24 PM          CT  ABDOMEN+PELVIS(CPT=74176)    Result Date: 2/28/2025  CONCLUSION:  1.  There is a large hiatal hernia.  Above the diaphragm the herniated segment of stomach contains a large volume of fluid.  The portion of the stomach below the diaphragm also contains a large volume of fluid and there is contiguous dilatation of the duodenum, jejunum and majority the ileum with fluid.  Recommend gastric decompression.  The bowel transitions fairly abruptly in the right lower quadrant in the distribution of the distal ileum.  The bowel has a spiral / corkscrew configuration in the region of transition, and there is also a twisting of the mesentery, raising the possibility of a narrowed/twisted bowel loop.  This could predispose to ischemic change, and at this time there is no pneumatosis and no mesenteric/portal venous gas.  No fecalization of bowel loops proximal to the transition.  Recommend surgical evaluation.  The terminal ileum is located immediately distal to the transition site and is completely collapsed.  Large bowel is incompletely distended and otherwise contains a small volume of fecal material as well as scattered diverticulosis.  2.  Bilateral fat containing inguinal hernias.  There is also a small volume of ascites in the left inguinal canal.  Exam discussed with Dr. Higuera on 2/28/25 at 1:43 p.m.  Dictated by (CST): Lavon Raza MD on 2/28/2025 at 1:29 PM     Finalized by (CST): Lavon Raza MD on 2/28/2025 at 1:44 PM         EKG    Result Date: 2/28/2025  Normal sinus rhythm ST & T wave abnormality, consider lateral ischemia Abnormal ECG When compared with ECG of 28-APR-2024 09:35, Premature atrial complexes are no longer Present T wave inversion now evident in Lateral leads Confirmed by SIMONE HILTON ([id number]) on 2/28/2025 5:12:13 PM       Time spent in direct patient contact and decision making as well as counseling/coordination of care:    50483- 35 min      GREGORY BENITES JR., MD. [facility]  GENERAL SURGERY  [facility]    3/2/2025  11:06 AM

## 2025-03-02 NOTE — PROGRESS NOTES
[facility] Hospitalist Progress Note     CC: Hospital Follow up    PCP: Chong Cornejo MD       Assessment/Plan:   This is a 84-year-old female with history of ovarian cancer, type 2 diabetes, hypertension, hyperlipidemia, who presents to the hospital for evaluation of severe nausea, episodes of emesis, diarrhea earlier in the week but now resolved.      High grade Small bowel obstruction   Ventral hernia   -POD # 2 lysis of adhesions, repair of ventral hernia   -NG tube--still with a lot of output overnight, recording 500cc  -not requiring vasopressors--dc order  -arellano removed 3/1, she is voiding  -encourage chair and incentive spirometer  -ambulate, PT/OT when able   -arellano intact for I/O   -diet advancement per surgery when appropriate      HERI  Hyperkalemia--resolved  -Cr improving, but monitor closely  -ok to change fluids to maintenance but can use PRN bolus if we need to    Hyperglycemia  -not diabetic, stable  -suspect stress response     Positive blood culture  -on zosyn   -suspect could be contaminant and not pathologic culture   -will follow results of identification      Ovarian cancer  -follows Dr. Tian Lawson oncology  -completed neoadjuvant chemotherapy with carboplatin and paclitaxel for six cycles followed by IKER-BSO and adjuvant chemotherapy with carbo/taxol.   -continues with follow up      Hypertension  -hold BP meds given hypotension      Hypothryoidism  -ok to hold synthroid for now      DVT prophylaxis: heparin sub q after surgery   Code status: full code      Sukh Leary DO  [facility] Hospitalist        Subjective:     Awake, alert. Sitting in chair  No major pain   Vitals stable    OBJECTIVE:    Blood pressure 123/59, pulse 95, temperature 98.2 °F (36.8 °C), temperature source Temporal, resp. rate 16, weight 114 lb 10.2 oz (52 kg), SpO2 93%, not currently breastfeeding.    Temp:  [97.8 °F (36.6 °C)-98.2 °F (36.8 °C)] 98.2 °F (36.8 °C)  Pulse:  [] 95  Resp:   [13-22] 16  BP: ()/(45-80) 123/59  SpO2:  [88 %-98 %] 93 %      Intake/Output:    Intake/Output Summary (Last 24 hours) at 3/2/2025 0930  Last data filed at 3/2/2025 0524  Gross per 24 hour   Intake 2468 ml   Output 2000 ml   Net 468 ml       Last 3 Weights   03/02/25 0500 114 lb 10.2 oz (52 kg)   03/01/25 0500 112 lb 7 oz (51 kg)   02/28/25 1030 111 lb (50.3 kg)   05/10/24 1239 103 lb (46.7 kg)   05/07/24 1107 103 lb (46.7 kg)       /59 (BP Location: Left arm)   Pulse 95   Temp 98.2 °F (36.8 °C) (Temporal)   Resp 16   Wt 114 lb 10.2 oz (52 kg)   SpO2 93%   BMI 24.81 kg/m²   General: Awake, appears comfortable  HEENT: NG in place  Lungs: clear to ausculation bilaterally  Heart: Regular rate and rhythm  Abdomen: soft  Extremities: No edema    Data Review:       Labs:     Recent Labs   Lab 03/01/25 0439 03/01/25  1504 03/02/25  0554   RBC 2.91* 3.41* 2.89*   HGB 8.2* 9.8* 8.2*   HCT 27.0* 31.9* 26.6*   MCV 92.8 93.5 92.0   MCH 28.2 28.7 28.4   MCHC 30.4* 30.7* 30.8*   RDW 14.9 15.1* 15.0   NEPRELIM 12.06* 12.22* 13.01*   WBC 13.4* 13.8* 14.9*   .0* 439.0 427.0         Recent Labs   Lab 03/01/25  0439 03/01/25  1504 03/02/25  0554   * 120* 92   BUN 45* 37* 42*   CREATSERUM 2.31* 2.28* 1.62*   EGFRCR 20* 21* 31*   CA 8.3* 8.6* 7.8*   * 135* 140   K 5.0 4.9 4.6    106 112   CO2 20.0* 17.0* 19.0*       Recent Labs   Lab 02/28/25  1138 02/28/25  1833 03/01/25  0439   ALT 23 16 13   AST 17 15 12   ALB 4.6 3.1* 3.0*         Imaging:  XR CHEST AP PORTABLE  (CPT=71045)    Result Date: 3/1/2025  CONCLUSION:   Nasogastric tube seen within distal stomach and below the diaphragm.  Leftward deviation of the nasogastric tube consistent with known hiatal hernia.  Left lower lobe atelectasis is unchanged.  Remainder of the findings otherwise unchanged.    Dictated by (CST): Philip Mauricio MD on 3/01/2025 at 1:02 PM     Finalized by (CST): Philip Mauricio MD on 3/01/2025 at 1:03 PM          XR  CHEST AP/PA (1 VIEW) (CPT=71045)    Result Date: 2/28/2025  CONCLUSION:  1. Malpositioned enteric tube, which is coiled in the mid esophagus.  Repositioning advised. 2. Probable bibasilar atelectasis/scarring.  Large retrocardiac hiatal hernia.  No other focal airspace disease or significant pleural effusion.   Dictated by (CST): Mookie Castillo MD on 2/28/2025 at 4:04 PM     Finalized by (CST): Mookie Castillo MD on 2/28/2025 at 4:05 PM          XR CHEST AP PORTABLE  (CPT=71045)    Result Date: 2/28/2025  CONCLUSION:  1. Cardiomegaly.  Tortuous thoracic aorta. 2. Large retrocardiac hiatal hernia confirmed on recent abdominal CT.  Minimal bibasilar atelectasis/scarring.  No acute airspace consolidation or pleural effusions     Dictated by (CST): Ellis Agarwal MD on 2/28/2025 at 2:17 PM     Finalized by (CST): Ellis Agarwal MD on 2/28/2025 at 2:24 PM          CT ABDOMEN+PELVIS(CPT=74176)    Result Date: 2/28/2025  CONCLUSION:  1.  There is a large hiatal hernia.  Above the diaphragm the herniated segment of stomach contains a large volume of fluid.  The portion of the stomach below the diaphragm also contains a large volume of fluid and there is contiguous dilatation of the duodenum, jejunum and majority the ileum with fluid.  Recommend gastric decompression.  The bowel transitions fairly abruptly in the right lower quadrant in the distribution of the distal ileum.  The bowel has a spiral / corkscrew configuration in the region of transition, and there is also a twisting of the mesentery, raising the possibility of a narrowed/twisted bowel loop.  This could predispose to ischemic change, and at this time there is no pneumatosis and no mesenteric/portal venous gas.  No fecalization of bowel loops proximal to the transition.  Recommend surgical evaluation.  The terminal ileum is located immediately distal to the transition site and is completely collapsed.  Large bowel is incompletely distended and  otherwise contains a small volume of fecal material as well as scattered diverticulosis.  2.  Bilateral fat containing inguinal hernias.  There is also a small volume of ascites in the left inguinal canal.  Exam discussed with Dr. Higuera on 2/28/25 at 1:43 p.m.  Dictated by (CST): Lavon Raza MD on 2/28/2025 at 1:29 PM     Finalized by (CST): Lavon Raza MD on 2/28/2025 at 1:44 PM             Meds:      piperacillin-tazobactam  3.375 g Intravenous q12h    insulin regular human  1-5 Units Subcutaneous 4 times per day    ondansetron  4 mg Intravenous Once    acetaminophen  15 mg/kg Intravenous Q6H    famotidine  20 mg Intravenous Daily    heparin  5,000 Units Subcutaneous Q12H      dextrose 5%-sodium chloride 0.45%         glucose **OR** glucose **OR** glucose-vitamin C **OR** dextrose **OR** glucose **OR** glucose **OR** glucose-vitamin C    ondansetron    morphINE **OR** morphINE

## 2025-03-03 ENCOUNTER — APPOINTMENT (OUTPATIENT)
Dept: GENERAL RADIOLOGY | Facility: HOSPITAL | Age: 85
DRG: 336 | End: 2025-03-03
Attending: SURGERY
Payer: MEDICARE

## 2025-03-03 LAB
ANION GAP SERPL CALC-SCNC: 9 MMOL/L (ref 0–18)
BASOPHILS # BLD: 0 X10(3) UL (ref 0–0.2)
BASOPHILS NFR BLD: 0 %
BUN BLD-MCNC: 28 MG/DL (ref 9–23)
BUN/CREAT SERPL: 30.1 (ref 10–20)
CALCIUM BLD-MCNC: 7.8 MG/DL (ref 8.7–10.4)
CHLORIDE SERPL-SCNC: 110 MMOL/L (ref 98–112)
CO2 SERPL-SCNC: 22 MMOL/L (ref 21–32)
CREAT BLD-MCNC: 0.93 MG/DL
DEPRECATED RDW RBC AUTO: 50.5 FL (ref 35.1–46.3)
EGFRCR SERPLBLD CKD-EPI 2021: 61 ML/MIN/1.73M2 (ref 60–?)
EOSINOPHIL # BLD: 0 X10(3) UL (ref 0–0.7)
EOSINOPHIL NFR BLD: 0 %
ERYTHROCYTE [DISTWIDTH] IN BLOOD BY AUTOMATED COUNT: 14.8 % (ref 11–15)
GLUCOSE BLD-MCNC: 143 MG/DL (ref 70–99)
GLUCOSE BLDC GLUCOMTR-MCNC: 120 MG/DL (ref 70–99)
GLUCOSE BLDC GLUCOMTR-MCNC: 125 MG/DL (ref 70–99)
GLUCOSE BLDC GLUCOMTR-MCNC: 151 MG/DL (ref 70–99)
GLUCOSE BLDC GLUCOMTR-MCNC: 153 MG/DL (ref 70–99)
HCT VFR BLD AUTO: 27 %
HGB BLD-MCNC: 8.2 G/DL
LYMPHOCYTES NFR BLD: 0.75 X10(3) UL (ref 1–4)
LYMPHOCYTES NFR BLD: 5 %
MAGNESIUM SERPL-MCNC: 2 MG/DL (ref 1.6–2.6)
MAGNESIUM SERPL-MCNC: 2.1 MG/DL (ref 1.6–2.6)
MCH RBC QN AUTO: 28.3 PG (ref 26–34)
MCHC RBC AUTO-ENTMCNC: 30.4 G/DL (ref 31–37)
MCV RBC AUTO: 93.1 FL
MONOCYTES # BLD: 1.04 X10(3) UL (ref 0.1–1)
MONOCYTES NFR BLD: 7 %
MORPHOLOGY: NORMAL
NEUTROPHILS # BLD AUTO: 12.25 X10 (3) UL (ref 1.5–7.7)
NEUTROPHILS NFR BLD: 87 %
NEUTS BAND NFR BLD: 1 %
NEUTS HYPERSEG # BLD: 13.11 X10(3) UL (ref 1.5–7.7)
OSMOLALITY SERPL CALC.SUM OF ELEC: 300 MOSM/KG (ref 275–295)
PHOSPHATE SERPL-MCNC: 2.2 MG/DL (ref 2.4–5.1)
PHOSPHATE SERPL-MCNC: 4 MG/DL (ref 2.4–5.1)
PLATELET # BLD AUTO: 432 10(3)UL (ref 150–450)
PLATELET MORPHOLOGY: NORMAL
POTASSIUM SERPL-SCNC: 3.6 MMOL/L (ref 3.5–5.1)
RBC # BLD AUTO: 2.9 X10(6)UL
SODIUM SERPL-SCNC: 141 MMOL/L (ref 136–145)
TOTAL CELLS COUNTED BLD: 100
WBC # BLD AUTO: 14.9 X10(3) UL (ref 4–11)

## 2025-03-03 PROCEDURE — 85007 BL SMEAR W/DIFF WBC COUNT: CPT | Performed by: INTERNAL MEDICINE

## 2025-03-03 PROCEDURE — 85025 COMPLETE CBC W/AUTO DIFF WBC: CPT | Performed by: INTERNAL MEDICINE

## 2025-03-03 PROCEDURE — S0028 INJECTION, FAMOTIDINE, 20 MG: HCPCS

## 2025-03-03 PROCEDURE — 82962 GLUCOSE BLOOD TEST: CPT

## 2025-03-03 PROCEDURE — 71046 X-RAY EXAM CHEST 2 VIEWS: CPT | Performed by: SURGERY

## 2025-03-03 PROCEDURE — 80048 BASIC METABOLIC PNL TOTAL CA: CPT | Performed by: INTERNAL MEDICINE

## 2025-03-03 PROCEDURE — 94667 MNPJ CHEST WALL 1ST: CPT

## 2025-03-03 PROCEDURE — 84100 ASSAY OF PHOSPHORUS: CPT | Performed by: SURGERY

## 2025-03-03 PROCEDURE — 85027 COMPLETE CBC AUTOMATED: CPT | Performed by: INTERNAL MEDICINE

## 2025-03-03 PROCEDURE — 83735 ASSAY OF MAGNESIUM: CPT | Performed by: SURGERY

## 2025-03-03 RX ORDER — ACETAMINOPHEN 10 MG/ML
INJECTION, SOLUTION INTRAVENOUS
Status: COMPLETED
Start: 2025-03-03 | End: 2025-03-03

## 2025-03-03 RX ORDER — FAMOTIDINE 10 MG/ML
INJECTION, SOLUTION INTRAVENOUS
Status: COMPLETED
Start: 2025-03-03 | End: 2025-03-03

## 2025-03-03 RX ORDER — FUROSEMIDE 10 MG/ML
20 INJECTION INTRAMUSCULAR; INTRAVENOUS ONCE
Status: COMPLETED | OUTPATIENT
Start: 2025-03-04 | End: 2025-03-04

## 2025-03-03 RX ORDER — DEXTROSE MONOHYDRATE, SODIUM CHLORIDE, AND POTASSIUM CHLORIDE 50; 1.49; 4.5 G/1000ML; G/1000ML; G/1000ML
INJECTION, SOLUTION INTRAVENOUS CONTINUOUS
Status: DISCONTINUED | OUTPATIENT
Start: 2025-03-03 | End: 2025-03-08

## 2025-03-03 RX ORDER — POTASSIUM CHLORIDE 14.9 MG/ML
20 INJECTION INTRAVENOUS ONCE
Status: COMPLETED | OUTPATIENT
Start: 2025-03-03 | End: 2025-03-03

## 2025-03-03 RX ORDER — ACETAMINOPHEN 10 MG/ML
1000 INJECTION, SOLUTION INTRAVENOUS EVERY 6 HOURS
Status: DISPENSED | OUTPATIENT
Start: 2025-03-03 | End: 2025-03-05

## 2025-03-03 RX ORDER — FAMOTIDINE 10 MG/ML
20 INJECTION, SOLUTION INTRAVENOUS DAILY
Status: DISCONTINUED | OUTPATIENT
Start: 2025-03-04 | End: 2025-03-06

## 2025-03-03 RX ORDER — FUROSEMIDE 10 MG/ML
20 INJECTION INTRAMUSCULAR; INTRAVENOUS ONCE
Status: COMPLETED | OUTPATIENT
Start: 2025-03-03 | End: 2025-03-03

## 2025-03-03 NOTE — CM/SW NOTE
Anticipated therapy need: Home with Home Healthcare    Type of Home: Independent living facility  Home Layout: One level  Stairs to Enter : 0        Stairs to Bedroom: 0         Lives With: Alone (family assists intermittently)    Drives: Yes   Patient Regularly Uses: Rolling walker;Rollator       Pt lives at [facility] ref sent, f2f done.    1210  CM f/u on dc plan for C    Pt sleeping soundly. List left at bedside with CM contact #. RN updated    Plan  [facility] pending choice    / to remain available for support and/or discharge planning.     Mayuri Alemndarez RN    Ext [phone]    Plan  Pending    / to remain available for support and/or discharge planning.     Mayuri Almendarez RN    Ext [phone]

## 2025-03-03 NOTE — PROGRESS NOTES
[facility]  part of [facility]    Progress Note    Lizeth Mcgill Patient Status:  Inpatient    10/26/1940 MRN [id number]   Location [facility] 2W/SW Attending Sukh Leary, DO   Hosp Day # 3 PCP Chong Cornejo MD       Subjective:   Lizeth Mcgill is a(n) 84 year old   female without complaints    Assessment and Plan:   Lizeth Mcgill is a(n) 84 year old female    POD #3 status post exploratory laparotomy with lysis of adhesions for strangulated small bowel  Patient doing well  Pulmonary toilet  Ambulate  Patient with positive blood cultures possible contamination but will continue to treat  Renal function normalized.  Will increase Pepcid to every 12 and Tylenol to 1 g Q6 with improved renal function.  Patient with persistent leukocytosis we will check chest x-ray  Continue IV antibiotics  Patient had flatus today but no BM would continue NG tube    VTE prophylaxis        Objective:   Blood pressure 133/60, pulse 98, temperature 99.2 °F (37.3 °C), temperature source Oral, resp. rate 18, weight 114 lb 10.2 oz (52 kg), SpO2 94%, not currently breastfeeding. I/O last 3 completed shifts:  In: 2462 [P.O.:120; I.V.:2112; NG/GT:30; IV PIGGYBACK:200]  Out: 2850 [Urine:1550; Emesis/NG output:1300]     General appearance: alert, appears stated age, and cooperative  Neck: no JVD and supple, symmetrical, trachea midline  Pulmonary: No labored breathing, equal respiratory excursion  Cardiovascular: regular rate and rhythm  Abdominal: soft, non-tender; bowel sounds normal; no masses,  no organomegaly and Prevena wound dressing in place  No flatus, no BM  Extremities: extremities normal, atraumatic, no cyanosis or edema          Results:       Recent Labs   Lab 25  1504 25  0554 25  0619   RBC 3.41* 2.89* 2.90*   HGB 9.8* 8.2* 8.2*   HCT 31.9* 26.6* 27.0*   MCV 93.5 92.0 93.1   MCH 28.7 28.4 28.3   MCHC 30.7* 30.8* 30.4*   RDW 15.1* 15.0 14.8   NEPRELIM 12.22* 13.01* 12.25*    WBC 13.8* 14.9* 14.9*   .0 427.0 432.0     Lab Results   Component Value Date    INR 1.02 08/18/2022       Recent Labs   Lab 02/28/25  1138 02/28/25  1833 02/28/25  2034 03/01/25  0439 03/01/25  1504 03/02/25  0554 03/03/25  0619   * 129*   < > 256* 120* 92 143*   BUN 45* 46*   < > 45* 37* 42* 28*   CREATSERUM 2.41* 1.89*   < > 2.31* 2.28* 1.62* 0.93   CA 10.2 8.5*   < > 8.3* 8.6* 7.8* 7.8*   ALB 4.6 3.1*  --  3.0*  --   --   --    * 137   < > 134* 135* 140 141   K 5.8* 5.0   < > 5.0 4.9 4.6 3.6   CL 96* 108   < > 104 106 112 110   CO2 23.0 19.0*   < > 20.0* 17.0* 19.0* 22.0   ALKPHO 74 56  --  45*  --   --   --    AST 17 15  --  12  --   --   --    ALT 23 16  --  13  --   --   --    BILT 0.2 0.4  --  0.3  --   --   --    TP 7.1 5.2*  --  5.0*  --   --   --     < > = values in this interval not displayed.             XR CHEST AP PORTABLE  (CPT=71045)    Result Date: 3/1/2025  CONCLUSION:   Nasogastric tube seen within distal stomach and below the diaphragm.  Leftward deviation of the nasogastric tube consistent with known hiatal hernia.  Left lower lobe atelectasis is unchanged.  Remainder of the findings otherwise unchanged.    Dictated by (CST): Philip Mauricio MD on 3/01/2025 at 1:02 PM     Finalized by (CST): Philip Mauricio MD on 3/01/2025 at 1:03 PM                 Time spent in direct patient contact and decision making as well as counseling/coordination of care:    48156- 35 min      GREGORY BENITES JR., MD. [facility]    3/3/2025  10:08 AM

## 2025-03-03 NOTE — PLAN OF CARE
Problem: GASTROINTESTINAL - ADULT  Goal: Minimal or absence of nausea and vomiting  Description: INTERVENTIONS:  - Maintain adequate hydration with IV or PO as ordered and tolerated  - Nasogastric tube to low intermittent suction as ordered  - Evaluate effectiveness of ordered antiemetic medications  - Provide nonpharmacologic comfort measures as appropriate  - Advance diet as tolerated, if ordered  - Obtain nutritional consult as needed  - Evaluate fluid balance  Outcome: Progressing  Goal: Maintains or returns to baseline bowel function  Description: INTERVENTIONS:  - Assess bowel function  - Maintain adequate hydration with IV or PO as ordered and tolerated  - Evaluate effectiveness of GI medications  - Encourage mobilization and activity  - Obtain nutritional consult as needed  - Establish a toileting routine/schedule  - Consider collaborating with pharmacy to review patient's medication profile  Outcome: Progressing     Problem: SKIN/TISSUE INTEGRITY - ADULT  Goal: Incision(s), wounds(s) or drain site(s) healing without S/S of infection  Description: INTERVENTIONS:  - Assess and document risk factors for pressure ulcer development  - Assess and document skin integrity  - Assess and document dressing/incision, wound bed, drain sites and surrounding tissue  - Implement wound care per orders  - Initiate isolation precautions as appropriate  - Initiate Pressure Ulcer prevention bundle as indicated  Outcome: Progressing     Problem: PAIN - ADULT  Goal: Verbalizes/displays adequate comfort level or patient's stated pain goal  Description: INTERVENTIONS:  - Encourage pt to monitor pain and request assistance  - Assess pain using appropriate pain scale  - Administer analgesics based on type and severity of pain and evaluate response  - Implement non-pharmacological measures as appropriate and evaluate response  - Consider cultural and social influences on pain and pain management  - Manage/alleviate anxiety  -  Utilize distraction and/or relaxation techniques  - Monitor for opioid side effects  - Notify MD/LIP if interventions unsuccessful or patient reports new pain  - Anticipate increased pain with activity and pre-medicate as appropriate  Outcome: Progressing     A/ox3, forgetful at times, on RA, NPO, NGT in place to low-intermittent suction, up x1 SBA with rolling walker, voiding freely, no BM overnight, scheduled IV Acetaminophen given for pain management, IVF and IV antibiotics continued, prevena wound vac in place, dressing is clean/dry/intact, denies nausea, vital signs stable, no acute changes overnight. Call light within reach, safety measures in place, frequent rounding done, plan of care continued.

## 2025-03-03 NOTE — PROGRESS NOTES
[facility] Hospitalist Progress Note     CC: Hospital Follow up    PCP: Chong Cornejo MD       Assessment/Plan:   This is a 84-year-old female with history of ovarian cancer, type 2 diabetes, hypertension, hyperlipidemia, who presents to the hospital for evaluation of severe nausea, episodes of emesis, diarrhea earlier in the week but now resolved.      High grade Small bowel obstruction   Ventral hernia  w strangulation  - sp emergent Exploratory laparotomy, lysis of adhesions, closure of mesenteric defect, repair of ventral hernia (fascial defect 4.5 cm) 2/28/25  -NG tube--cont today per surg w no BM cont w tube to LIS  -arellano removed 3/1, she is voiding  -encourage chair and incentive spirometer  -ambulate, PT/OT when able   -arellano intact for I/O   -diet advancement per surgery when appropriate      HERI- improved   Hyperkalemia--resolved  -Cr improving, but monitor closely  -ok to change fluids to maintenance but can use PRN bolus if we need to  - Cr    Hyperglycemia  -not diabetic, stable  -suspect stress response     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 Tian Lawson oncology  -completed neoadjuvant chemotherapy with carboplatin and paclitaxel for six cycles followed by IKER-BSO and adjuvant chemotherapy with carbo/taxol.   -continues with follow up      Hypertension  -hold BP meds given hypotension      Hypothryoidism  -ok to hold synthroid for now if NG prolonged will clamp NG 30 min and do med (or give IV)     DVT prophylaxis: heparin sub q after surgery   Code status: full code      Zabrina Malcolm, Hospitalist        Subjective:   Hosp course reviewed Cr improved denies focal sx     OBJECTIVE:    Blood pressure 133/60, pulse 98, temperature 99.2 °F (37.3 °C), temperature source Oral, resp. rate 18,  weight 114 lb 10.2 oz (52 kg), SpO2 94%, not currently breastfeeding.    Temp:  [97.8 °F (36.6 °C)-99.8 °F (37.7 °C)] 99.2 °F (37.3 °C)  Pulse:  [] 98  Resp:  [18-22] 18  BP: (121-144)/(50-60) 133/60  SpO2:  [93 %-95 %] 94 %      Intake/Output:    Intake/Output Summary (Last 24 hours) at 3/3/2025 1032  Last data filed at 3/3/2025 0625  Gross per 24 hour   Intake 872 ml   Output 1550 ml   Net -678 ml       Last 3 Weights   03/02/25 0500 114 lb 10.2 oz (52 kg)   03/01/25 0500 112 lb 7 oz (51 kg)   02/28/25 1030 111 lb (50.3 kg)   05/10/24 1239 103 lb (46.7 kg)   05/07/24 1107 103 lb (46.7 kg)       /60 (BP Location: Right arm)   Pulse 98   Temp 99.2 °F (37.3 °C) (Oral)   Resp 18   Wt 114 lb 10.2 oz (52 kg)   SpO2 94%   BMI 24.81 kg/m²   General: Awake, appears comfortable  HEENT: NG in place  Lungs: clear to ausculation bilaterally  Heart: Regular rate and rhythm  Abdomen: soft  Extremities: No edema    Data Review:       Labs:     Recent Labs   Lab 03/01/25  1504 03/02/25  0554 03/03/25  0619   RBC 3.41* 2.89* 2.90*   HGB 9.8* 8.2* 8.2*   HCT 31.9* 26.6* 27.0*   MCV 93.5 92.0 93.1   MCH 28.7 28.4 28.3   MCHC 30.7* 30.8* 30.4*   RDW 15.1* 15.0 14.8   NEPRELIM 12.22* 13.01* 12.25*   WBC 13.8* 14.9* 14.9*   .0 427.0 432.0         Recent Labs   Lab 03/01/25  1504 03/02/25  0554 03/03/25  0619   * 92 143*   BUN 37* 42* 28*   CREATSERUM 2.28* 1.62* 0.93   EGFRCR 21* 31* 61   CA 8.6* 7.8* 7.8*   * 140 141   K 4.9 4.6 3.6    112 110   CO2 17.0* 19.0* 22.0       Recent Labs   Lab 02/28/25  1138 02/28/25  1833 03/01/25  0439   ALT 23 16 13   AST 17 15 12   ALB 4.6 3.1* 3.0*         Imaging:  XR CHEST AP PORTABLE  (CPT=71045)    Result Date: 3/1/2025  CONCLUSION:   Nasogastric tube seen within distal stomach and below the diaphragm.  Leftward deviation of the nasogastric tube consistent with known hiatal hernia.  Left lower lobe atelectasis is unchanged.  Remainder of the findings  otherwise unchanged.    Dictated by (CST): Philip Mauricio MD on 3/01/2025 at 1:02 PM     Finalized by (CST): Philip Mauricio MD on 3/01/2025 at 1:03 PM          XR CHEST AP/PA (1 VIEW) (CPT=71045)    Result Date: 2/28/2025  CONCLUSION:  1. Malpositioned enteric tube, which is coiled in the mid esophagus.  Repositioning advised. 2. Probable bibasilar atelectasis/scarring.  Large retrocardiac hiatal hernia.  No other focal airspace disease or significant pleural effusion.   Dictated by (CST): Mookie Castillo MD on 2/28/2025 at 4:04 PM     Finalized by (CST): Mookie Castillo MD on 2/28/2025 at 4:05 PM          XR CHEST AP PORTABLE  (CPT=71045)    Result Date: 2/28/2025  CONCLUSION:  1. Cardiomegaly.  Tortuous thoracic aorta. 2. Large retrocardiac hiatal hernia confirmed on recent abdominal CT.  Minimal bibasilar atelectasis/scarring.  No acute airspace consolidation or pleural effusions     Dictated by (CST): Ellis Agarwal MD on 2/28/2025 at 2:17 PM     Finalized by (CST): Ellis Agarwal MD on 2/28/2025 at 2:24 PM          CT ABDOMEN+PELVIS(CPT=74176)    Result Date: 2/28/2025  CONCLUSION:  1.  There is a large hiatal hernia.  Above the diaphragm the herniated segment of stomach contains a large volume of fluid.  The portion of the stomach below the diaphragm also contains a large volume of fluid and there is contiguous dilatation of the duodenum, jejunum and majority the ileum with fluid.  Recommend gastric decompression.  The bowel transitions fairly abruptly in the right lower quadrant in the distribution of the distal ileum.  The bowel has a spiral / corkscrew configuration in the region of transition, and there is also a twisting of the mesentery, raising the possibility of a narrowed/twisted bowel loop.  This could predispose to ischemic change, and at this time there is no pneumatosis and no mesenteric/portal venous gas.  No fecalization of bowel loops proximal to the transition.  Recommend surgical  evaluation.  The terminal ileum is located immediately distal to the transition site and is completely collapsed.  Large bowel is incompletely distended and otherwise contains a small volume of fecal material as well as scattered diverticulosis.  2.  Bilateral fat containing inguinal hernias.  There is also a small volume of ascites in the left inguinal canal.  Exam discussed with Dr. Higuera on 2/28/25 at 1:43 p.m.  Dictated by (CST): Lavon Raza MD on 2/28/2025 at 1:29 PM     Finalized by (CST): Lavon Raza MD on 2/28/2025 at 1:44 PM             Meds:      furosemide  20 mg Intravenous Once    acetaminophen  1,000 mg Intravenous Q6H    famotidine  20 mg Intravenous BID    piperacillin-tazobactam  3.375 g Intravenous q12h    insulin regular human  1-5 Units Subcutaneous 4 times per day    ondansetron  4 mg Intravenous Once    heparin  5,000 Units Subcutaneous Q12H      potassium chloride in dextrose 5%-sodium chloride 0.45%         glucose **OR** glucose **OR** glucose-vitamin C **OR** dextrose **OR** glucose **OR** glucose **OR** glucose-vitamin C    ondansetron    morphINE **OR** morphINE

## 2025-03-03 NOTE — PROGRESS NOTES
Famotidine adjusted per P&T Protocol  Estimated Creatinine Clearance: 37 mL/min (based on SCr of 0.93 mg/dL).  Famotidine (Pepcid) Oral  Normal Dose Calculated CrCl 30-60 mL/min Calculated CrCl < 30 mL/min)   10 mg BID or 20 mg PO daily  10 mg PO daily 10 mg PO every other day   20 mg BID or 40 mg PO daily 20 mg PO daily 10 mg PO every other day   40 mg BID or 80 mg PO daily 40 mg PO daily 10 mg PO daily       Famotidine (Pepcid) INTRAVENOUS    Normal Dose Calculated CrCl < 50 mL/min   10 mg BID or 20 mg IV daily 10 mg IV daily   20 mg BID or 40 mg IV daily 20 mg IV daily   40 mg BID or 80 mg IV daily 40 mg IV daily

## 2025-03-03 NOTE — PLAN OF CARE
Lizeth Maher was [place] [facility] [place]. NGT to LIS. Scheduled Tylenol given to manage pain. Surgical dressing C/D/I.     Problem: GASTROINTESTINAL - ADULT  Goal: Minimal or absence of nausea and vomiting  Description: INTERVENTIONS:  - Maintain adequate hydration with IV or PO as ordered and tolerated  - Nasogastric tube to low intermittent suction as ordered  - Evaluate effectiveness of ordered antiemetic medications  - Provide nonpharmacologic comfort measures as appropriate  - Advance diet as tolerated, if ordered  - Obtain nutritional consult as needed  - Evaluate fluid balance  Outcome: Progressing  Goal: Maintains or returns to baseline bowel function  Description: INTERVENTIONS:  - Assess bowel function  - Maintain adequate hydration with IV or PO as ordered and tolerated  - Evaluate effectiveness of GI medications  - Encourage mobilization and activity  - Obtain nutritional consult as needed  - Establish a toileting routine/schedule  - Consider collaborating with pharmacy to review patient's medication profile  Outcome: Progressing     Problem: SKIN/TISSUE INTEGRITY - ADULT  Goal: Incision(s), wounds(s) or drain site(s) healing without S/S of infection  Description: INTERVENTIONS:  - Assess and document risk factors for pressure ulcer development  - Assess and document skin integrity  - Assess and document dressing/incision, wound bed, drain sites and surrounding tissue  - Implement wound care per orders  - Initiate isolation precautions as appropriate  - Initiate Pressure Ulcer prevention bundle as indicated  Outcome: Progressing     Problem: PAIN - ADULT  Goal: Verbalizes/displays adequate comfort level or patient's stated pain goal  Description: INTERVENTIONS:  - Encourage pt to monitor pain and request assistance  - Assess pain using appropriate pain scale  - Administer analgesics based on type and severity of pain and evaluate response  - Implement non-pharmacological measures as  appropriate and evaluate response  - Consider cultural and social influences on pain and pain management  - Manage/alleviate anxiety  - Utilize distraction and/or relaxation techniques  - Monitor for opioid side effects  - Notify MD/LIP if interventions unsuccessful or patient reports new pain  - Anticipate increased pain with activity and pre-medicate as appropriate  Outcome: Progressing

## 2025-03-04 LAB
ALBUMIN SERPL-MCNC: 3.4 G/DL (ref 3.2–4.8)
ALP LIVER SERPL-CCNC: 59 U/L
ALT SERPL-CCNC: 8 U/L
ANION GAP SERPL CALC-SCNC: 10 MMOL/L (ref 0–18)
AST SERPL-CCNC: <8 U/L (ref ?–34)
BASOPHILS # BLD: 0 X10(3) UL (ref 0–0.2)
BASOPHILS NFR BLD: 0 %
BILIRUB DIRECT SERPL-MCNC: <0.1 MG/DL (ref ?–0.3)
BILIRUB SERPL-MCNC: 0.2 MG/DL (ref 0.2–1.1)
BUN BLD-MCNC: 20 MG/DL (ref 9–23)
BUN/CREAT SERPL: 23.3 (ref 10–20)
CALCIUM BLD-MCNC: 8 MG/DL (ref 8.7–10.4)
CHLORIDE SERPL-SCNC: 110 MMOL/L (ref 98–112)
CO2 SERPL-SCNC: 23 MMOL/L (ref 21–32)
CREAT BLD-MCNC: 0.86 MG/DL
DEPRECATED RDW RBC AUTO: 48.8 FL (ref 35.1–46.3)
EGFRCR SERPLBLD CKD-EPI 2021: 67 ML/MIN/1.73M2 (ref 60–?)
EOSINOPHIL # BLD: 0.29 X10(3) UL (ref 0–0.7)
EOSINOPHIL NFR BLD: 2 %
ERYTHROCYTE [DISTWIDTH] IN BLOOD BY AUTOMATED COUNT: 15 % (ref 11–15)
GLUCOSE BLD-MCNC: 104 MG/DL (ref 70–99)
GLUCOSE BLDC GLUCOMTR-MCNC: 108 MG/DL (ref 70–99)
GLUCOSE BLDC GLUCOMTR-MCNC: 110 MG/DL (ref 70–99)
GLUCOSE BLDC GLUCOMTR-MCNC: 126 MG/DL (ref 70–99)
GLUCOSE BLDC GLUCOMTR-MCNC: 127 MG/DL (ref 70–99)
GLUCOSE BLDC GLUCOMTR-MCNC: 136 MG/DL (ref 70–99)
HCT VFR BLD AUTO: 27.3 %
HGB BLD-MCNC: 8.5 G/DL
LYMPHOCYTES NFR BLD: 1.02 X10(3) UL (ref 1–4)
LYMPHOCYTES NFR BLD: 7 %
MAGNESIUM SERPL-MCNC: 2 MG/DL (ref 1.6–2.6)
MCH RBC QN AUTO: 28.1 PG (ref 26–34)
MCHC RBC AUTO-ENTMCNC: 31.1 G/DL (ref 31–37)
MCV RBC AUTO: 90.4 FL
METAMYELOCYTES # BLD: 0.15 X10(3) UL
METAMYELOCYTES NFR BLD: 1 %
MONOCYTES # BLD: 1.02 X10(3) UL (ref 0.1–1)
MONOCYTES NFR BLD: 7 %
MORPHOLOGY: NORMAL
NEUTROPHILS # BLD AUTO: 10.68 X10 (3) UL (ref 1.5–7.7)
NEUTROPHILS NFR BLD: 82 %
NEUTS BAND NFR BLD: 1 %
NEUTS HYPERSEG # BLD: 12.12 X10(3) UL (ref 1.5–7.7)
OSMOLALITY SERPL CALC.SUM OF ELEC: 299 MOSM/KG (ref 275–295)
PHOSPHATE SERPL-MCNC: 2.5 MG/DL (ref 2.4–5.1)
PHOSPHATE SERPL-MCNC: 2.5 MG/DL (ref 2.4–5.1)
PLATELET # BLD AUTO: 434 10(3)UL (ref 150–450)
PLATELET MORPHOLOGY: NORMAL
POTASSIUM SERPL-SCNC: 4 MMOL/L (ref 3.5–5.1)
POTASSIUM SERPL-SCNC: 4 MMOL/L (ref 3.5–5.1)
PROT SERPL-MCNC: 5.4 G/DL (ref 5.7–8.2)
RBC # BLD AUTO: 3.02 X10(6)UL
SODIUM SERPL-SCNC: 143 MMOL/L (ref 136–145)
TOTAL CELLS COUNTED BLD: 100
WBC # BLD AUTO: 14.6 X10(3) UL (ref 4–11)

## 2025-03-04 PROCEDURE — 85007 BL SMEAR W/DIFF WBC COUNT: CPT | Performed by: SURGERY

## 2025-03-04 PROCEDURE — 94668 MNPJ CHEST WALL SBSQ: CPT

## 2025-03-04 PROCEDURE — 83735 ASSAY OF MAGNESIUM: CPT | Performed by: SURGERY

## 2025-03-04 PROCEDURE — 85027 COMPLETE CBC AUTOMATED: CPT | Performed by: SURGERY

## 2025-03-04 PROCEDURE — 84132 ASSAY OF SERUM POTASSIUM: CPT | Performed by: SURGERY

## 2025-03-04 PROCEDURE — 80048 BASIC METABOLIC PNL TOTAL CA: CPT | Performed by: SURGERY

## 2025-03-04 PROCEDURE — 82962 GLUCOSE BLOOD TEST: CPT

## 2025-03-04 PROCEDURE — 84100 ASSAY OF PHOSPHORUS: CPT | Performed by: SURGERY

## 2025-03-04 PROCEDURE — 85025 COMPLETE CBC W/AUTO DIFF WBC: CPT | Performed by: SURGERY

## 2025-03-04 PROCEDURE — 97116 GAIT TRAINING THERAPY: CPT

## 2025-03-04 PROCEDURE — 80076 HEPATIC FUNCTION PANEL: CPT | Performed by: SURGERY

## 2025-03-04 PROCEDURE — S0028 INJECTION, FAMOTIDINE, 20 MG: HCPCS | Performed by: SURGERY

## 2025-03-04 PROCEDURE — 97530 THERAPEUTIC ACTIVITIES: CPT

## 2025-03-04 RX ORDER — LEVOTHYROXINE SODIUM 50 UG/1
50 TABLET ORAL EVERY MORNING
Status: DISCONTINUED | OUTPATIENT
Start: 2025-03-04 | End: 2025-03-08

## 2025-03-04 RX ORDER — ATORVASTATIN CALCIUM 20 MG/1
20 TABLET, FILM COATED ORAL NIGHTLY
Status: DISCONTINUED | OUTPATIENT
Start: 2025-03-04 | End: 2025-03-08

## 2025-03-04 RX ORDER — DULOXETIN HYDROCHLORIDE 60 MG/1
60 CAPSULE, DELAYED RELEASE ORAL NIGHTLY
Status: DISCONTINUED | OUTPATIENT
Start: 2025-03-04 | End: 2025-03-08

## 2025-03-04 RX ORDER — ACETAMINOPHEN 325 MG/1
650 TABLET ORAL EVERY 4 HOURS PRN
Status: DISCONTINUED | OUTPATIENT
Start: 2025-03-04 | End: 2025-03-08

## 2025-03-04 NOTE — CM/SW NOTE
CM was notified from PT that From a PT standpoint pt is supervision/stand by assist and would be fine to go to back to [facility] with HHPT but she seems pretty confident she's going to rehab     CM informed RN/PT that pt will not get ins auth for ALLISON at current functional status, pt will need to pay oop for ALLISON.    [facility] list was delivered to bedside yesterday    CM sent ref to [facility] to inquire if pt has a life care contract that would allow for ALLISON at [facility]?    Per liaison no life care contract, Liaison will attempt auth however, pt would need to pay oop if unable to secure auth from [employer].     1515  Ins auth approved for ALLISON, [facility] sent    Plan  [facility] ALLISON, auth is approved    / to remain available for support and/or discharge planning.     Mayuri Almendarez, RN    Ext [phone]

## 2025-03-04 NOTE — PROGRESS NOTES
[facility] Hospitalist Progress Note     CC: Hospital Follow up    PCP: Chong Cornejo MD       Assessment/Plan:   This is a 84-year-old female with history of ovarian cancer, type 2 diabetes, hypertension, hyperlipidemia, who presents to the hospital for evaluation of severe nausea, episodes of emesis, diarrhea earlier in the week but now resolved.      High grade Small bowel obstruction   Ventral hernia  w strangulation  - sp emergent Exploratory laparotomy, lysis of adhesions, closure of mesenteric defect, repair of ventral hernia (fascial defect 4.5 cm) 2/28/25  -NG tube--hopefully out today await surg recs   -arellano removed 3/1, she is voiding  -encourage chair and incentive spirometer  -ambulate, PT/OT when able   -arellano intact for I/O   -diet advancement per surgery when appropriate      HERI- improved   Hyperkalemia--resolved  -Cr improving, but monitor closely  -ok to change fluids to maintenance but can use PRN bolus if we need to  - Cr    Hyperglycemia  -not diabetic, stable  -suspect stress response     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 Tian Lawson oncology  -completed neoadjuvant chemotherapy with carboplatin and paclitaxel for six cycles followed by IKER-BSO and adjuvant chemotherapy with carbo/taxol.   -continues with follow up      Hypertension  -hold BP meds given hypotension      Hypothryoidism  -ok to hold synthroid for now if NG prolonged will clamp NG 30 min and do med (or give IV) fu surg recs today     DVT prophylaxis: heparin sub q after surgery   Code status: full code      Zabrina Malcolm, Hospitalist        Subjective:   NG still in +BMs    OBJECTIVE:    Blood pressure 138/72, pulse 107, temperature 98.7 °F (37.1 °C), temperature source Oral, resp. rate 18, weight 114 lb 10.2 oz (52  kg), SpO2 98%, not currently breastfeeding.    Temp:  [98.2 °F (36.8 °C)-98.7 °F (37.1 °C)] 98.7 °F (37.1 °C)  Pulse:  [] 107  Resp:  [18] 18  BP: (123-138)/(55-85) 138/72  SpO2:  [94 %-98 %] 98 %      Intake/Output:    Intake/Output Summary (Last 24 hours) at 3/4/2025 1405  Last data filed at 3/4/2025 0432  Gross per 24 hour   Intake 760 ml   Output 1750 ml   Net -990 ml       Last 3 Weights   03/02/25 0500 114 lb 10.2 oz (52 kg)   03/01/25 0500 112 lb 7 oz (51 kg)   02/28/25 1030 111 lb (50.3 kg)   05/10/24 1239 103 lb (46.7 kg)   05/07/24 1107 103 lb (46.7 kg)       /72 (BP Location: Right arm)   Pulse 107   Temp 98.7 °F (37.1 °C) (Oral)   Resp 18   Wt 114 lb 10.2 oz (52 kg)   SpO2 98%   BMI 24.81 kg/m²   General: Awake, appears comfortable  HEENT: NG in place  Lungs: clear to ausculation bilaterally  Heart: Regular rate and rhythm  Abdomen: soft +BS  Extremities: No edema    Data Review:       Labs:     Recent Labs   Lab 03/02/25  0554 03/03/25  0619 03/04/25  0519   RBC 2.89* 2.90* 3.02*   HGB 8.2* 8.2* 8.5*   HCT 26.6* 27.0* 27.3*   MCV 92.0 93.1 90.4   MCH 28.4 28.3 28.1   MCHC 30.8* 30.4* 31.1   RDW 15.0 14.8 15.0   NEPRELIM 13.01* 12.25* 10.68*   WBC 14.9* 14.9* 14.6*   .0 432.0 434.0         Recent Labs   Lab 03/02/25  0554 03/03/25  0619 03/04/25  0519   GLU 92 143* 104*   BUN 42* 28* 20   CREATSERUM 1.62* 0.93 0.86   EGFRCR 31* 61 67   CA 7.8* 7.8* 8.0*    141 143   K 4.6 3.6 4.0  4.0    110 110   CO2 19.0* 22.0 23.0       Recent Labs   Lab 02/28/25  1138 02/28/25  1833 03/01/25  0439 03/04/25  0519   ALT 23 16 13 8*   AST 17 15 12 <8   ALB 4.6 3.1* 3.0* 3.4         Imaging:  XR CHEST PA + LAT CHEST (CPT=71046)    Result Date: 3/3/2025  CONCLUSION:   Persistent left lower lobe opacities and probable small pleural effusion.  No new abnormality.    Dictated by (CST): Kiel Hicks MD on 3/03/2025 at 2:42 PM     Finalized by (CST): Kiel Hicks MD on 3/03/2025 at 2:52  PM             Meds:      sodium phosphate  15 mmol Intravenous Once    acetaminophen  1,000 mg Intravenous Q6H    famotidine  20 mg Intravenous Daily    piperacillin-tazobactam  3.375 g Intravenous Q8H    insulin regular human  1-5 Units Subcutaneous 4 times per day    ondansetron  4 mg Intravenous Once    heparin  5,000 Units Subcutaneous Q12H      potassium chloride in dextrose 5%-sodium chloride 0.45% 42 mL/hr at 03/03/25 0442       glucose **OR** glucose **OR** glucose-vitamin C **OR** dextrose **OR** glucose **OR** glucose **OR** glucose-vitamin C    ondansetron    morphINE **OR** morphINE

## 2025-03-04 NOTE — PHYSICAL THERAPY NOTE
PHYSICAL THERAPY TREATMENT NOTE - INPATIENT     Room Number: 463/463-A       Presenting Problem: intestinal obstruction with ventral hernia  Co-Morbidities : ovarian ca, HTN, CHF, DM    Problem List  Principal Problem:    Complete intestinal obstruction, unspecified cause (HCC)  Active Problems:    Hypotension    HERI (acute kidney injury)    Acute cystitis without hematuria      PHYSICAL THERAPY ASSESSMENT   Patient demonstrates steady progress this session, goals  remain in progress.      Patient is requiring stand-by assist and contact guard assist as a result of the following impairments: decreased functional strength, decreased endurance/aerobic capacity, impaired dynamic standingt balance, and decreased muscular endurance.     Patient continues to function below baseline with bed mobility, transfers, gait, maintaining seated position, standing prolonged periods, and performing household tasks.  Next session anticipate patient to progress bed mobility, transfers, gait, maintaining seated position, standing prolonged periods, and performing household tasks.  Physical Therapy will continue to follow patient for duration of hospitalization.    Patient continues to benefit from continued skilled PT services: at discharge to promote prior level of function and safety with additional support and return home with home health PT.    PLAN DURING HOSPITALIZATION  Nursing Mobility Recommendation : 1 Assist  PT Device Recommendation: Rolling walker  PT Treatment Plan: Bed mobility, Body mechanics, Endurance, Energy conservation, Patient education, Gait training, Strengthening, Transfer training, Balance training  Frequency (Obs): 5x/week     SUBJECTIVE  \"I plan to go to rehab. I'll need help\"     OBJECTIVE  Precautions: Abdominal protective strategies, NG suction    PAIN ASSESSMENT   Ratin  Location: abdomen  Management Techniques: Activity promotion, Body mechanics, Repositioning    BALANCE  Static Sitting:  Good  Dynamic Sitting: Fair +  Static Standing: Fair  Dynamic Standing: Fair -    ACTIVITY TOLERANCE  Pulse: (!) 125  Heart Rate Source: Monitor         O2 WALK  Oxygen Therapy  SPO2% Ambulation on Room Air: 97    AM-PAC '6-Clicks' INPATIENT SHORT FORM - BASIC MOBILITY  How much difficulty does the patient currently have...  Patient Difficulty: Turning over in bed (including adjusting bedclothes, sheets and blankets)?: A Little   Patient Difficulty: Sitting down on and standing up from a chair with arms (e.g., wheelchair, bedside commode, etc.): A Little   Patient Difficulty: Moving from lying on back to sitting on the side of the bed?: A Little   How much help from another person does the patient currently need...   Help from Another: Moving to and from a bed to a chair (including a wheelchair)?: A Little   Help from Another: Need to walk in hospital room?: A Little   Help from Another: Climbing 3-5 steps with a railing?: A Little     AM-PAC Score:  Raw Score: 18   Approx Degree of Impairment: 46.58%   Standardized Score (AM-PAC Scale): 43.63   CMS Modifier (G-Code): CK    FUNCTIONAL ABILITY STATUS  Functional Mobility/Gait Assessment  Gait Assistance: Contact guard assist (SBA)  Distance (ft): 350ft  Assistive Device: Rolling walker  Pattern: Shuffle (decreased steve speed, narrow base of support, decreased step length. Cues for safe management of RW with transition from julia<>carpet. Initial SBA requiring CG as pt fatigued.)  Rolling: stand-by assist. Reviewed log roll technique  Side lying> sit: CG  Sit to Stand: stand-by assist. Cues for pacing upon standing to allow body time to acclimate to change in position.     Skilled Therapy Provided: Patient received supine in bed. RN approved activity. Therapist educated pt on POC and physiological benefits of mobilization post operatively. Reviewed abdominal protective strategy along with positioning techniques/body mechanics to limit pain and optimize healing.  Patient agreeable to participate. Denies pain. Expressed she is concerned to return to IL[facility] and stating she plans to go to rehab. This was communicated to case management team. Patient is making progress toward IP PT goals with continued recommendation for HHPT. *SpO2 on room air: 97% HR: 125bpm with activity.     The patient's Approx Degree of Impairment: 46.58% has been calculated based on documentation in the [facility] '6 clicks' Inpatient Daily Activity Short Form.  Research supports that patients with this level of impairment may benefit from home with home PT.  Final disposition will be made by interdisciplinary medical team.      Patient End of Session: Up in chair, Needs met, Call light within reach, RN aware of session/findings, With  staff    CURRENT GOALS   Goals to be met by: 3/9/25  Patient Goal Patient's self-stated goal is: walk   Goal #1 Patient is able to demonstrate supine - sit EOB @ level: modified independent      Goal #1   Current Status  progressing   Goal #2 Patient is able to demonstrate transfers Sit to/from Stand at assistance level: modified independent with walker - rolling      Goal #2  Current Status  progressing   Goal #3 Patient is able to ambulate 300 feet with assist device: walker - rolling at assistance level: modified independent   Goal #3   Current Status  progressing   Goal #4 Patient to demonstrate independence with home activity/exercise instructions provided to patient in preparation for discharge.   Goal #4   Current Status  progressing     Gait Training: 15 minutes  Therapeutic Activity: 9 minutes

## 2025-03-04 NOTE — PLAN OF CARE
Problem: GASTROINTESTINAL - ADULT  Goal: Minimal or absence of nausea and vomiting  Description: INTERVENTIONS:  - Maintain adequate hydration with IV or PO as ordered and tolerated  - Nasogastric tube to low intermittent suction as ordered  - Evaluate effectiveness of ordered antiemetic medications  - Provide nonpharmacologic comfort measures as appropriate  - Advance diet as tolerated, if ordered  - Obtain nutritional consult as needed  - Evaluate fluid balance  Outcome: Progressing  Goal: Maintains or returns to baseline bowel function  Description: INTERVENTIONS:  - Assess bowel function  - Maintain adequate hydration with IV or PO as ordered and tolerated  - Evaluate effectiveness of GI medications  - Encourage mobilization and activity  - Obtain nutritional consult as needed  - Establish a toileting routine/schedule  - Consider collaborating with pharmacy to review patient's medication profile  Outcome: Progressing     Problem: SKIN/TISSUE INTEGRITY - ADULT  Goal: Incision(s), wounds(s) or drain site(s) healing without S/S of infection  Description: INTERVENTIONS:  - Assess and document risk factors for pressure ulcer development  - Assess and document skin integrity  - Assess and document dressing/incision, wound bed, drain sites and surrounding tissue  - Implement wound care per orders  - Initiate isolation precautions as appropriate  - Initiate Pressure Ulcer prevention bundle as indicated  Outcome: Progressing     Problem: PAIN - ADULT  Goal: Verbalizes/displays adequate comfort level or patient's stated pain goal  Description: INTERVENTIONS:  - Encourage pt to monitor pain and request assistance  - Assess pain using appropriate pain scale  - Administer analgesics based on type and severity of pain and evaluate response  - Implement non-pharmacological measures as appropriate and evaluate response  - Consider cultural and social influences on pain and pain management  - Manage/alleviate anxiety  -  Utilize distraction and/or relaxation techniques  - Monitor for opioid side effects  - Notify MD/LIP if interventions unsuccessful or patient reports new pain  - Anticipate increased pain with activity and pre-medicate as appropriate  Outcome: Progressing     Problem: Patient Centered Care  Goal: Patient preferences are identified and integrated in the patient's plan of care  Description: Interventions:  - What would you like us to know as we care for you? I live at [facility]  - Provide timely, complete, and accurate information to patient/family  - Incorporate patient and family knowledge, values, beliefs, and cultural backgrounds into the planning and delivery of care  - Encourage patient/family to participate in care and decision-making at the level they choose  - Honor patient and family perspectives and choices  Outcome: Progressing     Problem: Patient/Family Goals  Goal: Patient/Family Long Term Goal  Description: Patient's Long Term Goal: Discharge home    Interventions:  - Regain baseline bowel function  - Tolerate adequate oral food and liquid intake  - Manage pain after surgery  - See additional Care Plan goals for specific interventions  Outcome: Progressing  Goal: Patient/Family Short Term Goal  Description: Patient's Short Term Goal: Regain bowel function after surgery    Interventions:   - Bowel rest with slow diet advancement as tolerated.  - Intermittent ambulation out of bed  - NG tube for bowel decompression  - See additional Care Plan goals for specific interventions  Outcome: Progressing     Problem: RISK FOR INFECTION - ADULT  Goal: Absence of fever/infection during anticipated neutropenic period  Description: INTERVENTIONS  - Monitor WBC  - Administer growth factors as ordered  - Implement neutropenic guidelines  Outcome: Progressing     Problem: SAFETY ADULT - FALL  Goal: Free from fall injury  Description: INTERVENTIONS:  - Assess pt frequently for physical needs  -  Identify cognitive and physical deficits and behaviors that affect risk of falls.  - [place] fall precautions as indicated by assessment.  - Educate pt/family on patient safety including physical limitations  - Instruct pt to call for assistance with activity based on assessment  - Modify environment to reduce risk of injury  - Provide assistive devices as appropriate  - Consider OT/PT consult to assist with strengthening/mobility  - Encourage toileting schedule  Outcome: Progressing     Problem: DISCHARGE PLANNING  Goal: Discharge to home or other facility with appropriate resources  Description: INTERVENTIONS:  - Identify barriers to discharge w/pt and caregiver  - Include patient/family/discharge partner in discharge planning  - Arrange for needed discharge resources and transportation as appropriate  - Identify discharge learning needs (meds, wound care, etc)  - Arrange for interpreters to assist at discharge as needed  - Consider post-discharge preferences of patient/family/discharge partner  - Complete POLST form as appropriate  - Assess patient's ability to be responsible for managing their own health  - Refer to Case Management Department for coordinating discharge planning if the patient needs post-hospital services based on physician/LIP order or complex needs related to functional status, cognitive ability or social support system  Outcome: Progressing     A/ox4, can be forgetful at times, on RA, up x1 SBA with rolling walker, strict NPO, NGT in place to low-intermittent suction with green-bile output, patient passing gas, no BM overnight, received scheduled IV Ofirmev for pain management, denies nausea, IVF and IV antibiotics continued, prevena wound vac in place, vital signs stable, no acute changes overnight. Call light within reach, safety measures in place, frequent rounding done, plan of care continued.

## 2025-03-04 NOTE — PLAN OF CARE
Problem: ALTERED NUTRIENT INTAKE - ADULT  Goal: Nutrient intake appropriate for improving, restoring or maintaining nutritional needs  Description: INTERVENTIONS:  - Assess nutritional status and recommend course of action  - Monitor oral intake, labs, and treatment plans  - Recommend appropriate diets, oral nutritional supplements, and vitamin/mineral supplements  - Recommend, monitor, and adjust tube feedings and TPN/PPN based on assessed needs  - Provide specific nutrition education as appropriate  Outcome: Progressing      Ok to refill? Medication set up for you.

## 2025-03-04 NOTE — PLAN OF CARE
Discussed plan of care for day, including all given medications and their indications.  IV Ofirmev infusions maintained as scheduled.  IV Zosyn also maintained.  Chest x-ray completed and IV Lasix given x 1.  Deep breathing exercises and increased mobility encouraged as tolerated.  Periods of rest encouraged after periods of activity.  NG tube maintained to low intermittent suction for bowel decompression post-op.  Comfort measures encouraged to promote restful environment.     Problem: GASTROINTESTINAL - ADULT  Goal: Minimal or absence of nausea and vomiting  Description: INTERVENTIONS:  - Maintain adequate hydration with IV or PO as ordered and tolerated  - Nasogastric tube to low intermittent suction as ordered  - Evaluate effectiveness of ordered antiemetic medications  - Provide nonpharmacologic comfort measures as appropriate  - Advance diet as tolerated, if ordered  - Obtain nutritional consult as needed  - Evaluate fluid balance  Outcome: Progressing  Goal: Maintains or returns to baseline bowel function  Description: INTERVENTIONS:  - Assess bowel function  - Maintain adequate hydration with IV or PO as ordered and tolerated  - Evaluate effectiveness of GI medications  - Encourage mobilization and activity  - Obtain nutritional consult as needed  - Establish a toileting routine/schedule  - Consider collaborating with pharmacy to review patient's medication profile  Outcome: Progressing     Problem: SKIN/TISSUE INTEGRITY - ADULT  Goal: Incision(s), wounds(s) or drain site(s) healing without S/S of infection  Description: INTERVENTIONS:  - Assess and document risk factors for pressure ulcer development  - Assess and document skin integrity  - Assess and document dressing/incision, wound bed, drain sites and surrounding tissue  - Implement wound care per orders  - Initiate isolation precautions as appropriate  - Initiate Pressure Ulcer prevention bundle as indicated  Outcome: Progressing     Problem: PAIN -  ADULT  Goal: Verbalizes/displays adequate comfort level or patient's stated pain goal  Description: INTERVENTIONS:  - Encourage pt to monitor pain and request assistance  - Assess pain using appropriate pain scale  - Administer analgesics based on type and severity of pain and evaluate response  - Implement non-pharmacological measures as appropriate and evaluate response  - Consider cultural and social influences on pain and pain management  - Manage/alleviate anxiety  - Utilize distraction and/or relaxation techniques  - Monitor for opioid side effects  - Notify MD/LIP if interventions unsuccessful or patient reports new pain  - Anticipate increased pain with activity and pre-medicate as appropriate  Outcome: Progressing     Problem: Patient Centered Care  Goal: Patient preferences are identified and integrated in the patient's plan of care  Description: Interventions:  - What would you like us to know as we care for you? I live at [facility]  - Provide timely, complete, and accurate information to patient/family  - Incorporate patient and family knowledge, values, beliefs, and cultural backgrounds into the planning and delivery of care  - Encourage patient/family to participate in care and decision-making at the level they choose  - Honor patient and family perspectives and choices  Outcome: Progressing     Problem: Patient/Family Goals  Goal: Patient/Family Long Term Goal  Description: Patient's Long Term Goal: Discharge home    Interventions:  - Regain baseline bowel function  - Tolerate adequate oral food and liquid intake  - Manage pain after surgery  - See additional Care Plan goals for specific interventions  Outcome: Progressing  Goal: Patient/Family Short Term Goal  Description: Patient's Short Term Goal: Regain bowel function after surgery    Interventions:   - Bowel rest with slow diet advancement as tolerated.  - Intermittent ambulation out of bed  - NG tube for bowel  decompression  - See additional Care Plan goals for specific interventions  Outcome: Progressing     Problem: RISK FOR INFECTION - ADULT  Goal: Absence of fever/infection during anticipated neutropenic period  Description: INTERVENTIONS  - Monitor WBC  - Administer growth factors as ordered  - Implement neutropenic guidelines  Outcome: Progressing     Problem: SAFETY ADULT - FALL  Goal: Free from fall injury  Description: INTERVENTIONS:  - Assess pt frequently for physical needs  - Identify cognitive and physical deficits and behaviors that affect risk of falls.  - [place] fall precautions as indicated by assessment.  - Educate pt/family on patient safety including physical limitations  - Instruct pt to call for assistance with activity based on assessment  - Modify environment to reduce risk of injury  - Provide assistive devices as appropriate  - Consider OT/PT consult to assist with strengthening/mobility  - Encourage toileting schedule  Outcome: Progressing     Problem: DISCHARGE PLANNING  Goal: Discharge to home or other facility with appropriate resources  Description: INTERVENTIONS:  - Identify barriers to discharge w/pt and caregiver  - Include patient/family/discharge partner in discharge planning  - Arrange for needed discharge resources and transportation as appropriate  - Identify discharge learning needs (meds, wound care, etc)  - Arrange for interpreters to assist at discharge as needed  - Consider post-discharge preferences of patient/family/discharge partner  - Complete POLST form as appropriate  - Assess patient's ability to be responsible for managing their own health  - Refer to Case Management Department for coordinating discharge planning if the patient needs post-hospital services based on physician/LIP order or complex needs related to functional status, cognitive ability or social support system  Outcome: Progressing     All efforts maintained to promote infection prevention during my  assessment and care for this patient.  Stethoscope cleansed and hand hygiene strictly maintained.

## 2025-03-04 NOTE — PROGRESS NOTES
[facility]  part of [facility]    Progress Note    Lizeth Mcgill Patient Status:  Inpatient    10/26/1940 MRN [id number]   Location [facility] 2W/SW Attending Sukh Leary, DO   Hosp Day # 4 PCP Chong Cornejo MD       Subjective:   Lizeth Mcgill is a(n) 84 year old   female without complaints    Assessment and Plan:   Lizeth Mcgill is a(n) 84 year old female    POD #4 status post exploratory laparotomy with lysis of adhesions for strangulated small bowel  Patient doing well  Pulmonary toilet  Ambulate  Patient with positive blood cultures possible contamination but will continue to treat  Renal function normalized.  Patient with bowel movement will DC NG tube  Chest x-ray with effusion  Continue IV antibiotics for mildly elevated leukocytosis  Will check urinalysis    VTE prophylaxis        Objective:   Blood pressure 138/72, pulse 107, temperature 98.7 °F (37.1 °C), temperature source Oral, resp. rate 18, weight 114 lb 10.2 oz (52 kg), SpO2 98%, not currently breastfeeding. I/O last 3 completed shifts:  In: 935 [I.V.:410; IV PIGGYBACK:525]  Out: 2550 [Urine:1350; Emesis/NG output:1200]     General appearance: alert, appears stated age, and cooperative  Neck: no JVD and supple, symmetrical, trachea midline  Pulmonary: No labored breathing, equal respiratory excursion  Cardiovascular: regular rate and rhythm  Abdominal: soft, non-tender; bowel sounds normal; no masses,  no organomegaly and Prevena wound dressing in place  + flatus, + BM  Extremities: extremities normal, atraumatic, no cyanosis or edema          Results:       Recent Labs   Lab 25  0554 25  0619 25  0519   RBC 2.89* 2.90* 3.02*   HGB 8.2* 8.2* 8.5*   HCT 26.6* 27.0* 27.3*   MCV 92.0 93.1 90.4   MCH 28.4 28.3 28.1   MCHC 30.8* 30.4* 31.1   RDW 15.0 14.8 15.0   NEPRELIM 13.01* 12.25* 10.68*   WBC 14.9* 14.9* 14.6*   .0 432.0 434.0     Lab Results   Component Value Date    INR 1.02  08/18/2022       Recent Labs   Lab 02/28/25  1833 02/28/25  2034 03/01/25  0439 03/01/25  1504 03/02/25  0554 03/03/25  0619 03/04/25  0519   *   < > 256*   < > 92 143* 104*   BUN 46*   < > 45*   < > 42* 28* 20   CREATSERUM 1.89*   < > 2.31*   < > 1.62* 0.93 0.86   CA 8.5*   < > 8.3*   < > 7.8* 7.8* 8.0*   ALB 3.1*  --  3.0*  --   --   --  3.4      < > 134*   < > 140 141 143   K 5.0   < > 5.0   < > 4.6 3.6 4.0  4.0      < > 104   < > 112 110 110   CO2 19.0*   < > 20.0*   < > 19.0* 22.0 23.0   ALKPHO 56  --  45*  --   --   --  59   AST 15  --  12  --   --   --  <8   ALT 16  --  13  --   --   --  8*   BILT 0.4  --  0.3  --   --   --  0.2   TP 5.2*  --  5.0*  --   --   --  5.4*    < > = values in this interval not displayed.             XR CHEST PA + LAT CHEST (CPT=71046)    Result Date: 3/3/2025  CONCLUSION:   Persistent left lower lobe opacities and probable small pleural effusion.  No new abnormality.    Dictated by (CST): Kiel Hicks MD on 3/03/2025 at 2:42 PM     Finalized by (CST): Kiel Hicks MD on 3/03/2025 at 2:52 PM                 Time spent in direct patient contact and decision making as well as counseling/coordination of care:    43928- 35 min      GREGORY BENITES JR., MD. [facility]    3/4/2025  3:13 PM

## 2025-03-04 NOTE — PLAN OF CARE
Lizeth is alert and oriented x 4. She denies pain other than some throat soreness after removal of the NG tube, patient is now on clear liquid diet, tolerating well. Patient is up stand by with a walker. Multiple loose bowel movements today. Call light within reach.  Problem: GASTROINTESTINAL - ADULT  Goal: Minimal or absence of nausea and vomiting  Description: INTERVENTIONS:  - Maintain adequate hydration with IV or PO as ordered and tolerated  - Nasogastric tube to low intermittent suction as ordered  - Evaluate effectiveness of ordered antiemetic medications  - Provide nonpharmacologic comfort measures as appropriate  - Advance diet as tolerated, if ordered  - Obtain nutritional consult as needed  - Evaluate fluid balance  Outcome: Progressing  Goal: Maintains or returns to baseline bowel function  Description: INTERVENTIONS:  - Assess bowel function  - Maintain adequate hydration with IV or PO as ordered and tolerated  - Evaluate effectiveness of GI medications  - Encourage mobilization and activity  - Obtain nutritional consult as needed  - Establish a toileting routine/schedule  - Consider collaborating with pharmacy to review patient's medication profile  Outcome: Progressing     Problem: SKIN/TISSUE INTEGRITY - ADULT  Goal: Incision(s), wounds(s) or drain site(s) healing without S/S of infection  Description: INTERVENTIONS:  - Assess and document risk factors for pressure ulcer development  - Assess and document skin integrity  - Assess and document dressing/incision, wound bed, drain sites and surrounding tissue  - Implement wound care per orders  - Initiate isolation precautions as appropriate  - Initiate Pressure Ulcer prevention bundle as indicated  Outcome: Progressing     Problem: PAIN - ADULT  Goal: Verbalizes/displays adequate comfort level or patient's stated pain goal  Description: INTERVENTIONS:  - Encourage pt to monitor pain and request assistance  - Assess pain using appropriate pain scale  -  Administer analgesics based on type and severity of pain and evaluate response  - Implement non-pharmacological measures as appropriate and evaluate response  - Consider cultural and social influences on pain and pain management  - Manage/alleviate anxiety  - Utilize distraction and/or relaxation techniques  - Monitor for opioid side effects  - Notify MD/LIP if interventions unsuccessful or patient reports new pain  - Anticipate increased pain with activity and pre-medicate as appropriate  Outcome: Progressing     Problem: Patient Centered Care  Goal: Patient preferences are identified and integrated in the patient's plan of care  Description: Interventions:  - What would you like us to know as we care for you? I live at [facility]  - Provide timely, complete, and accurate information to patient/family  - Incorporate patient and family knowledge, values, beliefs, and cultural backgrounds into the planning and delivery of care  - Encourage patient/family to participate in care and decision-making at the level they choose  - Honor patient and family perspectives and choices  Outcome: Progressing     Problem: Patient/Family Goals  Goal: Patient/Family Long Term Goal  Description: Patient's Long Term Goal: Discharge home    Interventions:  - Regain baseline bowel function  - Tolerate adequate oral food and liquid intake  - Manage pain after surgery  - See additional Care Plan goals for specific interventions  Outcome: Progressing  Goal: Patient/Family Short Term Goal  Description: Patient's Short Term Goal: Regain bowel function after surgery    Interventions:   - Bowel rest with slow diet advancement as tolerated.  - Intermittent ambulation out of bed  - NG tube for bowel decompression  - See additional Care Plan goals for specific interventions  Outcome: Progressing     Problem: RISK FOR INFECTION - ADULT  Goal: Absence of fever/infection during anticipated neutropenic period  Description:  INTERVENTIONS  - Monitor WBC  - Administer growth factors as ordered  - Implement neutropenic guidelines  Outcome: Progressing     Problem: SAFETY ADULT - FALL  Goal: Free from fall injury  Description: INTERVENTIONS:  - Assess pt frequently for physical needs  - Identify cognitive and physical deficits and behaviors that affect risk of falls.  - [place] fall precautions as indicated by assessment.  - Educate pt/family on patient safety including physical limitations  - Instruct pt to call for assistance with activity based on assessment  - Modify environment to reduce risk of injury  - Provide assistive devices as appropriate  - Consider OT/PT consult to assist with strengthening/mobility  - Encourage toileting schedule  Outcome: Progressing     Problem: DISCHARGE PLANNING  Goal: Discharge to home or other facility with appropriate resources  Description: INTERVENTIONS:  - Identify barriers to discharge w/pt and caregiver  - Include patient/family/discharge partner in discharge planning  - Arrange for needed discharge resources and transportation as appropriate  - Identify discharge learning needs (meds, wound care, etc)  - Arrange for interpreters to assist at discharge as needed  - Consider post-discharge preferences of patient/family/discharge partner  - Complete POLST form as appropriate  - Assess patient's ability to be responsible for managing their own health  - Refer to Case Management Department for coordinating discharge planning if the patient needs post-hospital services based on physician/LIP order or complex needs related to functional status, cognitive ability or social support system  Outcome: Progressing

## 2025-03-05 ENCOUNTER — APPOINTMENT (OUTPATIENT)
Dept: CT IMAGING | Facility: HOSPITAL | Age: 85
DRG: 336 | End: 2025-03-05
Attending: SURGERY
Payer: MEDICARE

## 2025-03-05 ENCOUNTER — APPOINTMENT (OUTPATIENT)
Dept: GENERAL RADIOLOGY | Facility: HOSPITAL | Age: 85
DRG: 336 | End: 2025-03-05
Attending: STUDENT IN AN ORGANIZED HEALTH CARE EDUCATION/TRAINING PROGRAM
Payer: MEDICARE

## 2025-03-05 PROBLEM — E46 MALNUTRITION (HCC): Status: ACTIVE | Noted: 2025-03-05

## 2025-03-05 LAB
ANION GAP SERPL CALC-SCNC: 12 MMOL/L (ref 0–18)
ATRIAL RATE: 112 BPM
BASOPHILS # BLD: 0.21 X10(3) UL (ref 0–0.2)
BASOPHILS NFR BLD: 1 %
BUN BLD-MCNC: 19 MG/DL (ref 9–23)
BUN/CREAT SERPL: 18.6 (ref 10–20)
C DIFF GDH + TOXINS A+B STL QL IA.RAPID: NOT DETECTED
C DIFF TOX B STL QL: POSITIVE
CALCIUM BLD-MCNC: 7.7 MG/DL (ref 8.7–10.4)
CHLORIDE SERPL-SCNC: 106 MMOL/L (ref 98–112)
CO2 SERPL-SCNC: 24 MMOL/L (ref 21–32)
CREAT BLD-MCNC: 1.02 MG/DL
DEPRECATED RDW RBC AUTO: 49.4 FL (ref 35.1–46.3)
EGFRCR SERPLBLD CKD-EPI 2021: 54 ML/MIN/1.73M2 (ref 60–?)
EOSINOPHIL # BLD: 0.21 X10(3) UL (ref 0–0.7)
EOSINOPHIL NFR BLD: 1 %
ERYTHROCYTE [DISTWIDTH] IN BLOOD BY AUTOMATED COUNT: 15.1 % (ref 11–15)
GLUCOSE BLD-MCNC: 172 MG/DL (ref 70–99)
GLUCOSE BLDC GLUCOMTR-MCNC: 167 MG/DL (ref 70–99)
GLUCOSE BLDC GLUCOMTR-MCNC: 168 MG/DL (ref 70–99)
GLUCOSE BLDC GLUCOMTR-MCNC: 172 MG/DL (ref 70–99)
HCT VFR BLD AUTO: 31.7 %
HGB BLD-MCNC: 10.2 G/DL
LYMPHOCYTES NFR BLD: 1.68 X10(3) UL (ref 1–4)
LYMPHOCYTES NFR BLD: 8 %
MAGNESIUM SERPL-MCNC: 1.7 MG/DL (ref 1.6–2.6)
MCH RBC QN AUTO: 29.5 PG (ref 26–34)
MCHC RBC AUTO-ENTMCNC: 32.2 G/DL (ref 31–37)
MCV RBC AUTO: 91.6 FL
METAMYELOCYTES # BLD: 0.42 X10(3) UL
METAMYELOCYTES NFR BLD: 2 %
MONOCYTES # BLD: 1.05 X10(3) UL (ref 0.1–1)
MONOCYTES NFR BLD: 5 %
MORPHOLOGY: NORMAL
NEUTROPHILS # BLD AUTO: 15.57 X10 (3) UL (ref 1.5–7.7)
NEUTROPHILS NFR BLD: 79 %
NEUTS BAND NFR BLD: 4 %
NEUTS HYPERSEG # BLD: 17.43 X10(3) UL (ref 1.5–7.7)
OSMOLALITY SERPL CALC.SUM OF ELEC: 300 MOSM/KG (ref 275–295)
P AXIS: 48 DEGREES
P-R INTERVAL: 144 MS
PHOSPHATE SERPL-MCNC: 3.6 MG/DL (ref 2.4–5.1)
PLATELET # BLD AUTO: 533 10(3)UL (ref 150–450)
PLATELET MORPHOLOGY: NORMAL
POTASSIUM SERPL-SCNC: 3.8 MMOL/L (ref 3.5–5.1)
PROCALCITONIN SERPL-MCNC: 1.06 NG/ML (ref ?–0.05)
Q-T INTERVAL: 334 MS
QRS DURATION: 80 MS
QTC CALCULATION (BEZET): 455 MS
R AXIS: -14 DEGREES
RBC # BLD AUTO: 3.46 X10(6)UL
SODIUM SERPL-SCNC: 142 MMOL/L (ref 136–145)
T AXIS: 66 DEGREES
TOTAL CELLS COUNTED BLD: 100
VENTRICULAR RATE: 112 BPM
WBC # BLD AUTO: 21 X10(3) UL (ref 4–11)

## 2025-03-05 PROCEDURE — 74177 CT ABD & PELVIS W/CONTRAST: CPT | Performed by: SURGERY

## 2025-03-05 PROCEDURE — 84145 PROCALCITONIN (PCT): CPT | Performed by: HOSPITALIST

## 2025-03-05 PROCEDURE — 93010 ELECTROCARDIOGRAM REPORT: CPT | Performed by: INTERNAL MEDICINE

## 2025-03-05 PROCEDURE — 85007 BL SMEAR W/DIFF WBC COUNT: CPT | Performed by: SURGERY

## 2025-03-05 PROCEDURE — S0028 INJECTION, FAMOTIDINE, 20 MG: HCPCS | Performed by: SURGERY

## 2025-03-05 PROCEDURE — 85027 COMPLETE CBC AUTOMATED: CPT | Performed by: SURGERY

## 2025-03-05 PROCEDURE — 93005 ELECTROCARDIOGRAM TRACING: CPT

## 2025-03-05 PROCEDURE — 82962 GLUCOSE BLOOD TEST: CPT

## 2025-03-05 PROCEDURE — 87493 C DIFF AMPLIFIED PROBE: CPT | Performed by: SURGERY

## 2025-03-05 PROCEDURE — 80048 BASIC METABOLIC PNL TOTAL CA: CPT | Performed by: SURGERY

## 2025-03-05 PROCEDURE — 71260 CT THORAX DX C+: CPT | Performed by: SURGERY

## 2025-03-05 PROCEDURE — 84100 ASSAY OF PHOSPHORUS: CPT | Performed by: SURGERY

## 2025-03-05 PROCEDURE — 83735 ASSAY OF MAGNESIUM: CPT | Performed by: SURGERY

## 2025-03-05 PROCEDURE — 85025 COMPLETE CBC W/AUTO DIFF WBC: CPT | Performed by: SURGERY

## 2025-03-05 PROCEDURE — 87040 BLOOD CULTURE FOR BACTERIA: CPT | Performed by: HOSPITALIST

## 2025-03-05 PROCEDURE — 71045 X-RAY EXAM CHEST 1 VIEW: CPT | Performed by: STUDENT IN AN ORGANIZED HEALTH CARE EDUCATION/TRAINING PROGRAM

## 2025-03-05 RX ORDER — POTASSIUM CHLORIDE 14.9 MG/ML
20 INJECTION INTRAVENOUS ONCE
Status: COMPLETED | OUTPATIENT
Start: 2025-03-05 | End: 2025-03-05

## 2025-03-05 RX ORDER — METOCLOPRAMIDE HYDROCHLORIDE 5 MG/ML
10 INJECTION INTRAMUSCULAR; INTRAVENOUS 4 TIMES DAILY PRN
Status: DISCONTINUED | OUTPATIENT
Start: 2025-03-05 | End: 2025-03-05

## 2025-03-05 RX ORDER — METOCLOPRAMIDE 5 MG/1
10 TABLET ORAL 4 TIMES DAILY PRN
Status: DISCONTINUED | OUTPATIENT
Start: 2025-03-05 | End: 2025-03-05

## 2025-03-05 RX ORDER — VANCOMYCIN HYDROCHLORIDE 125 MG/1
125 CAPSULE ORAL DAILY
Status: DISCONTINUED | OUTPATIENT
Start: 2025-03-05 | End: 2025-03-05

## 2025-03-05 RX ORDER — VANCOMYCIN HYDROCHLORIDE 125 MG/1
125 CAPSULE ORAL 4 TIMES DAILY
Status: DISCONTINUED | OUTPATIENT
Start: 2025-03-05 | End: 2025-03-08

## 2025-03-05 RX ORDER — MAGNESIUM SULFATE HEPTAHYDRATE 40 MG/ML
2 INJECTION, SOLUTION INTRAVENOUS ONCE
Status: COMPLETED | OUTPATIENT
Start: 2025-03-05 | End: 2025-03-05

## 2025-03-05 RX ORDER — ONDANSETRON 2 MG/ML
4 INJECTION INTRAMUSCULAR; INTRAVENOUS EVERY 6 HOURS PRN
Status: DISCONTINUED | OUTPATIENT
Start: 2025-03-05 | End: 2025-03-08

## 2025-03-05 RX ORDER — METOCLOPRAMIDE 5 MG/1
5 TABLET ORAL 4 TIMES DAILY PRN
Status: DISCONTINUED | OUTPATIENT
Start: 2025-03-05 | End: 2025-03-08

## 2025-03-05 RX ORDER — METOCLOPRAMIDE HYDROCHLORIDE 5 MG/ML
5 INJECTION INTRAMUSCULAR; INTRAVENOUS 4 TIMES DAILY PRN
Status: DISCONTINUED | OUTPATIENT
Start: 2025-03-05 | End: 2025-03-08

## 2025-03-05 NOTE — CONGREGATE LIVING REVIEW
Congregate Living Authorization    [facility] ([facility] has reviewed this case and the committee DOES NOT RECOMMEND discharge to a skilled nursing facility under skilled care.    The CLRC recommends:  Home with home health and any other appropriate caregiver assistance or medical equipment as needed vs respite in SNF.     Does not appear to have skilled services for [place], but additional home support appears to be needed.    For questions regarding CLRC approval process, please contact the CM assigned to the case.  For questions regarding RN discharge workflow, please contact the unit Clinical Leader.

## 2025-03-05 NOTE — SIGNIFICANT EVENT
Rapid Response Note;    Called to bedside d/t hypoxia. Per Bedside RN patient ambulated to bathroom, when returned found to be 80s% on RA - tachycardic, placed on 6LNC, rapid called. On my arrival dyspnea improved, endorses mild cough. Abd pain well controlled.     Objective:  VS reviewed - saturating 100% on 6LNC, mildly tachycardic low 100s, normotensive  Gen: Appears well, no acute distress  CV: S1S2 RRR  Pulm: diminished at b/l lung bases, scattered rhonchi no wheeze   Abd: Prevena wound dresssing in place  Extremities: No edema    A/P  Dyspnea, ?Hypoxias  I think likely 2/2 Atelectasis, cosn,  -will taper O2 support as able, check EKG and CXR      EKG as interpreted by me: , PACs, borderline LAD, normal intervals, no st change, no stemi. Qtc 455ms

## 2025-03-05 NOTE — PROGRESS NOTES
[facility] Hospitalist Progress Note     CC: Hospital Follow up    PCP: Chong Cornejo MD       Assessment/Plan:   This is a 84-year-old female with history of ovarian cancer, type 2 diabetes, hypertension, hyperlipidemia, who presents to the hospital for evaluation of severe nausea, episodes of emesis, diarrhea earlier in the week but now resolved.     Leukocytosis  - on broad abx zosyn stopped  3/5   - had 1/2 bcx S epi (contamintant) CXR - effusions/ atelectasis  - now w loose stools , also n/v  - worsening  - dw DR Schroeder adding C diff and CT chest/ abd pelvis,   - C diff toxin neg PCR + cw colonization started on full dose tx   - will add procal,   - ID cs   - in part fm hemoconcentration   - no fevers     High grade Small bowel obstruction   Ventral hernia  w strangulation  - sp emergent Exploratory laparotomy, lysis of adhesions, closure of mesenteric defect, repair of ventral hernia (fascial defect 4.5 cm) 2/28/25  -NG tube--hopefully out today await surg recs   -arellano removed 3/1, she is voiding  -encourage chair and incentive spirometer  -ambulate, PT/OT when able   -arellano intact for I/O   -diet advancement per surgery when appropriate      HERI- improved   Hyperkalemia--resolved  -Cr improving, but monitor closely  -ok to change fluids to maintenance but can use PRN bolus if we need to  - Cr    Hyperglycemia  -not diabetic, stable  -suspect stress response     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 Tian Lawson oncology  -completed neoadjuvant chemotherapy with carboplatin and paclitaxel for six cycles followed by IKER-BSO and adjuvant chemotherapy with carbo/taxol.   -continues with follow up      Hypertension  -hold BP meds given hypotension      Hypothryoidism  -ok to hold synthroid for now if NG prolonged will clamp NG 30 min and do med (or give IV) fu surg  recs today     DVT prophylaxis: heparin sub q after surgery   Code status: full code      Zabrina Malcolm, Hospitalist        Subjective:   NG still in +BMs + n/v rising wbc     OBJECTIVE:    Blood pressure 119/60, pulse 120, temperature 97.2 °F (36.2 °C), temperature source Oral, resp. rate 18, weight 114 lb 10.2 oz (52 kg), SpO2 93%, not currently breastfeeding.    Temp:  [97.2 °F (36.2 °C)-98.7 °F (37.1 °C)] 97.2 °F (36.2 °C)  Pulse:  [104-125] 120  Resp:  [18] 18  BP: (119-138)/(60-72) 119/60  SpO2:  [93 %-99 %] 93 %      Intake/Output:    Intake/Output Summary (Last 24 hours) at 3/5/2025 0921  Last data filed at 3/5/2025 0901  Gross per 24 hour   Intake 1485.9 ml   Output 400 ml   Net 1085.9 ml       Last 3 Weights   03/02/25 0500 114 lb 10.2 oz (52 kg)   03/01/25 0500 112 lb 7 oz (51 kg)   02/28/25 1030 111 lb (50.3 kg)   05/10/24 1239 103 lb (46.7 kg)   05/07/24 1107 103 lb (46.7 kg)       /60 (BP Location: Right arm)   Pulse 120   Temp 97.2 °F (36.2 °C) (Oral)   Resp 18   Wt 114 lb 10.2 oz (52 kg)   SpO2 93%   BMI 24.81 kg/m²   General: Awake, appears comfortable  HEENT: NG in place  Lungs: clear to ausculation bilaterally  Heart: Regular rate and rhythm  Abdomen: soft +BS  Extremities: No edema    Data Review:       Labs:     Recent Labs   Lab 03/03/25  0619 03/04/25  0519 03/05/25  0556   RBC 2.90* 3.02* 3.46*   HGB 8.2* 8.5* 10.2*   HCT 27.0* 27.3* 31.7*   MCV 93.1 90.4 91.6   MCH 28.3 28.1 29.5   MCHC 30.4* 31.1 32.2   RDW 14.8 15.0 15.1*   NEPRELIM 12.25* 10.68* 15.57*   WBC 14.9* 14.6* 21.0*   .0 434.0 533.0*         Recent Labs   Lab 03/03/25  0619 03/04/25  0519 03/05/25  0556   * 104* 172*   BUN 28* 20 19   CREATSERUM 0.93 0.86 1.02   EGFRCR 61 67 54*   CA 7.8* 8.0* 7.7*    143 142   K 3.6 4.0  4.0 3.8    110 106   CO2 22.0 23.0 24.0       Recent Labs   Lab 02/28/25  1138 02/28/25  1833 03/01/25  0439 03/04/25  0519   ALT 23 16 13 8*   AST 17 15 12 <8   ALB 4.6  3.1* 3.0* 3.4         Imaging:  XR CHEST AP PORTABLE  (CPT=71045)    Result Date: 3/5/2025  CONCLUSION:  1.  Large hiatal hernia. 2.  Small left pleural effusion similar to prior exam.  Small bibasilar pulmonary opacities, suggesting atelectasis. 3.  Dilated small bowel loop in the visualized left upper quadrant.  Interval removal of feeding tube.  [facility] provided a preliminary report for this examination. This final report agrees with their preliminary findings.     Dictated by (CST): Lavon Raza MD on 3/05/2025 at 6:27 AM     Finalized by (CST): Lavon Raza MD on 3/05/2025 at 6:29 AM          XR CHEST PA + LAT CHEST (CPT=71046)    Result Date: 3/3/2025  CONCLUSION:   Persistent left lower lobe opacities and probable small pleural effusion.  No new abnormality.    Dictated by (CST): Kiel Hicks MD on 3/03/2025 at 2:42 PM     Finalized by (CST): Kiel Hicks MD on 3/03/2025 at 2:52 PM             Meds:      vancomycin  125 mg Oral Daily    potassium chloride  20 mEq Intravenous Once    magnesium sulfate  2 g Intravenous Once    DULoxetine  60 mg Oral Nightly    levothyroxine  50 mcg Oral QAM    atorvastatin  20 mg Oral Nightly    famotidine  20 mg Intravenous Daily    piperacillin-tazobactam  3.375 g Intravenous Q8H    insulin regular human  1-5 Units Subcutaneous 4 times per day    ondansetron  4 mg Intravenous Once    heparin  5,000 Units Subcutaneous Q12H      potassium chloride in dextrose 5%-sodium chloride 0.45% 42 mL/hr at 03/05/25 0015       ondansetron    acetaminophen    phenol    glucose **OR** glucose **OR** glucose-vitamin C **OR** dextrose **OR** glucose **OR** glucose **OR** glucose-vitamin C    ondansetron    morphINE **OR** morphINE

## 2025-03-05 NOTE — PLAN OF CARE
Over night RRT was called on pt due to increased weakness when ambulating to the bathroom and oxygen levels dropping into the low 80's and tachycardia present. An ECG and Chest XR were ordered. Pt placed on 1 L NC. Call light is within reach and safety measures are in place.     Problem: GASTROINTESTINAL - ADULT  Goal: Minimal or absence of nausea and vomiting  Description: INTERVENTIONS:  - Maintain adequate hydration with IV or PO as ordered and tolerated  - Nasogastric tube to low intermittent suction as ordered  - Evaluate effectiveness of ordered antiemetic medications  - Provide nonpharmacologic comfort measures as appropriate  - Advance diet as tolerated, if ordered  - Obtain nutritional consult as needed  - Evaluate fluid balance  Outcome: Progressing  Goal: Maintains or returns to baseline bowel function  Description: INTERVENTIONS:  - Assess bowel function  - Maintain adequate hydration with IV or PO as ordered and tolerated  - Evaluate effectiveness of GI medications  - Encourage mobilization and activity  - Obtain nutritional consult as needed  - Establish a toileting routine/schedule  - Consider collaborating with pharmacy to review patient's medication profile  Outcome: Progressing     Problem: SKIN/TISSUE INTEGRITY - ADULT  Goal: Incision(s), wounds(s) or drain site(s) healing without S/S of infection  Description: INTERVENTIONS:  - Assess and document risk factors for pressure ulcer development  - Assess and document skin integrity  - Assess and document dressing/incision, wound bed, drain sites and surrounding tissue  - Implement wound care per orders  - Initiate isolation precautions as appropriate  - Initiate Pressure Ulcer prevention bundle as indicated  Outcome: Progressing     Problem: PAIN - ADULT  Goal: Verbalizes/displays adequate comfort level or patient's stated pain goal  Description: INTERVENTIONS:  - Encourage pt to monitor pain and request assistance  - Assess pain using appropriate  pain scale  - Administer analgesics based on type and severity of pain and evaluate response  - Implement non-pharmacological measures as appropriate and evaluate response  - Consider cultural and social influences on pain and pain management  - Manage/alleviate anxiety  - Utilize distraction and/or relaxation techniques  - Monitor for opioid side effects  - Notify MD/LIP if interventions unsuccessful or patient reports new pain  - Anticipate increased pain with activity and pre-medicate as appropriate  Outcome: Progressing     Problem: Patient Centered Care  Goal: Patient preferences are identified and integrated in the patient's plan of care  Description: Interventions:  - What would you like us to know as we care for you? I live at [facility]  - Provide timely, complete, and accurate information to patient/family  - Incorporate patient and family knowledge, values, beliefs, and cultural backgrounds into the planning and delivery of care  - Encourage patient/family to participate in care and decision-making at the level they choose  - Honor patient and family perspectives and choices  Outcome: Progressing     Problem: Patient/Family Goals  Goal: Patient/Family Long Term Goal  Description: Patient's Long Term Goal: Discharge home    Interventions:  - Regain baseline bowel function  - Tolerate adequate oral food and liquid intake  - Manage pain after surgery  - See additional Care Plan goals for specific interventions  Outcome: Progressing  Goal: Patient/Family Short Term Goal  Description: Patient's Short Term Goal: Regain bowel function after surgery    Interventions:   - Bowel rest with slow diet advancement as tolerated.  - Intermittent ambulation out of bed  - NG tube for bowel decompression  - See additional Care Plan goals for specific interventions  Outcome: Progressing     Problem: RISK FOR INFECTION - ADULT  Goal: Absence of fever/infection during anticipated neutropenic  period  Description: INTERVENTIONS  - Monitor WBC  - Administer growth factors as ordered  - Implement neutropenic guidelines  Outcome: Progressing     Problem: SAFETY ADULT - FALL  Goal: Free from fall injury  Description: INTERVENTIONS:  - Assess pt frequently for physical needs  - Identify cognitive and physical deficits and behaviors that affect risk of falls.  - [place] fall precautions as indicated by assessment.  - Educate pt/family on patient safety including physical limitations  - Instruct pt to call for assistance with activity based on assessment  - Modify environment to reduce risk of injury  - Provide assistive devices as appropriate  - Consider OT/PT consult to assist with strengthening/mobility  - Encourage toileting schedule  Outcome: Progressing     Problem: DISCHARGE PLANNING  Goal: Discharge to home or other facility with appropriate resources  Description: INTERVENTIONS:  - Identify barriers to discharge w/pt and caregiver  - Include patient/family/discharge partner in discharge planning  - Arrange for needed discharge resources and transportation as appropriate  - Identify discharge learning needs (meds, wound care, etc)  - Arrange for interpreters to assist at discharge as needed  - Consider post-discharge preferences of patient/family/discharge partner  - Complete POLST form as appropriate  - Assess patient's ability to be responsible for managing their own health  - Refer to Case Management Department for coordinating discharge planning if the patient needs post-hospital services based on physician/LIP order or complex needs related to functional status, cognitive ability or social support system  Outcome: Progressing

## 2025-03-05 NOTE — PROGRESS NOTES
[facility]  part of [facility]    Progress Note    Lizeth Mcgill Patient Status:  Inpatient    10/26/1940 MRN [id number]   Location [facility] 2W/SW Attending Sukh Leary, DO   Hosp Day # 5 PCP Chong Cornejo MD       Subjective:   Lizeth Mcgill is a(n) 84 year old   female without complaints    Assessment and Plan:   Lizeth Mcgill is a(n) 84 year old female    POD #5 status post exploratory laparotomy with lysis of adhesions for strangulated small bowel  Patient doing well  Pulmonary toilet  Ambulate  Patient with positive blood cultures possible contamination but will continue to treat  Renal function normalized.  Patient with liquid stool  Will send C. difficile due to postop antibiotics and liquid stool  CT scan of chest abdomen pelvis to further evaluate for any intra-abdominal source.  Check UA  Continue IV Zosyn      VTE prophylaxis        Objective:   Blood pressure 119/60, pulse 120, temperature 97.2 °F (36.2 °C), temperature source Oral, resp. rate 18, weight 114 lb 10.2 oz (52 kg), SpO2 93%, not currently breastfeeding. I/O last 3 completed shifts:  In: 1485.9 [I.V.:1285.9; IV PIGGYBACK:200]  Out: 1200 [Urine:500; Emesis/NG output:700]     General appearance: alert, appears stated age, and cooperative  Neck: no JVD and supple, symmetrical, trachea midline  Pulmonary: No labored breathing, equal respiratory excursion  Cardiovascular: regular rate and rhythm  Abdominal: soft, non-tender; bowel sounds normal; no masses,  no organomegaly and Prevena wound dressing removed incision clean dry and intact  + flatus, + BM  Extremities: extremities normal, atraumatic, no cyanosis or edema          Results:       Recent Labs   Lab 25  0619 25  0519 25  0556   RBC 2.90* 3.02* 3.46*   HGB 8.2* 8.5* 10.2*   HCT 27.0* 27.3* 31.7*   MCV 93.1 90.4 91.6   MCH 28.3 28.1 29.5   MCHC 30.4* 31.1 32.2   RDW 14.8 15.0 15.1*   NEPRELIM 12.25* 10.68* 15.57*   WBC 14.9*  14.6* 21.0*   .0 434.0 533.0*     Lab Results   Component Value Date    INR 1.02 08/18/2022       Recent Labs   Lab 02/28/25  1833 02/28/25  2034 03/01/25  0439 03/01/25  1504 03/03/25  0619 03/04/25  0519 03/05/25  0556   *   < > 256*   < > 143* 104* 172*   BUN 46*   < > 45*   < > 28* 20 19   CREATSERUM 1.89*   < > 2.31*   < > 0.93 0.86 1.02   CA 8.5*   < > 8.3*   < > 7.8* 8.0* 7.7*   ALB 3.1*  --  3.0*  --   --  3.4  --       < > 134*   < > 141 143 142   K 5.0   < > 5.0   < > 3.6 4.0  4.0 3.8      < > 104   < > 110 110 106   CO2 19.0*   < > 20.0*   < > 22.0 23.0 24.0   ALKPHO 56  --  45*  --   --  59  --    AST 15  --  12  --   --  <8  --    ALT 16  --  13  --   --  8*  --    BILT 0.4  --  0.3  --   --  0.2  --    TP 5.2*  --  5.0*  --   --  5.4*  --     < > = values in this interval not displayed.             XR CHEST AP PORTABLE  (CPT=71045)    Result Date: 3/5/2025  CONCLUSION:  1.  Large hiatal hernia. 2.  Small left pleural effusion similar to prior exam.  Small bibasilar pulmonary opacities, suggesting atelectasis. 3.  Dilated small bowel loop in the visualized left upper quadrant.  Interval removal of feeding tube.  [facility] provided a preliminary report for this examination. This final report agrees with their preliminary findings.     Dictated by (CST): Lavon Raza MD on 3/05/2025 at 6:27 AM     Finalized by (CST): Lavon Raza MD on 3/05/2025 at 6:29 AM          XR CHEST PA + LAT CHEST (CPT=71046)    Result Date: 3/3/2025  CONCLUSION:   Persistent left lower lobe opacities and probable small pleural effusion.  No new abnormality.    Dictated by (CST): Kiel Hicks MD on 3/03/2025 at 2:42 PM     Finalized by (CST): Kiel Hicks MD on 3/03/2025 at 2:52 PM         EKG 12 Lead    Result Date: 3/5/2025  Sinus tachycardia with Premature supraventricular complexes Otherwise normal ECG When compared with ECG of 28-FEB-2025 12:37, Premature supraventricular complexes are now  Present ST now depressed in Anterior leads Nonspecific T wave abnormality no longer evident in Inferior leads T wave inversion no longer evident in Lateral leads       Time spent in direct patient contact and decision making as well as counseling/coordination of care:    28139- 35 min      GREGORY BENITES JR., MD. [facility]    3/5/2025  8:46 AM

## 2025-03-05 NOTE — PLAN OF CARE
A/O x 4. Midline incision with staples, clean/dry/intact, vac removed this morning by MD. Emesis this morning possibly from drinking CT scan contrast. CT abdomen completed and  paged with resutls. NGT ordered, attempted to place but tube coiled in patients mouth, pt refusing further attempts, RN explained importance of NGT tube, pt still refused, MD paged and stated to keep pt NPO for now. Plan for repeat XR tomorrow morning. Incontinent of stool, cdiff sample sent and positive, MD paged. Voiding freely, however urine mixing with diarrhea, will endorse to night shift the need for U/A if able to get clean catch. Ofirmev for pain. IVF infusing. Electrolytes per protocol. Zosyn discontinued. Walking SBA. Updated on plan of car.e Bed in lowest position, call light in reach, frequent rounding, nonskid footwear, fall precautions in place.

## 2025-03-05 NOTE — PHYSICAL THERAPY NOTE
Attempted follow up PT treatment. Per RN communication, pt with increased nausea and vomiting with plan for CT of abdomen. Will reschedule for 3/6/25    Thank you,  Judy Sousa, PT, DPT

## 2025-03-05 NOTE — PAYOR COMM NOTE
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 failure, renal failure, pulm illness, DVT, even death as well as CVA will discuss in detail with the patient as well as her son both understand and wish to proceed with the above plan.  Will plan emergency exploratory laparotomy possible bowel resection possible colostomy emergently now.     OP NOTE  ADMISSION DATE:       02/28/2025      OPERATION DATE:  02/28/2025     OPERATIVE REPORT        PREOPERATIVE DIAGNOSIS:  Acute abdomen with high-grade small bowel obstruction and ventral hernia.  POSTOPERATIVE DIAGNOSIS:  Acute abdomen with high-grade small bowel obstruction, abdominal adhesions, mesenteric defect, and incisional ventral hernia.  PROCEDURE:  Exploratory laparotomy, lysis of adhesions, closure of mesenteric defect, repair of ventral hernia (fascial defect 4.5 cm).    3/1 HOSPITALIST NOTE        Assessment/Plan:   This is a 84-year-old female with history of ovarian cancer, type 2 diabetes, hypertension, hyperlipidemia, who presents to the hospital for evaluation of severe nausea, episodes of emesis, diarrhea earlier in the week but now resolved.      High grade Small  bowel obstruction   Ventral hernia   -POD # 1 lysis of adhesions, repair of ventral hernia   -NG tube  -continue fluids with 0.9 saline given her HERI  -not requiring vasopressors  -arellano intact for I/O      HERI  Hyperkalemia  -was on 0.45 last night would continue 0.9 today given HERI suspect needs more volume   -obtain CXR to monitor volume status      Positive blood culture  -still has 2 doses of zosyn still  -suspect could be contaminant and not pathologic culture   -will follow results of identification      Ovarian cancer  -follows Dr. Tian Lawson oncology  -completed neoadjuvant chemotherapy with carboplatin and paclitaxel for six cycles followed by IKER-BSO and adjuvant chemotherapy with carbo/taxol.   -continues with follow up      Hypertension  -hold BP meds given hypotension      Hypothryoidism  -ok to hold synthroid for now      DVT prophylaxis: heparin sub q after surgery   Code status: full code      Asraprabhu Almodovar [facility] Hospitalist          Subjective:      Awake, alert. Feels ok.   BP better this AM when seen.      OBJECTIVE:     Blood pressure 100/51, pulse 90, temperature 98 °F (36.7 °C), temperature source Temporal, resp. rate 15, weight 112 lb 7 oz (51 kg), SpO2 96%, Temp:  [97.5 °F (36.4 °C)-98.4 °F (36.9 °C)] 98 °F (36.7 °C)  Pulse:  [] 90  Resp:  [7-25] 15  BP: ()/(35-82) 100/51  SpO2:  [91 %-100 %] 96 %        Intake/Output:     Intake/Output Summary (Last 24 hours) at 3/1/2025 0911  Last data filed at 3/1/2025 0500      Gross per 24 hour   Intake 6206.67 ml   Output 5845 ml   Net 361.67 ml       Data Review:       Labs:            Recent Labs   Lab 02/28/25  1138 02/28/25  1833 03/01/25  0439   RBC 3.26* 3.39* 2.91*   HGB 9.5* 9.7* 8.2*   HCT 30.4* 31.8* 27.0*   MCV 93.3 93.8 92.8   MCH 29.1 28.6 28.2   MCHC 31.3 30.5* 30.4*   RDW 14.2 14.2 14.9   NEPRELIM 19.38* 14.99* 12.06*   WBC 21.8* 16.5* 13.4*   .0* 497.0* 459.0*                  Recent Labs   Lab  02/28/25 1833 02/28/25 2034 03/01/25 0439   * 161* 256*   BUN 46* 31* 45*   CREATSERUM 1.89* 1.98* 2.31*   EGFRCR 26* 24* 20*   CA 8.5* 8.5* 8.3*    134* 134*   K 5.0 5.7* 5.0    109 104   CO2 19.0* 13.0* 20.0*               Recent Labs   Lab 02/28/25  1138 02/28/25 1833 03/01/25 0439   ALT 23 16 13   AST 17 15 12   ALB 4.6 3.1* 3.0*     3/1 GEN SURGERY NOTE     Assessment and Plan:   Lizeth Mcgill is a(n) 84 year old female    POD #1 status post exploratory laparotomy with lysis of adhesions for strangulated small bowel  Patient doing well  Pulmonary toilet  Ambulate  Patient with positive blood cultures  Will continue IV antibiotics  Repeat laboratory data later today  Follow renal function improved urine output  Continue NG tube  VTE prophylaxis  Discussed intraoperative findings with the patient        Objective:   Blood pressure 120/87, pulse 93, temperature 98.1 °F (36.7 °C), temperature source Temporal, resp. rate 17, weight 112 lb 7 oz (51 kg), SpO2 95%,  I/O last 3 completed shifts:  In: 6206.7 [I.V.:5496.7; Blood:350; NG/GT:60; IV PIGGYBACK:300]  Out: 5845 [Urine:1105; Emesis/NG output:4700; Blood:40]     3/2 HOSPITALIST NOTE        Assessment/Plan:   This is a 84-year-old female with history of ovarian cancer, type 2 diabetes, hypertension, hyperlipidemia, who presents to the hospital for evaluation of severe nausea, episodes of emesis, diarrhea earlier in the week but now resolved.      High grade Small bowel obstruction   Ventral hernia   -POD # 2 lysis of adhesions, repair of ventral hernia   -NG tube--still with a lot of output overnight, recording 500cc  -not requiring vasopressors--dc order  -arellano removed 3/1, she is voiding  -encourage chair and incentive spirometer  -ambulate, PT/OT when able   -arellano intact for I/O   -diet advancement per surgery when appropriate      HERI  Hyperkalemia--resolved  -Cr improving, but monitor closely  -ok to change fluids to maintenance  but can use PRN bolus if we need to     Hyperglycemia  -not diabetic, stable  -suspect stress response      Positive blood culture  -on zosyn   -suspect could be contaminant and not pathologic culture   -will follow results of identification      Ovarian cancer  -follows Dr. Tian Lawson oncology  -completed neoadjuvant chemotherapy with carboplatin and paclitaxel for six cycles followed by IKER-BSO and adjuvant chemotherapy with carbo/taxol.   -continues with follow up      Hypertension  -hold BP meds given hypotension      Hypothryoidism  -ok to hold synthroid for now      DVT prophylaxis: heparin sub q after surgery   Code status: full code      Sukh Almodovar [facility] Hospitalist          Subjective:      Awake, alert. Sitting in chair  No major pain   Vitals stable     OBJECTIVE:     Blood pressure 123/59, pulse 95, temperature 98.2 °F (36.8 °C), temperature source Temporal, resp. rate 16, weight 114 lb 10.2 oz (52 kg), SpO2 93%, not currently breastfeeding.     Temp:  [97.8 °F (36.6 °C)-98.2 °F (36.8 °C)] 98.2 °F (36.8 °C)  Pulse:  [] 95  Resp:  [13-22] 16  BP: ()/(45-80) 123/59  SpO2:  [88 %-98 %] 93 %        Intake/Output:     Intake/Output Summary (Last 24 hours) at 3/2/2025 0930  Last data filed at 3/2/2025 0524      Gross per 24 hour   Intake 2468 ml   Output 2000 ml   Net 468 ml         Data Review:       Labs:            Recent Labs   Lab 03/01/25 0439 03/01/25  1504 03/02/25  0554   RBC 2.91* 3.41* 2.89*   HGB 8.2* 9.8* 8.2*   HCT 27.0* 31.9* 26.6*   MCV 92.8 93.5 92.0   MCH 28.2 28.7 28.4   MCHC 30.4* 30.7* 30.8*   RDW 14.9 15.1* 15.0   NEPRELIM 12.06* 12.22* 13.01*   WBC 13.4* 13.8* 14.9*   .0* 439.0 427.0                  Recent Labs   Lab 03/01/25 0439 03/01/25  1504 03/02/25  0554   * 120* 92   BUN 45* 37* 42*   CREATSERUM 2.31* 2.28* 1.62*   EGFRCR 20* 21* 31*   CA 8.3* 8.6* 7.8*   * 135* 140   K 5.0 4.9 4.6    106 112   CO2 20.0* 17.0* 19.0*      3/2 GEN SURGERY NOTE    Subjective:   Lizeth Mcgill is a(n) 84 year old   female without complaints     Assessment and Plan:   Lizeth Mcgill is a(n) 84 year old female    POD #2 status post exploratory laparotomy with lysis of adhesions for strangulated small bowel  Patient doing well  Pulmonary toilet  Ambulate  Patient with positive blood cultures  Will continue IV antibiotics  Renal function improving  Follow WBC  Continue NG tube  VTE prophylaxis  Transfer to fourth floo        Objective:   Blood pressure 123/59, pulse 95, temperature 98.2 °F (36.8 °C), temperature source Temporal, resp. rate 16, weight 114 lb 10.2 oz (52 kg), SpO2 93%, not currently breastfeeding. I/O last 3 completed shifts:  In: 3699.7 [P.O.:30; I.V.:3104.7; NG/GT:90; IV PIGGYBACK:475]  Out: 3400 [Urine:2400; Emesis/NG output:1000]      General appearance: alert, appears stated age, and cooperative  Neck: no JVD and supple, symmetrical, trachea midline  Pulmonary: No labored breathing, equal respiratory excursion  Cardiovascular: regular rate and rhythm  Abdominal: soft, non-tender; bowel sounds normal; no masses,  no organomegaly and Prevena wound dressing in place  No flatus, no BM  Extremities: extremities normal, atraumatic, no cyanosis or edema       [1] (Not in a hospital admission)

## 2025-03-05 NOTE — SIGNIFICANT EVENT
RRT    *See RRT Documentation Record*    Reason the RRT was called: Pt complained of SOB and oxygen levels dropped to low 80's  Assessment of patient leading up to RRT: Oxygen levels, BP, Temp, Auscultated lungs  Interventions/Testing: ECG, Chest XR  Patient Outcome/Disposition: Patient remained on the unit and was placed on 1 L Nasal canula.  Family Notified: no  Name of family notified:

## 2025-03-06 ENCOUNTER — APPOINTMENT (OUTPATIENT)
Dept: GENERAL RADIOLOGY | Facility: HOSPITAL | Age: 85
DRG: 336 | End: 2025-03-06
Attending: SURGERY
Payer: MEDICARE

## 2025-03-06 LAB
ALBUMIN SERPL-MCNC: 3.2 G/DL (ref 3.2–4.8)
ALP LIVER SERPL-CCNC: 49 U/L
ALT SERPL-CCNC: <7 U/L
ANION GAP SERPL CALC-SCNC: 11 MMOL/L (ref 0–18)
AST SERPL-CCNC: <8 U/L (ref ?–34)
BACTERIA BLD CULT: POSITIVE
BASOPHILS # BLD: 0 X10(3) UL (ref 0–0.2)
BASOPHILS NFR BLD: 0 %
BILIRUB DIRECT SERPL-MCNC: <0.1 MG/DL (ref ?–0.3)
BILIRUB SERPL-MCNC: 0.2 MG/DL (ref 0.2–1.1)
BILIRUB UR QL: NEGATIVE
BUN BLD-MCNC: 23 MG/DL (ref 9–23)
BUN/CREAT SERPL: 23.2 (ref 10–20)
CALCIUM BLD-MCNC: 7.3 MG/DL (ref 8.7–10.4)
CHLORIDE SERPL-SCNC: 106 MMOL/L (ref 98–112)
CLARITY UR: CLEAR
CO2 SERPL-SCNC: 21 MMOL/L (ref 21–32)
COLOR UR: YELLOW
CREAT BLD-MCNC: 0.99 MG/DL
DEPRECATED RDW RBC AUTO: 51.4 FL (ref 35.1–46.3)
EGFRCR SERPLBLD CKD-EPI 2021: 56 ML/MIN/1.73M2 (ref 60–?)
EOSINOPHIL # BLD: 0 X10(3) UL (ref 0–0.7)
EOSINOPHIL NFR BLD: 0 %
ERYTHROCYTE [DISTWIDTH] IN BLOOD BY AUTOMATED COUNT: 15.2 % (ref 11–15)
GLUCOSE BLD-MCNC: 135 MG/DL (ref 70–99)
GLUCOSE BLDC GLUCOMTR-MCNC: 107 MG/DL (ref 70–99)
GLUCOSE BLDC GLUCOMTR-MCNC: 119 MG/DL (ref 70–99)
GLUCOSE BLDC GLUCOMTR-MCNC: 124 MG/DL (ref 70–99)
GLUCOSE BLDC GLUCOMTR-MCNC: 137 MG/DL (ref 70–99)
GLUCOSE BLDC GLUCOMTR-MCNC: 145 MG/DL (ref 70–99)
GLUCOSE UR-MCNC: NORMAL MG/DL
HCT VFR BLD AUTO: 26 %
HGB BLD-MCNC: 8.2 G/DL
HGB UR QL STRIP.AUTO: NEGATIVE
KETONES UR-MCNC: NEGATIVE MG/DL
LEUKOCYTE ESTERASE UR QL STRIP.AUTO: NEGATIVE
LYMPHOCYTES NFR BLD: 1.32 X10(3) UL (ref 1–4)
LYMPHOCYTES NFR BLD: 7 %
MAGNESIUM SERPL-MCNC: 2.5 MG/DL (ref 1.6–2.6)
MAGNESIUM SERPL-MCNC: 2.5 MG/DL (ref 1.6–2.6)
MCH RBC QN AUTO: 29.9 PG (ref 26–34)
MCHC RBC AUTO-ENTMCNC: 31.5 G/DL (ref 31–37)
MCV RBC AUTO: 94.9 FL
MONOCYTES # BLD: 0.38 X10(3) UL (ref 0.1–1)
MONOCYTES NFR BLD: 2 %
MORPHOLOGY: NORMAL
NEUTROPHILS # BLD AUTO: 14.61 X10 (3) UL (ref 1.5–7.7)
NEUTROPHILS NFR BLD: 90 %
NEUTS BAND NFR BLD: 1 %
NEUTS HYPERSEG # BLD: 17.11 X10(3) UL (ref 1.5–7.7)
NITRITE UR QL STRIP.AUTO: NEGATIVE
OSMOLALITY SERPL CALC.SUM OF ELEC: 292 MOSM/KG (ref 275–295)
PH UR: 6 [PH] (ref 5–8)
PHOSPHATE SERPL-MCNC: 3.4 MG/DL (ref 2.4–5.1)
PLATELET # BLD AUTO: 448 10(3)UL (ref 150–450)
PLATELET MORPHOLOGY: NORMAL
POTASSIUM SERPL-SCNC: 4.4 MMOL/L (ref 3.5–5.1)
POTASSIUM SERPL-SCNC: 4.4 MMOL/L (ref 3.5–5.1)
PROT SERPL-MCNC: 5.1 G/DL (ref 5.7–8.2)
PROT UR-MCNC: 20 MG/DL
RBC # BLD AUTO: 2.74 X10(6)UL
SODIUM SERPL-SCNC: 138 MMOL/L (ref 136–145)
SP GR UR STRIP: >1.03 (ref 1–1.03)
TOTAL CELLS COUNTED BLD: 100
UROBILINOGEN UR STRIP-ACNC: NORMAL
WBC # BLD AUTO: 18.8 X10(3) UL (ref 4–11)

## 2025-03-06 PROCEDURE — 83735 ASSAY OF MAGNESIUM: CPT | Performed by: HOSPITALIST

## 2025-03-06 PROCEDURE — S0028 INJECTION, FAMOTIDINE, 20 MG: HCPCS | Performed by: SURGERY

## 2025-03-06 PROCEDURE — 3E0336Z INTRODUCTION OF NUTRITIONAL SUBSTANCE INTO PERIPHERAL VEIN, PERCUTANEOUS APPROACH: ICD-10-PCS | Performed by: SURGERY

## 2025-03-06 PROCEDURE — 84132 ASSAY OF SERUM POTASSIUM: CPT | Performed by: HOSPITALIST

## 2025-03-06 PROCEDURE — 97116 GAIT TRAINING THERAPY: CPT

## 2025-03-06 PROCEDURE — 81001 URINALYSIS AUTO W/SCOPE: CPT | Performed by: SURGERY

## 2025-03-06 PROCEDURE — 85007 BL SMEAR W/DIFF WBC COUNT: CPT | Performed by: SURGERY

## 2025-03-06 PROCEDURE — 80048 BASIC METABOLIC PNL TOTAL CA: CPT | Performed by: SURGERY

## 2025-03-06 PROCEDURE — 80076 HEPATIC FUNCTION PANEL: CPT | Performed by: SURGERY

## 2025-03-06 PROCEDURE — 82962 GLUCOSE BLOOD TEST: CPT

## 2025-03-06 PROCEDURE — 94760 N-INVAS EAR/PLS OXIMETRY 1: CPT

## 2025-03-06 PROCEDURE — 97110 THERAPEUTIC EXERCISES: CPT

## 2025-03-06 PROCEDURE — 85027 COMPLETE CBC AUTOMATED: CPT | Performed by: SURGERY

## 2025-03-06 PROCEDURE — 84100 ASSAY OF PHOSPHORUS: CPT | Performed by: SURGERY

## 2025-03-06 PROCEDURE — 74019 RADEX ABDOMEN 2 VIEWS: CPT | Performed by: SURGERY

## 2025-03-06 PROCEDURE — 85025 COMPLETE CBC W/AUTO DIFF WBC: CPT | Performed by: SURGERY

## 2025-03-06 PROCEDURE — 83735 ASSAY OF MAGNESIUM: CPT | Performed by: SURGERY

## 2025-03-06 RX ORDER — PANTOPRAZOLE SODIUM 40 MG/1
40 TABLET, DELAYED RELEASE ORAL
Status: DISCONTINUED | OUTPATIENT
Start: 2025-03-07 | End: 2025-03-08

## 2025-03-06 RX ORDER — AMLODIPINE BESYLATE 2.5 MG/1
2.5 TABLET ORAL NIGHTLY
Status: DISCONTINUED | OUTPATIENT
Start: 2025-03-06 | End: 2025-03-08

## 2025-03-06 RX ORDER — VANCOMYCIN HYDROCHLORIDE 125 MG/1
125 CAPSULE ORAL 4 TIMES DAILY
Qty: 40 CAPSULE | Refills: 0 | Status: SHIPPED | OUTPATIENT
Start: 2025-03-06 | End: 2025-03-16

## 2025-03-06 NOTE — PROGRESS NOTES
[facility]  part of [facility] Infectious Disease  Progress Note    Lizeth Mcgill Patient Status:  Inpatient    10/26/1940 MRN [id number]   Location [facility] 4W/SW/SE Attending Zabrina Malcolm MD   Hosp Day # 6 PCP Chong Cornejo MD     Subjective:  Patient seen/examined.  Up in bed.  Did have some N/V overnight.  Going to XR for AAS.    Objective:  Blood pressure 126/59, pulse 92, temperature 98.6 °F (37 °C), temperature source Oral, resp. rate 16, weight 114 lb 10.2 oz (52 kg), SpO2 95%, not currently breastfeeding.    Intake/Output:    Intake/Output Summary (Last 24 hours) at 3/6/2025 1000  Last data filed at 3/6/2025 0940  Gross per 24 hour   Intake 604 ml   Output 450 ml   Net 154 ml     Physical Exam:  General: Awake, alert, non-tox, NAD.  HEENT:  Oropharynx clear, trachea ML.  Heart: RRR S1S2 no murmurs.  Lungs: Essentially CTA b/l, no rhonchi, rales, wheezes.  Abdomen: Some distention.  Incisions clean.  Extremity: No edema.  Neurological: No focal deficits.  Derm:  Warm, dry, free from rashes.    Lab Data Review:  Lab Results   Component Value Date    WBC 18.8 2025    HGB 8.2 2025    HCT 26.0 2025    .0 2025    CREATSERUM 0.99 2025    BUN 23 2025     2025    K 4.4 2025    K 4.4 2025     2025    CO2 21.0 2025     2025    CA 7.3 2025    ALB 3.2 2025    ALKPHO 49 2025    BILT 0.2 2025    TP 5.1 2025    AST <8 2025    ALT <7 2025    MG 2.5 2025    MG 2.5 2025    PHOS 3.4 2025        Cultures:   1/2 blood cultures on admission with CoNS/staph hominis     Urine cultures negative  C.diff +/EIA negative    Radiology:  CONCLUSION:   1. Small left basilar pleural effusion with compressive atelectasis.  No evidence of pneumonia.   2. Large contrast filled retrocardiac hiatal hernia with contrast  distended esophagus likely related to reflux.   3. Multiple mild-to-moderately dilated fluid-filled loops of proximal and mid small bowel transitioning to mildly thickened nondilated small bowel loops in the left mid to lower abdomen.  No high-grade small-bowel obstruction or unequivocal pneumatosis.    Differential considerations include reactive inflammatory thickening of previously obstructed small bowel, or small bowel ischemia accounting for thickened loops at site of transition.   4. Small amount of ascites.  No focal fluid collection.   5. A left inguinal hernia containing fat and a minimally protruding small bowel loop.      Assessment and Plan:     Post-op leukocytosis with concern for post-op infection in this elderly woman who presented with 4 days of constipation/obstipation with N/V  - WBCs at 18.8K today and down slightly  - Found to have high grade bowel obstruction and went to OR for exploratory laparotomy, PANFILO, and repair of mesentery and ventral hernia  - IV zosyn was ongoing post-op through today  - C.diff +/EIA negative but given leukocytosis and liquid stools treatment started  - Vancomycin p.o. day #2 ongoing     2.  Single positive blood culture on admission for CoNS and staph hominis  - Consistent with contaminants     3.  Multiple medial comorbidities     4.  Disposition - inpatient.  Continue p.o. vancomycin empirically for now.  As she is testing positive for colonization, we may be able to step this down as WBCs normalize and stools become more formed.  To XR this a.m. given N/V to assure no recurrent obstruction.  Supportive care ongoing.  Trending temps and WBCs.  Will follow.    Shyanne Villagran DO, [facility] Infectious Disease  [phone]    3/6/2025  10:00 AM

## 2025-03-06 NOTE — PROGRESS NOTES
[facility] Hospitalist Progress Note     CC: Hospital Follow up    PCP: Chong Cornejo MD       Assessment/Plan:   This is a 84-year-old female with history of ovarian cancer, type 2 diabetes, hypertension, hyperlipidemia, who presents to the hospital for evaluation of severe nausea, episodes of emesis, diarrhea earlier in the week but now resolved.     Leukocytosis/ HR- SIRS v sepsis  - on broad abx zosyn stopped  3/5   - had 1/2 bcx S epi (contamintant) CXR - effusions/ atelectasis  - now w loose stools , also n/v ongoing but improved  - C diff toxin + EIA neg - colonization. Being treated for active infection w clinical sx  - given dose ertapenem x 1 today  - ID and surg managing abx  - pan CT done/ reviewed no acute focal findings effusion expected post op changes  - PCT 3/5 1.06  - repeat bcx 3/5 ngtd           High grade Small bowel obstruction   Ventral hernia  w strangulation  - sp emergent Exploratory laparotomy, lysis of adhesions, closure of mesenteric defect, repair of ventral hernia (fascial defect 4.5 cm) 2/28/25  -NG tube--out  -arellano removed 3/1, she is voiding  -encourage chair and incentive spirometer  -ambulate, PT/OT when able   -arellano intact for I/O   -diet advancement per surgery when appropriate on clears      HERI- improved   Hyperkalemia--resolved  -Cr improving, but monitor closely  -ok to change fluids to maintenance but can use PRN bolus if we need to  - Cr    Hyperglycemia  -not diabetic, stable  -suspect stress response     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 Tian Lawson oncology  -completed neoadjuvant chemotherapy with carboplatin and paclitaxel for six cycles followed by IKER-BSO and adjuvant chemotherapy with carbo/taxol.   -continues with follow up      Hypertension  - meds as able     Hypothryoidism  -meds resumed     DVT prophylaxis: heparin sub q after  surgery   Code status: full code      Zabrina Malcolm Hospitalist        Subjective:   Sx improved    OBJECTIVE:    Blood pressure 126/59, pulse 92, temperature 98.6 °F (37 °C), temperature source Oral, resp. rate 16, weight 114 lb 10.2 oz (52 kg), SpO2 95%, not currently breastfeeding.    Temp:  [98.1 °F (36.7 °C)-98.6 °F (37 °C)] 98.6 °F (37 °C)  Pulse:  [92-96] 92  Resp:  [16] 16  BP: (122-126)/(51-59) 126/59  SpO2:  [92 %-95 %] 95 %      Intake/Output:    Intake/Output Summary (Last 24 hours) at 3/6/2025 1724  Last data filed at 3/6/2025 0940  Gross per 24 hour   Intake 504 ml   Output 300 ml   Net 204 ml       Last 3 Weights   03/02/25 0500 114 lb 10.2 oz (52 kg)   03/01/25 0500 112 lb 7 oz (51 kg)   02/28/25 1030 111 lb (50.3 kg)   05/10/24 1239 103 lb (46.7 kg)   05/07/24 1107 103 lb (46.7 kg)       /59 (BP Location: Right arm)   Pulse 92   Temp 98.6 °F (37 °C) (Oral)   Resp 16   Wt 114 lb 10.2 oz (52 kg)   SpO2 95%   BMI 24.81 kg/m²   General: Awake, appears comfortable  HEENT: NG in place  Lungs: clear to ausculation bilaterally  Heart: Regular rate and rhythm  Abdomen: soft +BS  Extremities: No edema    Data Review:       Labs:     Recent Labs   Lab 03/04/25  0519 03/05/25  0556 03/06/25  0607   RBC 3.02* 3.46* 2.74*   HGB 8.5* 10.2* 8.2*   HCT 27.3* 31.7* 26.0*   MCV 90.4 91.6 94.9   MCH 28.1 29.5 29.9   MCHC 31.1 32.2 31.5   RDW 15.0 15.1* 15.2*   NEPRELIM 10.68* 15.57* 14.61*   WBC 14.6* 21.0* 18.8*   .0 533.0* 448.0         Recent Labs   Lab 03/04/25  0519 03/05/25  0556 03/06/25  0607   * 172* 135*   BUN 20 19 23   CREATSERUM 0.86 1.02 0.99   EGFRCR 67 54* 56*   CA 8.0* 7.7* 7.3*    142 138   K 4.0  4.0 3.8 4.4  4.4    106 106   CO2 23.0 24.0 21.0       Recent Labs   Lab 02/28/25  1138 02/28/25  1833 03/01/25  0439 03/04/25  0519 03/06/25  0607   ALT 23 16 13 8* <7*   AST 17 15 12 <8 <8   ALB 4.6 3.1* 3.0* 3.4 3.2         Imaging:  XR ABDOMEN OBSTRUCTIVE SERIES  ROUTINE(2 VW)(CPT=74019)    Result Date: 3/6/2025  CONCLUSION:  1. Nonspecific nonobstructive abdominal gas pattern. 2. Mild stool throughout the colon consistent constipation/fecal retention.    Dictated by (CST): Ellis Agarwal MD on 3/06/2025 at 11:49 AM     Finalized by (CST): Ellis Agarwal MD on 3/06/2025 at 11:52 AM          CT CHEST+ABDOMEN+PELVIS(ALL CNTRST ONLY)(CPT=71260/34802)    Result Date: 3/5/2025  CONCLUSION:  1. Small left basilar pleural effusion with compressive atelectasis.  No evidence of pneumonia. 2. Large contrast filled retrocardiac hiatal hernia with contrast distended esophagus likely related to reflux. 3. Multiple mild-to-moderately dilated fluid-filled loops of proximal and mid small bowel transitioning to mildly thickened nondilated small bowel loops in the left mid to lower abdomen.  No high-grade small-bowel obstruction or unequivocal pneumatosis.  Differential considerations include reactive inflammatory thickening of previously obstructed small bowel, or small bowel ischemia accounting for thickened loops at site of transition. 4. Small amount of ascites.  No focal fluid collection. 5. A left inguinal hernia containing fat and a minimally protruding small bowel loop.     Dictated by (CST): Néstor Phan MD on 3/05/2025 at 2:25 PM     Finalized by (CST): Néstor Phan MD on 3/05/2025 at 2:55 PM          XR CHEST AP PORTABLE  (CPT=71045)    Result Date: 3/5/2025  CONCLUSION:  1.  Large hiatal hernia. 2.  Small left pleural effusion similar to prior exam.  Small bibasilar pulmonary opacities, suggesting atelectasis. 3.  Dilated small bowel loop in the visualized left upper quadrant.  Interval removal of feeding tube.  [facility] provided a preliminary report for this examination. This final report agrees with their preliminary findings.     Dictated by (CST): Lavon Raza MD on 3/05/2025 at 6:27 AM     Finalized by (CST): Lavon Raza MD on 3/05/2025 at 6:29 AM              Meds:      vancomycin  125 mg Oral QID    DULoxetine  60 mg Oral Nightly    levothyroxine  50 mcg Oral QAM    atorvastatin  20 mg Oral Nightly    famotidine  20 mg Intravenous Daily    insulin regular human  1-5 Units Subcutaneous 4 times per day    ondansetron  4 mg Intravenous Once    heparin  5,000 Units Subcutaneous Q12H      dextrose 10%      adult 3 in 1 TPN      potassium chloride in dextrose 5%-sodium chloride 0.45% 42 mL/hr at 03/06/25 0556       dextrose 10%    ondansetron    metoclopramide **OR** metoclopramide    acetaminophen    phenol    glucose **OR** glucose **OR** glucose-vitamin C **OR** dextrose **OR** glucose **OR** glucose **OR** glucose-vitamin C    morphINE **OR** morphINE

## 2025-03-06 NOTE — DIETARY NOTE
Brief Nutrition Note: Full nutritional assessment dated 3/4--refer to for more details.    3/6/25 UPDATE: POD # 6--diarrhea (C diff) and vomiting. Order to replace NGT.  Attempted but coiled.  Discussed with surgeon TPN start and surgeon placed order. Pt with a port that can be used for TPN delivery. TPN written as follows:     -Parenteral Nutrition: 1600 ml volume. 70g protein (280 kcal), 118g dextrose (400 kcal), and 40g lipids (400 kcal). Provides a total of 1000 kcal and meets 69% of min total estimated energy needs and 100 % of estimated protein needs.     Recent Labs     03/04/25  0519 03/05/25  0556 03/06/25  0607   * 172* 135*   BUN 20 19 23   CREATSERUM 0.86 1.02 0.99   CA 8.0* 7.7* 7.3*   MG 2.0 1.7 2.5  2.5    142 138   K 4.0  4.0 3.8 4.4  4.4    106 106   CO2 23.0 24.0 21.0   PHOS 2.5  2.5 3.6 3.4   OSMOCALC 299* 300* 292        NUTRITION PRESCRIPTION:   Estimated Nutrition needs: --dosing wt of 52 kg - wt taken on 3/2  Calories: 0430-7289 calories/day (28-30 calories per kg Dosing wt)  Protein: 68-73 g protein/day (1.3-1.4 g protein/kg Dosing wt)    Ольга Bernal RD, LDN   Clinical Dietitian [id number]

## 2025-03-06 NOTE — PLAN OF CARE
Lizeth Maher is alert and oriented x 4, she is ambulating up stand by with a walker in the halls. Patient having multiple loose bowel movements throughout the day. Patient denies pain of nausea on a clear liquid diet. Call light within reach.  Problem: GASTROINTESTINAL - ADULT  Goal: Minimal or absence of nausea and vomiting  Description: INTERVENTIONS:  - Maintain adequate hydration with IV or PO as ordered and tolerated  - Nasogastric tube to low intermittent suction as ordered  - Evaluate effectiveness of ordered antiemetic medications  - Provide nonpharmacologic comfort measures as appropriate  - Advance diet as tolerated, if ordered  - Obtain nutritional consult as needed  - Evaluate fluid balance  Outcome: Progressing  Goal: Maintains or returns to baseline bowel function  Description: INTERVENTIONS:  - Assess bowel function  - Maintain adequate hydration with IV or PO as ordered and tolerated  - Evaluate effectiveness of GI medications  - Encourage mobilization and activity  - Obtain nutritional consult as needed  - Establish a toileting routine/schedule  - Consider collaborating with pharmacy to review patient's medication profile  Outcome: Progressing     Problem: SKIN/TISSUE INTEGRITY - ADULT  Goal: Incision(s), wounds(s) or drain site(s) healing without S/S of infection  Description: INTERVENTIONS:  - Assess and document risk factors for pressure ulcer development  - Assess and document skin integrity  - Assess and document dressing/incision, wound bed, drain sites and surrounding tissue  - Implement wound care per orders  - Initiate isolation precautions as appropriate  - Initiate Pressure Ulcer prevention bundle as indicated  Outcome: Progressing     Problem: PAIN - ADULT  Goal: Verbalizes/displays adequate comfort level or patient's stated pain goal  Description: INTERVENTIONS:  - Encourage pt to monitor pain and request assistance  - Assess pain using appropriate pain scale  - Administer analgesics based  on type and severity of pain and evaluate response  - Implement non-pharmacological measures as appropriate and evaluate response  - Consider cultural and social influences on pain and pain management  - Manage/alleviate anxiety  - Utilize distraction and/or relaxation techniques  - Monitor for opioid side effects  - Notify MD/LIP if interventions unsuccessful or patient reports new pain  - Anticipate increased pain with activity and pre-medicate as appropriate  Outcome: Progressing     Problem: Patient Centered Care  Goal: Patient preferences are identified and integrated in the patient's plan of care  Description: Interventions:  - What would you like us to know as we care for you? I live at [facility]  - Provide timely, complete, and accurate information to patient/family  - Incorporate patient and family knowledge, values, beliefs, and cultural backgrounds into the planning and delivery of care  - Encourage patient/family to participate in care and decision-making at the level they choose  - Honor patient and family perspectives and choices  Outcome: Progressing     Problem: Patient/Family Goals  Goal: Patient/Family Long Term Goal  Description: Patient's Long Term Goal: Discharge home    Interventions:  - Regain baseline bowel function  - Tolerate adequate oral food and liquid intake  - Manage pain after surgery  - See additional Care Plan goals for specific interventions  Outcome: Progressing  Goal: Patient/Family Short Term Goal  Description: Patient's Short Term Goal: Regain bowel function after surgery    Interventions:   - Bowel rest with slow diet advancement as tolerated.  - Intermittent ambulation out of bed  - NG tube for bowel decompression  - See additional Care Plan goals for specific interventions  Outcome: Progressing     Problem: RISK FOR INFECTION - ADULT  Goal: Absence of fever/infection during anticipated neutropenic period  Description: INTERVENTIONS  - Monitor WBC  -  Administer growth factors as ordered  - Implement neutropenic guidelines  Outcome: Progressing     Problem: SAFETY ADULT - FALL  Goal: Free from fall injury  Description: INTERVENTIONS:  - Assess pt frequently for physical needs  - Identify cognitive and physical deficits and behaviors that affect risk of falls.  - [place] fall precautions as indicated by assessment.  - Educate pt/family on patient safety including physical limitations  - Instruct pt to call for assistance with activity based on assessment  - Modify environment to reduce risk of injury  - Provide assistive devices as appropriate  - Consider OT/PT consult to assist with strengthening/mobility  - Encourage toileting schedule  Outcome: Progressing     Problem: DISCHARGE PLANNING  Goal: Discharge to home or other facility with appropriate resources  Description: INTERVENTIONS:  - Identify barriers to discharge w/pt and caregiver  - Include patient/family/discharge partner in discharge planning  - Arrange for needed discharge resources and transportation as appropriate  - Identify discharge learning needs (meds, wound care, etc)  - Arrange for interpreters to assist at discharge as needed  - Consider post-discharge preferences of patient/family/discharge partner  - Complete POLST form as appropriate  - Assess patient's ability to be responsible for managing their own health  - Refer to Case Management Department for coordinating discharge planning if the patient needs post-hospital services based on physician/LIP order or complex needs related to functional status, cognitive ability or social support system  Outcome: Progressing

## 2025-03-06 NOTE — DISCHARGE INSTRUCTIONS
Dr. Schroeder  [phone]    DISCHARGE INSTRUCTIONS-OPEN BOWEL RESECTION    Activity can be resumed as tolerated including walking, stairs, light exercise. Heavy lifting (i.e. objects > 15-20 lbs.) is to be avoided for 6 weeks.  Normal time off work is 2-4 weeks.    Incisional and “gas” pains are commonly of moderate intensity in the first 24-48 hours and gradually improve over the initial post-operative week.   Incisional  pains are commonly of moderate intensity in the first 24-48 hours and gradually improve over the initial post-operative week.  Please take tylenol extra strength 2   500mg tabs every 8 hours around the clock.     Prescription pain medication (Tramadol) should be used initially according to the pain experienced and gradually weaned over the next few days.  Prescription pain medication can make you nauseated, light-headed and constipated: it should be taken with food and discontinued if side-effects develop.  A prescription for  stool softener  (Colace) is helpful to avoid constipation. Take 1 orally twice a day.   If  no bowel movement within 48 hours, MOM 30 mls may be helpful.    Your diet should consist low residual diet for 3 weeks then transition to a high-fiber diet.       Clean your wound if staples are present with half-strength hydrogen peroxide and Neosporin then cover with dry gauze daily.  You may shower in 48 hours, no bath or swimming for 4 weeks from your surgery date.    No driving for 7 days or until off pain medication.      Activity after surgery  After surgery, take it easy for the rest of the day. If you had general anesthesia, don’t use machinery or power tools, drink alcohol, or make any major decisions for at least the first 24 hours.  Don’t drive while you are still taking opioid pain medication, and don’t drive u.ntil you are able to step firmly on the brake pedal without hesitation.  Ask others to help with chores and errands while you recover.  Don’t lift anything  heavier than 10 pounds until your doctor says it’s OK.  Don’t mow the lawn, shovel snow, use a vacuum , or do other strenuous activities until your doctor says it’s okay.  Walk as often as you feel able.  Continue the coughing and deep breathing exercises that you learned in the hospital.  Ask your doctor when you can expect to return to work.  Avoid constipation:  Drink 6-8 glasses of water a day, unless otherwise instructed.  Use a laxative or a mild stool softener as instructed by your doctor.  Call your doctor immediately if you have any of the following:  Chills  Fever above 101.5°F or 38.5°C    Redness, swelling, increasing pain, pus, or a foul smell at the incision site  Dark or rust-colored urine  Stool that is shanita-colored or light in color instead of brown  Increasing abdominal pain  persistent nausea or bowel problems, uncontrolled pain or poor appetite      you should contact the office or the 24-hour answering service.      Contact the office tomorrow to make a follow appointment   with my physician assistant Joby FERRIS on Tuesday, March 25, 2025.        GREGORY BENITES JR., MD. [facility]  GENERAL SURGERY  [facility]  OFFICE [phone]    3/6/2025  2:55 PM

## 2025-03-06 NOTE — PLAN OF CARE
A&ox4. 1L o2. Heparin for dvt prophylaxis. 1 episode of emesis overnight. PRN zofran and reglan for nausea. Hat in toilet for UA. NPO. Q6 accuchecks. IVF to R chest port. Midline staples CHAPIN.Call light in reach.   Problem: GASTROINTESTINAL - ADULT  Goal: Minimal or absence of nausea and vomiting  Description: INTERVENTIONS:  - Maintain adequate hydration with IV or PO as ordered and tolerated  - Nasogastric tube to low intermittent suction as ordered  - Evaluate effectiveness of ordered antiemetic medications  - Provide nonpharmacologic comfort measures as appropriate  - Advance diet as tolerated, if ordered  - Obtain nutritional consult as needed  - Evaluate fluid balance  Outcome: Progressing  Goal: Maintains or returns to baseline bowel function  Description: INTERVENTIONS:  - Assess bowel function  - Maintain adequate hydration with IV or PO as ordered and tolerated  - Evaluate effectiveness of GI medications  - Encourage mobilization and activity  - Obtain nutritional consult as needed  - Establish a toileting routine/schedule  - Consider collaborating with pharmacy to review patient's medication profile  Outcome: Progressing     Problem: RISK FOR INFECTION - ADULT  Goal: Absence of fever/infection during anticipated neutropenic period  Description: INTERVENTIONS  - Monitor WBC  - Administer growth factors as ordered  - Implement neutropenic guidelines  Outcome: Progressing     Problem: Patient Centered Care  Goal: Patient preferences are identified and integrated in the patient's plan of care  Description: Interventions:  - What would you like us to know as we care for you? I live at [facility]  - Provide timely, complete, and accurate information to patient/family  - Incorporate patient and family knowledge, values, beliefs, and cultural backgrounds into the planning and delivery of care  - Encourage patient/family to participate in care and decision-making at the level they choose  -  Honor patient and family perspectives and choices  Outcome: Progressing

## 2025-03-06 NOTE — PROGRESS NOTES
[facility]  part of [facility]    Progress Note    Lizeth Mcgill Patient Status:  Inpatient    10/26/1940 MRN [id number]   Location [facility] 2W/SW Attending Sukh Leary, DO   Hosp Day # 6 PCP Chong Cornejo MD       Subjective:   Lizeth Mcgill is a(n) 84 year old   female without complaints    Assessment and Plan:   Lizeth Mcgill is a(n) 84 year old female    POD #6 status post exploratory laparotomy with lysis of adhesions for strangulated small bowel  Patient doing well  Pulmonary toilet  Ambulate  Patient with positive blood cultures possible contamination but will continue to treat  Renal function normalized.  C. difficile positive treat with oral vancomycin.  IV antibiotics  Begin clear liquid diet  Will advance to full liquid diet in a.m.  Obstructive series today with results ileus.  VTE prophylaxis        Objective:   Blood pressure 126/59, pulse 92, temperature 98.6 °F (37 °C), temperature source Oral, resp. rate 16, weight 114 lb 10.2 oz (52 kg), SpO2 95%, not currently breastfeeding. I/O last 3 completed shifts:  In: 2189.9 [I.V.:1789.9; IV PIGGYBACK:400]  Out: 550 [Urine:150; Emesis/NG output:400]     General appearance: alert, appears stated age, and cooperative  Neck: no JVD and supple, symmetrical, trachea midline  Pulmonary: No labored breathing, equal respiratory excursion  Cardiovascular: regular rate and rhythm  Abdominal: soft, non-tender; bowel sounds normal; no masses,  no organomegaly and Prevena wound dressing removed incision clean dry and intact  + flatus, + BM  Extremities: extremities normal, atraumatic, no cyanosis or edema          Results:       Recent Labs   Lab 25  0519 25  0556 25  0607   RBC 3.02* 3.46* 2.74*   HGB 8.5* 10.2* 8.2*   HCT 27.3* 31.7* 26.0*   MCV 90.4 91.6 94.9   MCH 28.1 29.5 29.9   MCHC 31.1 32.2 31.5   RDW 15.0 15.1* 15.2*   NEPRELIM 10.68* 15.57* 14.61*   WBC 14.6* 21.0* 18.8*   .0 533.0* 448.0      Lab Results   Component Value Date    INR 1.02 08/18/2022       Recent Labs   Lab 03/01/25  0439 03/01/25  1504 03/04/25  0519 03/05/25  0556 03/06/25  0607   *   < > 104* 172* 135*   BUN 45*   < > 20 19 23   CREATSERUM 2.31*   < > 0.86 1.02 0.99   CA 8.3*   < > 8.0* 7.7* 7.3*   ALB 3.0*  --  3.4  --  3.2   *   < > 143 142 138   K 5.0   < > 4.0  4.0 3.8 4.4  4.4      < > 110 106 106   CO2 20.0*   < > 23.0 24.0 21.0   ALKPHO 45*  --  59  --  49*   AST 12  --  <8  --  <8   ALT 13  --  8*  --  <7*   BILT 0.3  --  0.2  --  0.2   TP 5.0*  --  5.4*  --  5.1*    < > = values in this interval not displayed.             XR ABDOMEN OBSTRUCTIVE SERIES ROUTINE(2 VW)(CPT=74019)    Result Date: 3/6/2025  CONCLUSION:  1. Nonspecific nonobstructive abdominal gas pattern. 2. Mild stool throughout the colon consistent constipation/fecal retention.    Dictated by (CST): Ellis Agarwal MD on 3/06/2025 at 11:49 AM     Finalized by (CST): Ellis Agarwal MD on 3/06/2025 at 11:52 AM          CT CHEST+ABDOMEN+PELVIS(ALL CNTRST ONLY)(CPT=71260/44169)    Result Date: 3/5/2025  CONCLUSION:  1. Small left basilar pleural effusion with compressive atelectasis.  No evidence of pneumonia. 2. Large contrast filled retrocardiac hiatal hernia with contrast distended esophagus likely related to reflux. 3. Multiple mild-to-moderately dilated fluid-filled loops of proximal and mid small bowel transitioning to mildly thickened nondilated small bowel loops in the left mid to lower abdomen.  No high-grade small-bowel obstruction or unequivocal pneumatosis.  Differential considerations include reactive inflammatory thickening of previously obstructed small bowel, or small bowel ischemia accounting for thickened loops at site of transition. 4. Small amount of ascites.  No focal fluid collection. 5. A left inguinal hernia containing fat and a minimally protruding small bowel loop.     Dictated by (CST): Néstor Phan MD on  3/05/2025 at 2:25 PM     Finalized by (CST): Néstor Phan MD on 3/05/2025 at 2:55 PM          XR CHEST AP PORTABLE  (CPT=71045)    Result Date: 3/5/2025  CONCLUSION:  1.  Large hiatal hernia. 2.  Small left pleural effusion similar to prior exam.  Small bibasilar pulmonary opacities, suggesting atelectasis. 3.  Dilated small bowel loop in the visualized left upper quadrant.  Interval removal of feeding tube.  [facility] provided a preliminary report for this examination. This final report agrees with their preliminary findings.     Dictated by (CST): Lavon Raza MD on 3/05/2025 at 6:27 AM     Finalized by (CST): Lavon Raza MD on 3/05/2025 at 6:29 AM         EKG 12 Lead    Result Date: 3/5/2025  Sinus tachycardia with Premature supraventricular complexes Otherwise normal ECG When compared with ECG of 28-FEB-2025 12:37, Premature supraventricular complexes are now Present ST now depressed in Anterior leads Nonspecific T wave abnormality no longer evident in Inferior leads T wave inversion no longer evident in Lateral leads Confirmed by SARAH HIGGINBOTHAM JOHN ([id number]) on 3/5/2025 1:33:24 PM       Time spent in direct patient contact and decision making as well as counseling/coordination of care:    42338- 27 min    GREGORY BENITES JR., MD. [facility]    3/6/2025  1:18 PM
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
## 2025-03-06 NOTE — PHYSICAL THERAPY NOTE
PHYSICAL THERAPY TREATMENT NOTE - INPATIENT     Room Number: 463/463-A       Presenting Problem: intestinal obstruction with ventral hernia  Co-Morbidities : ovarian ca, HTN, CHF, DM    Problem List  Principal Problem:    Complete intestinal obstruction, unspecified cause (HCC)  Active Problems:    Hypotension    HERI (acute kidney injury)    Acute cystitis without hematuria    Malnutrition (HCC)      PHYSICAL THERAPY ASSESSMENT   Patient demonstrates good  progress this session, goals  updated to reflect patient performance.      Patient is requiring supervision as a result of the following impairments: decreased functional strength, decreased endurance/aerobic capacity, impaired standing balance, decreased muscular endurance, and medical status.     Patient continues to function below baseline with bed mobility, transfers, gait, maintaining seated position, standing prolonged periods, and performing household tasks.  Next session anticipate patient to progress bed mobility, transfers, gait, maintaining seated position, standing prolonged periods, and performing household tasks.  Physical Therapy will continue to follow patient for duration of hospitalization.    Patient continues to benefit from continued skilled PT services: at discharge to promote prior level of function and safety with additional support and return home with home health PT.    PLAN DURING HOSPITALIZATION  Nursing Mobility Recommendation : 1 Assist  PT Device Recommendation: Rolling walker  PT Treatment Plan: Bed mobility, Body mechanics, Energy conservation, Endurance, Patient education, Family education, Gait training, Strengthening, Transfer training, Balance training  Frequency (Obs): 5x/week     SUBJECTIVE  I feel ok walking but it's exhausting I prefer to return to bed and rest after my PT session. Maybe I will sit up in a chair later.     OBJECTIVE  Precautions: Abdominal protective strategies, NG suction    WEIGHT BEARING RESTRICTION        PAIN ASSESSMENT   Ratin  Location: abd  Management Techniques: Body mechanics, Breathing techniques, Activity promotion, Relaxation, Repositioning    BALANCE  Static Sitting: Good  Dynamic Sitting: Fair +  Static Standing: Fair  Dynamic Standing: Fair -    ACTIVITY TOLERANCE                          O2 WALK  Oxygen Therapy  SPO2% Ambulation on Room Air: 96    AM-PAC '6-Clicks' INPATIENT SHORT FORM - BASIC MOBILITY  How much difficulty does the patient currently have...  Patient Difficulty: Turning over in bed (including adjusting bedclothes, sheets and blankets)?: None   Patient Difficulty: Sitting down on and standing up from a chair with arms (e.g., wheelchair, bedside commode, etc.): None   Patient Difficulty: Moving from lying on back to sitting on the side of the bed?: None   How much help from another person does the patient currently need...   Help from Another: Moving to and from a bed to a chair (including a wheelchair)?: A Little   Help from Another: Need to walk in hospital room?: A Little   Help from Another: Climbing 3-5 steps with a railing?: A Little     AM-PAC Score:  Raw Score: 21   Approx Degree of Impairment: 28.97%   Standardized Score (AM-PAC Scale): 50.25   CMS Modifier (G-Code): CJ    FUNCTIONAL ABILITY STATUS  Functional Mobility/Gait Assessment  Gait Assistance: Contact guard assist  Distance (ft): 250  Assistive Device: Rolling walker  Pattern:  (decreased step length and steve)  Rolling: supervision    Supine to Sit: supervision  Sit to Supine: supervision  Sit to Stand: supervision    Skilled Therapy Provided: Pt ed with bed mobility and transfers with SBA with RW. Pt ed log roll for abd protective strategies. Pt ed with gait progression 250' with RW with SBA with pt 02 sats 90% on room air. Pt ed with therex in bed for LE strength and ROM x 10 reps. Pt is on track to return home with [facility] PT as medical progress allows.     The patient's Approx Degree of Impairment: 28.97% has  been calculated based on documentation in the [facility] '6 clicks' Inpatient Daily Activity Short Form.  Research supports that patients with this level of impairment may benefit from [facility] PT.  Final disposition will be made by interdisciplinary medical team.    THERAPEUTIC EXERCISES  Lower Extremity Ankle pumps  Heel slides  LAQ     Position Sitting & Standing       Patient End of Session: Up in chair, With  staff, Needs met, Call light within reach, RN aware of session/findings, All patient questions and concerns addressed, Alarm set    CURRENT GOALS   Goals to be met by: 3/9/25  Patient Goal Patient's self-stated goal is: walk   Goal #1 Patient is able to demonstrate supine - sit EOB @ level: modified independent      Goal #1   Current Status  SBA   Goal #2 Patient is able to demonstrate transfers Sit to/from Stand at assistance level: modified independent with walker - rolling      Goal #2  Current Status  SBA with RW   Goal #3 Patient is able to ambulate 300 feet with assist device: walker - rolling at assistance level: modified independent   Goal #3   Current Status  ' with decreased step length and steve   Goal #4 Patient to demonstrate independence with home activity/exercise instructions provided to patient in preparation for discharge.   Goal #4   Current Status  progressing     Gait Training: 15 minutes  Therapeutic Exercise: 9 minutes

## 2025-03-07 LAB
ALBUMIN SERPL-MCNC: 3.3 G/DL (ref 3.2–4.8)
ALBUMIN/GLOB SERPL: 1.8 {RATIO} (ref 1–2)
ALP LIVER SERPL-CCNC: 45 U/L
ALT SERPL-CCNC: 7 U/L
ANION GAP SERPL CALC-SCNC: 8 MMOL/L (ref 0–18)
ANTIBODY SCREEN: NEGATIVE
AST SERPL-CCNC: 11 U/L (ref ?–34)
BASOPHILS # BLD: 0 X10(3) UL (ref 0–0.2)
BASOPHILS NFR BLD: 0 %
BILIRUB SERPL-MCNC: 0.2 MG/DL (ref 0.2–1.1)
BUN BLD-MCNC: 18 MG/DL (ref 9–23)
BUN/CREAT SERPL: 25 (ref 10–20)
CALCIUM BLD-MCNC: 7.4 MG/DL (ref 8.7–10.4)
CHLORIDE SERPL-SCNC: 108 MMOL/L (ref 98–112)
CO2 SERPL-SCNC: 23 MMOL/L (ref 21–32)
CREAT BLD-MCNC: 0.72 MG/DL
DEPRECATED RDW RBC AUTO: 52.6 FL (ref 35.1–46.3)
EGFRCR SERPLBLD CKD-EPI 2021: 82 ML/MIN/1.73M2 (ref 60–?)
EOSINOPHIL # BLD: 0.12 X10(3) UL (ref 0–0.7)
EOSINOPHIL NFR BLD: 1 %
ERYTHROCYTE [DISTWIDTH] IN BLOOD BY AUTOMATED COUNT: 15.5 % (ref 11–15)
GLOBULIN PLAS-MCNC: 1.8 G/DL (ref 2–3.5)
GLUCOSE BLD-MCNC: 130 MG/DL (ref 70–99)
GLUCOSE BLDC GLUCOMTR-MCNC: 117 MG/DL (ref 70–99)
GLUCOSE BLDC GLUCOMTR-MCNC: 120 MG/DL (ref 70–99)
GLUCOSE BLDC GLUCOMTR-MCNC: 128 MG/DL (ref 70–99)
GLUCOSE BLDC GLUCOMTR-MCNC: 145 MG/DL (ref 70–99)
GLUCOSE BLDC GLUCOMTR-MCNC: 189 MG/DL (ref 70–99)
HCT VFR BLD AUTO: 22.4 %
HGB BLD-MCNC: 6.7 G/DL
LYMPHOCYTES NFR BLD: 1.31 X10(3) UL (ref 1–4)
LYMPHOCYTES NFR BLD: 11 %
MAGNESIUM SERPL-MCNC: 2 MG/DL (ref 1.6–2.6)
MCH RBC QN AUTO: 28.8 PG (ref 26–34)
MCHC RBC AUTO-ENTMCNC: 29.9 G/DL (ref 31–37)
MCV RBC AUTO: 96.1 FL
METAMYELOCYTES # BLD: 0.24 X10(3) UL
METAMYELOCYTES NFR BLD: 2 %
MONOCYTES # BLD: 0.36 X10(3) UL (ref 0.1–1)
MONOCYTES NFR BLD: 3 %
MYELOCYTES # BLD: 0.12 X10(3) UL
MYELOCYTES NFR BLD: 1 %
NEUTROPHILS # BLD AUTO: 8.63 X10 (3) UL (ref 1.5–7.7)
NEUTROPHILS NFR BLD: 81 %
NEUTS BAND NFR BLD: 1 %
NEUTS HYPERSEG # BLD: 9.76 X10(3) UL (ref 1.5–7.7)
OSMOLALITY SERPL CALC.SUM OF ELEC: 292 MOSM/KG (ref 275–295)
PHOSPHATE SERPL-MCNC: 1.9 MG/DL (ref 2.4–5.1)
PLATELET # BLD AUTO: 365 10(3)UL (ref 150–450)
PLATELET MORPHOLOGY: NORMAL
POTASSIUM SERPL-SCNC: 4.3 MMOL/L (ref 3.5–5.1)
PROT SERPL-MCNC: 5.1 G/DL (ref 5.7–8.2)
RBC # BLD AUTO: 2.33 X10(6)UL
RH BLOOD TYPE: POSITIVE
SODIUM SERPL-SCNC: 139 MMOL/L (ref 136–145)
TOTAL CELLS COUNTED BLD: 100
TRIGL SERPL-MCNC: 215 MG/DL (ref 30–149)
WBC # BLD AUTO: 11.9 X10(3) UL (ref 4–11)

## 2025-03-07 PROCEDURE — 85025 COMPLETE CBC W/AUTO DIFF WBC: CPT | Performed by: HOSPITALIST

## 2025-03-07 PROCEDURE — 36430 TRANSFUSION BLD/BLD COMPNT: CPT

## 2025-03-07 PROCEDURE — 86920 COMPATIBILITY TEST SPIN: CPT

## 2025-03-07 PROCEDURE — 82962 GLUCOSE BLOOD TEST: CPT

## 2025-03-07 PROCEDURE — 85007 BL SMEAR W/DIFF WBC COUNT: CPT | Performed by: HOSPITALIST

## 2025-03-07 PROCEDURE — 86901 BLOOD TYPING SEROLOGIC RH(D): CPT | Performed by: HOSPITALIST

## 2025-03-07 PROCEDURE — 86900 BLOOD TYPING SEROLOGIC ABO: CPT | Performed by: HOSPITALIST

## 2025-03-07 PROCEDURE — 83735 ASSAY OF MAGNESIUM: CPT | Performed by: SURGERY

## 2025-03-07 PROCEDURE — 94760 N-INVAS EAR/PLS OXIMETRY 1: CPT

## 2025-03-07 PROCEDURE — 80053 COMPREHEN METABOLIC PANEL: CPT | Performed by: SURGERY

## 2025-03-07 PROCEDURE — 85027 COMPLETE CBC AUTOMATED: CPT | Performed by: HOSPITALIST

## 2025-03-07 PROCEDURE — 84100 ASSAY OF PHOSPHORUS: CPT | Performed by: SURGERY

## 2025-03-07 PROCEDURE — 84478 ASSAY OF TRIGLYCERIDES: CPT | Performed by: SURGERY

## 2025-03-07 PROCEDURE — 86850 RBC ANTIBODY SCREEN: CPT | Performed by: HOSPITALIST

## 2025-03-07 RX ORDER — SODIUM CHLORIDE 9 MG/ML
INJECTION, SOLUTION INTRAVENOUS ONCE
Status: COMPLETED | OUTPATIENT
Start: 2025-03-07 | End: 2025-03-07

## 2025-03-07 NOTE — SPIRITUAL CARE NOTE
Spiritual Care Visit Note    Patient Name: Lizeth Mcgill Date of Spiritual Care Visit: 25   : 10/26/1940 Primary Dx: Complete intestinal obstruction, unspecified cause (HCC)       Referred By: Referral From:     Spiritual Care Taxonomy:    Intended Effects: Tory affirmation    Methods: Accompany someone in their spiritual/Confucianism practice outside your tory tradition, Offer spiritual/Confucianism support    Interventions: Provde spiritual/Confucianism resources, Share words of hope and inspiration, Malcolm    Visit Type/Summary:     - Spiritual Care: Responded to a request for spiritual care and assessed the patient for spiritual care needs. Responded to a request via the on call phone Consulted with RN prior to visit. Patient and family expressed appreciation for  visit. Patient and son received Holy Communion.   remains available as needed for follow up.    Spiritual Care support can be requested via an Epic consult. For urgent/immediate needs, please contact the On Call  at: [place]: ext [phone]    Vikas HENSLEY  Chaplain Resident  [phone]

## 2025-03-07 NOTE — PROGRESS NOTES
[facility]  part of [facility]    Progress Note    Lizeth Mcgill Patient Status:  Inpatient    10/26/1940 MRN [id number]   Location [facility] 2W/SW Attending Sukh Leary, DO   Hosp Day # 7 PCP Chong Cornejo MD       Subjective:   Lizeth Mcgill is a(n) 84 year old   female without complaints. Accompanied by son.    Assessment and Plan:   Lizeth Mcgill is a(n) 84 year old female    POD #7 status post exploratory laparotomy with lysis of adhesions for strangulated small bowel    Doing well  C. Diff positive, positive blood cultures (contamination?)  Still incontinent of bowel  Tolerating FLD without nausea  ID for Antimicrobials  Patient doing well  Pulmonary toilet  Ambulate  Will advance diet for dinner. Encouraged patient to start slow.   Renal function normalized.      VTE prophylaxis        Objective:   Blood pressure 105/52, pulse 84, temperature 98.5 °F (36.9 °C), temperature source Oral, resp. rate 16, weight 114 lb 10.2 oz (52 kg), SpO2 91%, not currently breastfeeding. I/O last 3 completed shifts:  In: -   Out: 300 [Urine:300]     General appearance: alert, appears stated age, and cooperative  Neck: no JVD and supple, symmetrical, trachea midline  Pulmonary: No labored breathing, equal respiratory excursion  Cardiovascular: regular rate and rhythm  Abdominal: soft, non-tender; bowel sounds normal; no masses,  no organomegaly and staples in place no drainage.  + flatus, + BM  Extremities: extremities normal, atraumatic, no cyanosis or edema          Results:       Recent Labs   Lab 25  0519 25  0556 25  0607   RBC 3.02* 3.46* 2.74*   HGB 8.5* 10.2* 8.2*   HCT 27.3* 31.7* 26.0*   MCV 90.4 91.6 94.9   MCH 28.1 29.5 29.9   MCHC 31.1 32.2 31.5   RDW 15.0 15.1* 15.2*   NEPRELIM 10.68* 15.57* 14.61*   WBC 14.6* 21.0* 18.8*   .0 533.0* 448.0     Lab Results   Component Value Date    INR 1.02 2022       Recent Labs   Lab 25  0519  03/05/25  0556 03/06/25  0607 03/07/25  0526   * 172* 135* 130*   BUN 20 19 23 18   CREATSERUM 0.86 1.02 0.99 0.72   CA 8.0* 7.7* 7.3* 7.4*   ALB 3.4  --  3.2 3.3    142 138 139   K 4.0  4.0 3.8 4.4  4.4 4.3    106 106 108   CO2 23.0 24.0 21.0 23.0   ALKPHO 59  --  49* 45*   AST <8  --  <8 11   ALT 8*  --  <7* 7*   BILT 0.2  --  0.2 0.2   TP 5.4*  --  5.1* 5.1*             XR ABDOMEN OBSTRUCTIVE SERIES ROUTINE(2 VW)(CPT=74019)    Result Date: 3/6/2025  CONCLUSION:  1. Nonspecific nonobstructive abdominal gas pattern. 2. Mild stool throughout the colon consistent constipation/fecal retention.    Dictated by (CST): Ellis Agarwal MD on 3/06/2025 at 11:49 AM     Finalized by (CST): Ellis Agarwal MD on 3/06/2025 at 11:52 AM                 Time spent in direct patient contact and decision making as well as counseling/coordination of care:    25303- 35 min    At all points during my encounter with the patient my collaborating physician Dr. Romie Mckinley who is covering for Dr. Paul Schroeder  was available to answer questions and update the plan as necessary. The plan as stated previously is in agreement with the team.       Joby Patel PA-C  General Surgery  [facility]  03/07/25  1:04 PM        The patient was seen and examined by myself.  The above note was modified accordingly    Romie Mckinley MD [facility]  03/07/25  2:19 PM

## 2025-03-07 NOTE — PLAN OF CARE
Lizeth is alert/oriented. Vitals stable on room air. Advanced to low fiber diet for dinner, tolerating. Ambulating with standby assist and walker. Walking in halls this afternoon. Phos replaced. 1 unit PRBC for low hgb 6.7. Denies pain. Heparin and SCDs while in bed for DVT prophylaxis. Voiding. Last BM today, remains loose, somewhat soft. Bed low, locked, all safety measures in place.    Problem: GASTROINTESTINAL - ADULT  Goal: Minimal or absence of nausea and vomiting  Description: INTERVENTIONS:  - Maintain adequate hydration with IV or PO as ordered and tolerated  - Nasogastric tube to low intermittent suction as ordered  - Evaluate effectiveness of ordered antiemetic medications  - Provide nonpharmacologic comfort measures as appropriate  - Advance diet as tolerated, if ordered  - Obtain nutritional consult as needed  - Evaluate fluid balance  Outcome: Progressing  Goal: Maintains or returns to baseline bowel function  Description: INTERVENTIONS:  - Assess bowel function  - Maintain adequate hydration with IV or PO as ordered and tolerated  - Evaluate effectiveness of GI medications  - Encourage mobilization and activity  - Obtain nutritional consult as needed  - Establish a toileting routine/schedule  - Consider collaborating with pharmacy to review patient's medication profile  Outcome: Progressing     Problem: SKIN/TISSUE INTEGRITY - ADULT  Goal: Incision(s), wounds(s) or drain site(s) healing without S/S of infection  Description: INTERVENTIONS:  - Assess and document risk factors for pressure ulcer development  - Assess and document skin integrity  - Assess and document dressing/incision, wound bed, drain sites and surrounding tissue  - Implement wound care per orders  - Initiate isolation precautions as appropriate  - Initiate Pressure Ulcer prevention bundle as indicated  Outcome: Progressing     Problem: PAIN - ADULT  Goal: Verbalizes/displays adequate comfort level or patient's stated pain  goal  Description: INTERVENTIONS:  - Encourage pt to monitor pain and request assistance  - Assess pain using appropriate pain scale  - Administer analgesics based on type and severity of pain and evaluate response  - Implement non-pharmacological measures as appropriate and evaluate response  - Consider cultural and social influences on pain and pain management  - Manage/alleviate anxiety  - Utilize distraction and/or relaxation techniques  - Monitor for opioid side effects  - Notify MD/LIP if interventions unsuccessful or patient reports new pain  - Anticipate increased pain with activity and pre-medicate as appropriate  Outcome: Progressing     Problem: Patient Centered Care  Goal: Patient preferences are identified and integrated in the patient's plan of care  Description: Interventions:  - What would you like us to know as we care for you? I live at [facility]  - Provide timely, complete, and accurate information to patient/family  - Incorporate patient and family knowledge, values, beliefs, and cultural backgrounds into the planning and delivery of care  - Encourage patient/family to participate in care and decision-making at the level they choose  - Honor patient and family perspectives and choices  Outcome: Progressing     Problem: Patient/Family Goals  Goal: Patient/Family Long Term Goal  Description: Patient's Long Term Goal: Discharge home    Interventions:  - Regain baseline bowel function  - Tolerate adequate oral food and liquid intake  - Manage pain after surgery  - See additional Care Plan goals for specific interventions  Outcome: Progressing  Goal: Patient/Family Short Term Goal  Description: Patient's Short Term Goal: Regain bowel function after surgery    Interventions:   - Bowel rest with slow diet advancement as tolerated.  - Intermittent ambulation out of bed  - NG tube for bowel decompression  - See additional Care Plan goals for specific interventions  Outcome:  Progressing     Problem: RISK FOR INFECTION - ADULT  Goal: Absence of fever/infection during anticipated neutropenic period  Description: INTERVENTIONS  - Monitor WBC  - Administer growth factors as ordered  - Implement neutropenic guidelines  Outcome: Progressing     Problem: SAFETY ADULT - FALL  Goal: Free from fall injury  Description: INTERVENTIONS:  - Assess pt frequently for physical needs  - Identify cognitive and physical deficits and behaviors that affect risk of falls.  - Axis fall precautions as indicated by assessment.  - Educate pt/family on patient safety including physical limitations  - Instruct pt to call for assistance with activity based on assessment  - Modify environment to reduce risk of injury  - Provide assistive devices as appropriate  - Consider OT/PT consult to assist with strengthening/mobility  - Encourage toileting schedule  Outcome: Progressing     Problem: DISCHARGE PLANNING  Goal: Discharge to home or other facility with appropriate resources  Description: INTERVENTIONS:  - Identify barriers to discharge w/pt and caregiver  - Include patient/family/discharge partner in discharge planning  - Arrange for needed discharge resources and transportation as appropriate  - Identify discharge learning needs (meds, wound care, etc)  - Arrange for interpreters to assist at discharge as needed  - Consider post-discharge preferences of patient/family/discharge partner  - Complete POLST form as appropriate  - Assess patient's ability to be responsible for managing their own health  - Refer to Case Management Department for coordinating discharge planning if the patient needs post-hospital services based on physician/LIP order or complex needs related to functional status, cognitive ability or social support system  Outcome: Progressing

## 2025-03-07 NOTE — SPIRITUAL CARE NOTE
Spiritual Care Visit Note    Patient Name: Lizeth Mcgill Date of Spiritual Care Visit: 25   : 10/26/1940 Primary Dx: Complete intestinal obstruction, unspecified cause (HCC)       Referred By: Referral From:     Spiritual Care Taxonomy:    Intended Effects: Aligning care plan with patient's values    Methods: Collaborate with care team member, Offer spiritual/Orthodoxy support    Interventions: Active listening, Ask guided questions, Ask guided questions about cultural and Orthodoxy values, Connect someone with their laura community/clergy, Silent prayer    Visit Type/Summary:     - Spiritual Care: Consulted with RN prior to visit. Offered empathic listening and emotional support. Patient and family expressed appreciation for  visit. Provided information regarding how to contact Spiritual Care and left a Spiritual Care information card. Provided support for Patient's spiritual/Orthodoxy requests. Coordinated [employer] and verified NPO status.  remains available for follow up.    PENNY Mccabe CAMII   [id number]     Spiritual Care support can be requested via an Epic consult. For urgent/immediate needs, please contact the On Call  at: [place]: ext [phone]

## 2025-03-07 NOTE — PROGRESS NOTES
[facility]  part of [facility] Infectious Disease  Progress Note    Lizeth Mcgill Patient Status:  Inpatient    10/26/1940 MRN [id number]   Location [facility] 4W/SW/SE Attending Zabrina Malcolm MD   Hosp Day # 7 PCP Chong Cornejo MD     Subjective:  Patient seen/examined.  Up in bed, comfortable.  Some incontinence noted so p.o. vancomycin continues.        Objective:  Blood pressure 115/51, pulse 80, temperature 98.5 °F (36.9 °C), temperature source Oral, resp. rate 16, weight 114 lb 10.2 oz (52 kg), SpO2 97%, not currently breastfeeding.    Intake/Output:    Intake/Output Summary (Last 24 hours) at 3/7/2025 1527  Last data filed at 3/7/2025 1130  Gross per 24 hour   Intake 120 ml   Output 150 ml   Net -30 ml       Physical Exam:  General: Awake, alert, non-tox, NAD.  HEENT:  Oropharynx clear, trachea ML.  Heart: RRR S1S2 no murmurs.  Lungs: Essentially CTA b/l, no rhonchi, rales, wheezes.  Abdomen: Soft, mild distention.  Extremity: No edema.  Neurological: No focal deficits.  Derm:  Warm, dry, free from rashes.    Lab Data Review:  Lab Results   Component Value Date    WBC 11.9 2025    HGB 6.7 2025    HCT 22.4 2025    .0 2025    CREATSERUM 0.72 2025    BUN 18 2025     2025    K 4.3 2025     2025    CO2 23.0 2025     2025    CA 7.4 2025    ALB 3.3 2025    ALKPHO 45 2025    BILT 0.2 2025    TP 5.1 2025    AST 11 2025    ALT 7 2025    MG 2.0 2025    PHOS 1.9 2025     Cultures:   1 blood cultures on admission with CoNS/staph hominis     Urine cultures negative  C.diff +/EIA negative    Radiology:  CONCLUSION:   1. Small left basilar pleural effusion with compressive atelectasis.  No evidence of pneumonia.   2. Large contrast filled retrocardiac hiatal hernia with contrast distended esophagus likely related to  reflux.   3. Multiple mild-to-moderately dilated fluid-filled loops of proximal and mid small bowel transitioning to mildly thickened nondilated small bowel loops in the left mid to lower abdomen.  No high-grade small-bowel obstruction or unequivocal pneumatosis.    Differential considerations include reactive inflammatory thickening of previously obstructed small bowel, or small bowel ischemia accounting for thickened loops at site of transition.   4. Small amount of ascites.  No focal fluid collection.   5. A left inguinal hernia containing fat and a minimally protruding small bowel loop.      Assessment and Plan:     Post-op leukocytosis with concern for post-op infection in this elderly woman who presented with 4 days of constipation/obstipation with N/V  - WBCs at 11K today and trending down nicely  - Found to have high grade bowel obstruction and went to OR for exploratory laparotomy, PANFILO, and repair of mesentery and ventral hernia  - IV zosyn was ongoing post-op through today  - C.diff +/EIA negative but given leukocytosis and liquid stools treatment started  - Vancomycin p.o. day #3 ongoing     2.  Single positive blood culture on admission for CoNS and staph hominis  - Consistent with contaminants     3.  Multiple medial comorbidities     4.  Disposition - inpatient.  Continue p.o. vancomycin empirically for now as she continues to have incontinence of stool.  As she is testing positive for colonization, we may be able to step this down as WBCs normalize and stools become more formed.  Supportive care ongoing.  Trending temps and WBCs.  Will follow.    Shyanne Villagran DO, [facility] Infectious Disease  [phone]    3/7/2025  3:27 PM

## 2025-03-07 NOTE — PROGRESS NOTES
[facility] Hospitalist Progress Note     CC: Hospital Follow up    PCP: Chong Cornejo MD       Assessment/Plan:   This is a 84-year-old female with history of ovarian cancer, type 2 diabetes, hypertension, hyperlipidemia, who presents to the hospital for evaluation of severe nausea, episodes of emesis, diarrhea earlier in the week but now resolved.     Leukocytosis/ HR- SIRS v sepsis  - on broad abx zosyn stopped  3/5   - had 1/2 bcx S epi (contamintant) CXR - effusions/ atelectasis  - now w loose stools , also n/v ongoing but improved  - C diff toxin + EIA neg - colonization v infection (significant diarrhea) po vanc tx dose started 3/5/25  - given dose ertapenem x 1 on 3/6  - ID and surg managing abx  - pan CT done/ reviewed no acute focal findings effusion expected post op changes  - PCT 3/5 1.06  - repeat bcx 3/5 ngtd  - WBC improving today    Anemia  - acute on chronic bsl  8-9 today 6.7  [ ] pRBC today    Insomnia  [ ]  inc melatonin 5 to 10        High grade Small bowel obstruction   Ventral hernia  w strangulation  - sp emergent Exploratory laparotomy, lysis of adhesions, closure of mesenteric defect, repair of ventral hernia (fascial defect 4.5 cm) 2/28/25  -NG tube--out  -arellano removed 3/1, she is voiding  -encourage chair and incentive spirometer  -ambulate, PT/OT when able   -diet advancement per surgery tolerating soft diet  [ ]  TPN started per surg while in house hopefully dc soon     HERI- improved   Hyperkalemia--resolved  -Cr improving, but monitor closely  -ok to change fluids to maintenance but can use PRN bolus if we need to  - Cr    Hyperglycemia  -not diabetic, stable  -suspect stress response     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 Tian Lawson oncology  -completed neoadjuvant chemotherapy with carboplatin and paclitaxel for six cycles followed by IKER-BSO and adjuvant  chemotherapy with carbo/taxol.   -continues with follow up      Hypertension  - meds as able     Hypothryoidism  -meds resumed     DVT prophylaxis: heparin sub q   Code status: full code     Hopefully dc 3/8/25 if improved, d/w Dr Mckinley, also Dr Schroeder and Dr He Malcolm, Hospitalist        Subjective:     Reports not sleeping well w current melatonin dose, hg 6.7 tolerated diet    OBJECTIVE:    Blood pressure 105/52, pulse 84, temperature 98.5 °F (36.9 °C), temperature source Oral, resp. rate 16, weight 114 lb 10.2 oz (52 kg), SpO2 91%, not currently breastfeeding.    Temp:  [97.7 °F (36.5 °C)-98.8 °F (37.1 °C)] 98.5 °F (36.9 °C)  Pulse:  [84-95] 84  Resp:  [16] 16  BP: (105-128)/(51-58) 105/52  SpO2:  [90 %-95 %] 91 %      Intake/Output:    Intake/Output Summary (Last 24 hours) at 3/7/2025 1408  Last data filed at 3/7/2025 1130  Gross per 24 hour   Intake 120 ml   Output 150 ml   Net -30 ml       Last 3 Weights   03/02/25 0500 114 lb 10.2 oz (52 kg)   03/01/25 0500 112 lb 7 oz (51 kg)   02/28/25 1030 111 lb (50.3 kg)   05/10/24 1239 103 lb (46.7 kg)   05/07/24 1107 103 lb (46.7 kg)       /52 (BP Location: Right arm)   Pulse 84   Temp 98.5 °F (36.9 °C) (Oral)   Resp 16   Wt 114 lb 10.2 oz (52 kg)   SpO2 91%   BMI 24.81 kg/m²   General: Awake, appears comfortable  HEENT: NG in place  Lungs: clear to ausculation bilaterally  Heart: Regular rate and rhythm  Abdomen: soft +BS  Extremities: No edema    Data Review:       Labs:     Recent Labs   Lab 03/05/25  0556 03/06/25  0607 03/07/25  1325   RBC 3.46* 2.74* 2.33*   HGB 10.2* 8.2* 6.7*   HCT 31.7* 26.0* 22.4*   MCV 91.6 94.9 96.1   MCH 29.5 29.9 28.8   MCHC 32.2 31.5 29.9*   RDW 15.1* 15.2* 15.5*   NEPRELIM 15.57* 14.61* 8.63*   WBC 21.0* 18.8* 11.9*   .0* 448.0 365.0         Recent Labs   Lab 03/05/25  0556 03/06/25  0607 03/07/25  0526   * 135* 130*   BUN 19 23 18   CREATSERUM 1.02 0.99 0.72   EGFRCR 54* 56* 82   CA 7.7*  7.3* 7.4*    138 139   K 3.8 4.4  4.4 4.3    106 108   CO2 24.0 21.0 23.0       Recent Labs   Lab 02/28/25  1833 03/01/25  0439 03/04/25  0519 03/06/25  0607 03/07/25  0526   ALT 16 13 8* <7* 7*   AST 15 12 <8 <8 11   ALB 3.1* 3.0* 3.4 3.2 3.3         Imaging:  XR ABDOMEN OBSTRUCTIVE SERIES ROUTINE(2 VW)(CPT=74019)    Result Date: 3/6/2025  CONCLUSION:  1. Nonspecific nonobstructive abdominal gas pattern. 2. Mild stool throughout the colon consistent constipation/fecal retention.    Dictated by (CST): Ellis Agarwal MD on 3/06/2025 at 11:49 AM     Finalized by (CST): Ellis Agarwal MD on 3/06/2025 at 11:52 AM          CT CHEST+ABDOMEN+PELVIS(ALL CNTRST ONLY)(CPT=71260/54465)    Result Date: 3/5/2025  CONCLUSION:  1. Small left basilar pleural effusion with compressive atelectasis.  No evidence of pneumonia. 2. Large contrast filled retrocardiac hiatal hernia with contrast distended esophagus likely related to reflux. 3. Multiple mild-to-moderately dilated fluid-filled loops of proximal and mid small bowel transitioning to mildly thickened nondilated small bowel loops in the left mid to lower abdomen.  No high-grade small-bowel obstruction or unequivocal pneumatosis.  Differential considerations include reactive inflammatory thickening of previously obstructed small bowel, or small bowel ischemia accounting for thickened loops at site of transition. 4. Small amount of ascites.  No focal fluid collection. 5. A left inguinal hernia containing fat and a minimally protruding small bowel loop.     Dictated by (CST): Néstor Phan MD on 3/05/2025 at 2:25 PM     Finalized by (CST): Néstor Phan MD on 3/05/2025 at 2:55 PM          XR CHEST AP PORTABLE  (CPT=71045)    Result Date: 3/5/2025  CONCLUSION:  1.  Large hiatal hernia. 2.  Small left pleural effusion similar to prior exam.  Small bibasilar pulmonary opacities, suggesting atelectasis. 3.  Dilated small bowel loop in the visualized left upper  quadrant.  Interval removal of feeding tube.  [facility] provided a preliminary report for this examination. This final report agrees with their preliminary findings.     Dictated by (CST): Lavon Raza MD on 3/05/2025 at 6:27 AM     Finalized by (CST): Lavon Raza MD on 3/05/2025 at 6:29 AM             Meds:      melatonin  5 mg Oral Nightly    sodium phosphate  15 mmol Intravenous Once    amLODIPine  2.5 mg Oral Nightly    pantoprazole  40 mg Oral QAM AC    vancomycin  125 mg Oral QID    DULoxetine  60 mg Oral Nightly    levothyroxine  50 mcg Oral QAM    atorvastatin  20 mg Oral Nightly    insulin regular human  1-5 Units Subcutaneous 4 times per day    ondansetron  4 mg Intravenous Once    heparin  5,000 Units Subcutaneous Q12H      adult 3 in 1 TPN      dextrose 10%      adult 3 in 1 TPN Stopped (03/07/25 1237)    potassium chloride in dextrose 5%-sodium chloride 0.45% 42 mL/hr at 03/06/25 0556       dextrose 10%    ondansetron    metoclopramide **OR** metoclopramide    acetaminophen    phenol    glucose **OR** glucose **OR** glucose-vitamin C **OR** dextrose **OR** glucose **OR** glucose **OR** glucose-vitamin C    morphINE **OR** morphINE

## 2025-03-07 NOTE — CM/SW NOTE
Patient is potential weekend discharge.  CM confirmed with [facility] liaison Gabi via [facility] - isolation bed will be available for patient tomorrow 3/8.    CM placed [facility] on will call through Sunday, 3/9. PCS form started - date to be entered on day of discharge.    / to remain available for support and/or discharge planning.     Plan: [facility] [place] - once medically cleared    Lizeth Amor RN, BSN  Nurse   [phone]

## 2025-03-08 VITALS
DIASTOLIC BLOOD PRESSURE: 55 MMHG | HEART RATE: 101 BPM | OXYGEN SATURATION: 96 % | BODY MASS INDEX: 25 KG/M2 | TEMPERATURE: 99 F | SYSTOLIC BLOOD PRESSURE: 130 MMHG | RESPIRATION RATE: 18 BRPM | WEIGHT: 114.63 LBS

## 2025-03-08 LAB
ANION GAP SERPL CALC-SCNC: 9 MMOL/L (ref 0–18)
BASOPHILS # BLD: 0 X10(3) UL (ref 0–0.2)
BASOPHILS NFR BLD: 0 %
BUN BLD-MCNC: 13 MG/DL (ref 9–23)
BUN/CREAT SERPL: 21.3 (ref 10–20)
CALCIUM BLD-MCNC: 8.3 MG/DL (ref 8.7–10.4)
CHLORIDE SERPL-SCNC: 108 MMOL/L (ref 98–112)
CO2 SERPL-SCNC: 25 MMOL/L (ref 21–32)
CREAT BLD-MCNC: 0.61 MG/DL
DEPRECATED RDW RBC AUTO: 53.5 FL (ref 35.1–46.3)
EGFRCR SERPLBLD CKD-EPI 2021: 88 ML/MIN/1.73M2 (ref 60–?)
EOSINOPHIL # BLD: 0.46 X10(3) UL (ref 0–0.7)
EOSINOPHIL NFR BLD: 4 %
ERYTHROCYTE [DISTWIDTH] IN BLOOD BY AUTOMATED COUNT: 16.6 % (ref 11–15)
GLUCOSE BLD-MCNC: 129 MG/DL (ref 70–99)
GLUCOSE BLDC GLUCOMTR-MCNC: 141 MG/DL (ref 70–99)
GLUCOSE BLDC GLUCOMTR-MCNC: 145 MG/DL (ref 70–99)
HCT VFR BLD AUTO: 26.8 %
HGB BLD-MCNC: 8.5 G/DL
LYMPHOCYTES NFR BLD: 1.82 X10(3) UL (ref 1–4)
LYMPHOCYTES NFR BLD: 16 %
MAGNESIUM SERPL-MCNC: 1.6 MG/DL (ref 1.6–2.6)
MCH RBC QN AUTO: 28.5 PG (ref 26–34)
MCHC RBC AUTO-ENTMCNC: 31.7 G/DL (ref 31–37)
MCV RBC AUTO: 89.9 FL
METAMYELOCYTES # BLD: 0.23 X10(3) UL
METAMYELOCYTES NFR BLD: 2 %
MONOCYTES # BLD: 0 X10(3) UL (ref 0.1–1)
MONOCYTES NFR BLD: 0 %
NEUTROPHILS # BLD AUTO: 7.78 X10 (3) UL (ref 1.5–7.7)
NEUTROPHILS NFR BLD: 77 %
NEUTS BAND NFR BLD: 1 %
NEUTS HYPERSEG # BLD: 8.89 X10(3) UL (ref 1.5–7.7)
OSMOLALITY SERPL CALC.SUM OF ELEC: 296 MOSM/KG (ref 275–295)
PHOSPHATE SERPL-MCNC: 3.4 MG/DL (ref 2.4–5.1)
PLATELET # BLD AUTO: 364 10(3)UL (ref 150–450)
PLATELET MORPHOLOGY: NORMAL
POTASSIUM SERPL-SCNC: 4.5 MMOL/L (ref 3.5–5.1)
RBC # BLD AUTO: 2.98 X10(6)UL
SODIUM SERPL-SCNC: 142 MMOL/L (ref 136–145)
TOTAL CELLS COUNTED BLD: 100
WBC # BLD AUTO: 11.4 X10(3) UL (ref 4–11)

## 2025-03-08 PROCEDURE — 83735 ASSAY OF MAGNESIUM: CPT | Performed by: SURGERY

## 2025-03-08 PROCEDURE — 80048 BASIC METABOLIC PNL TOTAL CA: CPT | Performed by: SURGERY

## 2025-03-08 PROCEDURE — 85007 BL SMEAR W/DIFF WBC COUNT: CPT | Performed by: HOSPITALIST

## 2025-03-08 PROCEDURE — 82962 GLUCOSE BLOOD TEST: CPT

## 2025-03-08 PROCEDURE — 85027 COMPLETE CBC AUTOMATED: CPT | Performed by: HOSPITALIST

## 2025-03-08 PROCEDURE — 85025 COMPLETE CBC W/AUTO DIFF WBC: CPT | Performed by: HOSPITALIST

## 2025-03-08 PROCEDURE — 97116 GAIT TRAINING THERAPY: CPT

## 2025-03-08 PROCEDURE — 97530 THERAPEUTIC ACTIVITIES: CPT

## 2025-03-08 PROCEDURE — 84100 ASSAY OF PHOSPHORUS: CPT | Performed by: SURGERY

## 2025-03-08 RX ORDER — LISINOPRIL 10 MG/1
5 TABLET ORAL EVERY MORNING
Status: SHIPPED | COMMUNITY
Start: 2025-03-09 | End: 2025-03-08

## 2025-03-08 RX ORDER — MAGNESIUM OXIDE 400 MG/1
400 TABLET ORAL ONCE
Status: COMPLETED | OUTPATIENT
Start: 2025-03-08 | End: 2025-03-08

## 2025-03-08 RX ORDER — CLONAZEPAM 0.5 MG/1
0.5 TABLET ORAL NIGHTLY
Qty: 30 TABLET | Refills: 0 | Status: SHIPPED | OUTPATIENT
Start: 2025-03-08

## 2025-03-08 NOTE — PLAN OF CARE
No acute changes over night. Pt is alert and oriented on RA. Pt is voiding freely. TPN is running as ordered. Port needle and dressing changed. Q6 Accu checks. Pt tolerating low fiber soft diet. Pt denies nausea or vomiting. Pain managed with Tylenol. Call light is within reach and safety measures are in place.     Problem: GASTROINTESTINAL - ADULT  Goal: Minimal or absence of nausea and vomiting  Description: INTERVENTIONS:  - Maintain adequate hydration with IV or PO as ordered and tolerated  - Nasogastric tube to low intermittent suction as ordered  - Evaluate effectiveness of ordered antiemetic medications  - Provide nonpharmacologic comfort measures as appropriate  - Advance diet as tolerated, if ordered  - Obtain nutritional consult as needed  - Evaluate fluid balance  Outcome: Progressing  Goal: Maintains or returns to baseline bowel function  Description: INTERVENTIONS:  - Assess bowel function  - Maintain adequate hydration with IV or PO as ordered and tolerated  - Evaluate effectiveness of GI medications  - Encourage mobilization and activity  - Obtain nutritional consult as needed  - Establish a toileting routine/schedule  - Consider collaborating with pharmacy to review patient's medication profile  Outcome: Progressing     Problem: SKIN/TISSUE INTEGRITY - ADULT  Goal: Incision(s), wounds(s) or drain site(s) healing without S/S of infection  Description: INTERVENTIONS:  - Assess and document risk factors for pressure ulcer development  - Assess and document skin integrity  - Assess and document dressing/incision, wound bed, drain sites and surrounding tissue  - Implement wound care per orders  - Initiate isolation precautions as appropriate  - Initiate Pressure Ulcer prevention bundle as indicated  Outcome: Progressing     Problem: PAIN - ADULT  Goal: Verbalizes/displays adequate comfort level or patient's stated pain goal  Description: INTERVENTIONS:  - Encourage pt to monitor pain and request  assistance  - Assess pain using appropriate pain scale  - Administer analgesics based on type and severity of pain and evaluate response  - Implement non-pharmacological measures as appropriate and evaluate response  - Consider cultural and social influences on pain and pain management  - Manage/alleviate anxiety  - Utilize distraction and/or relaxation techniques  - Monitor for opioid side effects  - Notify MD/LIP if interventions unsuccessful or patient reports new pain  - Anticipate increased pain with activity and pre-medicate as appropriate  Outcome: Progressing     Problem: RISK FOR INFECTION - ADULT  Goal: Absence of fever/infection during anticipated neutropenic period  Description: INTERVENTIONS  - Monitor WBC  - Administer growth factors as ordered  - Implement neutropenic guidelines  Outcome: Progressing     Problem: SAFETY ADULT - FALL  Goal: Free from fall injury  Description: INTERVENTIONS:  - Assess pt frequently for physical needs  - Identify cognitive and physical deficits and behaviors that affect risk of falls.  - [place] fall precautions as indicated by assessment.  - Educate pt/family on patient safety including physical limitations  - Instruct pt to call for assistance with activity based on assessment  - Modify environment to reduce risk of injury  - Provide assistive devices as appropriate  - Consider OT/PT consult to assist with strengthening/mobility  - Encourage toileting schedule  Outcome: Progressing     Problem: DISCHARGE PLANNING  Goal: Discharge to home or other facility with appropriate resources  Description: INTERVENTIONS:  - Identify barriers to discharge w/pt and caregiver  - Include patient/family/discharge partner in discharge planning  - Arrange for needed discharge resources and transportation as appropriate  - Identify discharge learning needs (meds, wound care, etc)  - Arrange for interpreters to assist at discharge as needed  - Consider post-discharge preferences of  patient/family/discharge partner  - Complete POLST form as appropriate  - Assess patient's ability to be responsible for managing their own health  - Refer to Case Management Department for coordinating discharge planning if the patient needs post-hospital services based on physician/LIP order or complex needs related to functional status, cognitive ability or social support system  Outcome: Progressing

## 2025-03-08 NOTE — PROGRESS NOTES
[facility]  part of [facility] Infectious Disease  Progress Note    Lizeth Mcgill Patient Status:  Inpatient    10/26/1940 MRN [id number]   Location [facility] 4W/SW/SE Attending Sukh Leary,    Hosp Day # 8 PCP Chong Cornejo MD     Subjective:  Patient seen/examined.  Up in bed in NAD.  No F/C.  No new issues.  Feeling much better today.    Objective:  Blood pressure 110/53, pulse 82, temperature 97.3 °F (36.3 °C), temperature source Temporal, resp. rate 16, weight 114 lb 10.2 oz (52 kg), SpO2 91%, not currently breastfeeding.    Intake/Output:    Intake/Output Summary (Last 24 hours) at 3/8/2025 0953  Last data filed at 3/8/2025 0935  Gross per 24 hour   Intake 1057.17 ml   Output 560 ml   Net 497.17 ml       Physical Exam:  General: Awake, alert, non-tox, NAD.  HEENT:  Oropharynx clear, trachea ML.  Heart: RRR S1S2 no murmurs.  Lungs: Essentially CTA b/l, no rhonchi, rales, wheezes.  Abdomen: Soft, NT/ND.  BS present.  No organomegaly.  Extremity: No edema.  Neurological: No focal deficits.  Derm:  Warm, dry, free from rashes.    Lab Data Review:  Lab Results   Component Value Date    WBC 11.4 2025    HGB 8.5 2025    HCT 26.8 2025    .0 2025    CREATSERUM 0.61 2025    BUN 13 2025     2025    K 4.5 2025     2025    CO2 25.0 2025     2025    CA 8.3 2025    MG 1.6 2025    PHOS 3.4 2025      Cultures:   1/2 blood cultures on admission with CoNS/staph hominis     Urine cultures negative  C.diff +/EIA negative    Radiology:  CONCLUSION:   1. Small left basilar pleural effusion with compressive atelectasis.  No evidence of pneumonia.   2. Large contrast filled retrocardiac hiatal hernia with contrast distended esophagus likely related to reflux.   3. Multiple mild-to-moderately dilated fluid-filled loops of proximal and mid small bowel transitioning  to mildly thickened nondilated small bowel loops in the left mid to lower abdomen.  No high-grade small-bowel obstruction or unequivocal pneumatosis.    Differential considerations include reactive inflammatory thickening of previously obstructed small bowel, or small bowel ischemia accounting for thickened loops at site of transition.   4. Small amount of ascites.  No focal fluid collection.   5. A left inguinal hernia containing fat and a minimally protruding small bowel loop.      Assessment and Plan:     Post-op leukocytosis with concern for post-op infection in this elderly woman who presented with 4 days of constipation/obstipation with N/V  - WBCs at 11K today and trending down nicely  - Found to have high grade bowel obstruction and went to OR for exploratory laparotomy, PANFILO, and repair of mesentery and ventral hernia  - IV zosyn was ongoing post-op through today  - C.diff +/EIA negative but given leukocytosis and liquid stools treatment started  - Vancomycin p.o. day #4 ongoing     2.  Single positive blood culture on admission for CoNS and staph hominis  - Consistent with contaminants     3.  Multiple medial comorbidities     4.  Disposition - stable from ID.  Continue p.o. vancomycin empirically as she appears to be responding favorably.  Patient agrees to complete a 10 day total course.  Rx left for 1 more week of p.o. vancomycin.  She can f/u with ID prn if any new/worsening diarrhea.  Please call if any further questions or concerns.    Shyanne Villagran DO, [facility] Infectious Disease  [phone]    3/8/2025  9:53 AM

## 2025-03-08 NOTE — PLAN OF CARE
Orders received for patient discharge - cleared by ID, surgical, and internist. Patient transported to [facility] via [facility]. All discharge instructions were discussed with patient and/or family. Patient verbalizes understanding and agreeable to plan of care & follow-up plan as outlined in discharge summary / AVS and had no further questions regarding discharge instruction. All patient belongings were transported with patient/family at time of discharge - all given to [facility] superior personnel. Printed Rx provided in AVS/Transfer packet. Patient states she will contact her family to notify them of above.     Telephone handoff/transfer report provided to \"Kim\" WIL at [facility].    R Chest port deaccessed, flushed, prior to DC. All belongings sent.

## 2025-03-08 NOTE — DISCHARGE SUMMARY
General Medicine Discharge Summary     Patient ID:  Lizeth Mcgill  84 year old  10/26/1940    Admit date: 2/28/2025    Discharge date and time: 3/8/25    Attending Physician: Sukh Leary DO     Primary Care Physician: Chong Cornejo MD     Discharge Diagnoses:   Sepsis   High grade small bowel obstruction   Ventral Hernia with strangulation   HERI  Clostridium difficile infection  Ovarian cancer  Hypertension   Hypothyroidism     Discharge Condition: stable    Disposition: home     Important Follow up:  Joby Patel PA  [address]  [address]  [facility] [id number] [phone]  Follow up on 3/25/2025  Call for Follow-up postoperatively      Hospital Course:    84-year-old female with history of ovarian cancer, type 2 diabetes, hypertension, hyperlipidemia, who presented to the hospital for evaluation of severe nausea, episodes of emesis, found to have high grade SBO. Hospital course complicated by diarrhea and C diff.     High grade Small bowel obstruction   Ventral hernia  w strangulation  - sp emergent Exploratory laparotomy, lysis of adhesions, closure of mesenteric defect, repair of ventral hernia (fascial defect 4.5 cm) 2/28/25  -arellano removed 3/1, she is voiding  -diet advancement per surgery tolerating soft diet  -did require TPN for a portion of time as well     Leukocytosis/ HR- SIRS v sepsis  Clostridium Difficile infection   - was on broad abx zosyn stopped  3/5   - had 1/2 bcx S epi (contamintant) CXR - effusions/ atelectasis  - C diff toxin + EIA neg - colonization v infection (significant diarrhea) po vanc tx dose started 3/5/25, will continue one more week at discharge     Anemia  -got 1 unit of PRBC on 3/7/25     Insomnia  -melatonin was used here    HERI- improved   Hyperkalemia--resolved     Hyperglycemia  -not diabetic, stable  -suspect stress response      Staph epidermis colonization 1/2 bcx +  -suspect could be contaminant and not  pathologic culture   -repeat cx ngtd     Ovarian cancer  -follows Dr. Tian Lawson oncology  -completed neoadjuvant chemotherapy with carboplatin and paclitaxel for six cycles followed by IKER-BSO and adjuvant chemotherapy with carbo/taxol.   -continues with follow up      Hypertension  -will stop lisinopril at discharge for now  -resume her low dose of norvasc 2.5mg HS  -titrate meds as she tolerateds     Hypothryoidism  -meds resumed    Consults:   IP CONSULT TO RESPIRATORY CARE  IP CONSULT TO INFECTIOUS DISEASE    Operative Procedures:   Procedure(s) (LRB):  EXPLORATORY LAPAROTOMY, lysis of adhesions, closure of mesentery defect, repair of ventral hernia (N/A)  SMALL BOWEL OBSTRUCTION RELEASE (N/A)     Patient instructions:        Current Discharge Medication List        START taking these medications    Details   vancomycin 125 MG Oral Cap Take 1 capsule (125 mg total) by mouth 4 (four) times daily for 10 days.           CONTINUE these medications which have CHANGED    Details   clonazePAM 0.5 MG Oral Tab Take 1 tablet (0.5 mg total) by mouth at bedtime.           CONTINUE these medications which have NOT CHANGED    Details   docusate sodium 100 MG Oral Cap Take 100 mg by mouth 2 (two) times daily.      simethicone 80 MG Oral Chew Tab Chew 1 tablet (80 mg total) by mouth 3 (three) times daily as needed (gas pain).      magnesium 250 MG Oral Tab Take 1 tablet (250 mg total) by mouth in the morning and 1 tablet (250 mg total) before bedtime.      acetaminophen 500 MG Oral Tab Take 1 tablet (500 mg total) by mouth every 6 (six) hours as needed for Pain.      DULoxetine 60 MG Oral Cap DR Particles Take 1 capsule (60 mg total) by mouth at bedtime.      amLODIPine 2.5 MG Oral Tab Take 1 tablet (2.5 mg total) by mouth at bedtime.      ferrous sulfate 325 (65 FE) MG Oral Tab EC Take 1 tablet (325 mg total) by mouth every other day.      Cholecalciferol (VITAMIN D) 50 MCG (2000 UT) Oral Tab Take 1 capsule by mouth As  Directed. Twice a week      Denosumab 60 MG/ML Subcutaneous Solution Prefilled Syringe Inject 1 mL (60 mg total) into the skin every 6 (six) months. Historical documentation - see Epic Immunization Activity for administration details      gabapentin 300 MG Oral Cap Take 1 capsule (300 mg total) by mouth in the morning and 1 capsule (300 mg total) before bedtime.      omeprazole 20 MG Oral Capsule Delayed Release Take 1 capsule (20 mg total) by mouth daily.      pravastatin 40 MG Oral Tab Take 1 tablet (40 mg total) by mouth daily.      levothyroxine 50 MCG Oral Tab Take 1 tablet (50 mcg total) by mouth every morning.      Halobetasol Propionate 0.05 % External Ointment APPLY EXTERNALLY DAILY AS DIRECTED      loratadine 10 MG Oral Tab Take 1 tablet (10 mg total) by mouth at bedtime.           STOP taking these medications       HYDROcodone-acetaminophen 5-325 MG Oral Tab        lisinopril 10 MG Oral Tab            Code Status: Full Code    Exam on day of discharge:     Vitals:    03/08/25 0537   BP: 110/53   Pulse: 82   Resp: 16   Temp: 97.3 °F (36.3 °C)       General: no acute distress  Heart: RRR  Lungs: clear bilaterally  Abdomen: nontender  Extremities: no pedal edema     Total time coordinating care for discharge: Greater than 30 minutes    Sukh Leary DO

## 2025-03-08 NOTE — PLAN OF CARE
Problem: GASTROINTESTINAL - ADULT  Goal: Minimal or absence of nausea and vomiting  Description: INTERVENTIONS:  - Maintain adequate hydration with IV or PO as ordered and tolerated  - Nasogastric tube to low intermittent suction as ordered  - Evaluate effectiveness of ordered antiemetic medications  - Provide nonpharmacologic comfort measures as appropriate  - Advance diet as tolerated, if ordered  - Obtain nutritional consult as needed  - Evaluate fluid balance  Outcome: Adequate for Discharge  Goal: Maintains or returns to baseline bowel function  Description: INTERVENTIONS:  - Assess bowel function  - Maintain adequate hydration with IV or PO as ordered and tolerated  - Evaluate effectiveness of GI medications  - Encourage mobilization and activity  - Obtain nutritional consult as needed  - Establish a toileting routine/schedule  - Consider collaborating with pharmacy to review patient's medication profile  Outcome: Adequate for Discharge     Problem: SKIN/TISSUE INTEGRITY - ADULT  Goal: Incision(s), wounds(s) or drain site(s) healing without S/S of infection  Description: INTERVENTIONS:  - Assess and document risk factors for pressure ulcer development  - Assess and document skin integrity  - Assess and document dressing/incision, wound bed, drain sites and surrounding tissue  - Implement wound care per orders  - Initiate isolation precautions as appropriate  - Initiate Pressure Ulcer prevention bundle as indicated  Outcome: Adequate for Discharge     Problem: PAIN - ADULT  Goal: Verbalizes/displays adequate comfort level or patient's stated pain goal  Description: INTERVENTIONS:  - Encourage pt to monitor pain and request assistance  - Assess pain using appropriate pain scale  - Administer analgesics based on type and severity of pain and evaluate response  - Implement non-pharmacological measures as appropriate and evaluate response  - Consider cultural and social influences on pain and pain management  -  Manage/alleviate anxiety  - Utilize distraction and/or relaxation techniques  - Monitor for opioid side effects  - Notify MD/LIP if interventions unsuccessful or patient reports new pain  - Anticipate increased pain with activity and pre-medicate as appropriate  Outcome: Adequate for Discharge     Problem: Patient Centered Care  Goal: Patient preferences are identified and integrated in the patient's plan of care  Description: Interventions:  - What would you like us to know as we care for you? I live at [facility]  - Provide timely, complete, and accurate information to patient/family  - Incorporate patient and family knowledge, values, beliefs, and cultural backgrounds into the planning and delivery of care  - Encourage patient/family to participate in care and decision-making at the level they choose  - Honor patient and family perspectives and choices  Outcome: Adequate for Discharge     Problem: Patient/Family Goals  Goal: Patient/Family Long Term Goal  Description: Patient's Long Term Goal: Discharge home    Interventions:  - Regain baseline bowel function  - Tolerate adequate oral food and liquid intake  - Manage pain after surgery  - See additional Care Plan goals for specific interventions  Outcome: Adequate for Discharge  Goal: Patient/Family Short Term Goal  Description: Patient's Short Term Goal: Regain bowel function after surgery    Interventions:   - Bowel rest with slow diet advancement as tolerated.  - Intermittent ambulation out of bed  - NG tube for bowel decompression  - See additional Care Plan goals for specific interventions  Outcome: Adequate for Discharge     Problem: RISK FOR INFECTION - ADULT  Goal: Absence of fever/infection during anticipated neutropenic period  Description: INTERVENTIONS  - Monitor WBC  - Administer growth factors as ordered  - Implement neutropenic guidelines  Outcome: Adequate for Discharge     Problem: SAFETY ADULT - FALL  Goal: Free from fall  injury  Description: INTERVENTIONS:  - Assess pt frequently for physical needs  - Identify cognitive and physical deficits and behaviors that affect risk of falls.  - Colome fall precautions as indicated by assessment.  - Educate pt/family on patient safety including physical limitations  - Instruct pt to call for assistance with activity based on assessment  - Modify environment to reduce risk of injury  - Provide assistive devices as appropriate  - Consider OT/PT consult to assist with strengthening/mobility  - Encourage toileting schedule  Outcome: Adequate for Discharge     Problem: DISCHARGE PLANNING  Goal: Discharge to home or other facility with appropriate resources  Description: INTERVENTIONS:  - Identify barriers to discharge w/pt and caregiver  - Include patient/family/discharge partner in discharge planning  - Arrange for needed discharge resources and transportation as appropriate  - Identify discharge learning needs (meds, wound care, etc)  - Arrange for interpreters to assist at discharge as needed  - Consider post-discharge preferences of patient/family/discharge partner  - Complete POLST form as appropriate  - Assess patient's ability to be responsible for managing their own health  - Refer to Case Management Department for coordinating discharge planning if the patient needs post-hospital services based on physician/LIP order or complex needs related to functional status, cognitive ability or social support system  Outcome: Adequate for Discharge

## 2025-03-08 NOTE — CM/SW NOTE
03/08/25 1100   Discharge disposition   Expected discharge disposition subacute   Post Acute Care Provider [facility]   Discharge transportation [facility]     The patient received a MDO for discharge.    The patient will be transported to [facility] via [facility] at 3pm.  PCS complete.  The quote for the [facility] is $65 and the patient is agreeable.    The number to call report is [phone].  The room number will be 352.    RN to inform family.    SW/CM to remain available for support and/or discharge planning.     Cindi Odonnell MSW, LSW  Discharge Planner [id number]

## 2025-03-08 NOTE — PROGRESS NOTES
[facility]  part of [facility]    Progress Note    Lizeth Mcgill Patient Status:  Inpatient    10/26/1940 MRN [id number]   Location [facility] 2W/SW Attending Sukh Leary, DO   Hosp Day # 8 PCP Chong Cornejo MD       Subjective:   Lizeth Mcgill is a(n) 84 year old female without complaints.     Assessment and Plan:   Lizeth Mcgill is a(n) 84 year old female    POD #8 status post exploratory laparotomy with lysis of adhesions for strangulated small bowel    Doing well  C. Diff positive, positive blood cultures (contamination?)  No BMs overnight  Tolerating LFD without nausea  ID for Antimicrobials  Patient doing well  Pulmonary toilet  Ambulate  Renal function normalized.      VTE prophylaxis    OK to d/c home from surgery standpoint  Instructions given  F/u with Dr. Schroeder in 2 weeks.       Objective:   Blood pressure 110/53, pulse 82, temperature 97.3 °F (36.3 °C), temperature source Temporal, resp. rate 16, weight 114 lb 10.2 oz (52 kg), SpO2 91%, not currently breastfeeding. I/O last 3 completed shifts:  In: 1057.2 [P.O.:600; Blood:357.2; IV PIGGYBACK:100]  Out: 460 [Urine:460]     General appearance: alert, appears stated age, and cooperative  Neck: no JVD and supple, symmetrical, trachea midline  Pulmonary: No labored breathing, equal respiratory excursion  Cardiovascular: regular rate and rhythm  Abdominal: soft, non-tender; bowel sounds normal; no masses,  no organomegaly and staples in place no drainage.  + flatus, + BM  Extremities: extremities normal, atraumatic, no cyanosis or edema          Results:       Recent Labs   Lab 25  0607 25  1325 25  0553   RBC 2.74* 2.33* 2.98*   HGB 8.2* 6.7* 8.5*   HCT 26.0* 22.4* 26.8*   MCV 94.9 96.1 89.9   MCH 29.9 28.8 28.5   MCHC 31.5 29.9* 31.7   RDW 15.2* 15.5* 16.6*   NEPRELIM 14.61* 8.63* 7.78*   WBC 18.8* 11.9* 11.4*   .0 365.0 364.0     Lab Results   Component Value Date    INR 1.02 2022        Recent Labs   Lab 03/04/25  0519 03/05/25  0556 03/06/25  0607 03/07/25  0526 03/08/25  0553   *   < > 135* 130* 129*   BUN 20   < > 23 18 13   CREATSERUM 0.86   < > 0.99 0.72 0.61   CA 8.0*   < > 7.3* 7.4* 8.3*   ALB 3.4  --  3.2 3.3  --       < > 138 139 142   K 4.0  4.0   < > 4.4  4.4 4.3 4.5      < > 106 108 108   CO2 23.0   < > 21.0 23.0 25.0   ALKPHO 59  --  49* 45*  --    AST <8  --  <8 11  --    ALT 8*  --  <7* 7*  --    BILT 0.2  --  0.2 0.2  --    TP 5.4*  --  5.1* 5.1*  --     < > = values in this interval not displayed.             XR ABDOMEN OBSTRUCTIVE SERIES ROUTINE(2 VW)(CPT=74019)    Result Date: 3/6/2025  CONCLUSION:  1. Nonspecific nonobstructive abdominal gas pattern. 2. Mild stool throughout the colon consistent constipation/fecal retention.    Dictated by (CST): Ellis Agarwal MD on 3/06/2025 at 11:49 AM     Finalized by (CST): Ellis Agarwal MD on 3/06/2025 at 11:52 AM                   Romie Mckinley MD [facility]  03/08/25  10:23 AM

## 2025-03-08 NOTE — PHYSICAL THERAPY NOTE
PHYSICAL THERAPY TREATMENT NOTE - INPATIENT     Room Number: 463/463-A       Presenting Problem: intestinal obstruction with ventral hernia  Co-Morbidities : ovarian ca, HTN, CHF, DM    Problem List  Principal Problem:    Complete intestinal obstruction, unspecified cause (HCC)  Active Problems:    Hypotension    HERI (acute kidney injury)    Acute cystitis without hematuria    Malnutrition (HCC)      PHYSICAL THERAPY ASSESSMENT   Patient demonstrates good  progress this session, goals  remain in progress.      Patient is requiring supervision and stand-by assist as a result of the following impairments: decreased functional strength, decreased endurance/aerobic capacity, decreased muscular endurance, and medical status.     Patient continues to function below baseline with bed mobility, transfers, and gait.  Next session anticipate patient to progress bed mobility, transfers, gait, maintaining seated position, and standing prolonged periods.  Physical Therapy will continue to follow patient for duration of hospitalization.    Patient continues to benefit from continued skilled PT services: at discharge to promote prior level of function and safety with additional support and return home with home health PT.    PLAN DURING HOSPITALIZATION  Nursing Mobility Recommendation : 1 Assist  PT Device Recommendation: Rolling walker  PT Treatment Plan: Bed mobility, Body mechanics, Patient education, Gait training, Strengthening, Transfer training, Balance training  Frequency (Obs): 5x/week     SUBJECTIVE  I am fine    OBJECTIVE  Precautions: Abdominal protective strategies, NG suction    WEIGHT BEARING RESTRICTION       PAIN ASSESSMENT   Rating: Unable to rate  Location: abd  Management Techniques: Activity promotion, Body mechanics, Repositioning    BALANCE  Static Sitting: Good  Dynamic Sitting: Good  Static Standing: Fair +  Dynamic Standing: Fair -    ACTIVITY TOLERANCE                          O2 WALK       AM-PAC  '6-Clicks' INPATIENT SHORT FORM - BASIC MOBILITY  How much difficulty does the patient currently have...  Patient Difficulty: Turning over in bed (including adjusting bedclothes, sheets and blankets)?: None   Patient Difficulty: Sitting down on and standing up from a chair with arms (e.g., wheelchair, bedside commode, etc.): None   Patient Difficulty: Moving from lying on back to sitting on the side of the bed?: None   How much help from another person does the patient currently need...   Help from Another: Moving to and from a bed to a chair (including a wheelchair)?: None   Help from Another: Need to walk in hospital room?: A Little   Help from Another: Climbing 3-5 steps with a railing?: A Little     AM-PAC Score:  Raw Score: 22   Approx Degree of Impairment: 20.91%   Standardized Score (AM-PAC Scale): 53.28   CMS Modifier (G-Code): CJ    FUNCTIONAL ABILITY STATUS  Functional Mobility/Gait Assessment  Gait Assistance: Supervision  Distance (ft): 25 ft and 45 ft  Assistive Device: Rolling walker  Pattern:  (decreased steps length)  Stairs: Other (comment)  Rolling: supervision  Supine to Sit: stand-by assist  Sit to Supine: stand-by assist  Sit to Stand: stand-by assist    Skilled Therapy Provided: RN approved PT session and pt ready to work with therapist. Pt on contact isolation, amb only in her own room. Pt in supine, instructed on supine thera exs with ble's to improve functional ability. Pt amb towards to bathroom and needs assist with cleaning up. Amb to wash hands in standing position. Pt amb additional 45 ft with supervision and practice making U-turns. Pt left sitting in chair with all needs in reach, chair alarm activated.    The patient's Approx Degree of Impairment: 20.91% has been calculated based on documentation in the [facility] '6 clicks' Inpatient Daily Activity Short Form.  Research supports that patients with this level of impairment may benefit from going to home with HH PT.  Final disposition will  be made by interdisciplinary medical team.    THERAPEUTIC EXERCISES  Lower Extremity Alternating marching  Ankle pumps  Knee extension     Position Sitting       Patient End of Session: Up in chair, Needs met, Call light within reach, RN aware of session/findings, All patient questions and concerns addressed, Alarm set    CURRENT GOALS     Goals to be met by: 3/9/25  Patient Goal Patient's self-stated goal is: walk   Goal #1 Patient is able to demonstrate supine - sit EOB @ level: modified independent      Goal #1   Current Status  SBA   Goal #2 Patient is able to demonstrate transfers Sit to/from Stand at assistance level: modified independent with walker - rolling      Goal #2  Current Status  SBA with RW   Goal #3 Patient is able to ambulate 300 feet with assist device: walker - rolling at assistance level: modified independent   Goal #3   Current Status  supervision 25 ft and 45 ft  with decreased step length and steve, amb limited due pt on contact isolation.   Goal #4 Patient to demonstrate independence with home activity/exercise instructions provided to patient in preparation for discharge.   Goal #4   Current Status  progressing       Gait Trainin minutes  Therapeutic Activity: 12 minutes

## 2025-03-09 LAB
BLOOD TYPE BARCODE: 6200
UNIT VOLUME: 350 ML

## 2025-03-17 NOTE — PROGRESS NOTES
Provider Clarification     Additional information on the status of the infection.    Sepsis ruled in     This note is part of the patient's medical record.

## 2025-05-16 ENCOUNTER — HOSPITAL ENCOUNTER (EMERGENCY)
Facility: HOSPITAL | Age: 85
Discharge: HOME | End: 2025-05-16
Attending: EMERGENCY MEDICINE
Payer: MEDICARE

## 2025-05-16 ENCOUNTER — APPOINTMENT (OUTPATIENT)
Dept: GENERAL RADIOLOGY | Facility: HOSPITAL | Age: 85
End: 2025-05-16
Attending: EMERGENCY MEDICINE
Payer: MEDICARE

## 2025-05-16 VITALS
RESPIRATION RATE: 27 BRPM | DIASTOLIC BLOOD PRESSURE: 50 MMHG | BODY MASS INDEX: 22.65 KG/M2 | HEIGHT: 57 IN | TEMPERATURE: 98 F | OXYGEN SATURATION: 92 % | SYSTOLIC BLOOD PRESSURE: 135 MMHG | HEART RATE: 82 BPM | WEIGHT: 105 LBS

## 2025-05-16 DIAGNOSIS — J90 PLEURAL EFFUSION ON LEFT: ICD-10-CM

## 2025-05-16 DIAGNOSIS — R06.09 EXERTIONAL DYSPNEA: ICD-10-CM

## 2025-05-16 DIAGNOSIS — D64.9 SYMPTOMATIC ANEMIA: Primary | ICD-10-CM

## 2025-05-16 LAB
ALBUMIN SERPL-MCNC: 3.8 G/DL (ref 3.2–4.8)
ALBUMIN/GLOB SERPL: 1.4 {RATIO} (ref 1–2)
ALP LIVER SERPL-CCNC: 95 U/L (ref 55–142)
ALT SERPL-CCNC: 13 U/L (ref 10–49)
ANION GAP SERPL CALC-SCNC: 9 MMOL/L (ref 0–18)
ANTIBODY SCREEN: NEGATIVE
AST SERPL-CCNC: 18 U/L (ref ?–34)
ATRIAL RATE: 106 BPM
BASOPHILS # BLD AUTO: 0.05 X10(3) UL (ref 0–0.2)
BASOPHILS NFR BLD AUTO: 0.3 %
BILIRUB SERPL-MCNC: 0.2 MG/DL (ref 0.2–1.1)
BUN BLD-MCNC: 17 MG/DL (ref 9–23)
BUN/CREAT SERPL: 17.9 (ref 10–20)
CALCIUM BLD-MCNC: 8.6 MG/DL (ref 8.7–10.4)
CHLORIDE SERPL-SCNC: 102 MMOL/L (ref 98–112)
CO2 SERPL-SCNC: 20 MMOL/L (ref 21–32)
CREAT BLD-MCNC: 0.95 MG/DL (ref 0.55–1.02)
D DIMER PPP FEU-MCNC: 0.65 UG/ML FEU (ref ?–0.84)
DEPRECATED RDW RBC AUTO: 71.4 FL (ref 35.1–46.3)
EGFRCR SERPLBLD CKD-EPI 2021: 59 ML/MIN/1.73M2 (ref 60–?)
EOSINOPHIL # BLD AUTO: 0.02 X10(3) UL (ref 0–0.7)
EOSINOPHIL NFR BLD AUTO: 0.1 %
ERYTHROCYTE [DISTWIDTH] IN BLOOD BY AUTOMATED COUNT: 20.2 % (ref 11–15)
GLOBULIN PLAS-MCNC: 2.7 G/DL (ref 2–3.5)
GLUCOSE BLD-MCNC: 197 MG/DL (ref 70–99)
HCT VFR BLD AUTO: 22.5 % (ref 35–48)
HCT VFR BLD AUTO: 26.5 % (ref 35–48)
HGB BLD-MCNC: 6.1 G/DL (ref 12–16)
HGB BLD-MCNC: 8 G/DL (ref 12–16)
IMM GRANULOCYTES # BLD AUTO: 0.31 X10(3) UL (ref 0–1)
IMM GRANULOCYTES NFR BLD: 1.7 %
LYMPHOCYTES # BLD AUTO: 1.01 X10(3) UL (ref 1–4)
LYMPHOCYTES NFR BLD AUTO: 5.5 %
MCH RBC QN AUTO: 26.6 PG (ref 26–34)
MCHC RBC AUTO-ENTMCNC: 27.1 G/DL (ref 31–37)
MCV RBC AUTO: 98.3 FL (ref 80–100)
MONOCYTES # BLD AUTO: 1.07 X10(3) UL (ref 0.1–1)
MONOCYTES NFR BLD AUTO: 5.8 %
NEUTROPHILS # BLD AUTO: 16.06 X10 (3) UL (ref 1.5–7.7)
NEUTROPHILS # BLD AUTO: 16.06 X10(3) UL (ref 1.5–7.7)
NEUTROPHILS NFR BLD AUTO: 86.6 %
NT-PROBNP SERPL-MCNC: 1200 PG/ML (ref ?–450)
OSMOLALITY SERPL CALC.SUM OF ELEC: 279 MOSM/KG (ref 275–295)
P AXIS: 61 DEGREES
P-R INTERVAL: 160 MS
PLATELET # BLD AUTO: 587 10(3)UL (ref 150–450)
PLATELET MORPHOLOGY: NORMAL
POTASSIUM SERPL-SCNC: 4.3 MMOL/L (ref 3.5–5.1)
PROT SERPL-MCNC: 6.5 G/DL (ref 5.7–8.2)
Q-T INTERVAL: 342 MS
QRS DURATION: 78 MS
QTC CALCULATION (BEZET): 454 MS
R AXIS: 18 DEGREES
RBC # BLD AUTO: 2.29 X10(6)UL (ref 3.8–5.3)
RH BLOOD TYPE: POSITIVE
SODIUM SERPL-SCNC: 131 MMOL/L (ref 136–145)
T AXIS: 38 DEGREES
TROPONIN I SERPL HS-MCNC: 3 NG/L (ref ?–34)
VENTRICULAR RATE: 106 BPM
WBC # BLD AUTO: 18.5 X10(3) UL (ref 4–11)

## 2025-05-16 PROCEDURE — 86900 BLOOD TYPING SEROLOGIC ABO: CPT | Performed by: EMERGENCY MEDICINE

## 2025-05-16 PROCEDURE — 83880 ASSAY OF NATRIURETIC PEPTIDE: CPT | Performed by: EMERGENCY MEDICINE

## 2025-05-16 PROCEDURE — 85018 HEMOGLOBIN: CPT | Performed by: EMERGENCY MEDICINE

## 2025-05-16 PROCEDURE — 71045 X-RAY EXAM CHEST 1 VIEW: CPT | Performed by: EMERGENCY MEDICINE

## 2025-05-16 PROCEDURE — 86850 RBC ANTIBODY SCREEN: CPT | Performed by: EMERGENCY MEDICINE

## 2025-05-16 PROCEDURE — 85014 HEMATOCRIT: CPT | Performed by: EMERGENCY MEDICINE

## 2025-05-16 PROCEDURE — 85379 FIBRIN DEGRADATION QUANT: CPT | Performed by: EMERGENCY MEDICINE

## 2025-05-16 PROCEDURE — 85025 COMPLETE CBC W/AUTO DIFF WBC: CPT | Performed by: EMERGENCY MEDICINE

## 2025-05-16 PROCEDURE — 99285 EMERGENCY DEPT VISIT HI MDM: CPT

## 2025-05-16 PROCEDURE — 82272 OCCULT BLD FECES 1-3 TESTS: CPT

## 2025-05-16 PROCEDURE — 36430 TRANSFUSION BLD/BLD COMPNT: CPT

## 2025-05-16 PROCEDURE — 93005 ELECTROCARDIOGRAM TRACING: CPT

## 2025-05-16 PROCEDURE — 86901 BLOOD TYPING SEROLOGIC RH(D): CPT | Performed by: EMERGENCY MEDICINE

## 2025-05-16 PROCEDURE — 93010 ELECTROCARDIOGRAM REPORT: CPT

## 2025-05-16 PROCEDURE — 84484 ASSAY OF TROPONIN QUANT: CPT | Performed by: EMERGENCY MEDICINE

## 2025-05-16 PROCEDURE — 36415 COLL VENOUS BLD VENIPUNCTURE: CPT

## 2025-05-16 PROCEDURE — 80053 COMPREHEN METABOLIC PANEL: CPT | Performed by: EMERGENCY MEDICINE

## 2025-05-16 PROCEDURE — 86920 COMPATIBILITY TEST SPIN: CPT

## 2025-05-16 NOTE — DISCHARGE INSTRUCTIONS
Thank you for seeking care at [facility] Emergency Department.  You have been seen and evaluated.  As we discussed you are found to be anemic and this was addressed with a blood transfusion.  Please do follow-up with your oncology team for a recheck of your blood counts.  You are also found to have a small left pleural effusion or fluid on the lung.  Please follow-up with the cardiology team as an outpatient, your case was discussed with Dr. Stuart    We discussed the results of your workup   Please read the instructions provided   If given prescriptions, take as instructed    Remember, your care process does not end after your visit today. Please follow-up with your doctor within 1-2 days for a follow-up check to ensure you are  improving, to see if you need any further evaluation/testing, or to evaluate for any alternate diagnoses.     Please return to the emergency department if you develop chest pain, difficulty breathing, inability to drink liquids without vomiting, one sided numbness or weakness, slurred speech, severe headache, or if you develop any other new or concerning symptoms as these could be signs of more serious medical illness.    We hope you feel better.

## 2025-05-16 NOTE — ED QUICK NOTES
Rounding completed    No complaints at this time    Elimination assistance offered  No additional needs at this time  Call light within reach, will update patient with more information  Will continue to monitor

## 2025-05-16 NOTE — ED INITIAL ASSESSMENT (HPI)
Pt to ED, steady gait with walker, alert and orientated x4.   PT reports sent in by oncologist Dr. Overton for blood transfusion due to hbg of 6.7.
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
## 2025-05-17 LAB
BLOOD TYPE BARCODE: 6200
UNIT VOLUME: 350 ML

## 2025-05-17 NOTE — ED QUICK NOTES
Patient received from Dr. [facility] RN. Patient resting in bed, care continues.

## 2025-05-17 NOTE — ED QUICK NOTES
Patient discharged from ED in stable condition. Paperwork given, medications and follow up care reviewed, all questions and concerns addressed. Ambulatory with steady gait, VSS.

## 2025-06-11 ENCOUNTER — HOSPITAL ENCOUNTER (INPATIENT)
Facility: HOSPITAL | Age: 85
LOS: 4 days | Discharge: HOME | End: 2025-06-15
Attending: EMERGENCY MEDICINE | Admitting: INTERNAL MEDICINE
Payer: MEDICARE

## 2025-06-11 ENCOUNTER — APPOINTMENT (OUTPATIENT)
Dept: GENERAL RADIOLOGY | Facility: HOSPITAL | Age: 85
End: 2025-06-11
Attending: EMERGENCY MEDICINE
Payer: MEDICARE

## 2025-06-11 DIAGNOSIS — K56.609 SBO (SMALL BOWEL OBSTRUCTION) (HCC): Primary | ICD-10-CM

## 2025-06-11 LAB — LACTATE SERPL-SCNC: 2.8 MMOL/L (ref 0.5–2)

## 2025-06-11 PROCEDURE — 83605 ASSAY OF LACTIC ACID: CPT | Performed by: EMERGENCY MEDICINE

## 2025-06-11 PROCEDURE — 0D9670Z DRAINAGE OF STOMACH WITH DRAINAGE DEVICE, VIA NATURAL OR ARTIFICIAL OPENING: ICD-10-PCS | Performed by: EMERGENCY MEDICINE

## 2025-06-11 PROCEDURE — 99285 EMERGENCY DEPT VISIT HI MDM: CPT

## 2025-06-11 PROCEDURE — 71045 X-RAY EXAM CHEST 1 VIEW: CPT | Performed by: EMERGENCY MEDICINE

## 2025-06-11 PROCEDURE — 83605 ASSAY OF LACTIC ACID: CPT | Performed by: INTERNAL MEDICINE

## 2025-06-11 PROCEDURE — 87040 BLOOD CULTURE FOR BACTERIA: CPT | Performed by: EMERGENCY MEDICINE

## 2025-06-11 PROCEDURE — 74019 RADEX ABDOMEN 2 VIEWS: CPT | Performed by: EMERGENCY MEDICINE

## 2025-06-11 PROCEDURE — 36415 COLL VENOUS BLD VENIPUNCTURE: CPT

## 2025-06-11 PROCEDURE — 96365 THER/PROPH/DIAG IV INF INIT: CPT

## 2025-06-11 RX ORDER — DOCUSATE SODIUM 100 MG/1
100 CAPSULE, LIQUID FILLED ORAL 2 TIMES DAILY PRN
COMMUNITY

## 2025-06-11 RX ORDER — HEPARIN SODIUM 5000 [USP'U]/ML
5000 INJECTION, SOLUTION INTRAVENOUS; SUBCUTANEOUS EVERY 8 HOURS SCHEDULED
Status: DISCONTINUED | OUTPATIENT
Start: 2025-06-12 | End: 2025-06-11

## 2025-06-11 RX ORDER — METOCLOPRAMIDE HYDROCHLORIDE 5 MG/ML
5 INJECTION INTRAMUSCULAR; INTRAVENOUS EVERY 8 HOURS PRN
Status: DISCONTINUED | OUTPATIENT
Start: 2025-06-11 | End: 2025-06-15

## 2025-06-11 RX ORDER — OMEPRAZOLE 20 MG/1
20 CAPSULE, DELAYED RELEASE ORAL DAILY
COMMUNITY

## 2025-06-11 RX ORDER — MORPHINE SULFATE 2 MG/ML
0.5 INJECTION, SOLUTION INTRAMUSCULAR; INTRAVENOUS EVERY 2 HOUR PRN
Status: DISCONTINUED | OUTPATIENT
Start: 2025-06-11 | End: 2025-06-11

## 2025-06-11 RX ORDER — DEXAMETHASONE 4 MG/1
8 TABLET ORAL DAILY
COMMUNITY
Start: 2025-06-11 | End: 2025-06-15

## 2025-06-11 RX ORDER — MORPHINE SULFATE 2 MG/ML
2 INJECTION, SOLUTION INTRAMUSCULAR; INTRAVENOUS EVERY 2 HOUR PRN
Status: DISCONTINUED | OUTPATIENT
Start: 2025-06-11 | End: 2025-06-11

## 2025-06-11 RX ORDER — MORPHINE SULFATE 2 MG/ML
1 INJECTION, SOLUTION INTRAMUSCULAR; INTRAVENOUS EVERY 2 HOUR PRN
Status: DISCONTINUED | OUTPATIENT
Start: 2025-06-11 | End: 2025-06-11

## 2025-06-11 RX ORDER — MORPHINE SULFATE 2 MG/ML
2 INJECTION, SOLUTION INTRAMUSCULAR; INTRAVENOUS EVERY 2 HOUR PRN
Status: DISCONTINUED | OUTPATIENT
Start: 2025-06-11 | End: 2025-06-15

## 2025-06-11 RX ORDER — HEPARIN SODIUM 5000 [USP'U]/ML
5000 INJECTION, SOLUTION INTRAVENOUS; SUBCUTANEOUS EVERY 12 HOURS
Status: DISCONTINUED | OUTPATIENT
Start: 2025-06-11 | End: 2025-06-15

## 2025-06-11 RX ORDER — ACETAMINOPHEN 500 MG
500 TABLET ORAL EVERY 6 HOURS PRN
COMMUNITY

## 2025-06-11 RX ORDER — ONDANSETRON 2 MG/ML
4 INJECTION INTRAMUSCULAR; INTRAVENOUS EVERY 6 HOURS PRN
Status: DISCONTINUED | OUTPATIENT
Start: 2025-06-11 | End: 2025-06-15

## 2025-06-11 RX ORDER — MORPHINE SULFATE 4 MG/ML
INJECTION, SOLUTION INTRAMUSCULAR; INTRAVENOUS
Status: COMPLETED
Start: 2025-06-11 | End: 2025-06-11

## 2025-06-11 RX ORDER — MORPHINE SULFATE 4 MG/ML
4 INJECTION, SOLUTION INTRAMUSCULAR; INTRAVENOUS EVERY 2 HOUR PRN
Status: DISCONTINUED | OUTPATIENT
Start: 2025-06-11 | End: 2025-06-15

## 2025-06-11 RX ORDER — BENAZEPRIL HYDROCHLORIDE 20 MG/1
20 TABLET ORAL DAILY
Status: ON HOLD | COMMUNITY
End: 2025-06-12

## 2025-06-11 RX ORDER — LEVOTHYROXINE SODIUM 50 UG/1
50 TABLET ORAL DAILY
COMMUNITY

## 2025-06-11 RX ORDER — DEXTROSE MONOHYDRATE AND SODIUM CHLORIDE 5; .45 G/100ML; G/100ML
INJECTION, SOLUTION INTRAVENOUS CONTINUOUS
Status: DISCONTINUED | OUTPATIENT
Start: 2025-06-11 | End: 2025-06-15

## 2025-06-11 RX ORDER — FAMOTIDINE 10 MG/ML
20 INJECTION, SOLUTION INTRAVENOUS 2 TIMES DAILY
Status: DISCONTINUED | OUTPATIENT
Start: 2025-06-11 | End: 2025-06-13

## 2025-06-11 RX ORDER — MORPHINE SULFATE 4 MG/ML
6 INJECTION, SOLUTION INTRAMUSCULAR; INTRAVENOUS EVERY 2 HOUR PRN
Status: DISCONTINUED | OUTPATIENT
Start: 2025-06-11 | End: 2025-06-15

## 2025-06-11 RX ORDER — ONDANSETRON 2 MG/ML
8 INJECTION INTRAMUSCULAR; INTRAVENOUS EVERY 6 HOURS PRN
Status: DISCONTINUED | OUTPATIENT
Start: 2025-06-11 | End: 2025-06-11

## 2025-06-11 RX ORDER — DEXTROSE MONOHYDRATE, SODIUM CHLORIDE, AND POTASSIUM CHLORIDE 50; 1.49; 4.5 G/1000ML; G/1000ML; G/1000ML
INJECTION, SOLUTION INTRAVENOUS CONTINUOUS
Status: DISCONTINUED | OUTPATIENT
Start: 2025-06-11 | End: 2025-06-11
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
## 2025-06-12 ENCOUNTER — APPOINTMENT (OUTPATIENT)
Dept: GENERAL RADIOLOGY | Facility: HOSPITAL | Age: 85
End: 2025-06-12
Attending: SURGERY
Payer: MEDICARE

## 2025-06-12 LAB
ALBUMIN SERPL-MCNC: 3.1 G/DL (ref 3.2–4.8)
ALBUMIN/GLOB SERPL: 1.6 {RATIO} (ref 1–2)
ALP LIVER SERPL-CCNC: 91 U/L (ref 55–142)
ALT SERPL-CCNC: 13 U/L (ref 10–49)
ANION GAP SERPL CALC-SCNC: 8 MMOL/L (ref 0–18)
ANION GAP SERPL CALC-SCNC: 8 MMOL/L (ref 0–18)
ANTIBODY SCREEN: NEGATIVE
AST SERPL-CCNC: 10 U/L (ref ?–34)
BASOPHILS # BLD: 0 X10(3) UL (ref 0–0.2)
BASOPHILS NFR BLD: 0 %
BILIRUB DIRECT SERPL-MCNC: <0.1 MG/DL (ref ?–0.3)
BILIRUB SERPL-MCNC: 0.2 MG/DL (ref 0.2–1.1)
BUN BLD-MCNC: 13 MG/DL (ref 9–23)
BUN BLD-MCNC: 13 MG/DL (ref 9–23)
BUN/CREAT SERPL: 25 (ref 10–20)
BUN/CREAT SERPL: 25 (ref 10–20)
CALCIUM BLD-MCNC: 7.3 MG/DL (ref 8.7–10.4)
CALCIUM BLD-MCNC: 7.3 MG/DL (ref 8.7–10.4)
CHLORIDE SERPL-SCNC: 105 MMOL/L (ref 98–112)
CHLORIDE SERPL-SCNC: 105 MMOL/L (ref 98–112)
CO2 SERPL-SCNC: 22 MMOL/L (ref 21–32)
CO2 SERPL-SCNC: 22 MMOL/L (ref 21–32)
CREAT BLD-MCNC: 0.52 MG/DL (ref 0.55–1.02)
CREAT BLD-MCNC: 0.52 MG/DL (ref 0.55–1.02)
DEPRECATED RDW RBC AUTO: 73.9 FL (ref 35.1–46.3)
EGFRCR SERPLBLD CKD-EPI 2021: 92 ML/MIN/1.73M2 (ref 60–?)
EGFRCR SERPLBLD CKD-EPI 2021: 92 ML/MIN/1.73M2 (ref 60–?)
EOSINOPHIL # BLD: 0 X10(3) UL (ref 0–0.7)
EOSINOPHIL NFR BLD: 0 %
ERYTHROCYTE [DISTWIDTH] IN BLOOD BY AUTOMATED COUNT: 21.4 % (ref 11–15)
GLOBULIN PLAS-MCNC: 1.9 G/DL (ref 2–3.5)
GLUCOSE BLD-MCNC: 123 MG/DL (ref 70–99)
GLUCOSE BLD-MCNC: 123 MG/DL (ref 70–99)
HCT VFR BLD AUTO: 20.3 % (ref 35–48)
HGB BLD-MCNC: 6 G/DL (ref 12–16)
HGB BLD-MCNC: 8.4 G/DL (ref 12–16)
LACTATE SERPL-SCNC: 1 MMOL/L (ref 0.5–2)
LACTATE SERPL-SCNC: 1.5 MMOL/L (ref 0.5–2)
LYMPHOCYTES NFR BLD: 0.62 X10(3) UL (ref 1–4)
LYMPHOCYTES NFR BLD: 3 %
MAGNESIUM SERPL-MCNC: 1.7 MG/DL (ref 1.6–2.6)
MCH RBC QN AUTO: 29.6 PG (ref 26–34)
MCHC RBC AUTO-ENTMCNC: 29.6 G/DL (ref 31–37)
MCV RBC AUTO: 100 FL (ref 80–100)
METAMYELOCYTES # BLD: 0.21 X10(3) UL (ref ?–0.01)
METAMYELOCYTES NFR BLD: 1 %
MONOCYTES # BLD: 0 X10(3) UL (ref 0.1–1)
MONOCYTES NFR BLD: 0 %
NEUTROPHILS # BLD AUTO: 16.04 X10 (3) UL (ref 1.5–7.7)
NEUTROPHILS NFR BLD: 91 %
NEUTS BAND NFR BLD: 5 %
NEUTS HYPERSEG # BLD: 19.78 X10(3) UL (ref 1.5–7.7)
OSMOLALITY SERPL CALC.SUM OF ELEC: 281 MOSM/KG (ref 275–295)
OSMOLALITY SERPL CALC.SUM OF ELEC: 281 MOSM/KG (ref 275–295)
PHOSPHATE SERPL-MCNC: 2 MG/DL (ref 2.4–5.1)
PLATELET # BLD AUTO: 140 10(3)UL (ref 150–450)
PLATELET MORPHOLOGY: NORMAL
POTASSIUM SERPL-SCNC: 4.2 MMOL/L (ref 3.5–5.1)
POTASSIUM SERPL-SCNC: 4.2 MMOL/L (ref 3.5–5.1)
PROT SERPL-MCNC: 5 G/DL (ref 5.7–8.2)
RBC # BLD AUTO: 2.03 X10(6)UL (ref 3.8–5.3)
RH BLOOD TYPE: POSITIVE
SODIUM SERPL-SCNC: 135 MMOL/L (ref 136–145)
SODIUM SERPL-SCNC: 135 MMOL/L (ref 136–145)
TOTAL CELLS COUNTED BLD: 100
WBC # BLD AUTO: 20.6 X10(3) UL (ref 4–11)

## 2025-06-12 PROCEDURE — 85007 BL SMEAR W/DIFF WBC COUNT: CPT | Performed by: SURGERY

## 2025-06-12 PROCEDURE — 82248 BILIRUBIN DIRECT: CPT | Performed by: SURGERY

## 2025-06-12 PROCEDURE — 30233N1 TRANSFUSION OF NONAUTOLOGOUS RED BLOOD CELLS INTO PERIPHERAL VEIN, PERCUTANEOUS APPROACH: ICD-10-PCS | Performed by: STUDENT IN AN ORGANIZED HEALTH CARE EDUCATION/TRAINING PROGRAM

## 2025-06-12 PROCEDURE — 86850 RBC ANTIBODY SCREEN: CPT | Performed by: NURSE PRACTITIONER

## 2025-06-12 PROCEDURE — 85025 COMPLETE CBC W/AUTO DIFF WBC: CPT | Performed by: SURGERY

## 2025-06-12 PROCEDURE — 83605 ASSAY OF LACTIC ACID: CPT | Performed by: SURGERY

## 2025-06-12 PROCEDURE — 86920 COMPATIBILITY TEST SPIN: CPT

## 2025-06-12 PROCEDURE — 80053 COMPREHEN METABOLIC PANEL: CPT | Performed by: SURGERY

## 2025-06-12 PROCEDURE — 74250 X-RAY XM SM INT 1CNTRST STD: CPT | Performed by: SURGERY

## 2025-06-12 PROCEDURE — 36430 TRANSFUSION BLD/BLD COMPNT: CPT

## 2025-06-12 PROCEDURE — 85027 COMPLETE CBC AUTOMATED: CPT | Performed by: SURGERY

## 2025-06-12 PROCEDURE — 83735 ASSAY OF MAGNESIUM: CPT | Performed by: SURGERY

## 2025-06-12 PROCEDURE — 84100 ASSAY OF PHOSPHORUS: CPT | Performed by: SURGERY

## 2025-06-12 PROCEDURE — 85018 HEMOGLOBIN: CPT | Performed by: NURSE PRACTITIONER

## 2025-06-12 PROCEDURE — 86900 BLOOD TYPING SEROLOGIC ABO: CPT | Performed by: NURSE PRACTITIONER

## 2025-06-12 PROCEDURE — 86901 BLOOD TYPING SEROLOGIC RH(D): CPT | Performed by: NURSE PRACTITIONER

## 2025-06-12 RX ORDER — CLONAZEPAM 0.5 MG/1
0.5 TABLET ORAL NIGHTLY
Status: DISCONTINUED | OUTPATIENT
Start: 2025-06-12 | End: 2025-06-15

## 2025-06-12 RX ORDER — MAGNESIUM SULFATE HEPTAHYDRATE 40 MG/ML
2 INJECTION, SOLUTION INTRAVENOUS ONCE
Status: COMPLETED | OUTPATIENT
Start: 2025-06-12 | End: 2025-06-12

## 2025-06-12 RX ORDER — LEVOTHYROXINE SODIUM 50 UG/1
50 TABLET ORAL
Status: DISCONTINUED | OUTPATIENT
Start: 2025-06-12 | End: 2025-06-15

## 2025-06-12 RX ORDER — SODIUM CHLORIDE 9 MG/ML
INJECTION, SOLUTION INTRAVENOUS ONCE
Status: COMPLETED | OUTPATIENT
Start: 2025-06-12 | End: 2025-06-12

## 2025-06-12 RX ORDER — GABAPENTIN 300 MG/1
300 CAPSULE ORAL 2 TIMES DAILY
Status: DISCONTINUED | OUTPATIENT
Start: 2025-06-12 | End: 2025-06-15

## 2025-06-12 RX ORDER — DULOXETIN HYDROCHLORIDE 60 MG/1
60 CAPSULE, DELAYED RELEASE ORAL NIGHTLY
Status: DISCONTINUED | OUTPATIENT
Start: 2025-06-12 | End: 2025-06-15

## 2025-06-12 NOTE — ED QUICK NOTES
XR called to confirm NG placement.     Patient handoff given to Cal RN. Answered all questions and concerns. Will handover care at this time.
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
## 2025-06-12 NOTE — PLAN OF CARE
Patient admitted to unit from ED. Alert and oriented. Room air. Vital signs stable. Remote tele in place. NG to LIS. Irrigated/aspirated Q6 per order. Denies nausea. Prn morphine given for pain. Voiding via purewick. NPO with ice chips. IVF and abx continued. Call light and personal belongings within reach. Safety precautions in place.

## 2025-06-12 NOTE — PROGRESS NOTES
[facility]    Progress Note    Lizeth Mcgill Patient Status:  Inpatient    10/26/1940 MRN [id number]   Location [facility] 4W/SW/SE Attending Sukh Leary, DO   Hosp Day # 1 PCP Chong Cornejo MD       Subjective:   Lizeth Mcgill is a(n) 84 year old   female feels better    Assessment and Plan:   Lizeth Mcgill is a(n) 84 year old female    Patient with appears to have resolving small bowel obstruction.  Continue nasogastric tube decompression  Small bowel follow-through this morning to evaluate for partial versus complete small bowel obstruction  Will make further recommendations after reviewing small bowel follow-through  Patient with chronic anemia hemoglobin 6.0 agree with blood transfusion.  Patient is on chemotherapy has been receiving blood transfusions due to anemia most recently 1 month ago.  Patient without active bleeding.  Continue IV antibiotics  No acute surgical intervention at this time  VTE prophylaxis      Objective:   Blood pressure 109/58, pulse 93, temperature 98.6 °F (37 °C), temperature source Oral, resp. rate 16, height 4' 9\" (1.448 m), weight 104 lb (47.2 kg), SpO2 93%, not currently breastfeeding. I/O last 3 completed shifts:  In: 460 [I.V.:300; NG/GT:60; IV PIGGYBACK:100]  Out: 95 [Emesis/NG output:95]     General appearance: alert, appears stated age, and cooperative  Neck: no JVD and supple, symmetrical, trachea midline  Pulmonary: No labored breathing, equal respiratory excursion  Cardiovascular: regular rate and rhythm  Abdominal: soft, non-tender; bowel sounds normal; no masses,  no organomegaly and no flatus, no BM  Extremities: extremities normal, atraumatic, no cyanosis or edema          Results:       Recent Labs   Lab 25  0819   RBC 2.03*   HGB 6.0*   HCT 20.3*   .0   MCH 29.6   MCHC 29.6*   RDW 21.4*   NEPRELIM 16.04*   WBC 20.6*   .0*     Lab Results   Component Value Date    INR 1.02 2022        Recent Labs   Lab 06/12/25  0818   *  123*   BUN 13  13   CREATSERUM 0.52*  0.52*   CA 7.3*  7.3*   ALB 3.1*   *  135*   K 4.2  4.2     105   CO2 22.0  22.0   ALKPHO 91   AST 10   ALT 13   BILT 0.2   TP 5.0*             XR ABDOMEN OBSTRUCTIVE SERIES ROUTINE(2 VW)(CPT=74019)  Result Date: 6/11/2025  CONCLUSION:  1. Mildly dilated small bowel loops compatible with history of obstruction-probably partial. 2. NG tube in large retrocardiac hiatal hernia at junction between herniated fundus and body just above diaphragm.    Dictated by (CST): Néstor Phan MD on 6/11/2025 at 9:31 PM     Finalized by (CST): Néstor Phan MD on 6/11/2025 at 9:35 PM          XR CHEST AP PORTABLE  (CPT=71045)  Result Date: 6/11/2025  CONCLUSION:  1. NG tube is in a large retrocardiac hiatal hernia at gastric fundus body junction just above diaphragm. 2. Minimal left basilar atelectasis.  No pneumonia or other acute finding. 3. Right-sided Port-A-Cath remains with tip in SVC.  4. Heart size normal. 5. Atherosclerotic calcification aorta.    Dictated by (CST): Néstor Phan MD on 6/11/2025 at 9:28 PM     Finalized by (CST): Néstor Phan MD on 6/11/2025 at 9:31 PM                  Time spent in direct patient contact and decision making as well as counseling/coordination of care:    22142- 50 min      GREGORY BENITES MD [facility]  GENERAL SURGERY  [facility]  6/12/2025  10:10 AM
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
## 2025-06-12 NOTE — PROGRESS NOTES
[facility]: Initiation of oral vancomycin prophylaxis (OVP)    Lizeth Mcgill is at high risk of developing a C. difficile infection (CDI). Oral vancomycin prophylaxis 125 mg PO daily is being deferred per P&T approved protocol because Patient is strict NPO    Eligibility for OVP:  High-risk antibiotic use: Piperacillin-Tazobactam  History of positive C. difficile result (e.g., PCR) or CDI status within the last 6 months  No active orders for oral vancomycin or oral fidaxomicin     All measures taken within this protocol are to decrease the risk of CDI development.    Sawyer Aguila, PharmD  6/11/2025  9:26 PM  [facility]: [phone]

## 2025-06-12 NOTE — PAYOR COMM NOTE
--------------  ADMISSION REVIEW     Payor: [employer]  Subscriber #:  [id number]  Authorization Number: [id number]    Admit date: 6/11/25  Admit time:  9:19 PM       REVIEW DOCUMENTATION:     ED Provider Notes        ED Provider Notes signed by Trudy Encarnacion MD at 6/11/2025  9:47 PM    Patient Seen in: [facility] Emergency Department        History  Chief Complaint   Patient presents with    Abnormal Result     Stated Complaint: ct shows SBO, abdominal pain    Subjective:   HPI            84-year-old female with ovarian cancer undergoing chemotherapy who presents for evaluation of abdominal discomfort.  For the past 2 weeks she has noticed abdominal distention and generalized pain.  She is passing small amounts of very firm stool.  No vomiting.  No fevers.  She had an abnormal CT scan earlier today and was recommended to come to the ED.  She received chemotherapy 2 days ago followed by a medication to boost her WBC yesterday.      Objective:     Past Medical History:    Allergic rhinitis, unspecified seasonality, unspecified trigger    Anemia, unspecified type    Back pain    Back problem    Benign essential HTN    Blood disorder    Anemia    Breast cancer (HCC)    DCIS    Controlled type 2 diabetes mellitus with hyperglycemia, without long-term current use of insulin (HCC)    Disorder of thyroid    Diverticulosis of large intestine    Esophageal reflux    Essential hypertension    Exposure to medical diagnostic radiation    GERD without esophagitis    Heart valve disease    (+) Murmur    High blood pressure    High cholesterol    History of blood transfusion    No reaction    History of psoriasis    Hypercholesterolemia    Hyperlipidemia    Insomnia, unspecified type    Iron deficiency anemia, unspecified iron deficiency anemia type    Muscle weakness    Osteoarthritis    Osteopenia    Other emphysema (HCC)    Other osteoporosis without current pathological fracture    Peripheral edema    Personal  history of antineoplastic chemotherapy    2003    Prediabetes    Pulmonary emphysema (HCC)    S/P lumbar spine operation    Visual impairment    Reading glasses    Vitamin D deficiency       Physical Exam    ED Triage Vitals   BP 06/11/25 1844 114/57   Pulse 06/11/25 1757 99   Resp 06/11/25 1757 20   Temp 06/11/25 1757 98.4 °F (36.9 °C)   Temp src 06/11/25 1757 Temporal   SpO2 06/11/25 1757 99 %   O2 Device 06/11/25 1757 None (Room air)       Current Vitals:   Vital Signs  BP: 114/57  Pulse: 97  Resp: 20  Temp: 98.4 °F (36.9 °C)  Temp src: Temporal    Oxygen Therapy  SpO2: 96 %  O2 Device: None (Room air)            Physical Exam  Vitals and nursing note reviewed.   Constitutional:       Appearance: She is well-developed.   HENT:      Head: Normocephalic and atraumatic.   Eyes:      Extraocular Movements: Extraocular movements intact.   Cardiovascular:      Rate and Rhythm: Normal rate and regular rhythm.      Heart sounds: Normal heart sounds.   Pulmonary:      Effort: Pulmonary effort is normal.      Breath sounds: Normal breath sounds.   Abdominal:      General: There is distension.      Palpations: Abdomen is soft.      Tenderness: There is generalized abdominal tenderness.   Musculoskeletal:         General: Normal range of motion.      Cervical back: Normal range of motion.   Skin:     General: Skin is warm.   Neurological:      Mental Status: She is alert.      Comments: No focal deficits       Differential diagnose includes but is not limited to bowel obstruction, mesenteric ischemia, internal hernia        ED Course  Labs Reviewed   LACTIC ACID, PLASMA - Abnormal; Notable for the following components:       Result Value    Lactic Acid 2.8 (*)     All other components within normal limits   LACTIC ACID REFLEX POST POSTIVE   BLOOD CULTURE   BLOOD CULTURE        Ilsa Taylor MD - 06/11/2025   Formatting of this note might be different from the original.   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     Automated exposure control and ALARA manual techniques for patient specific dose reduction were   followed while maintaining the necessary diagnostic image quality.     ADVERSE REACTION: None     FINDINGS:     LUNG BASES: A 4 mm nodule in the right lower lobe is questioned and appear stable (series 3, image   13). And subsegmental atelectasis or scarring is seen at the left lung base.   LIVER: Normal.   GALLBLADDER/BILIARY: Cholecystectomy clips. The common bile duct measures up to 1 cm in caliber, and   this is unchanged.   PANCREAS: Normal.   SPLEEN: Multiple hypodense lesions are seen within the spleen. The largest at the anterosuperior   spleen measures 2.2 x 1.9 cm and is not significantly changed.   ADRENAL GLANDS: Normal.   KIDNEYS URETERS AND BLADDER: Normal.   BOWEL: Large hiatal hernia. Previously seen mass of the ascending colon appears improved.   Circumferential wall thickening remains at this level and is difficult to measure as was present   previously but the appearance is improved. The lumen appears narrowed at this level. However,   proximal to this area of wall thickening, there is a dilated and fluid-filled loop of bowel,   measuring up to 4.1 cm in caliber. This contains an air-fluid level. This is thought to represent   the terminal ileum.   Multiple slightly dilated loops of small bowel are seen in the lower abdomen/pelvis, measuring up to   3.2 cm. Several of these are fluid-filled. There is an area of slight wall thickening involving a   loop of small bowel within the low anterior midline, within a broad-based hernia. This appears to be   a transition point, proximal to which  the bowel is normal in caliber.   PELVIC ORGANS: No abnormal pelvic masses.   PERITONEUM: Trace free fluid is seen in the pelvis.   LYMPH NODES: There is no evidence of mesenteric, retroperitoneal or inguinal adenopathy.   AORTA/VASCULAR: The aorta is normal in caliber. Calcified atherosclerosis.   ABDOMINAL WALL: Diastases recti and broad-based hernia involving the lower anterior abdominal/pelvic   wall, containing loops of small bowel.. The lateral fat-containing inguinal hernias.   OSSEOUS STRUCTURES:  Postsurgical changes are seen related to posterior fusion of L4-S1. Multilevel   degenerative changes are seen within the tendon.       =====   IMPRESSION:   1. Previously seen mass involving the ascending colon is difficult to measure due to incomplete   distention but appears somewhat improved. However, the lumen appears narrowed, possibly representing   a stricture, and there is dilatation of the terminal ileum proximal to it, containing air-fluid   level and suggestive of partial small bowel obstruction.   2.  Another focal area of small bowel obstruction is noted with transition point at the midline of   the lower abdomen/pelvis, possibly related to a broad-based ventral hernia.   3.  Small free fluid in the pelvis.   4.  Multiple splenic lesions, stable and likely reflecting metastatic disease.     This report electronically signed by Dr. Ilsa Taylor 6/11/2025 4:43 PM           XR ABDOMEN OBSTRUCTIVE SERIES ROUTINE(2 VW)(CPT=74019)  Result Date: 6/11/2025  CONCLUSION:  1. Mildly dilated small bowel loops compatible with history of obstruction-probably partial. 2. NG tube in large retrocardiac hiatal hernia at junction between herniated fundus and body just above diaphragm.    Dictated by (CST): Néstor Phan MD on 6/11/2025 at 9:31 PM     Finalized by (CST): Néstor Phan MD on 6/11/2025 at 9:35 PM          XR CHEST AP PORTABLE  (CPT=71045)  Result Date: 6/11/2025  CONCLUSION:  1. NG tube is in a large  retrocardiac hiatal hernia at gastric fundus body junction just above diaphragm. 2. Minimal left basilar atelectasis.  No pneumonia or other acute finding. 3. Right-sided Port-A-Cath remains with tip in SVC.  4. Heart size normal. 5. Atherosclerotic calcification aorta.    Dictated by (CST): Néstor Phan MD on 6/11/2025 at 9:28 PM     Finalized by (CST): Néstor Phan MD on 6/11/2025 at 9:31 PM                   [facility]         Admission disposition: 6/11/2025  8:46 PM           Medical Decision Making  Vitals are stable in the ED.  Patient is nontoxic-appearing.  I reviewed labs from earlier today including CBC which shows leukocytosis.  Lactic acid today is 2.8.  Blood cultures are pending.  Broad-spectrum antibiotic ordered.  Reviewed CT abdomen pelvis report as above which is concerning for colonic stricture and 2 possible SBO's. D/w Dr Schroeder who kindly evaluated patient at bedside and recommends NG tube, x-ray obstructive series, antibiotics and admission to medicine.  Discussed with Dr. Leary for admission.    Problems Addressed:  SBO (small bowel obstruction) (HCC): complicated acute illness or injury with systemic symptoms that poses a threat to life or bodily functions    Amount and/or Complexity of Data Reviewed  External Data Reviewed: notes.     Details: Reviewed discharge summary from 3/8/2025: Patient admitted for SBO related to ventral hernia with strangulation underwent ex lap, PANFILO, repair of ventral hernia  Labs: ordered. Decision-making details documented in ED Course.  Radiology: independent interpretation performed.  Discussion of management or test interpretation with external provider(s): As above    Risk  Decision regarding hospitalization.        Disposition and Plan     Clinical Impression:  1. SBO (small bowel obstruction) (HCC)         Disposition:  Admit  6/11/2025  8:46 pm    [facility]  part of [facility]      Sepsis Reassessment Note    /57   Pulse 97    Temp 98.4 °F (36.9 °C) (Temporal)   Resp 20   Ht 144.8 cm (4' 9\")   Wt 47.2 kg   SpO2 96%   BMI 22.51 kg/m²      I completed the sepsis reassessment at 2100    Cardiac:  Regularity: Regular  Rate: Normal  Heart Sounds: S1,S2    Lungs:   Right: Clear  Left: Clear    Peripheral Pulses:  Radial: Right 1+ or Left 1+      Capillary Refill:  <3 Secs    Skin:  Temp/Moisture: Warm and Dry  Color: Normal      Trudy Encarnacion MD  6/11/2025  8:46 PM             Hospital Problems       Present on Admission  Date Reviewed: 3/1/2022          ICD-10-CM Noted POA    * (Principal) SBO (small bowel obstruction) ([facility]) K56.609 6/11/2025 Unknown         Signed by Trudy Encarnacion MD on 6/11/2025  9:47 PM             CONSULT       Consults signed by Paul Schroeder MD at 6/11/2025  8:57 PM         [facility]  part of [facility]          Date of Admission:  6/11/2025  Date of Consult:  6/11/2025     Reason for Consultation:  Recurrent small bowel obstruction        History of Present Illness:  Lizeth Mcgill   is a 84 year old    female who presents with a history of recurrent ovarian cancer.  Patient received chemotherapy for second dose 2 days ago on Monday.  Patient also received Neupogen like medication yesterday.  Patient had increasing crampy pain.  Patient denies any nausea.  Patient did move her bowels yesterday.  Patient's status post exploratory laparotomy with repair of internal hernia for septic shock of small bowel obstruction on 2/28/2025.  Patient subsequently was found to have recurrent ovarian cancer necessitating chemotherapy.  Patient began chemotherapy 1 month ago.     Patient had CT scan of the abdomen pelvis through [facility] facility today which revealed a mass involving send colon is difficult to measure due to incomplete distention but appears somewhat improved.  The lumen appears narrow and possibly representing a stricture.  There is dilation of the terminal ileum proximal to this  containing air-fluid level suggestive of partial small bowel obstruction.  There is no other focal area of small bowel obstruction noted with transition point in the midline of the lower abdomen/pelvis possibly related to broad-based ventral hernia.  Patient has had some free fluid in the abdomen.  There is multiple splenic lesions stable consistent with metastatic disease.               History:  [Past Medical History]    [Past Medical History]   Allergic rhinitis, unspecified seasonality, unspecified trigger    Anemia, unspecified type    Back pain    Back problem    Benign essential HTN    Blood disorder     Anemia    Breast cancer (HCC)     DCIS    Controlled type 2 diabetes mellitus with hyperglycemia, without long-term current use of insulin (HCC)    Disorder of thyroid    Diverticulosis of large intestine    Esophageal reflux    Essential hypertension    Exposure to medical diagnostic radiation    GERD without esophagitis    Heart valve disease     (+) Murmur    High blood pressure    High cholesterol    History of blood transfusion     No reaction    History of psoriasis    Hypercholesterolemia    Hyperlipidemia    Insomnia, unspecified type    Iron deficiency anemia, unspecified iron deficiency anemia type    Muscle weakness    Osteoarthritis    Osteopenia    Other emphysema (HCC)    Other osteoporosis without current pathological fracture    Peripheral edema    Personal history of antineoplastic chemotherapy     2003    Prediabetes    Pulmonary emphysema (HCC)    S/P lumbar spine operation    Visual impairment     Reading glasses    Vitamin D deficiency         Medications:  [Current Hospital Medications]     Current Facility-Administered Medications:     sodium chloride 0.9 % IV bolus 1,416 mL, 30 mL/kg, Intravenous, Once    piperacillin-tazobactam (Zosyn) 4.5 g in dextrose 5% 100 mL IVPB-ADDV, 4.5 g, Intravenous, Once     Review of Systems:  Pertinent items are noted in HPI.     Physical Exam:  Blood  pressure 114/57, pulse 97, temperature 98.4 °F (36.9 °C), temperature source Temporal, resp. rate 20, height 4' 9\" (1.448 m), weight 104 lb (47.2 kg), SpO2 96%, not currently breastfeeding.     General appearance:  alert, appears stated age, cooperative, and no distress  Eyes: Sclera anicteric  Pulmonary: Equal respiratory excursion, no labored breathing  Cardiovascular: regular rate and rhythm  Abdominal: Soft protrudent mildly distended with a large ventral hernia in the midline with no abdominal wall musculature present.  There is no tenderness present.  No palpable masses present.  Hernia is not incarcerated with tenderness or obstruction.  Extremities: extremities normal, atraumatic, no cyanosis or edema  Skin: Skin color, texture, turgor normal. No rashes or lesions  Psychiatric: calm  concrete thoughts   memory intact     Results:        Lab Results   Component Value Date     WBC 18.5 (H) 05/16/2025     HGB 8.0 (L) 05/16/2025     HCT 26.5 (L) 05/16/2025     .0 (H) 05/16/2025     CREATSERUM 0.95 05/16/2025     BUN 17 05/16/2025      (L) 05/16/2025     K 4.3 05/16/2025      05/16/2025     CO2 20.0 (L) 05/16/2025      (H) 05/16/2025     CA 8.6 (L) 05/16/2025     ALB 3.8 05/16/2025     ALKPHO 95 05/16/2025     BILT 0.2 05/16/2025     TP 6.5 05/16/2025     AST 18 05/16/2025     ALT 13 05/16/2025     PTT 25.0 08/18/2022     INR 1.02 08/18/2022     T4F 1.38 11/30/2016     TSH 2.490 08/25/2021     LIP 27 02/28/2025     DDIMER 0.65 05/16/2025     MG 1.6 03/08/2025     PHOS 3.4 03/08/2025     TROP <0.045 07/20/2019     B12 1,336 (H) 05/06/2021         No results found.            Impression and Plan:  [Problem List]    [Problem List]  Patient Active Problem List      Diagnosis    History of psoriasis    FHx: colon cancer    Hypercalcemia    Personal history of breast cancer    DDD (degenerative disc disease), lumbar    Insomnia, unspecified type    S/P subtotal parathyroidectomy    Aortic  calcification    Hypothyroidism, unspecified type    History of hyperparathyroidism    Sciatica of left side    Spinal stenosis of lumbar region with neurogenic claudication    Other osteoporosis without current pathological fracture    Spondylolisthesis at L5-S1 level    Iron deficiency anemia, unspecified iron deficiency anemia type    PVC (premature ventricular contraction)    Spondylolisthesis of lumbar region    History of lumbar fusion    GERD without esophagitis    Allergic rhinitis, unspecified seasonality, unspecified trigger    Benign essential HTN    Hypercholesterolemia    B12 deficiency    Prediabetes    Anemia, unspecified type    Back pain with left-sided sciatica    Lumbar radiculopathy    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  With the last dose 2 days ago.  Patient does appear to have a possible small bowel obstruction or narrowing secondary to recurrent cancer.  I would recommend aggressive nonoperative management with nasogastric decompression IV hydration.  Patient without evidence for acute surgical intervention or acute abdomen at this time.  Will repeat laboratory data physical examination as well as small bowel follow-through in a.m.  Due to recent chemotherapy we will try to avoid any surgical invention if possible.  Reviewed all old x-rays operative notes as well as imaging.  Will follow along with you.  No acute surgical invention at this time.  Discussed in  detail with the patient as well as her son both understands proceed with the above plan.     Time spent in direct patient contact and decision making as well as counseling/coordination of care:  79008- 80 min     GREGORY BENITES JR., MD. [facility]  GENERAL SURGERY  [facility]     6/11/2025  8:52 PM                   6/12          Date of Service: 6/12/2025 10:10 AM     Signed         [facility]  part of [facility]     Progress Note          Subjective:   Lizeth Mcgill is a(n) 84 year old   female feels better     Assessment and Plan:   Lizeth Mcgill is a(n) 84 year old female    Patient with appears to have resolving small bowel obstruction.  Continue nasogastric tube decompression  Small bowel follow-through this morning to evaluate for partial versus complete small bowel obstruction  Will make further recommendations after reviewing small bowel follow-through  Patient with chronic anemia hemoglobin 6.0 agree with blood transfusion.  Patient is on chemotherapy has been receiving blood transfusions due to anemia most recently 1 month ago.  Patient without active bleeding.  Continue IV antibiotics  No acute surgical intervention at this time  VTE prophylaxis        Objective:   Blood pressure 109/58, pulse 93, temperature 98.6 °F (37 °C), temperature source Oral, resp. rate 16, height 4' 9\" (1.448 m), weight 104 lb (47.2 kg), SpO2 93%, not currently breastfeeding. I/O last 3 completed shifts:  In: 460 [I.V.:300; NG/GT:60; IV PIGGYBACK:100]  Out: 95 [Emesis/NG output:95]      General appearance: alert, appears stated age, and cooperative  Neck: no JVD and supple, symmetrical, trachea midline  Pulmonary: No labored breathing, equal respiratory excursion  Cardiovascular: regular rate and rhythm  Abdominal: soft, non-tender; bowel sounds normal; no masses,  no organomegaly and no flatus, no BM  Extremities: extremities normal, atraumatic, no cyanosis or edema              Results:              Recent Labs   Lab 06/12/25  0819   RBC 2.03*   HGB 6.0*   HCT 20.3*   .0   MCH 29.6   MCHC 29.6*   RDW 21.4*   NEPRELIM 16.04*   WBC 20.6*   .0*            Lab Results   Component Value Date     INR 1.02 08/18/2022             Recent Labs   Lab 06/12/25  0818   *  123*   BUN 13  13   CREATSERUM 0.52*  0.52*   CA 7.3*  7.3*   ALB 3.1*   *  135*   K 4.2  4.2     105   CO2 22.0  22.0   ALKPHO 91   AST 10   ALT 13   BILT 0.2   TP 5.0*                  XR ABDOMEN OBSTRUCTIVE SERIES ROUTINE(2 VW)(CPT=74019)  Result Date: 6/11/2025  CONCLUSION:  1. Mildly dilated small bowel loops compatible with history of obstruction-probably partial. 2. NG tube in large retrocardiac hiatal hernia at junction between herniated fundus and body just above diaphragm.    Dictated by (CST): Néstor Phan MD on 6/11/2025 at 9:31 PM     Finalized by (CST): Néstor Phan MD on 6/11/2025 at 9:35 PM           XR CHEST AP PORTABLE  (CPT=71045)  Result Date: 6/11/2025  CONCLUSION:  1. NG tube is in a large retrocardiac hiatal hernia at gastric fundus body junction just above diaphragm. 2. Minimal left basilar atelectasis.  No pneumonia or other acute finding. 3. Right-sided Port-A-Cath remains with tip in SVC.  4. Heart size normal. 5. Atherosclerotic calcification aorta.    Dictated by (CST): Néstor Phan MD on 6/11/2025 at 9:28 PM     Finalized by (CST): Néstor Phan MD on 6/11/2025 at 9:31 PM                     Time spent in direct patient contact and decision making as well as counseling/coordination of care:     88825- 50 min        GREGORY BENITES MD [facility]  GENERAL SURGERY  [facility]  6/12/2025  10:10 AM                                     MEDICATIONS ADMINISTERED IN LAST 1 DAY:  dextrose 5%-sodium chloride 0.45% infusion       Date Action Dose Route User    6/12/2025 0853 New Bag (none) Intravenous Fay Briseno, RN          famotidine (Pepcid) 20 mg/2mL injection 20 mg        Date Action Dose Route User    6/12/2025 0853 Given 20 mg Intravenous Fay Briseno RN    6/11/2025 2242 Given 20 mg Intravenous Lucie Espino RN          heparin (Porcine) 5000 UNIT/ML injection 5,000 Units       Date Action Dose Route User    6/11/2025 2243 Given 5,000 Units Subcutaneous (Right Upper Arm) Lucie Espino RN          potassium chloride 20 mEq in dextrose 5%-sodium chloride 0.45% 1000mL infusion premix       Date Action Dose Route User    6/11/2025 2257 New Bag (none) Intravenous Lucie Espino RN          morphINE PF 2 MG/ML injection 2 mg       Date Action Dose Route User    6/12/2025 0904 Given 2 mg Intravenous Fay Briseno RN    6/12/2025 0540 Given 2 mg Intravenous Lcuie Espino RN    6/12/2025 0009 Given 2 mg Intravenous Lucie Espino RN          morphINE PF 4 MG/ML injection       Date Action Dose Route User    6/11/2025 2113 Given 4 mg (none) Bienvenido Martinez RN          piperacillin-tazobactam (Zosyn) 3.375 g in dextrose 5% 100 mL IVPB-ADDV       Date Action Dose Route User    6/12/2025 0853 New Bag 3.375 g Intravenous Fay Briseno RN    6/12/2025 0009 New Bag 3.375 g Intravenous Lucie Espino RN          piperacillin-tazobactam (Zosyn) 4.5 g in dextrose 5% 100 mL IVPB-ADDV       Date Action Dose Route User    6/11/2025 2057 New Bag 4.5 g Intravenous Bienvenido Martinez RN          sodium chloride 0.9 % IV bolus 1,416 mL       Date Action Dose Route User    6/11/2025 2057 New Bag 1,000 mL Intravenous Bienvenido Martinez RN            Vitals (last day)       Date/Time Temp Pulse Resp BP SpO2 Weight O2 Device O2 Flow Rate (L/min) [facility]    06/12/25 0516 98.6 °F (37 °C) 93 16 109/58 93 % -- None (Room air) -- KJ    06/12/25 0300 -- 85 -- -- -- -- -- -- GT    06/11/25 2259 98.3 °F (36.8 °C) 83 18 136/62 94 % -- None (Room air) -- CM    06/11/25 2252 -- 92 -- -- -- -- -- -- GT    06/11/25 2133 -- -- -- -- -- 104 lb (47.2 kg) -- -- CM     06/11/25 2126 98.5 °F (36.9 °C) 102 18 135/57 94 % -- None (Room air) -- CM    06/11/25 2100 -- 104 19 -- 95 % -- -- -- ML    06/11/25 2030 -- 98 19 111/67 96 % -- -- -- ML    06/11/25 1844 -- 97 20 114/57 96 % -- None (Room air) -- ZG    06/11/25 1757 98.4 °F (36.9 °C) 99 20 -- 99 % 104 lb (47.2 kg) None (Room air) -- KW
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
## 2025-06-12 NOTE — CM/SW NOTE
Met with patient at bedside for assessment. Patient lives at [facility]. She uses a rollator. She is independent w/ ADLs. She has a cleaning service 1x/wk through the building. She goes to OP therapy at [facility]. She has 4 children, all of which are involved & supportive. Patient was at SNF in 3/2025.    Discussed discharge plan. Patient anticipates returning home w/ no needs and continued support.    Ashley Brown, CRUZ [id number]

## 2025-06-12 NOTE — ED QUICK NOTES
Orders for admission, patient is aware of plan and ready to go upstairs. Any questions, please call ED RN max at extension [phone].     Patient Covid vaccination status: Fully vaccinated     COVID Test Ordered in ED: None    COVID Suspicion at Admission: N/A    Running Infusions: Medication Infusions[1] None    Mental Status/LOC at time of transport: aox3    Other pertinent information:   CIWA score: N/A   NIH score:  N/A             [1]

## 2025-06-13 ENCOUNTER — APPOINTMENT (OUTPATIENT)
Dept: GENERAL RADIOLOGY | Facility: HOSPITAL | Age: 85
End: 2025-06-13
Attending: SURGERY
Payer: MEDICARE

## 2025-06-13 ENCOUNTER — APPOINTMENT (OUTPATIENT)
Dept: CV DIAGNOSTICS | Facility: HOSPITAL | Age: 85
End: 2025-06-13
Attending: HOSPITALIST
Payer: MEDICARE

## 2025-06-13 LAB
ANION GAP SERPL CALC-SCNC: 7 MMOL/L (ref 0–18)
BASOPHILS # BLD: 0 X10(3) UL (ref 0–0.2)
BASOPHILS NFR BLD: 0 %
BLOOD TYPE BARCODE: 6200
BUN BLD-MCNC: 9 MG/DL (ref 9–23)
BUN/CREAT SERPL: 18.8 (ref 10–20)
C DIFF GDH + TOXINS A+B STL QL IA.RAPID: NOT DETECTED
C DIFF TOX B STL QL: POSITIVE
CALCIUM BLD-MCNC: 7 MG/DL (ref 8.7–10.4)
CHLORIDE SERPL-SCNC: 105 MMOL/L (ref 98–112)
CO2 SERPL-SCNC: 24 MMOL/L (ref 21–32)
CREAT BLD-MCNC: 0.48 MG/DL (ref 0.55–1.02)
DEPRECATED RDW RBC AUTO: 65 FL (ref 35.1–46.3)
EGFRCR SERPLBLD CKD-EPI 2021: 93 ML/MIN/1.73M2 (ref 60–?)
EOSINOPHIL # BLD: 0 X10(3) UL (ref 0–0.7)
EOSINOPHIL NFR BLD: 0 %
ERYTHROCYTE [DISTWIDTH] IN BLOOD BY AUTOMATED COUNT: 19.9 % (ref 11–15)
GLUCOSE BLD-MCNC: 116 MG/DL (ref 70–99)
HCT VFR BLD AUTO: 23.5 % (ref 35–48)
HGB BLD-MCNC: 7.2 G/DL (ref 12–16)
LYMPHOCYTES NFR BLD: 0.16 X10(3) UL (ref 1–4)
LYMPHOCYTES NFR BLD: 1 %
MAGNESIUM SERPL-MCNC: 2 MG/DL (ref 1.6–2.6)
MAGNESIUM SERPL-MCNC: 2.1 MG/DL (ref 1.6–2.6)
MCH RBC QN AUTO: 28.7 PG (ref 26–34)
MCHC RBC AUTO-ENTMCNC: 30.6 G/DL (ref 31–37)
MCV RBC AUTO: 93.6 FL (ref 80–100)
METAMYELOCYTES # BLD: 0.16 X10(3) UL (ref ?–0.01)
METAMYELOCYTES NFR BLD: 1 %
MONOCYTES # BLD: 0.32 X10(3) UL (ref 0.1–1)
MONOCYTES NFR BLD: 2 %
NEUTROPHILS # BLD AUTO: 11.12 X10 (3) UL (ref 1.5–7.7)
NEUTROPHILS NFR BLD: 93 %
NEUTS BAND NFR BLD: 3 %
NEUTS HYPERSEG # BLD: 15.17 X10(3) UL (ref 1.5–7.7)
OSMOLALITY SERPL CALC.SUM OF ELEC: 282 MOSM/KG (ref 275–295)
PHOSPHATE SERPL-MCNC: 2.4 MG/DL (ref 2.4–5.1)
PHOSPHATE SERPL-MCNC: 2.6 MG/DL (ref 2.4–5.1)
PLATELET # BLD AUTO: 116 10(3)UL (ref 150–450)
PLATELET MORPHOLOGY: NORMAL
POTASSIUM SERPL-SCNC: 3.3 MMOL/L (ref 3.5–5.1)
RBC # BLD AUTO: 2.51 X10(6)UL (ref 3.8–5.3)
SODIUM SERPL-SCNC: 136 MMOL/L (ref 136–145)
TOTAL CELLS COUNTED BLD: 100
UNIT VOLUME: 350 ML
WBC # BLD AUTO: 15.8 X10(3) UL (ref 4–11)

## 2025-06-13 PROCEDURE — 84100 ASSAY OF PHOSPHORUS: CPT | Performed by: NURSE PRACTITIONER

## 2025-06-13 PROCEDURE — 74021 RADEX ABDOMEN 3+ VIEWS: CPT | Performed by: SURGERY

## 2025-06-13 PROCEDURE — 93306 TTE W/DOPPLER COMPLETE: CPT | Performed by: HOSPITALIST

## 2025-06-13 PROCEDURE — 80048 BASIC METABOLIC PNL TOTAL CA: CPT | Performed by: SURGERY

## 2025-06-13 PROCEDURE — 87493 C DIFF AMPLIFIED PROBE: CPT | Performed by: INTERNAL MEDICINE

## 2025-06-13 PROCEDURE — 83735 ASSAY OF MAGNESIUM: CPT | Performed by: SURGERY

## 2025-06-13 PROCEDURE — 85027 COMPLETE CBC AUTOMATED: CPT | Performed by: SURGERY

## 2025-06-13 PROCEDURE — 84100 ASSAY OF PHOSPHORUS: CPT | Performed by: SURGERY

## 2025-06-13 PROCEDURE — 85025 COMPLETE CBC W/AUTO DIFF WBC: CPT | Performed by: SURGERY

## 2025-06-13 PROCEDURE — 83735 ASSAY OF MAGNESIUM: CPT | Performed by: NURSE PRACTITIONER

## 2025-06-13 PROCEDURE — 85007 BL SMEAR W/DIFF WBC COUNT: CPT | Performed by: SURGERY

## 2025-06-13 RX ORDER — FAMOTIDINE 20 MG/1
20 TABLET, FILM COATED ORAL 2 TIMES DAILY
Status: DISCONTINUED | OUTPATIENT
Start: 2025-06-13 | End: 2025-06-15

## 2025-06-13 RX ORDER — POTASSIUM CHLORIDE 14.9 MG/ML
20 INJECTION INTRAVENOUS ONCE
Status: COMPLETED | OUTPATIENT
Start: 2025-06-13 | End: 2025-06-13

## 2025-06-13 RX ORDER — METOPROLOL TARTRATE 25 MG/1
25 TABLET, FILM COATED ORAL
Status: DISCONTINUED | OUTPATIENT
Start: 2025-06-13 | End: 2025-06-15

## 2025-06-13 RX ORDER — POTASSIUM CHLORIDE 1500 MG/1
40 TABLET, EXTENDED RELEASE ORAL ONCE
Status: DISCONTINUED | OUTPATIENT
Start: 2025-06-13 | End: 2025-06-13

## 2025-06-13 RX ORDER — VANCOMYCIN HYDROCHLORIDE 125 MG/1
125 CAPSULE ORAL DAILY
Status: DISCONTINUED | OUTPATIENT
Start: 2025-06-13 | End: 2025-06-15

## 2025-06-13 NOTE — PROGRESS NOTES
[facility]  part of [facility]    Progress Note    Lizeth Mcgill Patient Status:  Inpatient    10/26/1940 MRN [id number]   Location [facility] 4W/SW/SE Attending Sukh Leary, DO   Hosp Day # 2 PCP Chong Cornejo MD       Subjective:   Lizeth Mcgill is a(n) 84 year old   female feels better    Assessment and Plan:   Lizeth Mcgill is a(n) 84 year old female    Patient with appears to have resolving small bowel obstruction.  Continue nasogastric tube decompression  Small bowel follow-through reveals no evidence of obstruction.  NG tube removed last night.  Patient tolerating clear liquid diet  Advance to full liquid diet this morning.  Low residual diet for dinner tonight if continues to tolerate  Hemoglobin improved with blood transfusion  Increase activity with ambulation  Discharge planning hopefully home tomorrow if continues to tolerate die  VTE prophylaxis      Objective:   Blood pressure 129/59, pulse 82, temperature 97.7 °F (36.5 °C), temperature source Oral, resp. rate 16, height 4' 9\" (1.448 m), weight 104 lb (47.2 kg), SpO2 92%, not currently breastfeeding. I/O last 3 completed shifts:  In: 2492.5 [P.O.:240; I.V.:1500; Blood:292.5; NG/GT:60; IV PIGGYBACK:400]  Out: 520 [Urine:400; Emesis/NG output:120]     General appearance: alert, appears stated age, and cooperative  Neck: no JVD and supple, symmetrical, trachea midline  Pulmonary: No labored breathing, equal respiratory excursion  Cardiovascular: regular rate and rhythm  Abdominal: soft, non-tender; bowel sounds normal; no masses,  no organomegaly and positive flatus, positive BM  Extremities: extremities normal, atraumatic, no cyanosis or edema          Results:       Recent Labs   Lab 25  0819 25  1837 25  0453   RBC 2.03*  --  2.51*   HGB 6.0* 8.4* 7.2*   HCT 20.3*  --  23.5*   .0  --  93.6   MCH 29.6  --  28.7   MCHC 29.6*  --  30.6*   RDW 21.4*  --  19.9*   NEPRELIM 16.04*  --  11.12*    WBC 20.6*  --  15.8*   .0*  --  116.0*     Lab Results   Component Value Date    INR 1.02 08/18/2022       Recent Labs   Lab 06/12/25  0818 06/13/25  0453   *  123* 116*   BUN 13  13 9   CREATSERUM 0.52*  0.52* 0.48*   CA 7.3*  7.3* 7.0*   ALB 3.1*  --    *  135* 136   K 4.2  4.2 3.3*     105 105   CO2 22.0  22.0 24.0   ALKPHO 91  --    AST 10  --    ALT 13  --    BILT 0.2  --    TP 5.0*  --              XR SMALL BOWEL SINGLE CONTRAST (CPT=74250)  Result Date: 6/12/2025  CONCLUSION:  1. Large hiatal hernia. 2. Follow bowel follow through demonstrates delayed transit time, with no evidence of obstruction.  Dictated by (CST): Matthias Hamilton MD on 6/12/2025 at 3:01 PM     Finalized by (CST): Matthias Hamilton MD on 6/12/2025 at 3:04 PM          XR ABDOMEN OBSTRUCTIVE SERIES ROUTINE(2 VW)(CPT=74019)  Result Date: 6/11/2025  CONCLUSION:  1. Mildly dilated small bowel loops compatible with history of obstruction-probably partial. 2. NG tube in large retrocardiac hiatal hernia at junction between herniated fundus and body just above diaphragm.    Dictated by (CST): Néstor Phan MD on 6/11/2025 at 9:31 PM     Finalized by (CST): Néstor Phan MD on 6/11/2025 at 9:35 PM          XR CHEST AP PORTABLE  (CPT=71045)  Result Date: 6/11/2025  CONCLUSION:  1. NG tube is in a large retrocardiac hiatal hernia at gastric fundus body junction just above diaphragm. 2. Minimal left basilar atelectasis.  No pneumonia or other acute finding. 3. Right-sided Port-A-Cath remains with tip in SVC.  4. Heart size normal. 5. Atherosclerotic calcification aorta.    Dictated by (CST): Néstor Phan MD on 6/11/2025 at 9:28 PM     Finalized by (CST): Néstor Phan MD on 6/11/2025 at 9:31 PM                  Time spent in direct patient contact and decision making as well as counseling/coordination of care:    [id number]- 50 min      GREGORY BENITES JR., MD. [facility]  GENERAL SURGERY  Formerly [facility], later [facility] AND  CARE    6/13/2025  9:11 AM

## 2025-06-13 NOTE — PROGRESS NOTES
[facility] Hospitalist Progress Note     CC: Hospital Follow up    PCP: Chong Cornejo MD       Assessment/Plan:   84-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, OP, hypertension-off meds, GERD, anxiety/depression, neuropathy, admission 2/28-3/8/2025 for SBO 2/2 ventral hernia s/p xploratory laparotomy, lysis of adhesions, closure of mesenteric defect, repair of ventral hernia 2/28/25 complicated by C-diff currently on chemo  for recurrent ovarian cancer who has been experiencing seeing abdominal pain bloating and constipation who had an outpatient CT of the abdomen and pelvis with possible partial small bowel obstruction versus narrowing due to recurrent cancer     Partial SBO vs Narrowing 2/2 Recurrent Cancer  -6/22 CT A/P at [facility] reviously seen mass involving the ascending colon is difficult to measure due to incomplete distention but appears somewhat improved possible stricture versus partial small bowel obstruction  -NG tube placed on admission, removed 6/12 evening. Started on clears, now on fulls. Advance to low fiber soft tonight most likely   -general surgery following      Leukocytosis, likely Related to Neupogen  -afebrile. WBC 24 outpatient, high here on arrival too   -cxr negative for acute process  -UA as outpt negative  -blood cx pending  -abx- zosyn per surgery. Would consider stopping soon or transition to augmentin, hx of C diff. Add daily vancomycin oral.   -follow     SVT   -early AM 6/13, transient, seems to have resolved without IV medicine. Lopressor 25mg BID ordered in meantime. TTE ordered. Ensure correcting K and Mag. Follow tele.     Acute on chronic anemia  Thrombocytopenia  -related to chemo  -1 unit of PRBC given 6/12     Recurrent Metastatic Ovarian cancer  -hx neoadjuvant chemotherapy with carboplatin and paclitaxel for six cycles followed by IKER-BSO and adjuvant chemotherapy with carbo/taxol.    -Now with recurrence   -palliative chemo s/p cycle 3 carbo/taxol/arden on 6/9  -follows with   Tian      GOC  Code status: FULL. Discussed with patient about some of her wishes. She was a little hesitant to elect DNR status and but did state that she may be considering changing her goals of care knowing that she has metastatic cancer. I told her we can continue to address while here, at this time she would want to be full code but will likely chat with her children about this as well soon she states.      Chronic Medical Problems:     GERD- pepcid  Neuropathy-gabapentin  Hypertension- bp has been running low, off norvasc and benazpril   Hypothyroidism-Synthroid   Anxiety/Depression- resume duloxetine and and clonazepam 0.5 nightly     GOC  - CODE STATUS with patient with Dr. Krishnamurthy present in room, patient has full code at this time but will be discussing this further with her family  -POA-kaykay Lebron [phone]  -discussed palliative care eval, son will discuss with Mom      MA  -ER Visits 2025: 2  -Admissions 2025:2  -Consults: surgery  -Discharge Needs: none anticipated  -Appointments: [ ] PCP Chong Cornejo MD     Prophy  -SCD  -heparin     Dispo  Discharge possibly by Saturday 6/12 update given to kaykay Lebron via phone via NP       [facility] Hospitalist   Answering service [phone]         Subjective:     Feels better today. Having bowel movements  No severe pain. No nausea.     OBJECTIVE:    Blood pressure 129/59, pulse 82, temperature 97.7 °F (36.5 °C), temperature source Oral, resp. rate 16, height 4' 9\" (1.448 m), weight 104 lb (47.2 kg), SpO2 92%, not currently breastfeeding.    Temp:  [97.7 °F (36.5 °C)-98.9 °F (37.2 °C)] 97.7 °F (36.5 °C)  Pulse:  [] 82  Resp:  [16] 16  BP: (124-145)/(53-68) 129/59  SpO2:  [90 %-94 %] 92 %      Intake/Output:    Intake/Output Summary (Last 24 hours) at 6/13/2025 1207  Last data filed at 6/13/2025 1115  Gross per 24 hour   Intake 2052.5 ml   Output 1825 ml   Net 227.5 ml       Last 3 Weights   06/11/25 2133 104 lb (47.2  kg)   06/11/25 1757 104 lb (47.2 kg)   05/16/25 1418 105 lb (47.6 kg)   03/02/25 0500 114 lb 10.2 oz (52 kg)   03/01/25 0500 112 lb 7 oz (51 kg)   02/28/25 1030 111 lb (50.3 kg)       /59 (BP Location: Right arm)   Pulse 82   Temp 97.7 °F (36.5 °C) (Oral)   Resp 16   Ht 4' 9\" (1.448 m)   Wt 104 lb (47.2 kg)   SpO2 92%   BMI 22.51 kg/m²   Gen: awake, alert.   HEENT: NG tube intact  Heart: RRR  Lungs: CTABL  Abdomen: ventral hernia noted, soft otherwise   Legs: no edema     Data Review:       Labs:     Recent Labs   Lab 06/12/25  0819 06/12/25  1837 06/13/25  0453   RBC 2.03*  --  2.51*   HGB 6.0* 8.4* 7.2*   HCT 20.3*  --  23.5*   .0  --  93.6   MCH 29.6  --  28.7   MCHC 29.6*  --  30.6*   RDW 21.4*  --  19.9*   NEPRELIM 16.04*  --  11.12*   WBC 20.6*  --  15.8*   .0*  --  116.0*         Recent Labs   Lab 06/12/25  0818 06/13/25  0453   *  123* 116*   BUN 13  13 9   CREATSERUM 0.52*  0.52* 0.48*   EGFRCR 92  92 93   CA 7.3*  7.3* 7.0*   *  135* 136   K 4.2  4.2 3.3*     105 105   CO2 22.0  22.0 24.0       Recent Labs   Lab 06/12/25  0818   ALT 13   AST 10   ALB 3.1*         Imaging:  XR ABDOMEN, OBSTRUCTIVE SERIES 3 VIEWS(CPT=74021)  Result Date: 6/13/2025  CONCLUSION:  Findings most consistent with a resolving partial small bowel obstruction.  Small bowel is no longer dilated and enteric contrast has passed distally into the colon.    Dictated by (CST): Nick Baron MD on 6/13/2025 at 9:06 AM     Finalized by (CST): Nick Baron MD on 6/13/2025 at 9:11 AM          XR SMALL BOWEL SINGLE CONTRAST (CPT=74250)  Result Date: 6/12/2025  CONCLUSION:  1. Large hiatal hernia. 2. Follow bowel follow through demonstrates delayed transit time, with no evidence of obstruction.  Dictated by (CST): Matthias Hamilton MD on 6/12/2025 at 3:01 PM     Finalized by (CST): Matthias Hamilton MD on 6/12/2025 at 3:04 PM          XR ABDOMEN OBSTRUCTIVE SERIES ROUTINE(2  VW)(CPT=74019)  Result Date: 6/11/2025  CONCLUSION:  1. Mildly dilated small bowel loops compatible with history of obstruction-probably partial. 2. NG tube in large retrocardiac hiatal hernia at junction between herniated fundus and body just above diaphragm.    Dictated by (CST): Néstor Phan MD on 6/11/2025 at 9:31 PM     Finalized by (CST): Néstor Phan MD on 6/11/2025 at 9:35 PM          XR CHEST AP PORTABLE  (CPT=71045)  Result Date: 6/11/2025  CONCLUSION:  1. NG tube is in a large retrocardiac hiatal hernia at gastric fundus body junction just above diaphragm. 2. Minimal left basilar atelectasis.  No pneumonia or other acute finding. 3. Right-sided Port-A-Cath remains with tip in SVC.  4. Heart size normal. 5. Atherosclerotic calcification aorta.    Dictated by (CST): Néstor Phan MD on 6/11/2025 at 9:28 PM     Finalized by (CST): Néstor Phan MD on 6/11/2025 at 9:31 PM              Meds:     Scheduled Medications[1]  Medication Infusions[2]  PRN Medications[3]                 [1]    metoprolol tartrate  25 mg Oral 2x Daily(Beta Blocker)    potassium chloride  40 mEq Oral Once    potassium phosphate dibasic 15 mmol in sodium chloride 0.9% 250 mL IVPB  15 mmol Intravenous Once    Followed by    potassium chloride  20 mEq Intravenous Once    clonazePAM  0.5 mg Oral Nightly    DULoxetine  60 mg Oral Nightly    gabapentin  300 mg Oral BID    levothyroxine  50 mcg Oral Before breakfast    famotidine  20 mg Intravenous BID    piperacillin-tazobactam  3.375 g Intravenous Q8H    heparin  5,000 Units Subcutaneous Q12H   [2]    dextrose 5%-sodium chloride 0.45% 42 mL/hr at 06/13/25 0950   [3]   ondansetron    metoclopramide    morphINE **OR** morphINE **OR** morphINE

## 2025-06-13 NOTE — PLAN OF CARE
Discussed plan of care for day, including all given medications and their indications. Tolerating clear liquids and advancement to full liquids this afternoon.  Plan to begin low fiber/soft diet this evening.  IV Zosyn maintained.  C.diff sample positive for C.diff but EIA not detected.  Prophylactic oral vancomycin started.  Up to bathroom several times for loose bowel movements.  Electrolytes replaced per protocol.  Comfort measures encouraged to promote restful environment.     Problem: Patient Centered Care  Goal: Patient preferences are identified and integrated in the patient's plan of care  Description: Interventions:  - What would you like us to know as we care for you? I live at [facility].  I just had surgery a couple months ago.  - Provide timely, complete, and accurate information to patient/family  - Incorporate patient and family knowledge, values, beliefs, and cultural backgrounds into the planning and delivery of care  - Encourage patient/family to participate in care and decision-making at the level they choose  - Honor patient and family perspectives and choices  Outcome: Progressing     Problem: Patient/Family Goals  Goal: Patient/Family Long Term Goal  Description: Patient's Long Term Goal: Discharge from hospital    Interventions:  - Resolve abdominal pain  - Tolerate slow diet advancement  - Regain baseline bowel function  - See additional Care Plan goals for specific interventions  Outcome: Progressing  Goal: Patient/Family Short Term Goal  Description: Patient's Short Term Goal: Manage abdominal pain    Interventions:   - Bowel decompression with NG tube  - Slow diet advancement as tolerated  - Imaging as indicated  - See additional Care Plan goals for specific interventions  Outcome: Progressing     Problem: PAIN - ADULT  Goal: Verbalizes/displays adequate comfort level or patient's stated pain goal  Description: INTERVENTIONS:  - Encourage pt to monitor pain and request  assistance  - Assess pain using appropriate pain scale  - Administer analgesics based on type and severity of pain and evaluate response  - Implement non-pharmacological measures as appropriate and evaluate response  - Consider cultural and social influences on pain and pain management  - Manage/alleviate anxiety  - Utilize distraction and/or relaxation techniques  - Monitor for opioid side effects  - Notify MD/LIP if interventions unsuccessful or patient reports new pain  - Anticipate increased pain with activity and pre-medicate as appropriate  Outcome: Progressing     Problem: SAFETY ADULT - FALL  Goal: Free from fall injury  Description: INTERVENTIONS:  - Assess pt frequently for physical needs  - Identify cognitive and physical deficits and behaviors that affect risk of falls.  - [facility] fall precautions as indicated by assessment.  - Educate pt/family on patient safety including physical limitations  - Instruct pt to call for assistance with activity based on assessment  - Modify environment to reduce risk of injury  - Provide assistive devices as appropriate  - Consider OT/PT consult to assist with strengthening/mobility  - Encourage toileting schedule  Outcome: Progressing     Problem: DISCHARGE PLANNING  Goal: Discharge to home or other facility with appropriate resources  Description: INTERVENTIONS:  - Identify barriers to discharge w/pt and caregiver  - Include patient/family/discharge partner in discharge planning  - Arrange for needed discharge resources and transportation as appropriate  - Identify discharge learning needs (meds, wound care, etc)  - Arrange for interpreters to assist at discharge as needed  - Consider post-discharge preferences of patient/family/discharge partner  - Complete POLST form as appropriate  - Assess patient's ability to be responsible for managing their own health  - Refer to Case Management Department for coordinating discharge planning if the patient needs post-hospital  services based on physician/LIP order or complex needs related to functional status, cognitive ability or social support system  Outcome: Progressing     Problem: GASTROINTESTINAL - ADULT  Goal: Minimal or absence of nausea and vomiting  Description: INTERVENTIONS:  - Maintain adequate hydration with IV or PO as ordered and tolerated  - Nasogastric tube to low intermittent suction as ordered  - Evaluate effectiveness of ordered antiemetic medications  - Provide nonpharmacologic comfort measures as appropriate  - Advance diet as tolerated, if ordered  - Obtain nutritional consult as needed  - Evaluate fluid balance  Outcome: Progressing  Goal: Maintains or returns to baseline bowel function  Description: INTERVENTIONS:  - Assess bowel function  - Maintain adequate hydration with IV or PO as ordered and tolerated  - Evaluate effectiveness of GI medications  - Encourage mobilization and activity  - Obtain nutritional consult as needed  - Establish a toileting routine/schedule  - Consider collaborating with pharmacy to review patient's medication profile  Outcome: Progressing     Problem: HEMATOLOGIC - ADULT  Goal: Free from bleeding injury  Description: (Example usage: patient with low platelets)  INTERVENTIONS:  - Avoid intramuscular injections, enemas and rectal medication administration  - Ensure safe mobilization of patient  - Hold pressure on venipuncture sites to achieve adequate hemostasis  - Assess for signs and symptoms of internal bleeding  - Monitor lab trends  - Patient is to report abnormal signs of bleeding to staff  - Avoid use of toothpicks and dental floss  - Use electric shaver for shaving  - Use soft bristle tooth brush  - Limit straining and forceful nose blowing  Outcome: Progressing     All efforts maintained to promote infection prevention during my assessment and care for this patient.  Stethoscope cleansed and hand hygiene strictly maintained.

## 2025-06-13 NOTE — PLAN OF CARE
Problem: Patient Centered Care  Goal: Patient preferences are identified and integrated in the patient's plan of care  Description: Interventions:  - What would you like us to know as we care for you? I live at [facility].  I just had surgery a couple months ago.  - Provide timely, complete, and accurate information to patient/family  - Incorporate patient and family knowledge, values, beliefs, and cultural backgrounds into the planning and delivery of care  - Encourage patient/family to participate in care and decision-making at the level they choose  - Honor patient and family perspectives and choices  Outcome: Progressing     Problem: Patient/Family Goals  Goal: Patient/Family Long Term Goal  Description: Patient's Long Term Goal: Discharge from hospital    Interventions:  - Resolve abdominal pain  - Tolerate slow diet advancement  - Regain baseline bowel function  - See additional Care Plan goals for specific interventions  Outcome: Progressing  Goal: Patient/Family Short Term Goal  Description: Patient's Short Term Goal: Manage abdominal pain    Interventions:   - Bowel decompression with NG tube  - Slow diet advancement as tolerated  - Imaging as indicated  - See additional Care Plan goals for specific interventions  Outcome: Progressing     Problem: PAIN - ADULT  Goal: Verbalizes/displays adequate comfort level or patient's stated pain goal  Description: INTERVENTIONS:  - Encourage pt to monitor pain and request assistance  - Assess pain using appropriate pain scale  - Administer analgesics based on type and severity of pain and evaluate response  - Implement non-pharmacological measures as appropriate and evaluate response  - Consider cultural and social influences on pain and pain management  - Manage/alleviate anxiety  - Utilize distraction and/or relaxation techniques  - Monitor for opioid side effects  - Notify MD/LIP if interventions unsuccessful or patient reports new pain  -  Anticipate increased pain with activity and pre-medicate as appropriate  Outcome: Progressing     Problem: SAFETY ADULT - FALL  Goal: Free from fall injury  Description: INTERVENTIONS:  - Assess pt frequently for physical needs  - Identify cognitive and physical deficits and behaviors that affect risk of falls.  - [facility] fall precautions as indicated by assessment.  - Educate pt/family on patient safety including physical limitations  - Instruct pt to call for assistance with activity based on assessment  - Modify environment to reduce risk of injury  - Provide assistive devices as appropriate  - Consider OT/PT consult to assist with strengthening/mobility  - Encourage toileting schedule  Outcome: Progressing     Problem: DISCHARGE PLANNING  Goal: Discharge to home or other facility with appropriate resources  Description: INTERVENTIONS:  - Identify barriers to discharge w/pt and caregiver  - Include patient/family/discharge partner in discharge planning  - Arrange for needed discharge resources and transportation as appropriate  - Identify discharge learning needs (meds, wound care, etc)  - Arrange for interpreters to assist at discharge as needed  - Consider post-discharge preferences of patient/family/discharge partner  - Complete POLST form as appropriate  - Assess patient's ability to be responsible for managing their own health  - Refer to Case Management Department for coordinating discharge planning if the patient needs post-hospital services based on physician/LIP order or complex needs related to functional status, cognitive ability or social support system  Outcome: Progressing     Problem: GASTROINTESTINAL - ADULT  Goal: Minimal or absence of nausea and vomiting  Description: INTERVENTIONS:  - Maintain adequate hydration with IV or PO as ordered and tolerated  - Nasogastric tube to low intermittent suction as ordered  - Evaluate effectiveness of ordered antiemetic medications  - Provide  nonpharmacologic comfort measures as appropriate  - Advance diet as tolerated, if ordered  - Obtain nutritional consult as needed  - Evaluate fluid balance  Outcome: Progressing  Goal: Maintains or returns to baseline bowel function  Description: INTERVENTIONS:  - Assess bowel function  - Maintain adequate hydration with IV or PO as ordered and tolerated  - Evaluate effectiveness of GI medications  - Encourage mobilization and activity  - Obtain nutritional consult as needed  - Establish a toileting routine/schedule  - Consider collaborating with pharmacy to review patient's medication profile  Outcome: Progressing     Problem: HEMATOLOGIC - ADULT  Goal: Free from bleeding injury  Description: (Example usage: patient with low platelets)  INTERVENTIONS:  - Avoid intramuscular injections, enemas and rectal medication administration  - Ensure safe mobilization of patient  - Hold pressure on venipuncture sites to achieve adequate hemostasis  - Assess for signs and symptoms of internal bleeding  - Monitor lab trends  - Patient is to report abnormal signs of bleeding to staff  - Avoid use of toothpicks and dental floss  - Use electric shaver for shaving  - Use soft bristle tooth brush  - Limit straining and forceful nose blowing  Outcome: Progressing

## 2025-06-13 NOTE — PLAN OF CARE
Discussed plan of care for day, including all given medications and their indications. NG tube remained in place until X-ray small bowel obtained.  Results reviewed by surgeon -- NG tube then removed and clear liquid diet started.  Tolerating clears in small amounts.  Intermittent IV morphine as needed.  IV Zosynmaintained.  Hemoglobin 6.0 this AM -- received 1 unit PRBC.  Electrolytes also replaced per protocol.  Comfort measures encouraged to promote restful environment.     Problem: Patient Centered Care  Goal: Patient preferences are identified and integrated in the patient's plan of care  Description: Interventions:  - What would you like us to know as we care for you? I live at [facility].  I just had surgery a couple months ago.  - Provide timely, complete, and accurate information to patient/family  - Incorporate patient and family knowledge, values, beliefs, and cultural backgrounds into the planning and delivery of care  - Encourage patient/family to participate in care and decision-making at the level they choose  - Honor patient and family perspectives and choices  Outcome: Progressing     Problem: Patient/Family Goals  Goal: Patient/Family Long Term Goal  Description: Patient's Long Term Goal: Discharge from hospital    Interventions:  - Resolve abdominal pain  - Tolerate slow diet advancement  - Regain baseline bowel function  - See additional Care Plan goals for specific interventions  Outcome: Progressing  Goal: Patient/Family Short Term Goal  Description: Patient's Short Term Goal: Manage abdominal pain    Interventions:   - Bowel decompression with NG tube  - Slow diet advancement as tolerated  - Imaging as indicated  - See additional Care Plan goals for specific interventions  Outcome: Progressing     Problem: PAIN - ADULT  Goal: Verbalizes/displays adequate comfort level or patient's stated pain goal  Description: INTERVENTIONS:  - Encourage pt to monitor pain and request  Psychiatric Inpatient Progress Note      Chief Complaint/Problem Status: Follow up on alcohol withdrawal with complication      Interval History:     Patient report:  Today the patient reports that he had slept well last night.  The patient denies any depression or anxiety.  The patient denies any suicidal or homicidal ideation.  The patient reports that his appetite is good.  The patient has been attending all groups on the unit.  The patient plans to come to the hospital to attend AA meetings after he is discharged.  Today the patient states that he would like to be discharged on Monday February 24th.  The patient denies any cravings for alcohol.  The patient has been taking Campral.  The patient is interacting well with all staff and patients.  The patient will remain on all medications as prescribed.      Medical Multi-System Review of Symptoms:  A complete review of systems was conducted and is negative apart from as follows or as mentioned in HPI.  negative        Current Facility-Administered Medications   Medication   • losartan (COZAAR) tablet 100 mg   • vitamin - therapeutic multivitamins w/minerals tablet 1 tablet   • polyethylene glycol (GLYCOLAX, MIRALAX) packet 17 g   • benztropine mesylate (COGENTIN) 1 MG/ML injection 1 mg    Or   • benztropine (COGENTIN) tablet 1 mg   • hydrOXYzine (ATARAX) tablet 50 mg   • OLANZapine (ZyPREXA ZYDIS) disintegrating tablet 5 mg   • LORazepam (ATIVAN) injection 1 mg    Or   • LORazepam (ATIVAN) tablet 1 mg   • haloperidol (HALDOL) 5 MG/ML injection 5 mg    Or   • haloperidol (HALDOL) tablet 10 mg   • zolpidem (AMBIEN) tablet 5 mg   • ibuprofen (MOTRIN) tablet 600 mg   • docusate sodium-sennosides (SENOKOT S) 50-8.6 MG 2 tablet   • bisacodyl (DULCOLAX) suppository 10 mg   • magnesium hydroxide (MILK OF MAGNESIA) 400 MG/5ML suspension 30 mL   • aluminum-magnesium hydroxide-simethicone (MAALOX) 200-200-20 MG/5ML suspension 30 mL   • ondansetron (ZOFRAN ODT) disintegrating  assistance  - Assess pain using appropriate pain scale  - Administer analgesics based on type and severity of pain and evaluate response  - Implement non-pharmacological measures as appropriate and evaluate response  - Consider cultural and social influences on pain and pain management  - Manage/alleviate anxiety  - Utilize distraction and/or relaxation techniques  - Monitor for opioid side effects  - Notify MD/LIP if interventions unsuccessful or patient reports new pain  - Anticipate increased pain with activity and pre-medicate as appropriate  Outcome: Progressing     Problem: SAFETY ADULT - FALL  Goal: Free from fall injury  Description: INTERVENTIONS:  - Assess pt frequently for physical needs  - Identify cognitive and physical deficits and behaviors that affect risk of falls.  - [place] fall precautions as indicated by assessment.  - Educate pt/family on patient safety including physical limitations  - Instruct pt to call for assistance with activity based on assessment  - Modify environment to reduce risk of injury  - Provide assistive devices as appropriate  - Consider OT/PT consult to assist with strengthening/mobility  - Encourage toileting schedule  Outcome: Progressing     Problem: DISCHARGE PLANNING  Goal: Discharge to home or other facility with appropriate resources  Description: INTERVENTIONS:  - Identify barriers to discharge w/pt and caregiver  - Include patient/family/discharge partner in discharge planning  - Arrange for needed discharge resources and transportation as appropriate  - Identify discharge learning needs (meds, wound care, etc)  - Arrange for interpreters to assist at discharge as needed  - Consider post-discharge preferences of patient/family/discharge partner  - Complete POLST form as appropriate  - Assess patient's ability to be responsible for managing their own health  - Refer to Case Management Department for coordinating discharge planning if the patient needs post-hospital  tablet 4 mg   • nicotine polacrilex (NICORETTE) gum 2 mg   • nicotine (NICODERM) 14 MG/24HR patch 1 patch   • acamprosate (CAMPRAL EC) tablet 333 mg   • amLODIPine (NORVASC) tablet 10 mg   • cloNIDine (CATAPRES) tablet 0.1 mg   • pantoprazole (PROTONIX) EC tablet 20 mg   • mirtazapine (REMERON) tablet 7.5 mg   • melatonin tablet 3 mg   • traMADol (ULTRAM) tablet 50 mg   • guaifenesin 100 MG/5ML solution 200 mg         Examination:     VITALS:  Visit Vitals  /68 (BP Location: LUE - Left upper extremity, Patient Position: Standing)   Pulse (!) 113   Temp 98.2 °F (36.8 °C) (Oral)   Resp 18   Ht 6' (1.829 m)   Wt 97.7 kg   BMI 29.21 kg/m²       Labs:  Reviewed      Mental Status Examination:    Appearance  [x] Unremarkable  []  Thin  [] Uncomfortable  [] Intoxicated  [] Other:    Hygiene  [x] Unremarkable  []  Marginal  []  Disheveled  []  Malodorous  []  Unkempt    []  Other:    Interpersonal behavior  [x]  Appropriate  []  Pleasant  []  Engaged  []  Guarded  []  Hostile  []  Withdrawn  []  Good eye contact  []  Avoidant eye contact  []  Other:    Speech Rate  [x] Normal  []   Fast  []   Slow  []   Pressured    Speech clarity  [x]   Clear  []   Dysarthric  []   Other:    Speech Volume  []   Soft  []   Loud  [x]   Normal    Speech Latency  [x]   Normal  []   Reduced  []   Prolonged    Mood  [x]   \"good\"  []  \"angry”  []   “sad”  [] \"depressed\"  []   \"anxious\"  []   \"worried\"  []   Not stated  []   Other:    Affect  [x]   Euthymic  []   Dysthymic  []   Anxious  []   Tense  []   Irritable  []   Sad    []   Tearful  []   Bright  []   Constricted  []   Blunted  []   Flat  []   Expansive  []   Euphoric  []   Congruent with stated mood  [] Not congruent with stated mood  []   Appropriate to situation and conversation  []   Inappropriate to situation or conversation  []   Stable  [] Labile  []   Fluid  []   Other:    Thought process  [x]   Linear  []   Logical  []   Well organized  []   Circumstantial  [] Tangential   []    Flight of ideas  []   [place]  []   Rigid  []   Difficult to follow   []   Disorganized  [] Other:    Thought Content  [x]  Unremarkable  []   Suspiciousness  []   Paranoia  []   Rumination   []  Self-critical  []   Catastrophizing  []   Grandiose  []   Delusional  []  Ideas of reference  []   Thought broadcasting or insertion  []  Obsessions or intrusive thoughts  []  Hopelessness  []  Somatic preoccupation  []  Other:    Perception/hallucinations  [x]  None reported or observed  []  Auditory hallucinations  [] Visual hallucinations  []  Second person  []  Command  []  Derogatory  []  Third person  []   Vague  []  Faint  []  Overpowering []  Grossly impaired or clouded sensorium    []  Other impairment:    Orientation, oriented to  [x]  Self  [x]  Place  [x]  Time  [x]  Situation  []  Other:  []  Not assessed    Attention and concentration  [x] No impairment noted in course of interview  []  Distractible  []  Difficulty sustaining or shifting attention  [] Assessed/screened as follows:    Memory  [x]  No impairment noted in course of interview as evidenced by recall of recent and distant events  []  Some impairment suspected from gaps in interview as follows:  []  Assessed/screened as follows:    Insight  [x]  Good as evidenced by awareness of illness  [x]  Fair as evidenced by awareness of illness, with some limitations  []  Poor, with substantial limitations to awareness of or nature of illness  []  Other:    Judgment  [x]  Good as evidenced by reasonable decision making and interpersonal appropriateness  [x]  Fair, some limitations noted such as impulsivity or marked ambivalence  []  Poor, as follows:  []  Other:    Suicidal thoughts  [x]  Denies  []  Present, denies intent or plan  []  At baseline  []  Present, with some intent or plan  []  Other:    Homicidal/Assaultive Ideation   [x]  Denies  []  Other, specify:    Gait   [x]  Steady  []  Well-paced  []  Wide-space  []  Slow  []  Unsteady  []  Requires  services based on physician/LIP order or complex needs related to functional status, cognitive ability or social support system  Outcome: Progressing     Problem: GASTROINTESTINAL - ADULT  Goal: Minimal or absence of nausea and vomiting  Description: INTERVENTIONS:  - Maintain adequate hydration with IV or PO as ordered and tolerated  - Nasogastric tube to low intermittent suction as ordered  - Evaluate effectiveness of ordered antiemetic medications  - Provide nonpharmacologic comfort measures as appropriate  - Advance diet as tolerated, if ordered  - Obtain nutritional consult as needed  - Evaluate fluid balance  Outcome: Progressing  Goal: Maintains or returns to baseline bowel function  Description: INTERVENTIONS:  - Assess bowel function  - Maintain adequate hydration with IV or PO as ordered and tolerated  - Evaluate effectiveness of GI medications  - Encourage mobilization and activity  - Obtain nutritional consult as needed  - Establish a toileting routine/schedule  - Consider collaborating with pharmacy to review patient's medication profile  Outcome: Progressing     All efforts maintained to promote infection prevention during my assessment and care for this patient.  Stethoscope cleansed and hand hygiene strictly maintained.    assistive device  []  Not assessed  []  Other:    Medical Decision Making:     Alcohol withdrawal with complications    Active Hospital Problems    Diagnosis   • Anxiety   • Acute bacterial conjunctivitis of both eyes   • Alcohol dependence with withdrawal (CMS/HCC)   • Elevated LFTs   • Benign essential HTN   • Alcohol abuse   • Tobacco abuse    Moderately improved     Narrative assessment and interventions begun, considered or continued for above problems today:    The patient will continue to be assessed for any symptoms of alcohol withdrawal.  The patient will continue to be assessed for his depression and anxiety.  The patient will continue to be assessed for any SI or HI.  The patient will remain on all medications as prescribed.

## 2025-06-13 NOTE — PAYOR COMM NOTE
--------------  CONTINUED STAY REVIEW    Payor: [employer]  Subscriber #:  [id number]  Authorization Number: [id number]    Admit date: 6/11/25  Admit time:  9:19 PM    6/13  Hgb 7.2 p 167  Continue nasogastric tube decompression  Small bowel follow-through reveals no evidence of obstruction.  NG tube removed last night.    Patient tolerating clear liquid diet    Advance to full liquid diet this morning.  Low residual diet for dinner tonight if continues to tolerate  Hemoglobin improved with blood transfusion  Increase activity with ambulation    IV PEPCID  IV ZOSYN  IVPB POTASSIUM         MEDICATIONS ADMINISTERED IN LAST 1 DAY:  Transfuse RBC       Date Action Dose Route User    6/12/2025 1530 Rate/Dose Change (none) Intravenous Fay Briseno RN    6/12/2025 1515 New Bag (none) Intravenous Fay Briseno RN          alteplase (Activase) 2 mg in sterile water for injection (PF) 2.2 mL IV push to declot line       Date Action Dose Route User    6/12/2025 2257 Given 2 mg Intravenous Edilma Squires RN          clonazePAM (KlonoPIN) tab 0.5 mg       Date Action Dose Route User    6/12/2025 2108 Given 0.5 mg Oral Goldberg, Rachel, RN          dextrose 5%-sodium chloride 0.45% infusion       Date Action Dose Route User    6/13/2025 0950 Rate/Dose Change (none) Intravenous Fay Briseno RN          DULoxetine (Cymbalta) DR cap 60 mg       Date Action Dose Route User    6/12/2025 2108 Given 60 mg Oral Goldberg, Rachel, RN          famotidine (Pepcid) 20 mg/2mL injection 20 mg       Date Action Dose Route User    6/13/2025 0951 Given 20 mg Intravenous Fay Briseno RN    6/12/2025 2111 Given 20 mg Intravenous Goldberg, Rachel, RN          gabapentin (Neurontin) cap 300 mg       Date Action Dose Route User    6/13/2025 0951 Given 300 mg Oral Fay Briseno RN    6/12/2025 2108 Given 300 mg Oral Goldberg, Rachel, RN          heparin (Porcine) 5000 UNIT/ML injection 5,000 Units       Date Action  Dose Route User    6/13/2025 0951 Given 5,000 Units Subcutaneous (Left Upper Arm) Fay Briseno RN    6/12/2025 2111 Given 5,000 Units Subcutaneous (Right Upper Arm) Goldberg, Rachel, RN    6/12/2025 1418 Given 5,000 Units Subcutaneous (Left Upper Arm) Fay Briseno RN          levothyroxine (Synthroid) tab 50 mcg       Date Action Dose Route User    6/13/2025 0500 Given 50 mcg Oral Edilma Squires RN          magnesium sulfate in sterile water for injection 2 g/50mL IVPB premix 2 g       Date Action Dose Route User    6/12/2025 1516 New Bag 2 g Intravenous Fay Briseno RN          metoprolol tartrate (Lopressor) tab 25 mg       Date Action Dose Route User    6/13/2025 0552 Given 25 mg Oral Edilma Squires RN          morphINE PF 2 MG/ML injection 2 mg       Date Action Dose Route User    6/12/2025 2115 Given 2 mg Intravenous Goldberg, Rachel, RN          piperacillin-tazobactam (Zosyn) 3.375 g in dextrose 5% 100 mL IVPB-ADDV       Date Action Dose Route User    6/13/2025 0951 New Bag 3.375 g Intravenous Fya Briseno RN    6/13/2025 0122 New Bag 3.375 g Intravenous Edilma Squires RN    6/12/2025 1755 New Bag 3.375 g Intravenous Fay Briseno RN          potassium phosphate dibasic 15 mmol in sodium chloride 0.9% 250 mL IVPB       Date Action Dose Route User    6/13/2025 0951 New Bag 15 mmol Intravenous Fay Briseno RN          sodium chloride 0.9% infusion       Date Action Dose Route User    6/12/2025 1515 New Bag (none) Intravenous Fay Briseno RN          sodium phosphate 15 mmol in 0.9% NaCl 100mL IVPB premix       Date Action Dose Route User    6/12/2025 1755 Given 15 mmol Intravenous Fay Briseno RN            Vitals (last day)       Date/Time Temp Pulse Resp BP SpO2 Weight O2 Device O2 Flow Rate (L/min) [facility]    06/13/25 0550 -- 82 -- 129/59 -- -- -- -- KB    06/13/25 0456 -- 167 -- -- -- -- -- -- KB    06/13/25 0339 -- 86 -- -- -- -- -- -- KB    06/13/25 0339 97.7 °F  (36.5 °C) -- 16 127/65 92 % -- None (Room air) -- DA    06/12/25 2011 -- 96 -- -- -- -- -- -- KB    06/12/25 2011 98.9 °F (37.2 °C) -- 16 145/63 94 % -- None (Room air) -- DA    06/12/25 1900 -- 95 -- -- -- -- -- -- MO    06/12/25 1736 97.7 °F (36.5 °C) 88 16 140/64 93 % -- None (Room air) -- TEO    06/12/25 1630 97.8 °F (36.6 °C) -- 16 126/64 93 % -- -- -- CV    06/12/25 1530 97.8 °F (36.6 °C) 83 16 125/53 90 % -- None (Room air) -- TEO    06/12/25 1440 97.9 °F (36.6 °C) -- 16 124/68 94 % -- None (Room air) -- CV    06/12/25 0516 98.6 °F (37 °C) 93 16 109/58 93 % -- None (Room air) -- KJ    06/12/25 0300 -- 85 -- -- -- -- -- -- GT          CIWA Scores (since admission)       None          Blood Transfusion Record       Product Unit Status Volume Start End            Transfuse RBC       25  [id number]  [id number] Stopped 292.5 mL 06/12/25 1515 06/12/25 1736

## 2025-06-13 NOTE — DIETARY NOTE
ADULT NUTRITION INITIAL ASSESSMENT    Pt is at high nutrition risk.  Pt meets severe malnutrition criteria.      RECOMMENDATIONS TO MD: Advance diet as medically safe and See nutrition intervention for ONS (oral nutrition supplements)    ADMITTING DIAGNOSIS:  SBO (small bowel obstruction) (HCC) [K56.609]  PERTINENT PAST MEDICAL HISTORY: Past Medical History[1]    PATIENT STATUS: Initial 06/13/25: Pt assessed due to screening at risk, MST score of 2. Pt admitted for SBO, with 2 weeks of abdominal pain and distention. PMH HTN, GERD, recent admission for SBO 2/2, ventral hernia, recurrent ovarian cancer currently on chemotherapy (began 1 month ago). Diet advanced to FLD this am and low fiber this afternoon. Met with pt bedside, pt with bfast tray in front of her- deferred NFPE. Pt reports appetite is improved and denies any N/V. Reports PTA she was eating 2 meals, typically experiencing early satiety and decreased PO intake due to this. Per EMR review, pt last weighed 114 lbs 3/2/25, 8.8% weight loss in 3 months (significant and severe). Pt endorses this weight loss, reports a UBW of 110 lbs. Discussed ONS to optimize nutrition as diet is advanced. Pt agreeable to chocolate ensure ONS BID. Typically drinks 1-2 ensure drinks at home. Will monitor PO + ONS tolerance/intake as diet is advanced.     FOOD/NUTRITION RELATED HISTORY:  Appetite: Fair and improving   Intake: ~100% x1 meals documented since admit  Intake Meeting Needs: No, but oral nutrition supplements (ONS) to maximize  Percent Meals Eaten (last 6 days)       Date/Time Percent Meals Eaten (%)    06/13/25 1100 100 %           Food Allergies: No Known Food Allergies (NKFA)  Cultural/Ethnic/Muslim Preferences: Not Obtained    GASTROINTESTINAL: bloating, diarrhea, and SBO    MEDICATIONS: reviewed  Scheduled Medications[2]  Medication Infusions[3]     LABS: reviewed  [place]ph[place] 15 mmol given   Recent Labs     06/12/25  0818 06/13/25  0453   *  123* 116*   BUN  13  13 9   CREATSERUM 0.52*  0.52* 0.48*   CA 7.3*  7.3* 7.0*   MG 1.7 2.1  2.0   *  135* 136   K 4.2  4.2 3.3*     105 105   CO2 22.0  22.0 24.0   PHOS 2.0* 2.6  2.4   OSMOCALC 281  281 282       WEIGHT HISTORY:  Patient Weight(s) for the past 336 hrs:   Weight   06/11/25 2133 47.2 kg (104 lb)   06/11/25 1757 47.2 kg (104 lb)     Wt Readings from Last 10 Encounters:   06/11/25 47.2 kg (104 lb)   05/16/25 47.6 kg (105 lb)   03/02/25 52 kg (114 lb 10.2 oz)   05/10/24 46.7 kg (103 lb)   05/07/24 46.7 kg (103 lb)   04/23/24 46.3 kg (102 lb)   10/10/23 48.1 kg (106 lb)   08/19/22 50.8 kg (112 lb)   02/03/22 49.9 kg (110 lb)   01/18/22 49.9 kg (110 lb)       ANTHROPOMETRICS:  HT: 144.8 cm (4' 9\")  Wt Readings from Last 1 Encounters:   06/11/25 47.2 kg (104 lb)     Last weight: Likely accurate  BMI: Body mass index is 22.51 kg/m².  Dosing Weight: 47.2 kg - recent weight and taken on 6/11, utilized for anthropometric calculations  BMI CLASSIFICATION: 18.5-24.9 kg/m2 - WNL  IBW/lbs (Calculated) Female: 85 lbs             122% IBW  Usual Body Wt: 110 lbs       95% UBW    NUTRITION RELATED PHYSICAL FINDINGS:  - Nutrition Focused Physical Exam (NFPE): Deferred- pt eating during visit   - Fluid Accumulation: none . See RN documentation for details  - Skin Integrity: RN documentation reviewed. See RN documentation for details    CRITERIA FOR MALNUTRITION DIAGNOSIS:  Criteria for severe malnutrition diagnosis: acute illness/injury related to wt loss greater than 7.5% in 3 months and energy intake less than 50% for greater than 5 days.    NUTRITION DIAGNOSIS/PROBLEM:   Severe Malnutrition related to Acute - Physiological causes resulting in anorexia or diminished intake d/t poor appetite/early satiety as evidenced by 8.8% weight loss in 3 months, and <50% energy intake for >5 days     NUTRITION INTERVENTION:     NUTRITION PRESCRIPTION:   Estimated Nutrition needs: --dosing wt of 47.2 kg - wt taken on  6/11/25  Calories: 5810-3425 calories/day (30-35 calories per kg Dosing wt)  Protein: 71-80 g protein/day (1.5-1.7 g protein/kg Dosing wt)    - Diet:       Procedures    Low Fiber/Soft diet Is Patient on Accuchecks? No      - Nutrition Care Plan: Encouraged increased PO intake, Encouraged small frequent meals with emphasis on high calorie/high protein, and Initiated ONS (oral nutritional supplements)  - ONS (Oral Nutrition Supplements)/Meals/Snacks: Ensure Max (150 calories and 30 g protein each) BID Chocolate   - Vitamin and mineral supplements: none  - Feeding assistance: independent  - Nutrition education: Discussed importance of adequate energy and protein intake    - Coordination of nutrition care: collaboration with other providers  - Discharge and transfer of nutrition care to new setting or provider: monitor plans    MONITOR AND EVALUATE/NUTRITION GOALS:  - Food and Nutrient Intake:      Monitor: adequacy of PO intake, tolerance of PO intake, adequacy of supplement intake, and tolerance of supplement intake  - Food and Nutrient Administration:      Monitor: N/A  - Anthropometric Measurement:    Monitor weight  - Nutrition Goals:      halt wt loss, PO and supplement greater than 75% of needs, intake to meet needs, good supplement intake, return to normal GI function, minimize lean body mass loss, support body systems, and improved GI status    DIETITIAN FOLLOW UP: RD to follow and monitor nutrition status    Denise Amaya MS, RDN, LDN  Clinical Dietitian  [phone]        [1]   Past Medical History:   Allergic rhinitis, unspecified seasonality, unspecified trigger    Anemia, unspecified type    Back pain    Back problem    Benign essential HTN    Blood disorder    Anemia    Breast cancer (HCC)    DCIS    Controlled type 2 diabetes mellitus with hyperglycemia, without long-term current use of insulin (HCC)    Disorder of thyroid    Diverticulosis of large intestine    Esophageal reflux    Essential  hypertension    Exposure to medical diagnostic radiation    GERD without esophagitis    Heart valve disease    (+) Murmur    High blood pressure    High cholesterol    History of blood transfusion    No reaction    History of psoriasis    Hypercholesterolemia    Hyperlipidemia    Insomnia, unspecified type    Iron deficiency anemia, unspecified iron deficiency anemia type    Muscle weakness    Osteoarthritis    Osteopenia    Other emphysema (HCC)    Other osteoporosis without current pathological fracture    Peripheral edema    Personal history of antineoplastic chemotherapy    2003    Prediabetes    Pulmonary emphysema (HCC)    S/P lumbar spine operation    Visual impairment    Reading glasses    Vitamin D deficiency   [2]    metoprolol tartrate  25 mg Oral 2x Daily(Beta Blocker)    potassium chloride  40 mEq Oral Once    potassium chloride  20 mEq Intravenous Once    vancomycin  125 mg Oral Daily    famotidine  20 mg Oral BID    clonazePAM  0.5 mg Oral Nightly    DULoxetine  60 mg Oral Nightly    gabapentin  300 mg Oral BID    levothyroxine  50 mcg Oral Before breakfast    piperacillin-tazobactam  3.375 g Intravenous Q8H    heparin  5,000 Units Subcutaneous Q12H   [3]    dextrose 5%-sodium chloride 0.45% 42 mL/hr at 06/13/25 0917

## 2025-06-14 LAB
ANION GAP SERPL CALC-SCNC: 7 MMOL/L (ref 0–18)
BASOPHILS # BLD: 0 X10(3) UL (ref 0–0.2)
BASOPHILS NFR BLD: 0 %
BUN BLD-MCNC: 6 MG/DL (ref 9–23)
BUN/CREAT SERPL: 11.8 (ref 10–20)
CALCIUM BLD-MCNC: 7.6 MG/DL (ref 8.7–10.4)
CHLORIDE SERPL-SCNC: 108 MMOL/L (ref 98–112)
CO2 SERPL-SCNC: 23 MMOL/L (ref 21–32)
CREAT BLD-MCNC: 0.51 MG/DL (ref 0.55–1.02)
DEPRECATED RDW RBC AUTO: 68.5 FL (ref 35.1–46.3)
EGFRCR SERPLBLD CKD-EPI 2021: 92 ML/MIN/1.73M2 (ref 60–?)
EOSINOPHIL # BLD: 0.05 X10(3) UL (ref 0–0.7)
EOSINOPHIL NFR BLD: 1 %
ERYTHROCYTE [DISTWIDTH] IN BLOOD BY AUTOMATED COUNT: 19.8 % (ref 11–15)
GLUCOSE BLD-MCNC: 111 MG/DL (ref 70–99)
HCT VFR BLD AUTO: 25 % (ref 35–48)
HGB BLD-MCNC: 7.6 G/DL (ref 12–16)
LYMPHOCYTES NFR BLD: 0.7 X10(3) UL (ref 1–4)
LYMPHOCYTES NFR BLD: 14 %
MAGNESIUM SERPL-MCNC: 1.8 MG/DL (ref 1.6–2.6)
MCH RBC QN AUTO: 29.7 PG (ref 26–34)
MCHC RBC AUTO-ENTMCNC: 30.4 G/DL (ref 31–37)
MCV RBC AUTO: 97.7 FL (ref 80–100)
METAMYELOCYTES # BLD: 0.15 X10(3) UL (ref ?–0.01)
METAMYELOCYTES NFR BLD: 3 %
MONOCYTES # BLD: 0.1 X10(3) UL (ref 0.1–1)
MONOCYTES NFR BLD: 2 %
MYELOCYTES # BLD: 0.2 X10(3) UL (ref ?–0.01)
MYELOCYTES NFR BLD: 4 %
NEUTROPHILS # BLD AUTO: 3.58 X10 (3) UL (ref 1.5–7.7)
NEUTROPHILS NFR BLD: 72 %
NEUTS BAND NFR BLD: 3 %
NEUTS HYPERSEG # BLD: 3.75 X10(3) UL (ref 1.5–7.7)
OSMOLALITY SERPL CALC.SUM OF ELEC: 284 MOSM/KG (ref 275–295)
PHOSPHATE SERPL-MCNC: 1.8 MG/DL (ref 2.4–5.1)
PLATELET # BLD AUTO: 94 10(3)UL (ref 150–450)
PLATELET MORPHOLOGY: NORMAL
PLATELETS.RETICULATED NFR BLD AUTO: 1.7 % (ref 0–7)
POTASSIUM SERPL-SCNC: 4.2 MMOL/L (ref 3.5–5.1)
POTASSIUM SERPL-SCNC: 4.2 MMOL/L (ref 3.5–5.1)
PROMYELOCYTES # BLD: 0.05 X10(3) UL (ref ?–0.01)
PROMYELOCYTES NFR BLD: 1 %
RBC # BLD AUTO: 2.56 X10(6)UL (ref 3.8–5.3)
SODIUM SERPL-SCNC: 138 MMOL/L (ref 136–145)
TOTAL CELLS COUNTED BLD: 100
WBC # BLD AUTO: 5 X10(3) UL (ref 4–11)

## 2025-06-14 PROCEDURE — 85027 COMPLETE CBC AUTOMATED: CPT | Performed by: SURGERY

## 2025-06-14 PROCEDURE — 84132 ASSAY OF SERUM POTASSIUM: CPT | Performed by: HOSPITALIST

## 2025-06-14 PROCEDURE — 80048 BASIC METABOLIC PNL TOTAL CA: CPT | Performed by: SURGERY

## 2025-06-14 PROCEDURE — 85007 BL SMEAR W/DIFF WBC COUNT: CPT | Performed by: SURGERY

## 2025-06-14 PROCEDURE — 84100 ASSAY OF PHOSPHORUS: CPT | Performed by: SURGERY

## 2025-06-14 PROCEDURE — 83735 ASSAY OF MAGNESIUM: CPT | Performed by: SURGERY

## 2025-06-14 PROCEDURE — 85025 COMPLETE CBC W/AUTO DIFF WBC: CPT | Performed by: SURGERY

## 2025-06-14 RX ORDER — MAGNESIUM OXIDE 400 MG/1
400 TABLET ORAL ONCE
Status: COMPLETED | OUTPATIENT
Start: 2025-06-14 | End: 2025-06-14

## 2025-06-14 NOTE — PROGRESS NOTES
[facility]  part of [facility]    Progress Note    Lizeth Mcgill Patient Status:  Inpatient    10/26/1940 MRN [id number]   Location [facility] 4W/SW/SE Attending Sukh Leary, DO   Hosp Day # 3 PCP Chong Cornejo MD       Subjective:   Lizeth Mcgill is a(n) 84 year old   female feels better    Assessment and Plan:   Lizeth Mcgill is a(n) 84 year old female    Patient with appears to have resolving small bowel obstruction.  Patient tolerating low residual diet  Resolved SBO  Hemoglobin stable  Discharge planning to home  Treat C. difficile most likely secondary to chemotherapy  Will DC IV Zosyn  VTE prophylaxis  Discussed in detail with the patient  Patient does not need to follow-up with me upon discharge.  Still having diarrhea      Objective:   Blood pressure 136/61, pulse 68, temperature 97.7 °F (36.5 °C), temperature source Oral, resp. rate 18, height 4' 9\" (1.448 m), weight 104 lb (47.2 kg), SpO2 95%, not currently breastfeeding. I/O last 3 completed shifts:  In: 1975 [P.O.:720; I.V.:705; IV PIGGYBACK:550]  Out: 2520 [Urine:2120; Stool:400]     General appearance: alert, appears stated age, and cooperative  Neck: no JVD and supple, symmetrical, trachea midline  Pulmonary: No labored breathing, equal respiratory excursion  Cardiovascular: regular rate and rhythm  Abdominal: soft, non-tender; bowel sounds normal; no masses,  no organomegaly and positive flatus, positive BM  Extremities: extremities normal, atraumatic, no cyanosis or edema          Results:       Recent Labs   Lab 25  0819 25  1837 25  0453 25  0618   RBC 2.03*  --  2.51* 2.56*   HGB 6.0* 8.4* 7.2* 7.6*   HCT 20.3*  --  23.5* 25.0*   .0  --  93.6 97.7   MCH 29.6  --  28.7 29.7   MCHC 29.6*  --  30.6* 30.4*   RDW 21.4*  --  19.9* 19.8*   NEPRELIM 16.04*  --  11.12* 3.58   WBC 20.6*  --  15.8* 5.0   .0*  --  116.0* 94.0*     Lab Results   Component Value Date    INR 1.02  08/18/2022       Recent Labs   Lab 06/12/25  0818 06/13/25  0453 06/14/25  0618   *  123* 116* 111*   BUN 13  13 9 6*   CREATSERUM 0.52*  0.52* 0.48* 0.51*   CA 7.3*  7.3* 7.0* 7.6*   ALB 3.1*  --   --    *  135* 136 138   K 4.2  4.2 3.3* 4.2  4.2     105 105 108   CO2 22.0  22.0 24.0 23.0   ALKPHO 91  --   --    AST 10  --   --    ALT 13  --   --    BILT 0.2  --   --    TP 5.0*  --   --              XR ABDOMEN, OBSTRUCTIVE SERIES 3 VIEWS(CPT=74021)  Result Date: 6/13/2025  CONCLUSION:  Findings most consistent with a resolving partial small bowel obstruction.  Small bowel is no longer dilated and enteric contrast has passed distally into the colon.    Dictated by (CST): Nick Baron MD on 6/13/2025 at 9:06 AM     Finalized by (CST): Nick Baron MD on 6/13/2025 at 9:11 AM          XR SMALL BOWEL SINGLE CONTRAST (CPT=74250)  Result Date: 6/12/2025  CONCLUSION:  1. Large hiatal hernia. 2. Follow bowel follow through demonstrates delayed transit time, with no evidence of obstruction.  Dictated by (CST): Matthias Hamilton MD on 6/12/2025 at 3:01 PM     Finalized by (CST): Matthias Hamilton MD on 6/12/2025 at 3:04 PM                  Time spent in direct patient contact and decision making as well as counseling/coordination of care:    35 minutes      GREGORY BENITES JR., MD. [facility]    6/14/2025  1:10 PM

## 2025-06-14 NOTE — PLAN OF CARE
Lizeth is alert/oriented. Vitals stable on room air. Tolerating low fiber diet. Ambulating with standby assist in room and in pavon. IVF infusing. Declines pain. Zosyn discontinued. Vanco continues for antibiotic coverage. Heparin for DVT prophylaxis. Voiding adequately. Last BM today, bloating improving. Bed low, locked, all safety measures in place.    Problem: Patient Centered Care  Goal: Patient preferences are identified and integrated in the patient's plan of care  Description: Interventions:  - What would you like us to know as we care for you? I live at [facility].  I just had surgery a couple months ago.  - Provide timely, complete, and accurate information to patient/family  - Incorporate patient and family knowledge, values, beliefs, and cultural backgrounds into the planning and delivery of care  - Encourage patient/family to participate in care and decision-making at the level they choose  - Honor patient and family perspectives and choices  Outcome: Progressing     Problem: Patient/Family Goals  Goal: Patient/Family Long Term Goal  Description: Patient's Long Term Goal: Discharge from hospital    Interventions:  - Resolve abdominal pain  - Tolerate slow diet advancement  - Regain baseline bowel function  - See additional Care Plan goals for specific interventions  Outcome: Progressing  Goal: Patient/Family Short Term Goal  Description: Patient's Short Term Goal: Manage abdominal pain    Interventions:   - Bowel decompression with NG tube  - Slow diet advancement as tolerated  - Imaging as indicated  - See additional Care Plan goals for specific interventions  Outcome: Progressing     Problem: PAIN - ADULT  Goal: Verbalizes/displays adequate comfort level or patient's stated pain goal  Description: INTERVENTIONS:  - Encourage pt to monitor pain and request assistance  - Assess pain using appropriate pain scale  - Administer analgesics based on type and severity of pain and evaluate  response  - Implement non-pharmacological measures as appropriate and evaluate response  - Consider cultural and social influences on pain and pain management  - Manage/alleviate anxiety  - Utilize distraction and/or relaxation techniques  - Monitor for opioid side effects  - Notify MD/LIP if interventions unsuccessful or patient reports new pain  - Anticipate increased pain with activity and pre-medicate as appropriate  Outcome: Progressing     Problem: SAFETY ADULT - FALL  Goal: Free from fall injury  Description: INTERVENTIONS:  - Assess pt frequently for physical needs  - Identify cognitive and physical deficits and behaviors that affect risk of falls.  - [place] fall precautions as indicated by assessment.  - Educate pt/family on patient safety including physical limitations  - Instruct pt to call for assistance with activity based on assessment  - Modify environment to reduce risk of injury  - Provide assistive devices as appropriate  - Consider OT/PT consult to assist with strengthening/mobility  - Encourage toileting schedule  Outcome: Progressing     Problem: DISCHARGE PLANNING  Goal: Discharge to home or other facility with appropriate resources  Description: INTERVENTIONS:  - Identify barriers to discharge w/pt and caregiver  - Include patient/family/discharge partner in discharge planning  - Arrange for needed discharge resources and transportation as appropriate  - Identify discharge learning needs (meds, wound care, etc)  - Arrange for interpreters to assist at discharge as needed  - Consider post-discharge preferences of patient/family/discharge partner  - Complete POLST form as appropriate  - Assess patient's ability to be responsible for managing their own health  - Refer to Case Management Department for coordinating discharge planning if the patient needs post-hospital services based on physician/LIP order or complex needs related to functional status, cognitive ability or social support  system  Outcome: Progressing     Problem: GASTROINTESTINAL - ADULT  Goal: Minimal or absence of nausea and vomiting  Description: INTERVENTIONS:  - Maintain adequate hydration with IV or PO as ordered and tolerated  - Nasogastric tube to low intermittent suction as ordered  - Evaluate effectiveness of ordered antiemetic medications  - Provide nonpharmacologic comfort measures as appropriate  - Advance diet as tolerated, if ordered  - Obtain nutritional consult as needed  - Evaluate fluid balance  Outcome: Progressing  Goal: Maintains or returns to baseline bowel function  Description: INTERVENTIONS:  - Assess bowel function  - Maintain adequate hydration with IV or PO as ordered and tolerated  - Evaluate effectiveness of GI medications  - Encourage mobilization and activity  - Obtain nutritional consult as needed  - Establish a toileting routine/schedule  - Consider collaborating with pharmacy to review patient's medication profile  Outcome: Progressing     Problem: HEMATOLOGIC - ADULT  Goal: Free from bleeding injury  Description: (Example usage: patient with low platelets)  INTERVENTIONS:  - Avoid intramuscular injections, enemas and rectal medication administration  - Ensure safe mobilization of patient  - Hold pressure on venipuncture sites to achieve adequate hemostasis  - Assess for signs and symptoms of internal bleeding  - Monitor lab trends  - Patient is to report abnormal signs of bleeding to staff  - Avoid use of toothpicks and dental floss  - Use electric shaver for shaving  - Use soft bristle tooth brush  - Limit straining and forceful nose blowing  Outcome: Progressing

## 2025-06-14 NOTE — PROGRESS NOTES
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, OP, hypertension-off meds, GERD, anxiety/depression, neuropathy, admission 2/28-3/8/2025 for SBO 2/2 ventral hernia s/p xploratory laparotomy, lysis of adhesions, closure of mesenteric defect, repair of ventral hernia 2/28/25 complicated by C-diff currently on chemo  for recurrent ovarian cancer who has been experiencing seeing abdominal pain bloating and constipation who had an outpatient CT of the abdomen and pelvis with possible partial small bowel obstruction versus narrowing due to recurrent cancer    Active problems:     Partial SBO vs Narrowing 2/2 Recurrent Cancer  - 6/22 CT A/P at [facility] reviously seen mass involving the ascending colon is difficult to measure due to incomplete distention but appears somewhat improved possible stricture versus partial small bowel obstruction  - NG tube placed on admission, removed 6/12 evening. Started on clears, now on fulls.     Plan:  - Follow  recommendations   - Advance diet as tolerated   - PT/OT given deconditioning and independent living      Leukocytosis, likely Related to Neupogen  C.Diff history, positive PCR  -afebrile. WBC 24 outpatient, high here on arrival too   -cxr negative for acute process     Microbials:   BCX x2: NGTD x5  BCX x2: NGTD x2  Cdiff Positive PCR    Antimicrobials:  Zosyn 6/12 -   Vancomycin PO: 6/13 -        Plan:   Consult ID   Continue Oral Vancomycin for now     SVT   - early AM 6/13, transient, seems to have resolved without IV medicine. Lopressor 25mg BID ordered in meantime. TTE ordered. Ensure correcting K and Mag. Follow tele.     Acute on chronic anemia  Thrombocytopenia  -related to chemo  -1 unit of PRBC given 6/12     Recurrent Metastatic Ovarian cancer  -hx neoadjuvant chemotherapy with carboplatin and paclitaxel for six cycles followed by IKER-BSO and adjuvant chemotherapy  with carbo/taxol.    -Now with recurrence   -palliative chemo s/p cycle 3 carbo/taxol/arden on 6/9  -follows with Dr Tian BECK  Code status: FULL. Discussed with patient about some of her wishes. She was a little hesitant to elect DNR status and but did state that she may be considering changing her goals of care knowing that she has metastatic cancer. I told her we can continue to address while here, at this time she would want to be full code but will likely chat with her children about this as well soon she states.      Chronic Medical Problems:     GERD- pepcid  Neuropathy-gabapentin  Hypertension- bp has been running low, off norvasc and benazpril   Hypothyroidism-Synthroid   Anxiety/Depression- resume duloxetine and and clonazepam 0.5 nightly     PETERC  - CODE STATUS with patient with Dr. Krishnamurthy present in room, patient has full code at this time but will be discussing this further with her family  -POA-kaykay Lebron [phone]  -discussed palliative care eval, son will discuss with Mom      MA  -ER Visits 2025: 2  -Admissions 2025:2  -Consults: surgery  -Discharge Needs: none anticipated  -Appointments: [ ] PCP Chong Cornejo MD     Prophy  -SCD  -heparin     Dispo  Discharge possibly by Saturday 6/12 update given to kaykay Lebron via phone via NP       Rafiq Pope MD  [facility]   Answering service [phone]         Subjective:     Feels better today however diarrhea predominant symptoms. Feeling weak, deconditioning.      OBJECTIVE:    Blood pressure 136/61, pulse 68, temperature 97.7 °F (36.5 °C), temperature source Oral, resp. rate 18, height 4' 9\" (1.448 m), weight 104 lb (47.2 kg), SpO2 95%, not currently breastfeeding.    Temp:  [97.7 °F (36.5 °C)-98.2 °F (36.8 °C)] 97.7 °F (36.5 °C)  Pulse:  [68-77] 68  Resp:  [16-18] 18  BP: (134-138)/(55-61) 136/61  SpO2:  [95 %-97 %] 95 %      Intake/Output:    Intake/Output Summary (Last 24 hours) at 6/14/2025 1049  Last data filed  at 6/14/2025 1037  Gross per 24 hour   Intake 1390 ml   Output 970 ml   Net 420 ml       Last 3 Weights   06/11/25 2133 104 lb (47.2 kg)   06/11/25 1757 104 lb (47.2 kg)   05/16/25 1418 105 lb (47.6 kg)   03/02/25 0500 114 lb 10.2 oz (52 kg)   03/01/25 0500 112 lb 7 oz (51 kg)   02/28/25 1030 111 lb (50.3 kg)       /61 (BP Location: Right arm)   Pulse 68   Temp 97.7 °F (36.5 °C) (Oral)   Resp 18   Ht 4' 9\" (1.448 m)   Wt 104 lb (47.2 kg)   SpO2 95%   BMI 22.51 kg/m²      Physical Exam  Vitals reviewed.   Constitutional:       General: She is not in acute distress.     Appearance: Normal appearance. She is not ill-appearing or diaphoretic.   Cardiovascular:      Rate and Rhythm: Normal rate and regular rhythm.      Pulses: Normal pulses.      Heart sounds: Normal heart sounds.   Pulmonary:      Effort: Pulmonary effort is normal.      Breath sounds: Normal breath sounds.   Abdominal:      Palpations: Abdomen is soft.      Tenderness: There is generalized abdominal tenderness.   Neurological:      General: No focal deficit present.      Mental Status: She is alert and oriented to person, place, and time.          Data Review:       Labs:     Recent Labs   Lab 06/12/25  0819 06/12/25 1837 06/13/25 0453 06/14/25 0618   RBC 2.03*  --  2.51* 2.56*   HGB 6.0* 8.4* 7.2* 7.6*   HCT 20.3*  --  23.5* 25.0*   .0  --  93.6 97.7   MCH 29.6  --  28.7 29.7   MCHC 29.6*  --  30.6* 30.4*   RDW 21.4*  --  19.9* 19.8*   NEPRELIM 16.04*  --  11.12* 3.58   WBC 20.6*  --  15.8* 5.0   .0*  --  116.0* 94.0*         Recent Labs   Lab 06/12/25  0818 06/13/25  0453 06/14/25  0618   *  123* 116* 111*   BUN 13  13 9 6*   CREATSERUM 0.52*  0.52* 0.48* 0.51*   EGFRCR 92  92 93 92   CA 7.3*  7.3* 7.0* 7.6*   *  135* 136 138   K 4.2  4.2 3.3* 4.2  4.2     105 105 108   CO2 22.0  22.0 24.0 23.0       Recent Labs   Lab 06/12/25  0818   ALT 13   AST 10   ALB 3.1*         Imaging:  XR ABDOMEN,  OBSTRUCTIVE SERIES 3 VIEWS(CPT=74021)  Result Date: 6/13/2025  CONCLUSION:  Findings most consistent with a resolving partial small bowel obstruction.  Small bowel is no longer dilated and enteric contrast has passed distally into the colon.    Dictated by (CST): Nick Baron MD on 6/13/2025 at 9:06 AM     Finalized by (CST): Nick Baron MD on 6/13/2025 at 9:11 AM          XR SMALL BOWEL SINGLE CONTRAST (CPT=74250)  Result Date: 6/12/2025  CONCLUSION:  1. Large hiatal hernia. 2. Follow bowel follow through demonstrates delayed transit time, with no evidence of obstruction.  Dictated by (CST): Matthias Hamilton MD on 6/12/2025 at 3:01 PM     Finalized by (CST): Matthias Hamilton MD on 6/12/2025 at 3:04 PM          XR ABDOMEN OBSTRUCTIVE SERIES ROUTINE(2 VW)(CPT=74019)  Result Date: 6/11/2025  CONCLUSION:  1. Mildly dilated small bowel loops compatible with history of obstruction-probably partial. 2. NG tube in large retrocardiac hiatal hernia at junction between herniated fundus and body just above diaphragm.    Dictated by (CST): Néstor Phan MD on 6/11/2025 at 9:31 PM     Finalized by (CST): Néstor Phan MD on 6/11/2025 at 9:35 PM          XR CHEST AP PORTABLE  (CPT=71045)  Result Date: 6/11/2025  CONCLUSION:  1. NG tube is in a large retrocardiac hiatal hernia at gastric fundus body junction just above diaphragm. 2. Minimal left basilar atelectasis.  No pneumonia or other acute finding. 3. Right-sided Port-A-Cath remains with tip in SVC.  4. Heart size normal. 5. Atherosclerotic calcification aorta.    Dictated by (CST): Néstor Phan MD on 6/11/2025 at 9:28 PM     Finalized by (CST): Néstor Phan MD on 6/11/2025 at 9:31 PM              Meds:     Scheduled Medications[1]  Medication Infusions[2]  PRN Medications[3]                 [1]    sodium phosphate  15 mmol Intravenous Once    metoprolol tartrate  25 mg Oral 2x Daily(Beta Blocker)    vancomycin  125 mg Oral Daily    famotidine  20 mg Oral  BID    clonazePAM  0.5 mg Oral Nightly    DULoxetine  60 mg Oral Nightly    gabapentin  300 mg Oral BID    levothyroxine  50 mcg Oral Before breakfast    piperacillin-tazobactam  3.375 g Intravenous Q8H    heparin  5,000 Units Subcutaneous Q12H   [2]    dextrose 5%-sodium chloride 0.45% 42 mL/hr at 06/13/25 1840   [3]   ondansetron    metoclopramide    morphINE **OR** morphINE **OR** morphINE

## 2025-06-14 NOTE — PLAN OF CARE
Patient denies of pain. Tolerating SLF diet. IVF, antibiotics infusing through port. Up x1 with  a walker. On tele, no calls overnight. Possible home today.  Safety precautions in place.     Problem: Patient Centered Care  Goal: Patient preferences are identified and integrated in the patient's plan of care  Description: Interventions:  - What would you like us to know as we care for you? I live at [facility].  I just had surgery a couple months ago.  - Provide timely, complete, and accurate information to patient/family  - Incorporate patient and family knowledge, values, beliefs, and cultural backgrounds into the planning and delivery of care  - Encourage patient/family to participate in care and decision-making at the level they choose  - Honor patient and family perspectives and choices  Outcome: Progressing     Problem: PAIN - ADULT  Goal: Verbalizes/displays adequate comfort level or patient's stated pain goal  Description: INTERVENTIONS:  - Encourage pt to monitor pain and request assistance  - Assess pain using appropriate pain scale  - Administer analgesics based on type and severity of pain and evaluate response  - Implement non-pharmacological measures as appropriate and evaluate response  - Consider cultural and social influences on pain and pain management  - Manage/alleviate anxiety  - Utilize distraction and/or relaxation techniques  - Monitor for opioid side effects  - Notify MD/LIP if interventions unsuccessful or patient reports new pain  - Anticipate increased pain with activity and pre-medicate as appropriate  Outcome: Progressing     Problem: GASTROINTESTINAL - ADULT  Goal: Minimal or absence of nausea and vomiting  Description: INTERVENTIONS:  - Maintain adequate hydration with IV or PO as ordered and tolerated  - Nasogastric tube to low intermittent suction as ordered  - Evaluate effectiveness of ordered antiemetic medications  - Provide nonpharmacologic comfort measures as  appropriate  - Advance diet as tolerated, if ordered  - Obtain nutritional consult as needed  - Evaluate fluid balance  Outcome: Progressing     Problem: HEMATOLOGIC - ADULT  Goal: Free from bleeding injury  Description: (Example usage: patient with low platelets)  INTERVENTIONS:  - Avoid intramuscular injections, enemas and rectal medication administration  - Ensure safe mobilization of patient  - Hold pressure on venipuncture sites to achieve adequate hemostasis  - Assess for signs and symptoms of internal bleeding  - Monitor lab trends  - Patient is to report abnormal signs of bleeding to staff  - Avoid use of toothpicks and dental floss  - Use electric shaver for shaving  - Use soft bristle tooth brush  - Limit straining and forceful nose blowing  Outcome: Progressing

## 2025-06-15 VITALS
HEIGHT: 57 IN | OXYGEN SATURATION: 96 % | TEMPERATURE: 98 F | HEART RATE: 85 BPM | WEIGHT: 104 LBS | RESPIRATION RATE: 18 BRPM | DIASTOLIC BLOOD PRESSURE: 65 MMHG | SYSTOLIC BLOOD PRESSURE: 120 MMHG | BODY MASS INDEX: 22.44 KG/M2

## 2025-06-15 PROBLEM — K56.609 SBO (SMALL BOWEL OBSTRUCTION) (HCC): Status: RESOLVED | Noted: 2025-06-11 | Resolved: 2025-06-15

## 2025-06-15 LAB
MAGNESIUM SERPL-MCNC: 1.4 MG/DL (ref 1.6–2.6)
PHOSPHATE SERPL-MCNC: 4.4 MG/DL (ref 2.4–5.1)

## 2025-06-15 PROCEDURE — 83735 ASSAY OF MAGNESIUM: CPT | Performed by: STUDENT IN AN ORGANIZED HEALTH CARE EDUCATION/TRAINING PROGRAM

## 2025-06-15 PROCEDURE — 85060 BLOOD SMEAR INTERPRETATION: CPT | Performed by: STUDENT IN AN ORGANIZED HEALTH CARE EDUCATION/TRAINING PROGRAM

## 2025-06-15 PROCEDURE — 85025 COMPLETE CBC W/AUTO DIFF WBC: CPT | Performed by: STUDENT IN AN ORGANIZED HEALTH CARE EDUCATION/TRAINING PROGRAM

## 2025-06-15 PROCEDURE — 84100 ASSAY OF PHOSPHORUS: CPT | Performed by: STUDENT IN AN ORGANIZED HEALTH CARE EDUCATION/TRAINING PROGRAM

## 2025-06-15 RX ORDER — MAGNESIUM OXIDE 400 MG/1
800 TABLET ORAL ONCE
Status: COMPLETED | OUTPATIENT
Start: 2025-06-15 | End: 2025-06-15

## 2025-06-15 RX ORDER — VANCOMYCIN HYDROCHLORIDE 125 MG/1
125 CAPSULE ORAL DAILY
Qty: 30 CAPSULE | Refills: 0 | Status: SHIPPED | OUTPATIENT
Start: 2025-06-15 | End: 2025-07-15

## 2025-06-15 NOTE — PLAN OF CARE
Lizeth is alert/oriented. Vitals stable on room air. Tolerating low fiber diet. Ambulating with standby assist and walker. Denies pain PO Vanco for antibiotic coverage. Heparin given for DVT prophylaxis. Voiding adequately. Last BM today. Cleared for discharge by surgery, ID and hospitalist. IV removed, port decannulated. Discharge summary given and explained with Lizeth and 2 sons at bedside. Follow up and medications reviewed with patient. All questions answered at this time.     Problem: Patient Centered Care  Goal: Patient preferences are identified and integrated in the patient's plan of care  Description: Interventions:  - What would you like us to know as we care for you? I live at [facility].  I just had surgery a couple months ago.  - Provide timely, complete, and accurate information to patient/family  - Incorporate patient and family knowledge, values, beliefs, and cultural backgrounds into the planning and delivery of care  - Encourage patient/family to participate in care and decision-making at the level they choose  - Honor patient and family perspectives and choices  Outcome: Adequate for Discharge     Problem: Patient/Family Goals  Goal: Patient/Family Long Term Goal  Description: Patient's Long Term Goal: Discharge from hospital    Interventions:  - Resolve abdominal pain  - Tolerate slow diet advancement  - Regain baseline bowel function  - See additional Care Plan goals for specific interventions  Outcome: Adequate for Discharge  Goal: Patient/Family Short Term Goal  Description: Patient's Short Term Goal: Manage abdominal pain    Interventions:   - Bowel decompression with NG tube  - Slow diet advancement as tolerated  - Imaging as indicated  - See additional Care Plan goals for specific interventions  Outcome: Adequate for Discharge     Problem: PAIN - ADULT  Goal: Verbalizes/displays adequate comfort level or patient's stated pain goal  Description: INTERVENTIONS:  - Encourage  pt to monitor pain and request assistance  - Assess pain using appropriate pain scale  - Administer analgesics based on type and severity of pain and evaluate response  - Implement non-pharmacological measures as appropriate and evaluate response  - Consider cultural and social influences on pain and pain management  - Manage/alleviate anxiety  - Utilize distraction and/or relaxation techniques  - Monitor for opioid side effects  - Notify MD/LIP if interventions unsuccessful or patient reports new pain  - Anticipate increased pain with activity and pre-medicate as appropriate  Outcome: Adequate for Discharge     Problem: SAFETY ADULT - FALL  Goal: Free from fall injury  Description: INTERVENTIONS:  - Assess pt frequently for physical needs  - Identify cognitive and physical deficits and behaviors that affect risk of falls.  - [place] fall precautions as indicated by assessment.  - Educate pt/family on patient safety including physical limitations  - Instruct pt to call for assistance with activity based on assessment  - Modify environment to reduce risk of injury  - Provide assistive devices as appropriate  - Consider OT/PT consult to assist with strengthening/mobility  - Encourage toileting schedule  Outcome: Adequate for Discharge     Problem: DISCHARGE PLANNING  Goal: Discharge to home or other facility with appropriate resources  Description: INTERVENTIONS:  - Identify barriers to discharge w/pt and caregiver  - Include patient/family/discharge partner in discharge planning  - Arrange for needed discharge resources and transportation as appropriate  - Identify discharge learning needs (meds, wound care, etc)  - Arrange for interpreters to assist at discharge as needed  - Consider post-discharge preferences of patient/family/discharge partner  - Complete POLST form as appropriate  - Assess patient's ability to be responsible for managing their own health  - Refer to Case Management Department for coordinating  discharge planning if the patient needs post-hospital services based on physician/LIP order or complex needs related to functional status, cognitive ability or social support system  Outcome: Adequate for Discharge     Problem: GASTROINTESTINAL - ADULT  Goal: Minimal or absence of nausea and vomiting  Description: INTERVENTIONS:  - Maintain adequate hydration with IV or PO as ordered and tolerated  - Nasogastric tube to low intermittent suction as ordered  - Evaluate effectiveness of ordered antiemetic medications  - Provide nonpharmacologic comfort measures as appropriate  - Advance diet as tolerated, if ordered  - Obtain nutritional consult as needed  - Evaluate fluid balance  Outcome: Adequate for Discharge  Goal: Maintains or returns to baseline bowel function  Description: INTERVENTIONS:  - Assess bowel function  - Maintain adequate hydration with IV or PO as ordered and tolerated  - Evaluate effectiveness of GI medications  - Encourage mobilization and activity  - Obtain nutritional consult as needed  - Establish a toileting routine/schedule  - Consider collaborating with pharmacy to review patient's medication profile  Outcome: Adequate for Discharge     Problem: HEMATOLOGIC - ADULT  Goal: Free from bleeding injury  Description: (Example usage: patient with low platelets)  INTERVENTIONS:  - Avoid intramuscular injections, enemas and rectal medication administration  - Ensure safe mobilization of patient  - Hold pressure on venipuncture sites to achieve adequate hemostasis  - Assess for signs and symptoms of internal bleeding  - Monitor lab trends  - Patient is to report abnormal signs of bleeding to staff  - Avoid use of toothpicks and dental floss  - Use electric shaver for shaving  - Use soft bristle tooth brush  - Limit straining and forceful nose blowing  Outcome: Adequate for Discharge

## 2025-06-15 NOTE — PLAN OF CARE
Patient is A/Ox4 on room air.  VSS. Up SBA with walker. IVF continued. Denies pain and nausea; tolerating diet. Voids freely. No BM overnight. Call light within reach; safety precautions in place.   Problem: Patient Centered Care  Goal: Patient preferences are identified and integrated in the patient's plan of care  Description: Interventions:  - What would you like us to know as we care for you? I live at [facility].  I just had surgery a couple months ago.  - Provide timely, complete, and accurate information to patient/family  - Incorporate patient and family knowledge, values, beliefs, and cultural backgrounds into the planning and delivery of care  - Encourage patient/family to participate in care and decision-making at the level they choose  - Honor patient and family perspectives and choices  Outcome: Progressing     Problem: Patient/Family Goals  Goal: Patient/Family Long Term Goal  Description: Patient's Long Term Goal: Discharge from hospital    Interventions:  - Resolve abdominal pain  - Tolerate slow diet advancement  - Regain baseline bowel function  - See additional Care Plan goals for specific interventions  Outcome: Progressing  Goal: Patient/Family Short Term Goal  Description: Patient's Short Term Goal: Manage abdominal pain    Interventions:   - Bowel decompression with NG tube  - Slow diet advancement as tolerated  - Imaging as indicated  - See additional Care Plan goals for specific interventions  Outcome: Progressing     Problem: PAIN - ADULT  Goal: Verbalizes/displays adequate comfort level or patient's stated pain goal  Description: INTERVENTIONS:  - Encourage pt to monitor pain and request assistance  - Assess pain using appropriate pain scale  - Administer analgesics based on type and severity of pain and evaluate response  - Implement non-pharmacological measures as appropriate and evaluate response  - Consider cultural and social influences on pain and pain  management  - Manage/alleviate anxiety  - Utilize distraction and/or relaxation techniques  - Monitor for opioid side effects  - Notify MD/LIP if interventions unsuccessful or patient reports new pain  - Anticipate increased pain with activity and pre-medicate as appropriate  Outcome: Progressing     Problem: SAFETY ADULT - FALL  Goal: Free from fall injury  Description: INTERVENTIONS:  - Assess pt frequently for physical needs  - Identify cognitive and physical deficits and behaviors that affect risk of falls.  - [place] fall precautions as indicated by assessment.  - Educate pt/family on patient safety including physical limitations  - Instruct pt to call for assistance with activity based on assessment  - Modify environment to reduce risk of injury  - Provide assistive devices as appropriate  - Consider OT/PT consult to assist with strengthening/mobility  - Encourage toileting schedule  Outcome: Progressing     Problem: DISCHARGE PLANNING  Goal: Discharge to home or other facility with appropriate resources  Description: INTERVENTIONS:  - Identify barriers to discharge w/pt and caregiver  - Include patient/family/discharge partner in discharge planning  - Arrange for needed discharge resources and transportation as appropriate  - Identify discharge learning needs (meds, wound care, etc)  - Arrange for interpreters to assist at discharge as needed  - Consider post-discharge preferences of patient/family/discharge partner  - Complete POLST form as appropriate  - Assess patient's ability to be responsible for managing their own health  - Refer to Case Management Department for coordinating discharge planning if the patient needs post-hospital services based on physician/LIP order or complex needs related to functional status, cognitive ability or social support system  Outcome: Progressing     Problem: GASTROINTESTINAL - ADULT  Goal: Minimal or absence of nausea and vomiting  Description: INTERVENTIONS:  -  Maintain adequate hydration with IV or PO as ordered and tolerated  - Nasogastric tube to low intermittent suction as ordered  - Evaluate effectiveness of ordered antiemetic medications  - Provide nonpharmacologic comfort measures as appropriate  - Advance diet as tolerated, if ordered  - Obtain nutritional consult as needed  - Evaluate fluid balance  Outcome: Progressing  Goal: Maintains or returns to baseline bowel function  Description: INTERVENTIONS:  - Assess bowel function  - Maintain adequate hydration with IV or PO as ordered and tolerated  - Evaluate effectiveness of GI medications  - Encourage mobilization and activity  - Obtain nutritional consult as needed  - Establish a toileting routine/schedule  - Consider collaborating with pharmacy to review patient's medication profile  Outcome: Progressing     Problem: HEMATOLOGIC - ADULT  Goal: Free from bleeding injury  Description: (Example usage: patient with low platelets)  INTERVENTIONS:  - Avoid intramuscular injections, enemas and rectal medication administration  - Ensure safe mobilization of patient  - Hold pressure on venipuncture sites to achieve adequate hemostasis  - Assess for signs and symptoms of internal bleeding  - Monitor lab trends  - Patient is to report abnormal signs of bleeding to staff  - Avoid use of toothpicks and dental floss  - Use electric shaver for shaving  - Use soft bristle tooth brush  - Limit straining and forceful nose blowing  Outcome: Progressing
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
## 2025-06-15 NOTE — DISCHARGE SUMMARY
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, OP, hypertension-off meds, GERD, anxiety/depression, neuropathy, admission 2/28-3/8/2025 for SBO 2/2 ventral hernia s/p xploratory laparotomy, lysis of adhesions, closure of mesenteric defect, repair of ventral hernia 2/28/25 complicated by C-diff currently on chemo  for recurrent ovarian cancer who has been experiencing seeing abdominal pain bloating and constipation who had an outpatient CT of the abdomen and pelvis with possible partial small bowel obstruction versus narrowing due to recurrent cancer      Partial SBO vs Narrowing 2/2 Recurrent Cancer  - 6/22 CT A/P at [facility] reviously seen mass involving the ascending colon is difficult to measure due to incomplete distention but appears somewhat improved possible stricture versus partial small bowel obstruction  - NG tube placed on admission, removed 6/12 evening. S      Leukocytosis, likely Related to Neupogen  C.Diff history, positive PCR  -afebrile. WBC 24 outpatient, high here on arrival too   -cxr negative for acute process                 Microbials:   BCX x2: NGTD x5  BCX x2: NGTD x2  Cdiff Positive PCR     Antimicrobials:  Zosyn 6/12 - 6/14  Vancomycin PO: 6/13 -                           Continue Oral Vancomycin outpatient, follow-up ID in 2-3 weeks      SVT   - early AM 6/13, transient, seems to have resolved without IV medicine. TTE: Systolic function was normal. The estimated  ejection fraction was 60-65%      Acute on chronic anemia  Thrombocytopenia  - related to chemo  -1 unit of PRBC given 6/12     Recurrent Metastatic Ovarian cancer  -hx neoadjuvant chemotherapy with carboplatin and paclitaxel for six cycles followed by IKER-BSO and adjuvant chemotherapy with carbo/taxol.    -Now with recurrence   -palliative chemo s/p cycle 3 carbo/taxol/arden on 6/9  -follows with Dr Tian BECK  Code status: FULL. Discussed with patient about some of her wishes. She was a little hesitant to elect DNR status and but did state that she may be considering changing her goals of care knowing that she has metastatic cancer. I told her we can continue to address while here, at this time she would want to be full code but will likely chat with her children about this as well soon she states.      Chronic Medical Problems:     GERD- pepcid  Neuropathy-gabapentin  Hypertension- bp has been running low, off norvasc and benazpril   Hypothyroidism-Synthroid   Anxiety/Depression- resume duloxetine and and clonazepam 0.5 nightly     EBONY  - CODE STATUS with patient with Dr. Krishnamurthy present in room, patient has full code at this time but will be discussing this further with her family  -POA-son Bernardino [phone]  -discussed palliative care terri, son will discuss with Mom      MA  -ER Visits 2025: 2  -Admissions 2025:2  -Consults: surgery  -Discharge Needs: none anticipated  -Appointments: [ ] PCP Chong Cornejo MD    Medication Changes  Vancomycin PO caps    Important Follow Up Items  Labs: Anemia  Imaging: None  Medications: Vancomycin  Incidental findings: SVT, resolved    Procedures/Diagnostics  None    Change in Code Status: None, please see goals of care discussion above    Discharge Medications       Medication List        START taking these medications      vancomycin 125 MG Caps  Commonly known as: Vancocin  Take 1 capsule (125 mg total) by mouth daily.            CONTINUE taking these medications       acetaminophen 500 MG Tabs  Commonly known as: Tylenol Extra Strength     clonazePAM 0.5 MG Tabs  Commonly known as: KlonoPIN  Take 1 tablet (0.5 mg total) by mouth at bedtime.     denosumab 60 MG/ML Sosy  Commonly known as: Prolia     docusate sodium 100 MG Caps  Commonly known as: Colace     DULoxetine 60 MG Cpep  Commonly known as: Cymbalta     ferrous sulfate 325 (65 FE) MG Tbec     gabapentin 300 MG Caps  Commonly known as: Neurontin     levothyroxine 50 MCG Tabs  Commonly known as: Synthroid     loratadine 10 MG Tabs  Commonly known as: Claritin     magnesium 250 MG Tabs     omeprazole 20 MG Cpdr  Commonly known as: PriLOSEC     Vitamin D 50 MCG ( UT) Tabs            STOP taking these medications      dexamethasone 4 MG tablet  Commonly known as: Decadron               Where to Get Your Medications        These medications were sent to [id number] - [place], [place] - [address] [place] [address] AT [facility] [address], [phone], [phone]  [address] [place] [address], [place] [address]      Phone: [phone]   vancomycin 125 MG Caps       I reconciled current and discharge medications on the day of discharge.    Imaging/Diagnostic Reports  CARD ECHO 2D DOPPLER (CPT=93306)  Result Date: 2025  Transthoracic Echocardiogram Name:Lizeth Mcgill Date: 2025 :  10/26/1940 Ht:  (57in)  BP: 129 / 59 MRN:  [id number]    Age:  84years    Wt:  (104lb) HR: 71bpm Loc:  [facility]       Gndr: F          BSA: 1.36m^2 Sonographer: Jazz Ordering:    Kay Salas Consulting:  Sukh Leary ---------------------------------------------------------------------------- History/Indications:  Supraventricular tachycardia. ---------------------------------------------------------------------------- Procedure information:  A transthoracic complete 2D study was performed. Additional evaluation included M-mode, complete spectral Doppler, and color Doppler.  Patient status:  Inpatient.  Location:  Bedside.    The  previous study was not available, so comparison was made to the report of 05/10/2023.    This was a routine study. Transthoracic echocardiography for diagnosis and ventricular function evaluation. Image quality was adequate. ECG rhythm:   Normal sinus ---------------------------------------------------------------------------- Conclusions: 1. Left ventricle: The cavity size was normal. Wall thickness was normal.    Systolic function was normal. The estimated ejection fraction was 60-65%,    by visual assessment. No diagnostic evidence for regional wall motion    abnormalities. Left ventricular diastolic function parameters were normal    for the patient's age. 2. Left atrium: The atrium was mildly enlarged. 3. Aortic valve: There was mild regurgitation. 4. Mitral valve: There was mild regurgitation. 5. Tricuspid valve: There was mild-moderate regurgitation. 6. Pulmonary arteries: Systolic pressure was mildly increased, estimated to    be 38mm Hg. Estimated pulmonary artery diastolic pressure was 15mm Hg. * ---------------------------------------------------------------------------- * Findings: Left ventricle:  The cavity size was normal. Wall thickness was normal. Systolic function was normal. The estimated ejection fraction was 60-65%, by visual assessment. No diagnostic evidence for regional wall motion abnormalities. Left ventricular diastolic function parameters were normal for the patient's age. Left atrium:  The atrium was mildly enlarged. Right ventricle:  The cavity size was normal. Systolic function was normal. Systolic pressure was mildly increased. Right atrium:  The atrium was normal in size. Mitral valve:  The valve was structurally normal. The annulus was mildly thickened. The leaflets were normal thickness. Leaflet separation was normal.  Doppler:  Transvalvular velocity was within the normal range. There was no evidence for stenosis. There was mild regurgitation. Aortic valve:   The valve was  trileaflet. The leaflets were mildly calcified. Cusp separation was normal.  Doppler:  Transvalvular velocity was within the normal range. There was no evidence for stenosis. There was mild regurgitation. Tricuspid valve:  The valve is structurally normal. Leaflet separation was normal.  Doppler:  Transvalvular velocity was within the normal range. There was no evidence for stenosis. There was mild-moderate regurgitation. Pulmonic valve:   The valve is structurally normal. Cusp separation was normal.  Doppler:  Transvalvular velocity was within the normal range. There was no evidence for stenosis. There was mild regurgitation. Pericardium:   There was no pericardial effusion. Aorta: Aortic root: The aortic root was normal. Ascending aorta: The ascending aorta was normal. Pulmonary arteries: Systolic pressure was mildly increased, estimated to be 38mm Hg. Estimated pulmonary artery diastolic pressure was 15mm Hg. Systemic veins:  Central venous respirophasic diameter changes are blunted (< 50%). Inferior vena cava: The IVC was normal-sized. ---------------------------------------------------------------------------- Measurements  Left ventricle                    Value        Ref  IVS thickness, ED, PLAX           0.8   cm     0.6 -                                                 0.9  LV ID, ED, PLAX                   3.8   cm     3.8 -                                                 5.2  LV ID, ES, PLAX                   2.7   cm     2.2 -                                                 3.5  LV PW thickness, ED, PLAX         0.7   cm     0.6 -                                                 0.9  IVS/LV PW ratio, ED, PLAX         1.14         --------  LV PW/LV ID ratio, ED, PLAX       0.18         --------  LV ejection fraction              57    %      54 - 74  Stroke volume/bsa, 2D             47    ml/m^2 --------  LV e', lateral                (L) 7.1   cm/sec >=10.0  LV E/e', lateral              (H) 14            <=13  LV e', medial                     8.3   cm/sec >=7.0  LV E/e', medial                   12           --------  LV e', average                    7.7   cm/sec --------  LV E/e', average                  13           <=14  LVOT                              Value        Ref  LVOT ID                           1.9   cm     --------  LVOT peak velocity, S             1.13  m/sec  --------  LVOT VTI, S                       22.4  cm     --------  LVOT peak gradient, S             5     mm Hg  --------  LVOT mean gradient, S             3     mm Hg  --------  Stroke volume (SV), LVOT DP       64    ml     --------  Stroke index (SV/bsa), LVOT       47    ml/m^2 --------  DP  Aortic root                       Value        Ref  Aortic root ID                    2.9   cm     2.2 -                                                 3.7  Ascending aorta                   Value        Ref  Ascending aorta ID                2.8   cm     1.9 -                                                 3.5  Left atrium                       Value        Ref  LA ID, A-P, ES                    3.1   cm     2.7 -                                                 3.8  LA volume, S                      33    ml     22 - 52  LA volume/bsa, S                  24    ml/m^2 16 - 34  LA volume, ES, 1-p A4C            31    ml     22 - 52  LA volume, ES, 1-p A2C            33    ml     22 - 52  LA volume, ES, A/L                37    ml     --------  LA volume/bsa, ES, A/L            27    ml/m^2 16 - 34  LA/aortic root ratio              1.07         --------  Mitral valve                      Value        Ref  Mitral E-wave peak velocity       0.99  m/sec  --------  Mitral A-wave peak velocity       0.8   m/sec  --------  Mitral deceleration time          169   ms     --------  Mitral peak gradient, D           4     mm Hg  --------  Mitral E/A ratio, peak            1.2          --------  Pulmonary artery                  Value        Ref  PA  pressure, S, DP                38    mm Hg  --------  PA pressure, ED, DP               15    mm Hg  --------  Tricuspid valve                   Value        Ref  Tricuspid regurg peak             2.76  m/sec  <=2.8  velocity  Tricuspid peak RV-RA gradient     30    mm Hg  --------  Right atrium                      Value        Ref  RA ID, S-I, ES, A4C               4.0   cm     3.4 -                                                 5.3  RA ID/bsa, S-I, ES, A4C           2.9   cm/m^2 1.9 -                                                 3.1  RA area, ES, A4C                  12    cm^2   10 - 18  RA volume, ES, 1-p A4C            28    ml     --------  RA volume/bsa, ES, 1-p A4C        21    ml/m^2 9 - 33  Systemic veins                    Value        Ref  Estimated CVP                     8     mm Hg  --------  Inferior vena cava                Value        Ref  ID                                1.3   cm     <=2.1  Right ventricle                   Value        Ref  TAPSE, MM                         1.93  cm     >=1.70  RV pressure, S, DP                38    mm Hg  --------  RV s', lateral                    10.2  cm/sec >=9.5  Pulmonic valve                    Value        Ref  Pulmonic regurg velocity, ED      1.29  m/sec  --------  Pulmonic regurg gradient, ED      7     mm Hg  -------- Legend: (L)  and  (H)  anita values outside specified reference range. ---------------------------------------------------------------------------- Prepared and electronically signed by Sanford Canales 06/13/2025 12:29     XR ABDOMEN, OBSTRUCTIVE SERIES 3 VIEWS(CPT=74021)  Result Date: 6/13/2025  PROCEDURE: XR ABDOMEN, OBSTRUCTIVE SERIES (CPT=74021)  COMPARISON: [facility], XR SMALL BOWEL SINGLE CONTRAST (CPT=74250), 6/12/2025, 10:28 AM.  [facility], CT CHEST+ABDOMEN+PELVIS(ALL CNTRST ONLY)(CPT=71260/42465), 3/05/2025, 12:39 PM.  [facility], XR ABDOMEN OBSTRUCTIVE SERIES ROUTINE  (2 VIEWS)(CPT=74019), 6/11/2025, 7:26 PM.  INDICATIONS: fu sbo  TECHNIQUE: Supine, prone, and upright (or decubitus) radiographs of the abdomen were performed.   FINDINGS:  BOWEL GAS PATTERN: There is bowel gas throughout the abdomen, including the rectum.  Enteric contrast from the recent small-bowel follow-through study has completely passed into the colon.  No dilated bowel loops are seen.  Air-fluid levels are noted on the decubitus view. FREE AIR:   None.  SOFT TISSUES: Normal. No masses or organomegaly. CALCIFICATIONS: None significant.  BONES: There is stable moderate biconvex scoliosis to the visualized thoracolumbar spine including a levoconvex lumbar curvature.  Postoperative changes are again noted from an interbody fusion and posterior instrumented fusion at L4-S1. OTHER: Surgical clips in the right upper quadrant consistent with a previous cholecystectomy.         CONCLUSION:  Findings most consistent with a resolving partial small bowel obstruction.  Small bowel is no longer dilated and enteric contrast has passed distally into the colon.    Dictated by (CST): Nick Baron MD on 6/13/2025 at 9:06 AM     Finalized by (CST): Nick Baron MD on 6/13/2025 at 9:11 AM          XR SMALL BOWEL SINGLE CONTRAST (CPT=74250)  Result Date: 6/12/2025  PROCEDURE: XR SMALL BOWEL SINGLE CONTRAST (CPT=74250)  COMPARISON: CT abdomen/pelvis 03/05/2025.  INDICATIONS: Abdominal pain and destention.  TECHNIQUE: Small bowel series was performed in the usual manner.  Contrast was injected through the patient's indwelling enteric tube.  Fourteen radiograph images were obtained.   FINDINGS:  There is a large hiatal hernia, with the majority of the stomach above the diaphragm. DUODENUM: Normal.  No ulceration or diverticulum.  JEJUNUM: Normal.  Normal motility.  No obstruction or visible lesion.  ILEUM: Normal.  Normal motility.  No obstruction or visible lesion.  TRANSIT TIME: Slightly delayed, with contrast not  reaching the cecum until 4 hours after ingestion OTHER: Surgical hardware is present in the lumbosacral spine.         CONCLUSION:  1. Large hiatal hernia. 2. Follow bowel follow through demonstrates delayed transit time, with no evidence of obstruction.  Dictated by (CST): Matthias Hamilton MD on 6/12/2025 at 3:01 PM     Finalized by (CST): Matthias Hamilton MD on 6/12/2025 at 3:04 PM          XR ABDOMEN OBSTRUCTIVE SERIES ROUTINE(2 VW)(CPT=74019)  Result Date: 6/11/2025  PROCEDURE: XR ABDOMEN OBSTRUCTIVE SERIES ROUTINE (2 VIEWS)(CPT=74019)  COMPARISON: [facility], XR ABDOMEN OBSTRUCTIVE SERIES ROUTINE (2 VIEWS)(CPT=74019), 3/06/2025, 10:21 AM.  INDICATIONS: CT shows SBO, abdominal pain.  TECHNIQUE: Supine, prone, and upright (or decubitus) radiographs of the abdomen were performed.   FINDINGS:  BOWEL GAS PATTERN: Mildly dilated small bowel loops.  NG tube in a large retrocardiac hiatal hernia at the junction between the herniated fundus and body just above diaphragm. FREE AIR:   None.  OTHER: Negative.          CONCLUSION:  1. Mildly dilated small bowel loops compatible with history of obstruction-probably partial. 2. NG tube in large retrocardiac hiatal hernia at junction between herniated fundus and body just above diaphragm.    Dictated by (CST): Néstor Phan MD on 6/11/2025 at 9:31 PM     Finalized by (CST): Néstor Phan MD on 6/11/2025 at 9:35 PM          XR CHEST AP PORTABLE  (CPT=71045)  Result Date: 6/11/2025  PROCEDURE: XR CHEST AP PORTABLE  (CPT=71045) TIME: 1949  COMPARISON: [facility], CT CHEST+ABDOMEN+PELVIS(ALL CNTRST ONLY)(CPT=71260/34538), 3/05/2025, 12:39 PM.  [facility], XR CHEST AP PORTABLE (CPT=71045), 5/16/2025, 4:13 PM.  INDICATIONS: Verify correct tube placement  TECHNIQUE:   Single view.           CONCLUSION:  1. NG tube is in a large retrocardiac hiatal hernia at gastric fundus body junction just above diaphragm. 2. Minimal left basilar  atelectasis.  No pneumonia or other acute finding. 3. Right-sided Port-A-Cath remains with tip in SVC.  4. Heart size normal. 5. Atherosclerotic calcification aorta.    Dictated by (CST): Néstor Phan MD on 6/11/2025 at 9:28 PM     Finalized by (CST): Néstor Phan MD on 6/11/2025 at 9:31 PM          XR CHEST AP PORTABLE  (CPT=71045)  Result Date: 5/16/2025  PROCEDURE: XR CHEST AP PORTABLE  (CPT=71045) TIME: 4:16 p.m.   COMPARISON: [facility], CT CHEST+ABDOMEN+PELVIS(ALL CNTRST ONLY)(CPT=71260/99585), 3/05/2025, 12:39 PM.  [facility], XR CHEST AP PORTABLE (CPT=71045), 3/05/2025, 4:19 AM.  INDICATIONS: Shortness of breath and fatigue x2 weeks.  TECHNIQUE:   Single view.   FINDINGS:  CARDIAC/VASC: No cardiac silhouette abnormality or cardiomegaly.  Unremarkable pulmonary vasculature.  MEDIAST/LIVAN:   Atherosclerotic aorta with no visible aneurysm.  LUNGS/PLEURA: Retrocardiac left basal pulmonary opacity similar to prior.  Small left effusion. BONES: Scattered mild degenerative endplate changes in the visualized thoracolumbar spine. OTHER: Clips/suture in the left axilla.  Right chest port with tip in the distal superior vena cava.         CONCLUSION: Small left pleural effusion.  Chronic retrocardiac pulmonary opacity compatible with large hiatal hernia.  No significant interval change since prior exam.     Dictated by (CST): Lavon Raza MD on 5/16/2025 at 4:41 PM     Finalized by (CST): Lavon Raza MD on 5/16/2025 at 4:43 PM            Secondary Discharge Diagnoses  Chronic C. difficile    Pertinent Physical Exam At Time of Discharge  Vitals:    06/14/25 1900 06/14/25 2041 06/15/25 0428 06/15/25 1205   BP:  140/68 154/84 120/65   BP Location:  Right arm Right arm Right arm   Pulse: 75 71 77 85   Resp:  16 18 18   Temp:  98.6 °F (37 °C) 97.7 °F (36.5 °C) 98.2 °F (36.8 °C)   TempSrc:  Oral Oral Oral   SpO2:  99% 96% 96%   Weight:       Height:          Constitutional:       General:  She is not in acute distress.     Appearance: Normal appearance. She is not ill-appearing or diaphoretic.   Cardiovascular:      Rate and Rhythm: Normal rate and regular rhythm.      Pulses: Normal pulses.      Heart sounds: Normal heart sounds.   Pulmonary:      Effort: Pulmonary effort is normal.      Breath sounds: Normal breath sounds.   Abdominal:      Palpations: Abdomen is soft.      Tenderness: There is generalized abdominal tenderness (Improved from day prior).   Neurological:      General: No focal deficit present.      Mental Status: She is alert and oriented to person, place, and time.       Total time coordinating care > 30 mins.    Patient had opportunity to ask questions and stated understanding and agreed with therapeutic plan as outlined.     Rafiq Pope MD  6/15/2025

## 2025-06-15 NOTE — PROGRESS NOTES
[facility] Progress Note     CC: Hospital Follow up    PCP: Chong Cornejo MD       Assessment/Plan:   84-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, OP, hypertension-off meds, GERD, anxiety/depression, neuropathy, admission 2/28-3/8/2025 for SBO 2/2 ventral hernia s/p xploratory laparotomy, lysis of adhesions, closure of mesenteric defect, repair of ventral hernia 2/28/25 complicated by C-diff currently on chemo  for recurrent ovarian cancer who has been experiencing seeing abdominal pain bloating and constipation who had an outpatient CT of the abdomen and pelvis with possible partial small bowel obstruction versus narrowing due to recurrent cancer    Active problems:    Partial SBO vs Narrowing 2/2 Recurrent Cancer  - 6/22 CT A/P at [facility] reviously seen mass involving the ascending colon is difficult to measure due to incomplete distention but appears somewhat improved possible stricture versus partial small bowel obstruction  - NG tube placed on admission, removed 6/12 evening. Started on clears, now on fulls.     Plan:  - PT/OT given deconditioning and independent living      Leukocytosis, likely Related to Neupogen  C.Diff history, positive PCR  -afebrile. WBC 24 outpatient, high here on arrival too   -cxr negative for acute process     Microbials:   BCX x2: NGTD x5  BCX x2: NGTD x2  Cdiff Positive PCR    Antimicrobials:  Zosyn 6/12 - 6/14  Vancomycin PO: 6/13 -        Plan:   Consult ID   Continue Oral Vancomycin for now     SVT   - early AM 6/13, transient, seems to have resolved without IV medicine. Lopressor 25mg BID ordered in meantime. TTE ordered. Ensure correcting K and Mag. Follow tele.     Acute on chronic anemia  Thrombocytopenia  -related to chemo  -1 unit of PRBC given 6/12     Recurrent Metastatic Ovarian cancer  -hx neoadjuvant chemotherapy with carboplatin and paclitaxel for six cycles followed by IKER-BSO and adjuvant chemotherapy with carbo/taxol.    -Now with recurrence   -palliative  chemo s/p cycle 3 carbo/taxol/arden on 6/9  -follows with Dr Tian BECK  Code status: FULL. Discussed with patient about some of her wishes. She was a little hesitant to elect DNR status and but did state that she may be considering changing her goals of care knowing that she has metastatic cancer. I told her we can continue to address while here, at this time she would want to be full code but will likely chat with her children about this as well soon she states.      Chronic Medical Problems:     GERD- pepcid  Neuropathy-gabapentin  Hypertension- bp has been running low, off norvasc and benazpril   Hypothyroidism-Synthroid   Anxiety/Depression- resume duloxetine and and clonazepam 0.5 nightly     EBONY  - CODE STATUS with patient with Dr. Krishnamurthy present in room, patient has full code at this time but will be discussing this further with her family  -POA-son Bernardino [phone]  -discussed palliative care terri son will discuss with Mom      MA  -ER Visits 2025: 2  -Admissions 2025:2  -Consults: surgery  -Discharge Needs: none anticipated  -Appointments: [ ] PCP Chong Cornejo MD     Prophy  -SCD  -heparin     Dispo  Likely discharge after antibiotic regimen.  Will need close follow-up with oncology.      Rafiq Pope MD  [facility] Hospitalist   Answering service [phone]         Subjective:     Patient doing better today, feels comfortable going home.  All questions answered and discussed.    OBJECTIVE:    Blood pressure 154/84, pulse 77, temperature 97.7 °F (36.5 °C), temperature source Oral, resp. rate 18, height 4' 9\" (1.448 m), weight 104 lb (47.2 kg), SpO2 96%, not currently breastfeeding.    Temp:  [97.6 °F (36.4 °C)-98.6 °F (37 °C)] 97.7 °F (36.5 °C)  Pulse:  [71-88] 77  Resp:  [16-18] 18  BP: (124-154)/(58-84) 154/84  SpO2:  [95 %-99 %] 96 %      Intake/Output:    Intake/Output Summary (Last 24 hours) at 6/15/2025 1110  Last data filed at 6/15/2025 1019  Gross per 24 hour   Intake 480 ml    Output 1050 ml   Net -570 ml       Last 3 Weights   06/11/25 2133 104 lb (47.2 kg)   06/11/25 1757 104 lb (47.2 kg)   05/16/25 1418 105 lb (47.6 kg)   03/02/25 0500 114 lb 10.2 oz (52 kg)   03/01/25 0500 112 lb 7 oz (51 kg)   02/28/25 1030 111 lb (50.3 kg)       /84 (BP Location: Right arm)   Pulse 77   Temp 97.7 °F (36.5 °C) (Oral)   Resp 18   Ht 4' 9\" (1.448 m)   Wt 104 lb (47.2 kg)   SpO2 96%   BMI 22.51 kg/m²      Physical Exam  Vitals reviewed.   Constitutional:       General: She is not in acute distress.     Appearance: Normal appearance. She is not ill-appearing or diaphoretic.   Cardiovascular:      Rate and Rhythm: Normal rate and regular rhythm.      Pulses: Normal pulses.      Heart sounds: Normal heart sounds.   Pulmonary:      Effort: Pulmonary effort is normal.      Breath sounds: Normal breath sounds.   Abdominal:      Palpations: Abdomen is soft.      Tenderness: There is generalized abdominal tenderness (Improved from day prior).   Neurological:      General: No focal deficit present.      Mental Status: She is alert and oriented to person, place, and time.          Data Review:       Labs:     Recent Labs   Lab 06/12/25  0819 06/12/25  1837 06/13/25 0453 06/14/25 0618   RBC 2.03*  --  2.51* 2.56*   HGB 6.0* 8.4* 7.2* 7.6*   HCT 20.3*  --  23.5* 25.0*   .0  --  93.6 97.7   MCH 29.6  --  28.7 29.7   MCHC 29.6*  --  30.6* 30.4*   RDW 21.4*  --  19.9* 19.8*   NEPRELIM 16.04*  --  11.12* 3.58   WBC 20.6*  --  15.8* 5.0   .0*  --  116.0* 94.0*         Recent Labs   Lab 06/12/25  0818 06/13/25  0453 06/14/25  0618   *  123* 116* 111*   BUN 13  13 9 6*   CREATSERUM 0.52*  0.52* 0.48* 0.51*   EGFRCR 92  92 93 92   CA 7.3*  7.3* 7.0* 7.6*   *  135* 136 138   K 4.2  4.2 3.3* 4.2  4.2     105 105 108   CO2 22.0  22.0 24.0 23.0       Recent Labs   Lab 06/12/25  0818   ALT 13   AST 10   ALB 3.1*         Imaging:  XR ABDOMEN, OBSTRUCTIVE SERIES 3  VIEWS(CPT=74021)  Result Date: 6/13/2025  CONCLUSION:  Findings most consistent with a resolving partial small bowel obstruction.  Small bowel is no longer dilated and enteric contrast has passed distally into the colon.    Dictated by (CST): Nick Baron MD on 6/13/2025 at 9:06 AM     Finalized by (CST): Nick Baron MD on 6/13/2025 at 9:11 AM          XR SMALL BOWEL SINGLE CONTRAST (CPT=74250)  Result Date: 6/12/2025  CONCLUSION:  1. Large hiatal hernia. 2. Follow bowel follow through demonstrates delayed transit time, with no evidence of obstruction.  Dictated by (CST): Matthias Hamilton MD on 6/12/2025 at 3:01 PM     Finalized by (CST): Matthias Hamilton MD on 6/12/2025 at 3:04 PM              Meds:     Scheduled Medications[1]  Medication Infusions[2]  PRN Medications[3]                 [1]    metoprolol tartrate  25 mg Oral 2x Daily(Beta Blocker)    vancomycin  125 mg Oral Daily    famotidine  20 mg Oral BID    clonazePAM  0.5 mg Oral Nightly    DULoxetine  60 mg Oral Nightly    gabapentin  300 mg Oral BID    levothyroxine  50 mcg Oral Before breakfast    heparin  5,000 Units Subcutaneous Q12H   [2]    dextrose 5%-sodium chloride 0.45% 42 mL/hr at 06/14/25 1741   [3]   ondansetron    metoclopramide    morphINE **OR** morphINE **OR** morphINE

## 2025-06-16 LAB
BASOPHILS # BLD AUTO: 0.02 X10(3) UL (ref 0–0.2)
BASOPHILS NFR BLD AUTO: 1.1 %
DEPRECATED RDW RBC AUTO: 67.7 FL (ref 35.1–46.3)
EOSINOPHIL # BLD AUTO: 0.04 X10(3) UL (ref 0–0.7)
EOSINOPHIL NFR BLD AUTO: 2.1 %
ERYTHROCYTE [DISTWIDTH] IN BLOOD BY AUTOMATED COUNT: 19.3 % (ref 11–15)
HCT VFR BLD AUTO: 25.9 % (ref 35–48)
HGB BLD-MCNC: 8.1 G/DL (ref 12–16)
IMM GRANULOCYTES # BLD AUTO: 0.05 X10(3) UL (ref 0–1)
IMM GRANULOCYTES NFR BLD: 2.6 %
LYMPHOCYTES # BLD AUTO: 0.56 X10(3) UL (ref 1–4)
LYMPHOCYTES NFR BLD AUTO: 29.5 %
MCH RBC QN AUTO: 30.2 PG (ref 26–34)
MCHC RBC AUTO-ENTMCNC: 31.3 G/DL (ref 31–37)
MCV RBC AUTO: 96.6 FL (ref 80–100)
MONOCYTES # BLD AUTO: 0.14 X10(3) UL (ref 0.1–1)
MONOCYTES NFR BLD AUTO: 7.4 %
NEUTROPHILS # BLD AUTO: 1.09 X10 (3) UL (ref 1.5–7.7)
NEUTROPHILS # BLD AUTO: 1.09 X10(3) UL (ref 1.5–7.7)
NEUTROPHILS NFR BLD AUTO: 57.3 %
PLATELET # BLD AUTO: 62 10(3)UL (ref 150–450)
PLATELETS.RETICULATED NFR BLD AUTO: 2.7 % (ref 0–7)
RBC # BLD AUTO: 2.68 X10(6)UL (ref 3.8–5.3)
WBC # BLD AUTO: 1.9 X10(3) UL (ref 4–11)

## 2025-06-17 ENCOUNTER — PATIENT OUTREACH (OUTPATIENT)
Age: 85
End: 2025-06-17

## 2025-06-17 NOTE — PROGRESS NOTES
25 1230   BENY Assessment   Assessment Type BENY Initial   Assessment completed with Patient   Patient Subjective NCM spoke with patient states is feeling okay. Patient reports continued loose stools, feels tired and bloated. Patient reports feeling like her stomach is swollen. She continues to take her antibiotic as prescribed. She is scheduled with her PCP today. She will discuss further lab results and antibiotic. Patient denies any fevers, chills, nausea, vomiting, shortness of breath, chest pain or any others. Patient denies any questions or concerns at this time.   Chief Complaint SBO   BENY Navigation Initial Assessment   Verify patient name and  with patient/ caregiver Yes   Tell me what you understand of why you were in the hospital or emergency department pt reports to ED with abdominal pain and constipation.   Prior to leaving the hospital were your Discharge Instructions reviewed with you? Yes   Did you receive a copy of your written Discharge Instructions? Yes   What questions do you have about your Discharge Instructions? no questions at this time.   Do you feel better or worse since you left the hospital or emergency department? Better   Do you have a follow-up appointment? Yes   Date 25   Physician PCP   Are there any barriers to getting to your follow-up appointment? No   Prior to leaving the hospital was Home Health (HH) arranged for you? N/A   Are HH needs identified by staff during the assessment? No   Prior to leaving the hospital or emergency department was Durable Medical Equipment (DME), medical supplies, or infusions arranged for you? N/A   Are DME/medical supply/infusions needs identified by staff during this assessment? No   Did any of your medications change, during or after your hospital stay or ED visit? Yes   Do you have your new or updated medications? Yes   Do you understand what your medications are for and possible side effects? Yes   Are there any reasons that keep  you from taking your medication as prescribed? No   Any concerns about medication refills? No   Were you given a different diet per your Discharge Instructions? No   Reason n/a   Do you have any questions or concerns that have not been discussed? No       All discharge instructions reviewed with the patient. Reviewed when to call MD vs when to call 911 or go the ED. Educated patient on the importance of taking all meds as prescribed as well as close f/u with PCP/specialists. Pt verbalized understanding and will contact the office with any further questions or concerns. Patient denies fevers, chills, nausea, vomiting, shortness of breath, chest pain, or any other symptoms at this time.   NCM provided contact information for any further questions/concerns. Patient verbalized understanding and agreeable.

## 2025-06-18 NOTE — PAYOR COMM NOTE
--------------  DISCHARGE REVIEW    Payor: [employer]  Subscriber #:  [id number]  Authorization Number: [id number]    Admit date: 6/11/25  Admit time:   9:19 PM  Discharge Date: 6/15/2025  4:52 PM     Admitting Physician: Sukh Leary DO  Attending Physician:  No att. providers found  Primary Care Physician: Chong Cornejo MD          Discharge Summary Notes        Discharge Summary signed by Rafiq Pope MD at 6/15/2025  1:32 PM       Author: Rafiq Pope MD Specialty: Family Medicine Author Type: Physician    Filed: 6/15/2025  1:32 PM Date of Service: 6/15/2025  1:27 PM Status: Signed    : Rafiq Pope MD (Physician)           Hospitalist Discharge Summary    Admission Date/Time  6/11/2025  6:00 PM  Discharge Date  06/15/25    PCP  Chong Cornejo MD     Discharging Hospitalist:  Rafiq Pope MD    Disposition:  Home    Follow Up Appointments  Shyanne Villagran DO  [address] [facility] [address]  [place] [address] [phone]    Follow up  2-3 weeks    Paul Schroeder MD  [address] [facility] [address]  [facility] [address] [phone]    Follow up  As needed    Appointments to be made by PCP: Oncology, ID     Primary Hospital Problems/Hospital Course Summary  84-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, OP, hypertension-off meds, GERD, anxiety/depression, neuropathy, admission 2/28-3/8/2025 for SBO 2/2 ventral hernia s/p xploratory laparotomy, lysis of adhesions, closure of mesenteric defect, repair of ventral hernia 2/28/25 complicated by C-diff currently on chemo  for recurrent ovarian cancer who has been experiencing seeing abdominal pain bloating and constipation who had an outpatient CT of the abdomen and pelvis with possible partial small bowel obstruction versus narrowing due to recurrent cancer      Partial SBO vs Narrowing 2/2 Recurrent Cancer  - 6/22 CT A/P at [facility] reviously seen mass involving the ascending colon is difficult to measure due to incomplete distention but appears  somewhat improved possible stricture versus partial small bowel obstruction  - NG tube placed on admission, removed 6/12 evening. S      Leukocytosis, likely Related to Neupogen  C.Diff history, positive PCR  -afebrile. WBC 24 outpatient, high here on arrival too   -cxr negative for acute process                 Microbials:   BCX x2: NGTD x5  BCX x2: NGTD x2  Cdiff Positive PCR     Antimicrobials:  Zosyn 6/12 - 6/14  Vancomycin PO: 6/13 -                           Continue Oral Vancomycin outpatient, follow-up ID in 2-3 weeks      SVT   - early AM 6/13, transient, seems to have resolved without IV medicine. TTE: Systolic function was normal. The estimated ejection fraction was 60-65%      Acute on chronic anemia  Thrombocytopenia  - related to chemo  -1 unit of PRBC given 6/12     Recurrent Metastatic Ovarian cancer  -hx neoadjuvant chemotherapy with carboplatin and paclitaxel for six cycles followed by IKER-BSO and adjuvant chemotherapy with carbo/taxol.    -Now with recurrence   -palliative chemo s/p cycle 3 carbo/taxol/arden on 6/9  -follows with Dr Tian BECK  Code status: FULL. Discussed with patient about some of her wishes. She was a little hesitant to elect DNR status and but did state that she may be considering changing her goals of care knowing that she has metastatic cancer. I told her we can continue to address while here, at this time she would want to be full code but will likely chat with her children about this as well soon she states.      Chronic Medical Problems:     GERD- pepcid  Neuropathy-gabapentin  Hypertension- bp has been running low, off norvasc and benazpril   Hypothyroidism-Synthroid   Anxiety/Depression- resume duloxetine and and clonazepam 0.5 nightly     EBONY  - CODE STATUS with patient with Dr. Krishnamurthy present in room, patient has full code at this time but will be discussing this further with her family  -POA-son Bernardino [phone]  -discussed palliative care terri, son will  discuss with Mom      MA  -ER Visits 2025: 2  -Admissions 2025:2  -Consults: surgery  -Discharge Needs: none anticipated  -Appointments: [ ] PCP Chong Cornejo MD    Medication Changes  Vancomycin PO caps    Important Follow Up Items  Labs: Anemia  Imaging: None  Medications: Vancomycin  Incidental findings: SVT, resolved    Procedures/Diagnostics  None    Change in Code Status: None, please see goals of care discussion above    Discharge Medications       Medication List        START taking these medications      vancomycin 125 MG Caps  Commonly known as: Vancocin  Take 1 capsule (125 mg total) by mouth daily.            CONTINUE taking these medications      acetaminophen 500 MG Tabs  Commonly known as: Tylenol Extra Strength     clonazePAM 0.5 MG Tabs  Commonly known as: KlonoPIN  Take 1 tablet (0.5 mg total) by mouth at bedtime.     denosumab 60 MG/ML Sosy  Commonly known as: Prolia     docusate sodium 100 MG Caps  Commonly known as: Colace     DULoxetine 60 MG Cpep  Commonly known as: Cymbalta     ferrous sulfate 325 (65 FE) MG Tbec     gabapentin 300 MG Caps  Commonly known as: Neurontin     levothyroxine 50 MCG Tabs  Commonly known as: Synthroid     loratadine 10 MG Tabs  Commonly known as: Claritin     magnesium 250 MG Tabs     omeprazole 20 MG Cpdr  Commonly known as: PriLOSEC     Vitamin D 50 MCG (2000 UT) Tabs            STOP taking these medications      dexamethasone 4 MG tablet  Commonly known as: Decadron               Where to Get Your Medications        These medications were sent to [id number] - [place] - [address] [place] [place] [address] AT [facility] [address], [phone], [phone]  [address] [place] [place] [address], [place] [address]      Phone: [phone]   vancomycin 125 MG Caps       I reconciled current and discharge medications on the day of discharge.    Imaging/Diagnostic Reports  CARD ECHO 2D DOPPLER (CPT=93306)  Result Date: 6/13/2025  Transthoracic Echocardiogram Name:Artem  Lizeth Maher Date: 2025 :  10/26/1940 Ht:  (57in)  BP: 129 / 59 MRN:  [id number]    Age:  84years    Wt:  (104lb) HR: 71bpm Loc:  [facility]       Gndr: F          BSA: 1.36m^2 Sonographer: Jazz Ordering:    Kay Salas Consulting:  Sukh Leary ---------------------------------------------------------------------------- History/Indications:  Supraventricular tachycardia. ---------------------------------------------------------------------------- Procedure information:  A transthoracic complete 2D study was performed. Additional evaluation included M-mode, complete spectral Doppler, and color Doppler.  Patient status:  Inpatient.  Location:  Bedside.    The previous study was not available, so comparison was made to the report of 05/10/2023.    This was a routine study. Transthoracic echocardiography for diagnosis and ventricular function evaluation. Image quality was adequate. ECG rhythm:   Normal sinus ---------------------------------------------------------------------------- Conclusions: 1. Left ventricle: The cavity size was normal. Wall thickness was normal.    Systolic function was normal. The estimated ejection fraction was 60-65%,    by visual assessment. No diagnostic evidence for regional wall motion    abnormalities. Left ventricular diastolic function parameters were normal    for the patient's age. 2. Left atrium: The atrium was mildly enlarged. 3. Aortic valve: There was mild regurgitation. 4. Mitral valve: There was mild regurgitation. 5. Tricuspid valve: There was mild-moderate regurgitation. 6. Pulmonary arteries: Systolic pressure was mildly increased, estimated to    be 38mm Hg. Estimated pulmonary artery diastolic pressure was 15mm Hg. * ---------------------------------------------------------------------------- * Findings: Left ventricle:  The cavity size was normal. Wall thickness was normal. Systolic function was normal. The estimated ejection fraction was 60-65%, by visual assessment. No  diagnostic evidence for regional wall motion abnormalities. Left ventricular diastolic function parameters were normal for the patient's age. Left atrium:  The atrium was mildly enlarged. Right ventricle:  The cavity size was normal. Systolic function was normal. Systolic pressure was mildly increased. Right atrium:  The atrium was normal in size. Mitral valve:  The valve was structurally normal. The annulus was mildly thickened. The leaflets were normal thickness. Leaflet separation was normal.  Doppler:  Transvalvular velocity was within the normal range. There was no evidence for stenosis. There was mild regurgitation. Aortic valve:   The valve was trileaflet. The leaflets were mildly calcified. Cusp separation was normal.  Doppler:  Transvalvular velocity was within the normal range. There was no evidence for stenosis. There was mild regurgitation. Tricuspid valve:  The valve is structurally normal. Leaflet separation was normal.  Doppler:  Transvalvular velocity was within the normal range. There was no evidence for stenosis. There was mild-moderate regurgitation. Pulmonic valve:   The valve is structurally normal. Cusp separation was normal.  Doppler:  Transvalvular velocity was within the normal range. There was no evidence for stenosis. There was mild regurgitation. Pericardium:   There was no pericardial effusion. Aorta: Aortic root: The aortic root was normal. Ascending aorta: The ascending aorta was normal. Pulmonary arteries: Systolic pressure was mildly increased, estimated to be 38mm Hg. Estimated pulmonary artery diastolic pressure was 15mm Hg. Systemic veins:  Central venous respirophasic diameter changes are blunted (< 50%). Inferior vena cava: The IVC was normal-sized. ---------------------------------------------------------------------------- Measurements  Left ventricle                    Value        Ref  IVS thickness, ED, PLAX           0.8   cm     0.6 -                                                  0.9  LV ID, ED, PLAX                   3.8   cm     3.8 -                                                 5.2  LV ID, ES, PLAX                   2.7   cm     2.2 -                                                 3.5  LV PW thickness, ED, PLAX         0.7   cm     0.6 -                                                 0.9  IVS/LV PW ratio, ED, PLAX         1.14         --------  LV PW/LV ID ratio, ED, PLAX       0.18         --------  LV ejection fraction              57    %      54 - 74  Stroke volume/bsa, 2D             47    ml/m^2 --------  LV e', lateral                (L) 7.1   cm/sec >=10.0  LV E/e', lateral              (H) 14           <=13  LV e', medial                     8.3   cm/sec >=7.0  LV E/e', medial                   12           --------  LV e', average                    7.7   cm/sec --------  LV E/e', average                  13           <=14  LVOT                              Value        Ref  LVOT ID                           1.9   cm     --------  LVOT peak velocity, S             1.13  m/sec  --------  LVOT VTI, S                       22.4  cm     --------  LVOT peak gradient, S             5     mm Hg  --------  LVOT mean gradient, S             3     mm Hg  --------  Stroke volume (SV), LVOT DP       64    ml     --------  Stroke index (SV/bsa), LVOT       47    ml/m^2 --------  DP  Aortic root                       Value        Ref  Aortic root ID                    2.9   cm     2.2 -                                                 3.7  Ascending aorta                   Value        Ref  Ascending aorta ID                2.8   cm     1.9 -                                                 3.5  Left atrium                       Value        Ref  LA ID, A-P, ES                    3.1   cm     2.7 -                                                 3.8  LA volume, S                      33    ml     22 - 52  LA volume/bsa, S                  24    ml/m^2 16 - 34  LA volume, ES, 1-p  A4C            31    ml     22 - 52  LA volume, ES, 1-p A2C            33    ml     22 - 52  LA volume, ES, A/L                37    ml     --------  LA volume/bsa, ES, A/L            27    ml/m^2 16 - 34  LA/aortic root ratio              1.07         --------  Mitral valve                      Value        Ref  Mitral E-wave peak velocity       0.99  m/sec  --------  Mitral A-wave peak velocity       0.8   m/sec  --------  Mitral deceleration time          169   ms     --------  Mitral peak gradient, D           4     mm Hg  --------  Mitral E/A ratio, peak            1.2          --------  Pulmonary artery                  Value        Ref  PA pressure, S, DP                38    mm Hg  --------  PA pressure, ED, DP               15    mm Hg  --------  Tricuspid valve                   Value        Ref  Tricuspid regurg peak             2.76  m/sec  <=2.8  velocity  Tricuspid peak RV-RA gradient     30    mm Hg  --------  Right atrium                      Value        Ref  RA ID, S-I, ES, A4C               4.0   cm     3.4 -                                                 5.3  RA ID/bsa, S-I, ES, A4C           2.9   cm/m^2 1.9 -                                                 3.1  RA area, ES, A4C                  12    cm^2   10 - 18  RA volume, ES, 1-p A4C            28    ml     --------  RA volume/bsa, ES, 1-p A4C        21    ml/m^2 9 - 33  Systemic veins                    Value        Ref  Estimated CVP                     8     mm Hg  --------  Inferior vena cava                Value        Ref  ID                                1.3   cm     <=2.1  Right ventricle                   Value        Ref  TAPSE, MM                         1.93  cm     >=1.70  RV pressure, S, DP                38    mm Hg  --------  RV s', lateral                    10.2  cm/sec >=9.5  Pulmonic valve                    Value        Ref  Pulmonic regurg velocity, ED      1.29  m/sec  --------  Pulmonic regurg gradient, ED      7      mm Hg  -------- Legend: (L)  and  (H)  anita values outside specified reference range. ---------------------------------------------------------------------------- Prepared and electronically signed by Sanford Canales 06/13/2025 12:29     XR ABDOMEN, OBSTRUCTIVE SERIES 3 VIEWS(CPT=74021)  Result Date: 6/13/2025  PROCEDURE: XR ABDOMEN, OBSTRUCTIVE SERIES (CPT=74021)  COMPARISON: [facility], XR SMALL BOWEL SINGLE CONTRAST (CPT=74250), 6/12/2025, 10:28 AM.  [facility], CT CHEST+ABDOMEN+PELVIS(ALL CNTRST ONLY)(CPT=71260/40393), 3/05/2025, 12:39 PM.  [facility], XR ABDOMEN OBSTRUCTIVE SERIES ROUTINE (2 VIEWS)(CPT=74019), 6/11/2025, 7:26 PM.  INDICATIONS: fu sbo  TECHNIQUE: Supine, prone, and upright (or decubitus) radiographs of the abdomen were performed.   FINDINGS:  BOWEL GAS PATTERN: There is bowel gas throughout the abdomen, including the rectum.  Enteric contrast from the recent small-bowel follow-through study has completely passed into the colon.  No dilated bowel loops are seen.  Air-fluid levels are noted on the decubitus view. FREE AIR:   None.  SOFT TISSUES: Normal. No masses or organomegaly. CALCIFICATIONS: None significant.  BONES: There is stable moderate biconvex scoliosis to the visualized thoracolumbar spine including a levoconvex lumbar curvature.  Postoperative changes are again noted from an interbody fusion and posterior instrumented fusion at L4-S1. OTHER: Surgical clips in the right upper quadrant consistent with a previous cholecystectomy.         CONCLUSION:  Findings most consistent with a resolving partial small bowel obstruction.  Small bowel is no longer dilated and enteric contrast has passed distally into the colon.    Dictated by (CST): Nick Baron MD on 6/13/2025 at 9:06 AM     Finalized by (CST): Nick Baron MD on 6/13/2025 at 9:11 AM          XR SMALL BOWEL SINGLE CONTRAST (CPT=74250)  Result Date:  6/12/2025  PROCEDURE: XR SMALL BOWEL SINGLE CONTRAST (CPT=74250)  COMPARISON: CT abdomen/pelvis 03/05/2025.  INDICATIONS: Abdominal pain and destention.  TECHNIQUE: Small bowel series was performed in the usual manner.  Contrast was injected through the patient's indwelling enteric tube.  Fourteen radiograph images were obtained.   FINDINGS:  There is a large hiatal hernia, with the majority of the stomach above the diaphragm. DUODENUM: Normal.  No ulceration or diverticulum.  JEJUNUM: Normal.  Normal motility.  No obstruction or visible lesion.  ILEUM: Normal.  Normal motility.  No obstruction or visible lesion.  TRANSIT TIME: Slightly delayed, with contrast not reaching the cecum until 4 hours after ingestion OTHER: Surgical hardware is present in the lumbosacral spine.         CONCLUSION:  1. Large hiatal hernia. 2. Follow bowel follow through demonstrates delayed transit time, with no evidence of obstruction.  Dictated by (CST): Matthias Hamilton MD on 6/12/2025 at 3:01 PM     Finalized by (CST): Matthias Hamilton MD on 6/12/2025 at 3:04 PM          XR ABDOMEN OBSTRUCTIVE SERIES ROUTINE(2 VW)(CPT=74019)  Result Date: 6/11/2025  PROCEDURE: XR ABDOMEN OBSTRUCTIVE SERIES ROUTINE (2 VIEWS)(CPT=74019)  COMPARISON: [facility], XR ABDOMEN OBSTRUCTIVE SERIES ROUTINE (2 VIEWS)(CPT=74019), 3/06/2025, 10:21 AM.  INDICATIONS: CT shows SBO, abdominal pain.  TECHNIQUE: Supine, prone, and upright (or decubitus) radiographs of the abdomen were performed.   FINDINGS:  BOWEL GAS PATTERN: Mildly dilated small bowel loops.  NG tube in a large retrocardiac hiatal hernia at the junction between the herniated fundus and body just above diaphragm. FREE AIR:   None.  OTHER: Negative.          CONCLUSION:  1. Mildly dilated small bowel loops compatible with history of obstruction-probably partial. 2. NG tube in large retrocardiac hiatal hernia at junction between herniated fundus and body just above diaphragm.    Dictated  by (CST): Néstor Phan MD on 6/11/2025 at 9:31 PM     Finalized by (CST): Néstor Phan MD on 6/11/2025 at 9:35 PM          XR CHEST AP PORTABLE  (CPT=71045)  Result Date: 6/11/2025  PROCEDURE: XR CHEST AP PORTABLE  (CPT=71045) TIME: 1949  COMPARISON: [facility], CT CHEST+ABDOMEN+PELVIS(ALL CNTRST ONLY)(CPT=71260/93216), 3/05/2025, 12:39 PM.  [facility], XR CHEST AP PORTABLE (CPT=71045), 5/16/2025, 4:13 PM.  INDICATIONS: Verify correct tube placement  TECHNIQUE:   Single view.           CONCLUSION:  1. NG tube is in a large retrocardiac hiatal hernia at gastric fundus body junction just above diaphragm. 2. Minimal left basilar atelectasis.  No pneumonia or other acute finding. 3. Right-sided Port-A-Cath remains with tip in SVC.  4. Heart size normal. 5. Atherosclerotic calcification aorta.    Dictated by (CST): Néstor Phan MD on 6/11/2025 at 9:28 PM     Finalized by (CST): Néstor Phan MD on 6/11/2025 at 9:31 PM          XR CHEST AP PORTABLE  (CPT=71045)  Result Date: 5/16/2025  PROCEDURE: XR CHEST AP PORTABLE  (CPT=71045) TIME: 4:16 p.m.   COMPARISON: [facility], CT CHEST+ABDOMEN+PELVIS(ALL CNTRST ONLY)(CPT=71260/54420), 3/05/2025, 12:39 PM.  [facility], XR CHEST AP PORTABLE (CPT=71045), 3/05/2025, 4:19 AM.  INDICATIONS: Shortness of breath and fatigue x2 weeks.  TECHNIQUE:   Single view.   FINDINGS:  CARDIAC/VASC: No cardiac silhouette abnormality or cardiomegaly.  Unremarkable pulmonary vasculature.  MEDIAST/LIVAN:   Atherosclerotic aorta with no visible aneurysm.  LUNGS/PLEURA: Retrocardiac left basal pulmonary opacity similar to prior.  Small left effusion. BONES: Scattered mild degenerative endplate changes in the visualized thoracolumbar spine. OTHER: Clips/suture in the left axilla.  Right chest port with tip in the distal superior vena cava.         CONCLUSION: Small left pleural effusion.  Chronic retrocardiac pulmonary opacity  compatible with large hiatal hernia.  No significant interval change since prior exam.     Dictated by (CST): Lavon Raza MD on 5/16/2025 at 4:41 PM     Finalized by (CST): Lavon Raza MD on 5/16/2025 at 4:43 PM            Secondary Discharge Diagnoses  Chronic C. difficile    Pertinent Physical Exam At Time of Discharge  Vitals:    06/14/25 1900 06/14/25 2041 06/15/25 0428 06/15/25 1205   BP:  140/68 154/84 120/65   BP Location:  Right arm Right arm Right arm   Pulse: 75 71 77 85   Resp:  16 18 18   Temp:  98.6 °F (37 °C) 97.7 °F (36.5 °C) 98.2 °F (36.8 °C)   TempSrc:  Oral Oral Oral   SpO2:  99% 96% 96%   Weight:       Height:          Constitutional:       General: She is not in acute distress.     Appearance: Normal appearance. She is not ill-appearing or diaphoretic.   Cardiovascular:      Rate and Rhythm: Normal rate and regular rhythm.      Pulses: Normal pulses.      Heart sounds: Normal heart sounds.   Pulmonary:      Effort: Pulmonary effort is normal.      Breath sounds: Normal breath sounds.   Abdominal:      Palpations: Abdomen is soft.      Tenderness: There is generalized abdominal tenderness (Improved from day prior).   Neurological:      General: No focal deficit present.      Mental Status: She is alert and oriented to person, place, and time.       Total time coordinating care > 30 mins.    Patient had opportunity to ask questions and stated understanding and agreed with therapeutic plan as outlined.     Rafiq Pope MD  6/15/2025    Electronically signed by Rafiq Pope MD on 6/15/2025  1:32 PM         REVIEWER COMMENTS

## (undated) DEVICE — WOUND RETRACTOR AND PROTECTOR: Brand: ALEXIS O WOUND PROTECTOR-RETRACTOR

## (undated) DEVICE — SOLUTION IV 1000ML 0.9% NACL PRESERVATIVE

## (undated) DEVICE — LAPAROTOMY: Brand: MEDLINE INDUSTRIES, INC.

## (undated) DEVICE — GAUZE SPONGES,12 PLY: Brand: CURITY

## (undated) DEVICE — SUT CHRM GUT 2-0 27IN SH ABSRB UD 26MM 1/2

## (undated) DEVICE — NVM5 NEEDLE MODULE S

## (undated) DEVICE — TUBE SUCTION COLPOTOMIZOR 35MM

## (undated) DEVICE — YANKAUER,POOLE TIP,STERILE,50/CS: Brand: MEDLINE

## (undated) DEVICE — STERILE POLYISOPRENE POWDER-FREE SURGICAL GLOVES: Brand: PROTEXIS

## (undated) DEVICE — GOWN SURG AERO BLUE PERF XLG

## (undated) DEVICE — PROGRASP FORCEPS: Brand: ENDOWRIST

## (undated) DEVICE — PACK,UNIVERSAL,NO GOWNS: Brand: MEDLINE

## (undated) DEVICE — SUTURE VICRYL 2-0 CT-2

## (undated) DEVICE — POLAR CARE CUBE COOLING UNIT

## (undated) DEVICE — TRAP MCS 40ML 5IN PLS SCR CAP

## (undated) DEVICE — ADHESIVE SKIN TOP FOR WND CLSR DERMBND ADV

## (undated) DEVICE — SUT MCRYL 4-0 18IN PS-2 ABSRB UD 19MM 3/8 CIR

## (undated) DEVICE — A P RESECTION: Brand: MEDLINE INDUSTRIES, INC.

## (undated) DEVICE — ELECTRODE ES L16.5CM BLDE MPLR OPN APPRCH EZ

## (undated) DEVICE — Device

## (undated) DEVICE — DRAPE SRG 150X54IN LEICA

## (undated) DEVICE — SUT PERMA- 2-0 30IN NABSRB BLK TIE SILK

## (undated) DEVICE — PROVIDES A STERILE INTERFACE BETWEEN THE OPERATING ROOM SURGICAL LAMPS (NON-STERILE) AND THE SURGEON OR NURSE (STERILE).: Brand: STERION®CLAMP COVER FABRIC

## (undated) DEVICE — 3M™ IOBAN™ 2 ANTIMICROBIAL INCISE DRAPE 6650EZ: Brand: IOBAN™ 2

## (undated) DEVICE — SUCTION CANISTER, 3000CC,SAFELINER: Brand: DEROYAL

## (undated) DEVICE — 3.0MM PRECISION NEURO (MATCH HEAD)

## (undated) DEVICE — SUTURE MONOCRYL 3-0 Y936H

## (undated) DEVICE — GAMMEX® PI HYBRID SIZE 7.5, STERILE POWDER-FREE SURGICAL GLOVE, POLYISOPRENE AND NEOPRENE BLEND: Brand: GAMMEX

## (undated) DEVICE — POWDER HEMSTAT 3GM OXIDIZED REGENERATED CELOS

## (undated) DEVICE — HF-RESECTION ELECTRODE PLASMALOOP LOOP, MEDIUM, 24 FR., 12°/16°, ESG TURIS: Brand: OLYMPUS

## (undated) DEVICE — SOLUTION IRRIG 1000ML ST H2O AQUALITE PLAS

## (undated) DEVICE — ROBOTIC: Brand: MEDLINE INDUSTRIES, INC.

## (undated) DEVICE — 3M™ STERI-DRAPE™ INCISE DRAPE 1040 (35CM X 35CM): Brand: STERI-DRAPE™

## (undated) DEVICE — GAMMEX® PI HYBRID SIZE 8, STERILE POWDER-FREE SURGICAL GLOVE, POLYISOPRENE AND NEOPRENE BLEND: Brand: GAMMEX

## (undated) DEVICE — DRAPE SHEET LG

## (undated) DEVICE — STERILE LATEX POWDER-FREE SURGICAL GLOVESWITH NITRILE COATING: Brand: PROTEXIS

## (undated) DEVICE — C-ARMOR C-ARM EQUIPMENT COVERS CLEAR STERILE UNIVERSAL FIT 12 PER CASE: Brand: C-ARMOR

## (undated) DEVICE — 520MM NITINOL GUIDEWIRE, DOUBLE BLUNT AND THREADED: Brand: NEWPORT

## (undated) DEVICE — NV CLIP DSC&IN-LINE ACTIVATOR

## (undated) DEVICE — JELLY,LUBE,STERILE,FLIP TOP,TUBE,2-OZ: Brand: MEDLINE

## (undated) DEVICE — FENESTRATED BIPOLAR FORCEPS: Brand: ENDOWRIST

## (undated) DEVICE — SOLUTION PREP 10% POVIDONE IOD 32OZ BTL

## (undated) DEVICE — AIRSEAL 5 MM ACCESS PORT AND LOW PROFILE OBTURATOR WITH BLADELESS OPTICAL TIP, 120 MM LENGTH: Brand: AIRSEAL

## (undated) DEVICE — SUT CHRM GUT 3-0 27IN SH ABSRB UD 26MM 1/2

## (undated) DEVICE — PAD POS 36IN DISP SURGYPAD

## (undated) DEVICE — ABSORBABLE HEMOSTAT (OXIDIZED REGENERATED CELLULOSE): Brand: SURGICEL

## (undated) DEVICE — SHEET, T, LAPAROTOMY, STERILE: Brand: MEDLINE

## (undated) DEVICE — CAUTERY TIP TEFLON MEGADYNE

## (undated) DEVICE — FLOSEAL SEALENT STERILE 10ML

## (undated) DEVICE — AIRSEAL TRI-LUMEN LILTERED TUBE SET: Brand: AIRSEAL

## (undated) DEVICE — SUT COAT VCRL+ 0 27IN CT-1 ABSRB VLT ANTIBACT

## (undated) DEVICE — SUT VCRL 2-0 36IN CT-1 ABSRB UD L36MM 1/2 CIR

## (undated) DEVICE — KIT NEG PRSS PREVENA DRAIN 20CM INCIS MGMT PEEL PALACE

## (undated) DEVICE — SUT PERMA- 0 30IN NABSRB BLK TIE SILK

## (undated) DEVICE — MONOPOLAR CURVED SCISSORS: Brand: ENDOWRIST

## (undated) DEVICE — NVM5 PROBE SNGL USE STER PKG

## (undated) DEVICE — OR TOWEL, 16" X 26" XRAY DETECTABLE STERILE, GREEN: Brand: PREMIERPRO

## (undated) DEVICE — NVM5 NEEDLE MODULE M/E

## (undated) DEVICE — INSUFFLATION NEEDLE TO ESTABLISH PNEUMOPERITONEUM.: Brand: INSUFFLATION NEEDLE

## (undated) DEVICE — GLOVE SURG SENSICARE SZ 7

## (undated) DEVICE — COLUMN DRAPE

## (undated) DEVICE — ARM DRAPE

## (undated) DEVICE — SOLUTION IRRIG 1000ML 0.9% NACL USP BTL

## (undated) DEVICE — SUTURE VICRYL 0 CT-1

## (undated) DEVICE — VESSEL SEALER EXTEND: Brand: ENDOWRIST

## (undated) DEVICE — MEGA NEEDLE DRIVER: Brand: ENDOWRIST

## (undated) DEVICE — SUT PLN GUT 3-0 27IN CT-1 ABSRB TAN YELLOWISH

## (undated) DEVICE — APPLICATOR ENDOSCP FOR ADJUNCTIVE HEMSTAS

## (undated) DEVICE — SYRINGE BULB 50/CS 48/PLT: Brand: MEDEGEN MEDICAL PRODUCTS, LLC

## (undated) DEVICE — BLADELESS OBTURATOR: Brand: WECK VISTA

## (undated) DEVICE — VISUALIZATION SYSTEM: Brand: CLEARIFY

## (undated) DEVICE — SUT PERMA- 3-0 18IN NABSRB BLK TIE

## (undated) DEVICE — VCARE MEDIUM, UTERINE MANIPULATOR, VAGINAL-CERVICAL-AHLUWALIA'S-RETRACTOR-ELEVATOR: Brand: VCARE

## (undated) DEVICE — LEGGINGS, PAIR, 31X48, STERILE: Brand: MEDLINE

## (undated) DEVICE — 3M(TM) TEGADERM(TM) TRANSPARENT FILM DRESSING FRAME STYLE 1628: Brand: 3M™ TEGADERM™

## (undated) DEVICE — SYRINGE MED 30ML STD CLR PLAS LL TIP N CTRL

## (undated) DEVICE — GLOVE SUR 7.5 SENSICARE PI PIP CRM PWD F

## (undated) DEVICE — Device: Brand: DEFENDO AIR/WATER/SUCTION AND BIOPSY VALVE

## (undated) DEVICE — ADHESIVE LIQ 2/3ML VI MASTISOL

## (undated) DEVICE — BIPOLAR SEALER 23-121-1 AQM EVS: Brand: AQUAMANTYS™

## (undated) DEVICE — GLOVE SRG BIOGEL 8.5

## (undated) DEVICE — DRESSING AQUACEL AG 3.5X3.75

## (undated) DEVICE — SOL  .9 1000ML BTL

## (undated) DEVICE — E-Z CLEAN, NON-STICK, PTFE COATED, ELECTROSURGICAL BLADE ELECTRODE, MODIFIED EXTENDED INSULATION, 2.5 INCH (6.35 CM): Brand: MEGADYNE

## (undated) DEVICE — STERILE LATEX POWDER-FREE SURGICAL GLOVES WITH HYDROGEL COATING, SMOOTH FINISH, STRAIGHT FINGER: Brand: PROTEXIS

## (undated) DEVICE — GLOVE SRG BIOGEL 8.0

## (undated) DEVICE — CANNULA SEAL

## (undated) DEVICE — DRAPE,ROBOTICS,STERILE: Brand: MEDLINE

## (undated) DEVICE — TIP COVER ACCESSORY

## (undated) DEVICE — SOLUTION IRRIG 3000ML 0.9% NACL FLX CONT

## (undated) DEVICE — PROXIMATE SKIN STAPLERS (35 WIDE) CONTAINS 35 STAINLESS STEEL STAPLES (FIXED HEAD): Brand: PROXIMATE

## (undated) DEVICE — ELECTRO LUBE IS A SINGLE PATIENT USE DEVICE THAT IS INTENDED TO BE USED ON ELECTROSURGICAL ELECTRODES TO REDUCE STICKING.: Brand: KEY SURGICAL ELECTRO LUBE

## (undated) DEVICE — SUT PLN GUT 2-0 27IN CT ABSRB TAN YELLOWISH 4

## (undated) DEVICE — SKIN PREP TRAY 4 COMPARTM TRAY: Brand: MEDLINE INDUSTRIES, INC.

## (undated) DEVICE — PROXIMATE RH ROTATING HEAD SKIN STAPLERS (35 WIDE) CONTAINS 35 STAINLESS STEEL STAPLES: Brand: PROXIMATE

## (undated) DEVICE — CONTAINER,SPECIMEN,OR STERILE,4OZ: Brand: MEDLINE

## (undated) DEVICE — CYSTO CDS-LF: Brand: MEDLINE INDUSTRIES, INC.

## (undated) DEVICE — ABSORBABLE WOUND CLOSURE DEVICE: Brand: V-LOC 180

## (undated) DEVICE — PAD THRP WRPON PLR LNG STRAP

## (undated) DEVICE — PACK DRP UNIV W/ BK TBL MAYO STD BTM TOP SIDE

## (undated) DEVICE — SPONGE LAP 18X18 XRAY STRL

## (undated) DEVICE — LAMINECTOMY: Brand: MEDLINE INDUSTRIES, INC.

## (undated) DEVICE — FORCEP RADIAL JAW 4

## (undated) DEVICE — DRAPE,UNDRBUT,WHT GRAD PCH,CAPPORT,20/CS: Brand: MEDLINE

## (undated) DEVICE — SLEEVE COMPR M KNEE LEN SGL USE KENDALL SCD

## (undated) NOTE — ED AVS SNAPSHOT
Basilia Shone   MRN: [id number]    Department:  [facility] Emergency Department   Date of Visit:  7/20/2019           Disclosure     Insurance plans vary and the physician(s) referred by the ER may not be covered by your plan.  Please contact within the next three months to obtain basic health screening including reassessment of your blood pressure.     IF THERE IS ANY CHANGE OR WORSENING OF YOUR CONDITION, CALL YOUR PRIMARY CARE PHYSICIAN AT ONCE OR RETURN IMMEDIATELY TO THE EMERGENCY DEPARTMEN

## (undated) NOTE — IP AVS SNAPSHOT
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 heart murmur.  This is not new, and I doubt endocarditis.  Blood cultures will not be obtained, as the patient is already on antibiotics.  Further suggestions to follow.      Thank you very much for allowing me to see this patient.    Dictated By Demarcus Stanton MD  d: 05/02/2024 14:22:39  t: 05/02/2024 14:51:44  Job [id number]  [facility]/    cc: Rigoberto Couch MD      Electronically signed by Demarcus Stanton MD on 5/3/2024  9:41 AM              Discharge Summary - D/C Summary      Discharge Summary signed by Nasim Davila MD at 5/3/2024  1:41 PM  Version 1 of 1    Author: Nasim Davila MD Service: Hospitalist Author Type: Physician    Filed: 5/3/2024  1:41 PM Date of Service: 5/3/2024  1:35 PM Status: Signed    : Nasim Davila MD (Physician)       General Medicine Discharge Summary     Patient ID:  Lizeth Mcgill  83 year old  10/26/1940    Admit date: 4/23/2024    Discharge date and time:  5/3/24    Attending Physician: Rigoberto Couch MD     Consults: IP CONSULT TO FAMILY/INTERNAL MED  IP CONSULT TO RESPIRATORY CARE  IP CONSULT TO SOCIAL WORK  IP CONSULT TO INTERVENTIONAL RADIOLOGY  IP CONSULT TO INFECTIOUS DISEASE    Primary Care Physician: Chong Cornejo MD     Reason for admission:   S/p robotic assisted total lap hysterectomy-BSO, debulking, omentectomy     Risk For Readmission: low    Discharge Diagnoses: Carcinoma, malignant neoplasm of right ovary  Ovarian cancer (HCC)  See Additional Discharge Diagnoses in Hospital Course    Discharged Condition: stable    Follow-up with labs/images appointments: PCP, GYN, IR    Exam  Gen: No acute distress  Pulm: Lungs clear, normal respiratory effort  CV: Heart with regular rate and rhythm  Abd: Abdomen soft,     HPI: per chart  Ms. Mcgill is an 82 yo F with PMH of ovarian CA, HTN, breaset CA, DM2, hypothyroidism who presented for hysterectomy-BSO. Patient seen post op in PACU, sleepy, complains of some abdominal pain.     Hospital Course:   Lizeth Mcgill is a 83 year old female with ovarian CA, HTN, breaset CA, DM2, hypothyroidism who presented for hysterectomy-BSO, course complicated by acute blood loss anemia, hypoxia, as well as fever and leukocytosis, with concerns of possible postoperative fluid collection/hematoma with possible infection, on IV Zosyn s/p aspiration of postoperative fluid collection by IR and drain placed on 4/30.  No growth on cultures to date.  Seen by ID and will plan to dc on Augmentin.  Continue drain care per IR.      Ovarian CA  S/p robotic assisted total lap hysterectomy-BSO, debulking, omentectomy  - PRN pain meds, Transition to PO when able  - monitor for acute blood loss anemia   - Bowel reg, antiemetics  - DVT Prophy- lovenox (held with ABLA and hematoma per GYN)  - Bernardo removed     Fever / abd pain  Dysuria  Fluid collection/hematoma  - CT abd pelvis, with fluid collection, urine culture negative  - given fever, elevated  WBC, and neg Ucx, will need to assume fluid collection is poss infected  - zosyn started on 4/28  - discussed with Dr. Couch, rec IR consult for aspiration  - IR consulted  - s/p aspiration of postoperative fluid collection by IR and drain placed on 4/30  - White blood cell count improving, fever curve improved  - drain cx   - ID consulted and will dc on Augmentin      Acute resp failure w hypoxia, now resolved   - 70% w ambulating per pt. On 3L NC 4/24 AM  - Suspect atelectasis. Minimal basilar air movement on exam  - Can hold off on imaging for now  - OOB, encourage IS, ambulate TID  - Supplemental O2 for goal SpO2 90-93%. On 2L NC 4/24 AM   - Encourage IS  - CXR mild/moderate Left LL with effusion/atelectasis   - give dose of IV lasix today  - on RA this morning but sats 90%  - ECG with PAc's not afib  - CT chest without PE  - Now on room air     Acute on chronic anemia   - Presume 2/2 expected post-op ABLA w dilutional component from IVF  - No active bleeding  - Trend CBC  - 4/25 hgb < 7 - 1u RBC     HTN  - BP stable  - hold home lisinopril for now     Anxiety  - clonazepam nightly     Hypothyroidism  - home sythroid     GERD  - PPI    Operative Procedures: Procedure(s) (LRB):  Robotic assisted total laparoscopic hysterectomy, bilateral salpingo-oophorectomy, staging, debulking; Omentectomy; lysis of adhesions (N/A)  XI ROBOT-ASSISTED LAPAROSCOPIC OVARIAN CYSTECTOMY/ SALPINGO-OOPHORECTOMY (Bilateral)     Imaging: CT DRAIN ABSCESS PERITONEAL (CPT=49406)    Result Date: 4/30/2024  CONCLUSION: Insertion of drainage catheter into pelvic fluid collection yielding serous fluid.  IR will follow.    Dictated by (CST): Cruz Tariq MD on 4/30/2024 at 4:53 PM     Finalized by (CST): Cruz Tariq MD on 4/30/2024 at 5:03 PM           Disposition: ALLISON    Activity: as tolerated   Diet: general   Wound Care: no needs  Code Status: Full Code  O2: no needs    Home Medication Changes: as below   All discharge  medications have been reconciled with current medication list.     Med list     Medication List        START taking these medications      amoxicillin clavulanate 500-125 MG Tabs  Commonly known as: Augmentin  Take 1 tablet by mouth in the morning and 1 tablet before bedtime. Do all this for 14 days.  Start taking on: May 4, 2024     docusate sodium 100 MG Caps  Commonly known as: COLACE  Take 100 mg by mouth 2 (two) times daily.  Notes to patient: Stool softener     enoxaparin 40 MG/0.4ML Sosy  Commonly known as: Lovenox  Inject 0.4 mL (40 mg total) into the skin daily for 14 days.  Notes to patient: To prevent blood clots     HYDROcodone-acetaminophen 5-325 MG Tabs  Commonly known as: Norco  Take 1 tablet by mouth every 6 (six) hours as needed.  Notes to patient: For pain -- Do Not take at same time as Tylenol because this medicine has Tylenol in it     simethicone 80 MG Chew  Commonly known as: Mylicon  Chew 1 tablet (80 mg total) by mouth 3 (three) times daily as needed (gas pain).  Notes to patient: For gas pain            CHANGE how you take these medications      Halobetasol Propionate 0.05 % Oint  Commonly known as: ULTRAVATE  APPLY EXTERNALLY DAILY AS DIRECTED  What changed:   when to take this  reasons to take this     omeprazole 20 MG Cpdr  Commonly known as: PriLOSEC  Take 1 capsule (20 mg total) by mouth daily.  What changed: when to take this     pravastatin 40 MG Tabs  Commonly known as: Pravachol  Take 1 tablet (40 mg total) by mouth daily.  What changed: when to take this            CONTINUE taking these medications      acetaminophen 500 MG Tabs  Commonly known as: Tylenol Extra Strength     amLODIPine 2.5 MG Tabs  Commonly known as: Norvasc     clonazePAM 0.5 MG Tabs  Commonly known as: KlonoPIN     denosumab 60 MG/ML Sosy  Commonly known as: Prolia     DULoxetine 60 MG Cpep  Commonly known as: Cymbalta     ferrous sulfate 325 (65 FE) MG Tbec     gabapentin 300 MG Caps  Commonly known as:  Neurontin     levothyroxine 50 MCG Tabs  Commonly known as: Synthroid  Take 1 tablet (50 mcg total) by mouth every morning.     lisinopril 10 MG Tabs  Commonly known as: Zestril     loratadine 10 MG Tabs  Commonly known as: Claritin     magnesium 250 MG Tabs     Vitamin D 50 MCG (2000 UT) Tabs               Where to Get Your Medications        You can get these medications from any pharmacy    Bring a paper prescription for each of these medications  amoxicillin clavulanate 500-125 MG Tabs  docusate sodium 100 MG Caps  enoxaparin 40 MG/0.4ML Sosy  HYDROcodone-acetaminophen 5-325 MG Tabs  simethicone 80 MG Chew         FU   Follow-up Information       Amanda Garza PA Follow up on 5/10/2024.    Specialty: Physician Assistant  Why: 5/10 as previously scheduled  Contact information:  [address]  [address]  [place] [address]  [phone]               Chong Cornejo MD Follow up.    Specialty: Internal Medicine  Why: within 1 week of discharge from rehab  Contact information:  [address]  [address]  [place] [place] [facility] [id number] [phone]               Lizeth Manzano APRN Follow up in 1 week(s).    Specialties: Nurse Practitioner, INTERVENTIONAL, RADIOLOGY  Contact information:  [address]  [address]  [facility] [id number] [phone]                             DC instructions:      Other Discharge Instructions:         Keep all follow up appointments and continue current medications.   Continue drain care as instructed.         Patient had opportunity to ask questions and state understand and agree with therapeutic plan as outlined    Thank You,    Nasim Davila M.D.  [facility] Services[facility]      Electronically signed by Nasim Davila MD on 5/3/2024  1:41 PM              Physical Therapy Notes (last 72 hours)      Physical Therapy Note signed by Tash Peters PT at 5/1/2024  3:21 PM  Version 1 of 1    Author: Tash Peters PT Service: Rehab Author Type: Physical Therapist    Filed: 5/1/2024   3:21 PM Date of Service: 5/1/2024 11:50 AM Status: Signed    : Tash Peters, PT (Physical Therapist)       PHYSICAL THERAPY TREATMENT NOTE - INPATIENT     Room Number: 456/456-A       Presenting Problem: s/p Robotic assisted total laparoscopic hysterectomy, bilateral salpingo-oophorectomy, staging, debulking; Omentectomy; lysis of adhesions on 4/23  Co-Morbidities : ovarian cancer    Problem List  Active Problems:    Ovarian cancer (HCC)    PHYSICAL THERAPY ASSESSMENT   Patient demonstrates good  progress this session, goals  remain in progress.    Patient continues to function below baseline with bed mobility, transfers, gait, maintaining seated position, standing prolonged periods, and performing household tasks.  Contributing factors to remaining limitations include decreased functional strength, pain, impaired dynamic standing balance, decreased muscular endurance, and medical status.  Next session anticipate patient to progress bed mobility, transfers, gait, maintaining seated position, standing prolonged periods, and performing household tasks.  Physical Therapy will continue to follow patient for duration of hospitalization.    Patient continues to benefit from continued skilled PT services: to promote return to prior level of function and safety with continuous assistance and gradual rehabilitative therapy .    PLAN  PT Treatment Plan: Bed mobility;Body mechanics;Coordination;Endurance;Energy conservation;Patient education;Gait training;Strengthening;Transfer training;Balance training  Frequency (Obs): 3-5x/week    SUBJECTIVE  \"I would like to try to walk\"    OBJECTIVE  Precautions: Abdominal protective strategies;Drain(s);Bed/chair alarm    WEIGHT BEARING RESTRICTION  none    PAIN ASSESSMENT   Rating: Unable to rate  Location: abdomen/drain site  Management Techniques: Activity promotion;Body mechanics;Breathing techniques;Repositioning    BALANCE  Static Sitting: Good  Dynamic Sitting: Fair +  Static  Standing: Fair  Dynamic Standing: Fair -    AM-PAC '6-Clicks' INPATIENT SHORT FORM - BASIC MOBILITY  How much difficulty does the patient currently have...  Patient Difficulty: Turning over in bed (including adjusting bedclothes, sheets and blankets)?: A Little   Patient Difficulty: Sitting down on and standing up from a chair with arms (e.g., wheelchair, bedside commode, etc.): A Little   Patient Difficulty: Moving from lying on back to sitting on the side of the bed?: A Little   How much help from another person does the patient currently need...   Help from Another: Moving to and from a bed to a chair (including a wheelchair)?: A Little   Help from Another: Need to walk in hospital room?: A Little   Help from Another: Climbing 3-5 steps with a railing?: A Little     AM-PAC Score:  Raw Score: 18   Approx Degree of Impairment: 46.58%   Standardized Score (AM-PAC Scale): 43.63   CMS Modifier (G-Code): CK    FUNCTIONAL ABILITY STATUS  Functional Mobility/Gait Assessment  Gait Assistance: Contact guard assist  Distance (ft): 100  Assistive Device: Rolling walker  Pattern: Shuffle (slow pace, slightly unsteady, VC for upright posture and to maintain proximity to RW. no LOB)  Rolling: contact guard assist with VC for sequencing of log rolling   Supine to Sit: contact guard assist  Sit to Stand: contact guard assist     Additional information: pt received resting in bed. Introduced self and role. Educated on goals for today. Pt verbalized understanding. C/o moderate abdominal pain with movement. Demos log rolling and supine>sit at EOB. STS transfer with RW. Ambulated in hallway with RW. No overt LOB. Returned to room and was left sitting in chair with chair alarm on, needs within reach, handoff to RN complete.     The patient's Approx Degree of Impairment: 46.58% has been calculated based on documentation in the [facility] '6 clicks' Inpatient Daily Activity Short Form.  Research supports that patients with this level of  impairment may benefit from home with HH PT however anticipate benefit from rehab.  Final disposition will be made by interdisciplinary medical team.    Patient End of Session: Up in chair;Needs met;Call light within reach;RN aware of session/findings;All patient questions and concerns addressed;Ice applied;Alarm set    CURRENT GOALS   Goals to be met by: 5/1/24  Patient Goal Patient's self-stated goal is: go to rehab   Goal #1 Patient is able to demonstrate supine - sit EOB @ level: supervision      Goal #1   Current Status CGA   Goal #2 Patient is able to demonstrate transfers Sit to/from Stand at assistance level: supervision with walker - rolling      Goal #2  Current Status CGA with RW    Goal #3 Patient is able to ambulate 150 feet with assist device: walker - rolling at assistance level: supervision   Goal #3   Current Status 100 ft with RW and CGA    Goal #4     Goal #4   Current Status     Goal #5 Patient to demonstrate independence with home activity/exercise instructions provided to patient in preparation for discharge.   Goal #5   Current Status IN PROGRESS   Goal #6     Goal #6  Current Status           Therapeutic Activity: 24 minutes             Occupational Therapy Notes (last 72 hours)  Notes from 4/30/2024  4:14 PM through 5/3/2024  4:14 PM   No notes of this type exist for this encounter.     Video Swallow Study Notes    No notes of this type exist for this encounter.     SLP Notes    No notes of this type exist for this encounter.     Immunizations     Name Date      Covid-19 Moderna 10/24/21     Covid-19 Moderna 03/08/21     Covid-19 Moderna 02/10/21     Covid-19 Moderna Bivalent 12+ years 10/21/22     Denosumab, INJ 04/08/22     Denosumab, INJ 07/31/18     Denosumab, INJ 12/14/17     Denosumab, INJ 06/13/17     Fluad 0.5ml 10/31/19     INFLUENZA 09/07/21     INFLUENZA 09/17/20     INFLUENZA 11/07/18     INFLUENZA 11/07/18     INFLUENZA 12/14/17     INFLUENZA 12/14/17     INFLUENZA  defer-09/26/17     Deferral: +Patient Refuses Z28.21     INFLUENZA 11/25/16     INFLUENZA 11/25/16     INFLUENZA 11/19/15     INFLUENZA 11/19/15     INFLUENZA 11/19/14     INFLUENZA 11/19/14     INFLUENZA 01/11/14     INFLUENZA 01/11/14     INFLUENZA 12/04/12     INFLUENZA 11/15/11     INFLUENZA 09/01/10     INFLUENZA 11/18/08     Pneumococcal (Prevnar 13) 02/07/18     Pneumococcal (Prevnar 13) 05/21/15     Pneumovax 23 08/06/19     Pneumovax 23 01/02/07     Pneumovax 23 01/02/02     Prolia Im Inj, Up To 60 Mg 09/28/21     Prolia Im Inj, Up To 60 Mg 03/19/21     Prolia Im Inj, Up To 60 Mg 09/18/20     Prolia Im Inj, Up To 60 Mg 02/25/20     Prolia Im Inj, Up To 60 Mg 08/20/19     Prolia Im Inj, Up To 60 Mg 02/05/19     TD 01/02/02     TDAP 11/15/11     Zoster Vaccine Live (Zostavax) 12/04/12       Future Appointments        Provider Department Center    5/9/2024  1:00 PM David Mcconnell MD; [facility] IV[facility] [address] [facility] [place]      Multidisciplinary Problems     Active Goals        Problem: Patient/Family Goals    Goal Priority Disciplines Outcome Interventions   Patient/Family Long Term Goal     Interdisciplinary Progressing    Description: Patient's Long Term Goal: ***    Interventions:  - ***  - See additional Care Plan goals for specific interventions   Patient/Family Short Term Goal     Interdisciplinary Progressing    Description: Patient's Short Term Goal: ***    Interventions:   - ***  - See additional Care Plan goals for specific interventions

## (undated) NOTE — LETTER
[facility]  Administration of Anesthesia  I, Lizeth Mcgill agree to be cared for by a physician anesthesiologist alone and/or with a nurse anesthetist, who is specially trained to monitor me and give me medicine to put me to sleep or keep me comfortable during my procedure    I understand that my anesthesiologist and/or anesthetist is not an employee or agent of [facility] or [facility] [facility]. He or she works for [facility]    As the patient asking for anesthesia services, I agree to:  Allow the anesthesiologist (anesthesia doctor) to give me medicine and do additional procedures as necessary. Some examples are: Starting or using an “IV” to give me medicine, fluids or blood during my procedure, and having a breathing tube placed to help me breathe when I’m asleep (intubation). In the event that my heart stops working properly, I understand that my anesthesiologist will make every effort to sustain my life, unless otherwise directed by [facility] Do Not Resuscitate documents.  Tell my anesthesia doctor before my procedure:  If I am pregnant.  The last time that I ate or drank.  iii. All of the medicines I take (including prescriptions, herbal supplements, and pills I can buy without a prescription (including street drugs/illegal medications). Failure to inform my anesthesiologist about these medicines may increase my risk of anesthetic complications.  iv.If I am allergic to anything or have had a reaction to anesthesia before.  I understand how the anesthesia medicine will help me (benefits).  I understand that with any type of anesthesia medicine there are risks:  The most common risks are: nausea, vomiting, sore throat, muscle soreness, damage to my eyes, mouth, or teeth (from breathing tube placement).  Rare risks include: remembering what happened during my procedure, allergic reactions to medications, injury to my airway, heart, lungs, vision, nerves, or  muscles and in extremely rare instances death.  My doctor has explained to me other choices available to me for my care (alternatives).  Pregnant Patients (“epidural”):  I understand that the risks of having an epidural (medicine given into my back to help control pain during labor), include itching, low blood pressure, difficulty urinating, headache or slowing of the baby’s heart. Very rare risks include infection, bleeding, seizure, irregular heart rhythms and nerve injury.  Regional Anesthesia (“spinal”, “epidural”, & “nerve blocks”):  I understand that rare but potential complications include headache, bleeding, infection, seizure, irregular heart rhythms, and nerve injury.    _____________________________________________________________________________  Patient (or Representative) Signature/Relationship to Patient  Date   Time    _____________________________________________________________________________   Name (if used)    Language/Organization   Time    _____________________________________________________________________________  Nurse Anesthetist Signature     Date   Time  _____________________________________________________________________________  Anesthesiologist Signature     Date   Time  I have discussed the procedure and information above with the patient (or patient’s representative) and answered their questions. The patient or their representative has agreed to have anesthesia services.    _____________________________________________________________________________  Witness        Date   Time  I have verified that the signature is that of the patient or patient’s representative, and that it was signed before the procedure  Patient Name: Lizeth Mcgill     : 10/26/1940                 Printed: 2025 at 3:04 PM    Medical Record #: [id number]                                            Page 1 of 1  ----------ANESTHESIA CONSENT----------

## (undated) NOTE — IP AVS SNAPSHOT
[facility] - [facility]    [address], [place], [place] ~ [phone]                Discharge Summary   6/23/2017    [address]           Admission Information        Provider Department    6/23/2017 Yogi Avila MD [facility] En Diet:  - Resume your regular diet as tolerated unless otherwise instructed. - Start with light meals to minimize bloating.  - Do not drink alcohol today.     Medication:  - If you have questions about resuming your normal medications, please contact your P 9/26/2017 10:00 AM [place], [address] ENDOCRINOLOGY    12/14/2017 9:30 AM [place], [address]      Immunization History as of 6/23/2017  Never Reviewed    INFLUENZA 11/25/2016, 11/19/2015, 11/19/201 Additional Information       We are concerned for your overall well being:    - If you are a smoker or have smoked in the last 12 months, we encourage you to explore options for quitting.     - If you have concerns related to behavioral health issues or th

## (undated) NOTE — LETTER
[facility] 4W/SW/SE  [address]  [facility] [address]  [phone]    Blood Transfusion Consent    In the course of your treatment, it may become necessary to administer a transfusion of blood or blood components. This form provides basic information concerning this procedure and, if signed by you, authorizes its administration. By signing this form, you agree that all of your questions about the administration of blood or blood products have been answered by the ordering medical professional or designee.    Description of Procedure  Blood is introduced into one of your veins, commonly in the arm, using a sterilized disposable needle. The amount of blood transfused, and whether the transfusion will be of blood or blood components is a judgement the physician will make based on your particular needs.    Risks  The transfusion is a common procedure of low risk.  MINOR AND TEMPORARY REACTIONS ARE NOT UNCOMMON, including a slight bruise, swelling or local reaction in the area where the needle pierces your skin, or a nonserious reaction to the transfused material itself, including headache, fever or mild skin reaction, such as rash.  Serious reactions are possible, though very unlikely, and include severe allergic reaction (shock) and destruction (hemolysis) of transfused blood cells.  Infectious diseases which are known to be transmitted by blood transfusion include certain types of viral Hepatitis(liver infection from a virus), Human Immunodeficiency Virus (HIV-1,2) infection, a viral infection known to cause Acquired Immunodeficiency Syndrome (AIDS), as well as certain other bacterial, viral, and parasitic diseases. While a minimal risk of acquiring an infectious disease from transfused blood exists, in accordance with the Federal and State law, all due care has been taken in donor selection and testing to avoid transmission of disease.    Alternatives  If loss of blood poses serious threats during your  treatment, THERE IS NO EFFECTIVE ALTERNATIVE TO BLOOD TRANSFUSION. However, if you have any further questions on this matter, your provider will fully explain the alternatives to you if it has not already been done.    I, ______________________________, have read/had read to me the above. I understand the matters bearing on the decision whether or not to authorize a transfusion of blood or blood components. I have no questions which have not been answered to my full satisfaction. I hereby consent to such transfusion as my physician may deem necessary or advisable in the course of my treatment.    ______________________________________________                    ___________________________  (Signature of Patient or Responsible party in case of minor,                 (Printed Name of Patient or incompetent, or unconscious patient)              Responsible Party)    ___________________________               _____________________  (Relationship to Patient if not self)                                    (Date and Time)    __________________________                                                           ______________________              (Signature of Witness)               (Printed Name of Witness)     Language line ()    Telephone/Verbal/Video Consent    __________________________                     ____________________  (Signature of 2nd Witness           (Printed Name of 2nd  Telephone/Verbal/Video Consent)           Witness)    Patient Name: Lizeth Mcgill     : 10/26/1940                 Printed: 2025     Medical Record #: [id number]      Rev: 2023

## (undated) NOTE — LETTER
[facility] 4W/SW/SE  [address]  [facility] [address]  [phone]    Blood Transfusion Consent    In the course of your treatment, it may become necessary to administer a transfusion of blood or blood components. This form provides basic information concerning this procedure and, if signed by you, authorizes its administration. By signing this form, you agree that all of your questions about the administration of blood or blood products have been answered by the ordering medical professional or designee.    Description of Procedure  Blood is introduced into one of your veins, commonly in the arm, using a sterilized disposable needle. The amount of blood transfused, and whether the transfusion will be of blood or blood components is a judgement the physician will make based on your particular needs.    Risks  The transfusion is a common procedure of low risk.  MINOR AND TEMPORARY REACTIONS ARE NOT UNCOMMON, including a slight bruise, swelling or local reaction in the area where the needle pierces your skin, or a nonserious reaction to the transfused material itself, including headache, fever or mild skin reaction, such as rash.  Serious reactions are possible, though very unlikely, and include severe allergic reaction (shock) and destruction (hemolysis) of transfused blood cells.  Infectious diseases which are known to be transmitted by blood transfusion include certain types of viral Hepatitis(liver infection from a virus), Human Immunodeficiency Virus (HIV-1,2) infection, a viral infection known to cause Acquired Immunodeficiency Syndrome (AIDS), as well as certain other bacterial, viral, and parasitic diseases. While a minimal risk of acquiring an infectious disease from transfused blood exists, in accordance with the Federal and State law, all due care has been taken in donor selection and testing to avoid transmission of disease.    Alternatives  If loss of blood poses serious threats during your  treatment, THERE IS NO EFFECTIVE ALTERNATIVE TO BLOOD TRANSFUSION. However, if you have any further questions on this matter, your provider will fully explain the alternatives to you if it has not already been done.    I, ______________________________, have read/had read to me the above. I understand the matters bearing on the decision whether or not to authorize a transfusion of blood or blood components. I have no questions which have not been answered to my full satisfaction. I hereby consent to such transfusion as my physician may deem necessary or advisable in the course of my treatment.    ______________________________________________                    ___________________________  (Signature of Patient or Responsible party in case of minor,                 (Printed Name of Patient or incompetent, or unconscious patient)              Responsible Party)    ___________________________               _____________________  (Relationship to Patient if not self)                                    (Date and Time)    __________________________                                                           ______________________              (Signature of Witness)               (Printed Name of Witness)     Language line ()    Telephone/Verbal/Video Consent    __________________________                     ____________________  (Signature of 2nd Witness           (Printed Name of 2nd  Telephone/Verbal/Video Consent)           Witness)    Patient Name: Lizeth Mcgill     : 10/26/1940                 Printed: 2024     Medical Record #: [id number]      Rev: 2023

## (undated) NOTE — LETTER
[place] [address]  Authorization for Invasive Procedures  Date: 4/30/2024          Time: 0800    I hereby authorize Dr. Tariq, my physician and his/her assistants (if applicable), which may include medical students, residents, and/or fellows, to perform the following surgical operation/ procedure and administer such anesthesia as may be determined necessary by my physician: CT guided abdominal abscess drain placement  on Lizeth Mcgill  2.   I recognize that during the surgical operation/procedure, unforeseen conditions may necessitate additional or different procedures than those listed above.  I, therefore, further authorize and request that the above-named surgeon, assistants, or designees perform such procedures as are, in their judgment, necessary and desirable.    3.   My surgeon/physician has discussed prior to my surgery the potential benefits, risks and side effects of this procedure; the likelihood of achieving goals; and potential problems that might occur during recuperation.  They also discussed reasonable alternatives to the procedure, including risks, benefits, and side effects related to the alternatives and risks related to not receiving this procedure.  I have had all my questions answered and I acknowledge that no guarantee has been made as to the result that may be obtained.    4.   Should the need arise during my operation/procedure, which includes change of level of care prior to discharge, I also consent to the administration of blood and/or blood products.  Further, I understand that despite careful testing and screening of blood or blood products by collecting agencies, I may still be subject to ill effects as a result of receiving a blood transfusion and/or blood products.  The following are some, but not all, of the potential risks that can occur: fever and allergic reactions, hemolytic reactions, transmission of diseases such as Hepatitis, AIDS and  Cytomegalovirus (CMV) and fluid overload.  In the event that I wish to have an autologous transfusion of my own blood, or a directed donor transfusion, I will discuss this with my physician.   Check only if Refusing Blood or Blood Products  I understand refusal of blood or blood products as deemed necessary by my physician may have serious consequences to my condition to include possible death. I hereby assume responsibility for my refusal and release the hospital, its personnel, and my physicians from any responsibility for the consequences of my refusal.         o  Refuse         5.   I authorize the use of any specimen, organs, tissues, body parts or foreign objects that may be removed from my body during the operation/procedure for diagnosis, research or teaching purposes and their subsequent disposal by hospital authorities.  I also authorize the release of specimen test results and/or written reports to my treating physician on the hospital medical staff or other referring or consulting physicians involved in my care, at the discretion of the Pathologist or my treating physician.    6.   I consent to the photographing or videotaping of the operations or procedures to be performed, including appropriate portions of my body for medical, scientific, or educational purposes, provided my identity is not revealed by the pictures or by descriptive texts accompanying them.  If the procedure has been photographed/videotaped, the surgeon will obtain the original picture, image, videotape or CD.  The hospital will not be responsible for storage, release or maintenance of the picture, image, tape or CD.    7.   I consent to the presence of a  or observers in the operating room as deemed necessary by my physician or their designees.    8.   I recognize that in the event my procedure results in extended X-Ray/fluoroscopy time, I may develop a skin reaction.    9. If I have a Do Not Attempt Resuscitation  (DNAR) order in place, that status will be suspended while in the operating room, procedural suite, and during the recovery period unless otherwise explicitly stated by me (or a person authorized to consent on my behalf). The surgeon or my attending physician will determine when the applicable recovery period ends for purposes of reinstating the DNAR order.  10. Patients having a sterilization procedure: I understand that if the procedure is successful the results will be permanent and it will therefore be impossible for me to inseminate, conceive, or bear children.  I also understand that the procedure is intended to result in sterility, although the result has not been guaranteed.   11. I acknowledge that my physician has explained sedation/analgesia administration to me including the risk and benefits I consent to the administration of sedation/analgesia as may be necessary or desirable in the judgment of my physician.    I CERTIFY THAT I HAVE READ AND FULLY UNDERSTAND THE ABOVE CONSENT TO OPERATION and/or OTHER PROCEDURE.        ____________________________________       _________________________________      ______________________________  Signature of Patient         Signature of Responsible Person        Printed Name of Responsible Person        ____________________________________      _________________________________      ______________________________       Signature of Witness          Relationship to Patient                       Date                                       Time  Patient Name: Lizeth Mcgill  : 10/26/1940    Reviewed: 2024   Printed: 2024  Medical Record #: [id number] Page 1 of 2             STATEMENT OF PHYSICIAN My signature below affirms that prior to the time of the procedure; I have explained to the patient and/or his/her legal representative, the risks and benefits involved in the proposed treatment and any reasonable alternative to the proposed treatment. I have  also explained the risks and benefits involved in refusal of the proposed treatment and alternatives to the proposed treatment and have answered the patient's questions. If I have a significant financial interest in a co-management agreement or a significant financial interest in any product or implant, or other significant relationship used in this procedure/surgery, I have disclosed this and had a discussion with my patient.     _______________________________________________________________ _____________________________  (Signature of Physician)                                                                                         (Date)                                   (Time)  Patient Name: Lizeth Mcgill  : 10/26/1940    Reviewed: 2024   Printed: 2024  Medical Record #: [id number] Page 2 of 2

## (undated) NOTE — LETTER
Pre-procedure Instructions for Lizeth Mcgill     Pre-Procedure Instructions: Encouraged to check in electronically via [email or web address]     Visitor Instructions     Adult Patients: 2 Adult [facility] can accompany the patient day of procedure.    There are certain rooms that cannot accommodate 2 visitors due to room size which may require us to limit 1 visitor at a time.       Pre-Op Instructions     Date of Procedure: Thursday May 09, 2024  Time of Procedure: 01:00 pm  Arrival Time: 12:00 pm     You are scheduled for: An Interventional Radiology Procedure - Drain check    Diet Instructions: Continue regular diet    Medications: Medications you are allowed to take can be taken with a sip of water the morning of your procedure : Take medications as scheduled    Park in the [facility] parking lot. Enter at the MAIN entrance of the hospital THEN TURN LEFT.     Check in at the DIAGNOSTIC MAIN reception desk LOCATED ON THE FIRST FLOOR.  Our  will be there to check you in for your procedure. Please bring your insurance cards and ID with you.     Further instructions will be provided there.    This procedure will usually take approximately: 1 hour     You will be in the recovery area for: 30 minutes    Driving After Procedure: Sedation WILL NOT be given. You are able to drive home on your own.     Discharge Teaching: Your nurse will give you specific instructions before discharge     Please call [phone] option 1 for questions regarding your  IR Procedures .